# Patient Record
Sex: FEMALE | Race: BLACK OR AFRICAN AMERICAN | Employment: OTHER | URBAN - METROPOLITAN AREA
[De-identification: names, ages, dates, MRNs, and addresses within clinical notes are randomized per-mention and may not be internally consistent; named-entity substitution may affect disease eponyms.]

---

## 2019-07-16 ENCOUNTER — APPOINTMENT (OUTPATIENT)
Dept: GENERAL RADIOLOGY | Age: 64
End: 2019-07-16
Attending: PHYSICIAN ASSISTANT
Payer: MEDICARE

## 2019-07-16 ENCOUNTER — HOSPITAL ENCOUNTER (INPATIENT)
Age: 64
LOS: 6 days | Discharge: HOME | DRG: 291 | End: 2019-07-22
Attending: EMERGENCY MEDICINE | Admitting: INTERNAL MEDICINE
Payer: MEDICARE

## 2019-07-16 ENCOUNTER — HOSPITAL ENCOUNTER (EMERGENCY)
Age: 64
Discharge: ANOTHER HOSPITAL | End: 2019-07-16
Attending: EMERGENCY MEDICINE
Payer: MEDICARE

## 2019-07-16 VITALS
WEIGHT: 207.3 LBS | SYSTOLIC BLOOD PRESSURE: 149 MMHG | HEIGHT: 65 IN | RESPIRATION RATE: 24 BRPM | HEART RATE: 82 BPM | BODY MASS INDEX: 34.54 KG/M2 | OXYGEN SATURATION: 100 % | TEMPERATURE: 98.3 F | DIASTOLIC BLOOD PRESSURE: 90 MMHG

## 2019-07-16 DIAGNOSIS — R77.8 ELEVATED TROPONIN: ICD-10-CM

## 2019-07-16 DIAGNOSIS — Z99.2 ESRD ON PERITONEAL DIALYSIS (HCC): ICD-10-CM

## 2019-07-16 DIAGNOSIS — I10 ACCELERATED HYPERTENSION: ICD-10-CM

## 2019-07-16 DIAGNOSIS — J96.01 ACUTE HYPOXEMIC RESPIRATORY FAILURE (HCC): Primary | ICD-10-CM

## 2019-07-16 DIAGNOSIS — N18.6 ESRD ON PERITONEAL DIALYSIS (HCC): ICD-10-CM

## 2019-07-16 DIAGNOSIS — I50.43 ACUTE ON CHRONIC COMBINED SYSTOLIC AND DIASTOLIC HEART FAILURE (HCC): Primary | ICD-10-CM

## 2019-07-16 DIAGNOSIS — N18.6 END STAGE RENAL DISEASE (HCC): ICD-10-CM

## 2019-07-16 DIAGNOSIS — J96.21 ACUTE ON CHRONIC RESPIRATORY FAILURE WITH HYPOXIA (HCC): ICD-10-CM

## 2019-07-16 PROBLEM — Z95.820 S/P ANGIOPLASTY WITH STENT: Status: ACTIVE | Noted: 2019-07-16

## 2019-07-16 PROBLEM — I50.9 CHF (CONGESTIVE HEART FAILURE) (HCC): Status: ACTIVE | Noted: 2019-07-16

## 2019-07-16 PROBLEM — R06.02 SOB (SHORTNESS OF BREATH): Status: ACTIVE | Noted: 2019-07-16

## 2019-07-16 LAB
ALBUMIN SERPL-MCNC: 2.5 G/DL (ref 3.5–5)
ALBUMIN/GLOB SERPL: 0.5 {RATIO} (ref 1.1–2.2)
ALP SERPL-CCNC: 107 U/L (ref 45–117)
ALT SERPL-CCNC: 18 U/L (ref 12–78)
ANION GAP SERPL CALC-SCNC: 5 MMOL/L (ref 5–15)
ANION GAP SERPL CALC-SCNC: 6 MMOL/L (ref 5–15)
AST SERPL-CCNC: 29 U/L (ref 15–37)
BASE EXCESS BLDV CALC-SCNC: 5.6 MMOL/L
BASOPHILS # BLD: 0.1 K/UL (ref 0–0.1)
BASOPHILS NFR BLD: 1 % (ref 0–1)
BDY SITE: ABNORMAL
BILIRUB SERPL-MCNC: 0.4 MG/DL (ref 0.2–1)
BNP SERPL-MCNC: ABNORMAL PG/ML (ref 0–125)
BREATHS.SPONTANEOUS ON VENT: 15
BUN SERPL-MCNC: 46 MG/DL (ref 6–20)
BUN SERPL-MCNC: 48 MG/DL (ref 6–20)
BUN/CREAT SERPL: 4 (ref 12–20)
BUN/CREAT SERPL: 4 (ref 12–20)
CALCIUM SERPL-MCNC: 9 MG/DL (ref 8.5–10.1)
CALCIUM SERPL-MCNC: 9 MG/DL (ref 8.5–10.1)
CHLORIDE SERPL-SCNC: 103 MMOL/L (ref 97–108)
CHLORIDE SERPL-SCNC: 103 MMOL/L (ref 97–108)
CK MB CFR SERPL CALC: 1 % (ref 0–2.5)
CK MB CFR SERPL CALC: 1.7 % (ref 0–2.5)
CK MB SERPL-MCNC: 2.7 NG/ML (ref 5–25)
CK MB SERPL-MCNC: 3.7 NG/ML (ref 5–25)
CK SERPL-CCNC: 216 U/L (ref 26–192)
CK SERPL-CCNC: 272 U/L (ref 26–192)
CO2 SERPL-SCNC: 31 MMOL/L (ref 21–32)
CO2 SERPL-SCNC: 33 MMOL/L (ref 21–32)
CREAT SERPL-MCNC: 10.74 MG/DL (ref 0.55–1.02)
CREAT SERPL-MCNC: 11.08 MG/DL (ref 0.55–1.02)
D DIMER PPP FEU-MCNC: 1.6 MG/L FEU (ref 0–0.65)
DIFFERENTIAL METHOD BLD: ABNORMAL
EOSINOPHIL # BLD: 0.5 K/UL (ref 0–0.4)
EOSINOPHIL NFR BLD: 4 % (ref 0–7)
ERYTHROCYTE [DISTWIDTH] IN BLOOD BY AUTOMATED COUNT: 20.5 % (ref 11.5–14.5)
FIO2 ON VENT: 100 %
GLOBULIN SER CALC-MCNC: 4.9 G/DL (ref 2–4)
GLUCOSE SERPL-MCNC: 85 MG/DL (ref 65–100)
GLUCOSE SERPL-MCNC: 87 MG/DL (ref 65–100)
HCO3 BLDV-SCNC: 33 MMOL/L (ref 23–28)
HCT VFR BLD AUTO: 29.4 % (ref 35–47)
HGB BLD-MCNC: 9 G/DL (ref 11.5–16)
IMM GRANULOCYTES # BLD AUTO: 0 K/UL (ref 0–0.04)
IMM GRANULOCYTES NFR BLD AUTO: 0 % (ref 0–0.5)
LYMPHOCYTES # BLD: 1.7 K/UL (ref 0.8–3.5)
LYMPHOCYTES NFR BLD: 14 % (ref 12–49)
MCH RBC QN AUTO: 28.3 PG (ref 26–34)
MCHC RBC AUTO-ENTMCNC: 30.6 G/DL (ref 30–36.5)
MCV RBC AUTO: 92.5 FL (ref 80–99)
MONOCYTES # BLD: 1 K/UL (ref 0–1)
MONOCYTES NFR BLD: 8 % (ref 5–13)
NEUTS SEG # BLD: 9.1 K/UL (ref 1.8–8)
NEUTS SEG NFR BLD: 73 % (ref 32–75)
NRBC # BLD: 0 K/UL (ref 0–0.01)
NRBC BLD-RTO: 0 PER 100 WBC
PCO2 BLDV: 61 MMHG (ref 41–51)
PH BLDV: 7.36 [PH] (ref 7.32–7.42)
PLATELET # BLD AUTO: 448 K/UL (ref 150–400)
PMV BLD AUTO: 11.3 FL (ref 8.9–12.9)
PO2 BLDV: 29 MMHG (ref 25–40)
POTASSIUM SERPL-SCNC: 5.7 MMOL/L (ref 3.5–5.1)
POTASSIUM SERPL-SCNC: 6.1 MMOL/L (ref 3.5–5.1)
PROT SERPL-MCNC: 7.4 G/DL (ref 6.4–8.2)
RBC # BLD AUTO: 3.18 M/UL (ref 3.8–5.2)
RBC MORPH BLD: ABNORMAL
SAO2 % BLDV: 50 % (ref 65–88)
SAO2% DEVICE SAO2% SENSOR NAME: ABNORMAL
SODIUM SERPL-SCNC: 140 MMOL/L (ref 136–145)
SODIUM SERPL-SCNC: 141 MMOL/L (ref 136–145)
SPECIMEN SITE: ABNORMAL
TROPONIN I SERPL-MCNC: 0.06 NG/ML
TROPONIN I SERPL-MCNC: 0.06 NG/ML
WBC # BLD AUTO: 12.4 K/UL (ref 3.6–11)

## 2019-07-16 PROCEDURE — 83880 ASSAY OF NATRIURETIC PEPTIDE: CPT

## 2019-07-16 PROCEDURE — 74011250636 HC RX REV CODE- 250/636: Performed by: PHYSICIAN ASSISTANT

## 2019-07-16 PROCEDURE — 75810000455 HC PLCMT CENT VENOUS CATH LVL 2 5182

## 2019-07-16 PROCEDURE — 82550 ASSAY OF CK (CPK): CPT

## 2019-07-16 PROCEDURE — 85025 COMPLETE CBC W/AUTO DIFF WBC: CPT

## 2019-07-16 PROCEDURE — 36415 COLL VENOUS BLD VENIPUNCTURE: CPT

## 2019-07-16 PROCEDURE — 84484 ASSAY OF TROPONIN QUANT: CPT

## 2019-07-16 PROCEDURE — 82553 CREATINE MB FRACTION: CPT

## 2019-07-16 PROCEDURE — 65660000000 HC RM CCU STEPDOWN

## 2019-07-16 PROCEDURE — 99285 EMERGENCY DEPT VISIT HI MDM: CPT

## 2019-07-16 PROCEDURE — 93005 ELECTROCARDIOGRAM TRACING: CPT

## 2019-07-16 PROCEDURE — 80053 COMPREHEN METABOLIC PANEL: CPT

## 2019-07-16 PROCEDURE — 85379 FIBRIN DEGRADATION QUANT: CPT

## 2019-07-16 PROCEDURE — 71045 X-RAY EXAM CHEST 1 VIEW: CPT

## 2019-07-16 PROCEDURE — 96374 THER/PROPH/DIAG INJ IV PUSH: CPT

## 2019-07-16 PROCEDURE — 74011250636 HC RX REV CODE- 250/636: Performed by: INTERNAL MEDICINE

## 2019-07-16 PROCEDURE — 65270000029 HC RM PRIVATE

## 2019-07-16 PROCEDURE — 82803 BLOOD GASES ANY COMBINATION: CPT

## 2019-07-16 PROCEDURE — C1751 CATH, INF, PER/CENT/MIDLINE: HCPCS

## 2019-07-16 RX ORDER — ISOSORBIDE MONONITRATE 30 MG/1
60 TABLET, EXTENDED RELEASE ORAL DAILY
Status: DISCONTINUED | OUTPATIENT
Start: 2019-07-17 | End: 2019-07-22 | Stop reason: HOSPADM

## 2019-07-16 RX ORDER — GUAIFENESIN 100 MG/5ML
81 LIQUID (ML) ORAL DAILY
Status: DISCONTINUED | OUTPATIENT
Start: 2019-07-17 | End: 2019-07-22 | Stop reason: HOSPADM

## 2019-07-16 RX ORDER — OXYCODONE HYDROCHLORIDE 5 MG/1
5 TABLET ORAL
Status: DISCONTINUED | OUTPATIENT
Start: 2019-07-16 | End: 2019-07-22 | Stop reason: HOSPADM

## 2019-07-16 RX ORDER — ONDANSETRON 2 MG/ML
4 INJECTION INTRAMUSCULAR; INTRAVENOUS
Status: DISCONTINUED | OUTPATIENT
Start: 2019-07-16 | End: 2019-07-22 | Stop reason: HOSPADM

## 2019-07-16 RX ORDER — CALCITRIOL 0.25 UG/1
0.25 CAPSULE ORAL
Status: DISCONTINUED | OUTPATIENT
Start: 2019-07-18 | End: 2019-07-22 | Stop reason: HOSPADM

## 2019-07-16 RX ORDER — LEVETIRACETAM 250 MG/1
250 TABLET ORAL 2 TIMES DAILY
Status: DISCONTINUED | OUTPATIENT
Start: 2019-07-17 | End: 2019-07-19

## 2019-07-16 RX ORDER — FUROSEMIDE 10 MG/ML
40 INJECTION INTRAMUSCULAR; INTRAVENOUS ONCE
Status: COMPLETED | OUTPATIENT
Start: 2019-07-16 | End: 2019-07-16

## 2019-07-16 RX ORDER — SEVELAMER CARBONATE 800 MG/1
800 TABLET, FILM COATED ORAL 3 TIMES DAILY
Status: DISCONTINUED | OUTPATIENT
Start: 2019-07-17 | End: 2019-07-22 | Stop reason: HOSPADM

## 2019-07-16 RX ORDER — ATORVASTATIN CALCIUM 40 MG/1
40 TABLET, FILM COATED ORAL
Status: DISCONTINUED | OUTPATIENT
Start: 2019-07-16 | End: 2019-07-22 | Stop reason: HOSPADM

## 2019-07-16 RX ORDER — TRAMADOL HYDROCHLORIDE 50 MG/1
50 TABLET ORAL
Status: DISCONTINUED | OUTPATIENT
Start: 2019-07-16 | End: 2019-07-22 | Stop reason: HOSPADM

## 2019-07-16 RX ORDER — HEPARIN SODIUM 5000 [USP'U]/ML
5000 INJECTION, SOLUTION INTRAVENOUS; SUBCUTANEOUS EVERY 8 HOURS
Status: DISCONTINUED | OUTPATIENT
Start: 2019-07-16 | End: 2019-07-22 | Stop reason: HOSPADM

## 2019-07-16 RX ORDER — MORPHINE SULFATE 4 MG/ML
4 INJECTION, SOLUTION INTRAMUSCULAR; INTRAVENOUS
Status: COMPLETED | OUTPATIENT
Start: 2019-07-16 | End: 2019-07-16

## 2019-07-16 RX ORDER — CLOPIDOGREL BISULFATE 75 MG/1
75 TABLET ORAL DAILY
Status: DISCONTINUED | OUTPATIENT
Start: 2019-07-17 | End: 2019-07-19

## 2019-07-16 RX ORDER — PANTOPRAZOLE SODIUM 40 MG/1
40 TABLET, DELAYED RELEASE ORAL DAILY
Status: DISCONTINUED | OUTPATIENT
Start: 2019-07-17 | End: 2019-07-22 | Stop reason: HOSPADM

## 2019-07-16 RX ORDER — AMLODIPINE BESYLATE 5 MG/1
5 TABLET ORAL DAILY
Status: DISCONTINUED | OUTPATIENT
Start: 2019-07-17 | End: 2019-07-22 | Stop reason: HOSPADM

## 2019-07-16 RX ORDER — ACETAMINOPHEN 325 MG/1
650 TABLET ORAL
Status: DISCONTINUED | OUTPATIENT
Start: 2019-07-16 | End: 2019-07-22 | Stop reason: HOSPADM

## 2019-07-16 RX ORDER — LABETALOL HYDROCHLORIDE 5 MG/ML
10 INJECTION, SOLUTION INTRAVENOUS
Status: DISCONTINUED | OUTPATIENT
Start: 2019-07-16 | End: 2019-07-22 | Stop reason: HOSPADM

## 2019-07-16 RX ADMIN — FUROSEMIDE 40 MG: 10 INJECTION, SOLUTION INTRAMUSCULAR; INTRAVENOUS at 23:04

## 2019-07-16 RX ADMIN — MORPHINE SULFATE 4 MG: 4 INJECTION, SOLUTION INTRAMUSCULAR; INTRAVENOUS at 19:29

## 2019-07-16 NOTE — ED NOTES
Patient here with c/o shortness of breath with intermittent chest pain. Patient reports symptoms started earlier today while sitting in her wheelchair watching a scary movie. Patient states that she had a previous episode a month or two ago when she moved to this area from the [place] area, leaving rehab to come live in a care center. Patient states that she does not wear oxygen at home except however for rare intermittent use. EMS reports arriving with patient 100% on RA, placed on 2L NC for symptoms and comfort. Patient reports being legally blind. Patient uses wheelchair and rollator walker at baseline. Patient with hx of HTN, CHF, and multiple strokes with most recent one in April. Patient also reports hx of blood clot in her left arm. Patient also with hx of renal failure, reports being on peritoneal dialysis every night with nephrologist Dr Sparkle Lima. Patient denies changes to meds, denies changes to diet. 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        Emergency [address] is developed from the Nursing assessment and Emergency Department Attending provider initial evaluation. The plan of care may be reviewed in the ED Provider note.     The Plan of Care was developed with the following considerations:   Patient / Family readiness to learn indicated by:verbalized understanding  Persons(s) to be included in education: patient  Barriers to Learning/Limitations:No    Signed     Sandro Real RN    7/16/2019   4:32 PM

## 2019-07-16 NOTE — ED NOTES
TRANSFER - OUT REPORT:    Ezequiel Tee report given to Jeanette Becker RN (name) on Garland Arnett  being transferred to [address] [facility] ED (unit) for urgent transfer       Report consisted of patients Situation, Background, Assessment and   Recommendations(SBAR). Information from the following report(s) SBAR, Kardex, ED Summary, Intake/Output, MAR and Recent Results was reviewed with the receiving nurse. Lines:       Opportunity for questions and clarification was provided.       Patient transported with:   EMS

## 2019-07-16 NOTE — CONSULTS
[facility]                     [address], [address]                     [facility]       7/16/2019 7:40 PM    [address]  [facility]     Date of  Admission: 7/16/2019  3:48 PM     Admission type:Emergency    Consult for:SOB  Consult by: Essie Iyer MD,FACC,[facility]     Subjective:     Rama Malik is a 62 y.o. female non-smoker with PMHX of CHF, ESRD on peritoneal dialysis, s/p angioplasty and stent 1.5 yrs ago [address], HTN, obesity, s/p CVA, dyslipidemia, hydrocephalus, seizures, DVT left arm, legally blind, GERD admitted for gradual SOB through last night culminating in PND,orthopnea. Had to sleep in recliner last night. Because of progressive SOB,transported via EMS here for further evaluation. Upon arrival, supplemented on oxygen. EKG revealed SR and non-specific ST-T wave changes. BNP . 35,000,  troponin 0.06. Asked to see to from cardiac standpoint. Denies chest pain, palpitations, cough, dizziness, syncope, sputum, wheezing. Patient complains of  dyspnea, orthopnea, paroxysmal nocturnal dyspnea. Previous treatment/evaluation includes Percutaneous Coronary Intervention, echocardiogram, cardiac catheterization, coronary angioplasty and NST . Cardiac risk factors: Dyslipidemia, HTN, obesity.     Patient Active Problem List    Diagnosis Date Noted    S/P angioplasty with stent 07/16/2019    CHF (congestive heart failure) ([facility] 07/16/2019    SOB (shortness of breath) 07/16/2019    Abdominal pain 06/19/2016    Muscle cramps 05/26/2016    Ventriculo-peritoneal shunt status 05/26/2016    Chronic nausea 05/26/2016    Itchy skin 03/31/2016    Hyperlipidemia 03/17/2016    Peritoneal dialysis status ([address] 03/10/2016    Memory loss 03/10/2016    Coronary artery disease involving native coronary artery of native heart with unstable angina pectoris ([facility] 03/10/2016    CVA, old, cognitive deficits 03/10/2016  Chronic abdominal pain 03/10/2016    Seizure ([facility] 10/12/2015    HTN (hypertension) 10/12/2015    Hydrocephalus 10/12/2015    End stage renal disease ([facility] 10/12/2015      Dorina eWiss DO  Past Medical History:   Diagnosis Date    Blood clot in vein     left arm several years ago    CAD (coronary artery disease)     Chronic kidney disease     Congestive heart failure ([facility]     Hypercholesterolemia     Hypertension     Legally blind     Seizures ([facility]     Stroke [facility])       Social History     Socioeconomic History    Marital status: SINGLE     Spouse name: Not on file    Number of children: Not on file    Years of education: Not on file    Highest education level: Not on file   Tobacco Use    Smoking status: Never Smoker    Smokeless tobacco: Never Used   Substance and Sexual Activity    Alcohol use: No    Drug use: Yes     Types: Marijuana     Comment: last used 1 week ago    Sexual activity: Never     Allergies   Allergen Reactions    Gabapentin Other (comments) and Seizures     Confusion, psychosis \"I was acting like a 4year old at a family event, drawing on walls and everything\"        Family History   Problem Relation Age of Onset    Diabetes Other     Hypertension Other     Cancer Other       No current facility-administered medications for this encounter. Current Outpatient Medications   Medication Sig    isosorbide mononitrate ER (IMDUR) 60 mg CR tablet TAKE 1 TABLET BY MOUTH EVERY MORNING    atorvastatin (LIPITOR) 40 mg tablet TAKE 1 TABLET BY MOUTH ONCE DAILY    sevelamer (RENAGEL) 800 mg tablet Take 800 mg by mouth three (3) times daily (with meals).  lactulose (CHRONULAC) 10 gram/15 mL solution Take 20 g by mouth two (2) times daily as needed (constipation).  amLODIPine (NORVASC) 5 mg tablet TAKE 1 TABLET BY MOUTH EVERY EVENING    omeprazole (PRILOSEC) 20 mg capsule Take 20 mg by mouth daily.     IRON FUM & PS CMP/VIT C & B (INTEGRA PO) Take 1 Tab by mouth daily.    traMADol (ULTRAM) 50 mg tablet Take 50 mg by mouth every four (4) hours as needed for Pain.  levETIRAcetam (KEPPRA) 250 mg tablet Take 1 Tab by mouth two (2) times a day.  clopidogrel (PLAVIX) 75 mg tablet Take 1 Tab by mouth daily.  calcitRIOL (ROCALTROL) 0.25 mcg capsule Take 0.25 mcg by mouth two (2) days a week. Mondays and Fridays    potassium chloride (K-DUR, KLOR-CON) 20 mEq tablet Take 20 mEq by mouth daily.  aspirin 81 mg tablet Take 81 mg by mouth daily.         Review of Symptoms:   Constitutional: Tired  Eyes: negative   Ears, nose, mouth, throat, and face: negative  Respiratory: +SOB  Cardiovascular: + dyspnea,PND,orthopnea  Gastrointestinal: negative  Genitourinary:negative   Musculoskeletal:negative   Neurological: negative   Endocrine: negative          Subjective:      Visit Vitals  BP (!) 140/91   Pulse 88   Temp 98.2 °F (36.8 °C)   Resp 16   Ht 5' 5\" (1.651 m)   Wt 207 lb 4.8 oz (94 kg)   SpO2 100%   BMI 34.50 kg/m²       Physical:    General; Obese AAF in mid-moderate resp distress especially when talking on oxygen  Heart: RRR, no m/S3/JVD, no carotid bruits   Lungs: diminished BS's   Abdomen: Soft, +BS, NTND   Extremities: LE zion +DP/PT, no edema   Neurologic: Grossly normal    Data Review:   Recent Labs     07/16/19  1631   WBC 12.4*   HGB 9.0*   HCT 29.4*   *     Recent Labs     07/16/19  1631      K 6.1*      CO2 31   GLU 87   BUN 46*   CREA 10.74*   CA 9.0       Recent Labs     07/16/19  1631   TROIQ 0.06*   *   CKMB 2.7       No intake or output data in the 24 hours ending 07/16/19 1940     Cardiographics    Telemetry: NSR  ECG-as above  Echocardiogram: not done  CXRAY:cardiomegaly, interstitial edema       Assessment:     Assessment:       Principal Problem:    CHF (congestive heart failure) ([facility]) (7/16/2019)    Active Problems:    HTN (hypertension) (10/12/2015)      End stage renal disease (HCC) (10/12/2015)      Per[facility] ([facility] (3/10/2016)      Coronary artery disease involving native coronary artery of native heart with unstable angina pectoris ([facility] (3/10/2016)      CVA, old, cognitive deficits (3/10/2016)      Hyperlipidemia (3/17/2016)      S/P angioplasty with stent (7/16/2019)      SOB (shortness of breath) (7/16/2019)         Plan:     Principal Problem:    CHF (congestive heart failure) (HCC) (7/16/2019)--likely volume overload driven due to ? ineffective peritoneal  Dialysis  In ?combination systolic and diastolic HF. No echo on record. Recommend transfer to higher level of acuity treatment facility especially for Renal involvement > review of peritoneal dialysis formula for better fluid extraction. Followed usually by Dr Libby Reddy. Also will need further cardiac evaluation regarding patency of CAD stent recently placed about 1 year ago as patient states she experienced similar symptoms temporally surrounding recent stent placement. Echo at receiving  facility. Active Problems:     HTN (hypertension) (10/12/2015)--nitropaste transcutaneously for now. Continue home meds      Coronary artery disease involving native coronary artery of native heart with unstable angina pectoris ([facility] (3/10/2016)-- SOB possibly ACS/USA. When stable, will need vigorous cardiac evaluation to exclude ISR. S/P angioplasty with stent (7/16/2019)--as above. High rate of ISR in ESRD patients. Will need Lexiscan NST prior to discharge. SOB (shortness of breath) (7/16/2019)--better on nasal oxygen. Add nitropaste.     Thank you,     Signed: Deidre Oden MD,FACC,[facility]

## 2019-07-16 NOTE — ED PROVIDER NOTES
EMERGENCY DEPARTMENT HISTORY AND PHYSICAL EXAM      Date: 7/16/2019  Patient Name: Duy Yanez    History of Presenting Illness     Chief Complaint   Patient presents with    Shortness of Breath     starting couple hours ago       History Provided By: Patient    HPI: Duy Yanez, 62 y.o. female with PMHx significant for htn, CKD with peritoneal dialysis nightly, seizures, CAD, hypercholesterolemia, CHF, DVT, stroke x 3, legally blind,a dn a fib presents via ambulance to the ED with cc of SOB that started about 3 hours PTA. Pt reports she was sitting in wheelchair and watching TV, when SOB started. Pt reports SOB is intermittent. Denies CP, dizziness, blurred vision. Pt has not taken anything for sx. Denies hx asthma and COPD. Pt states she was recently in rehab for stroke and was discharged 3 weeks ago. Pt states she was on 2L of O2 at rehab facility, but she was not discharged home on O2. Hx previous DVT to left arm. Pt reports stent to heart about 1 year ago. Pt has not taken anything for sx. Denies aggravating or alleviating sx. Denies lower leg swelling. Pt reports receiving peritoneal dialysis last night, per usual.    Most recent stroke 3 months prior. Cardiac stent placed 1.5 years ago. There are no other complaints, changes, or physical findings at this time. PCP: Vivek Watts DO    No current facility-administered medications on file prior to encounter. Current Outpatient Medications on File Prior to Encounter   Medication Sig Dispense Refill    isosorbide mononitrate ER (IMDUR) 60 mg CR tablet TAKE 1 TABLET BY MOUTH EVERY MORNING 30 Tab 0    atorvastatin (LIPITOR) 40 mg tablet TAKE 1 TABLET BY MOUTH ONCE DAILY 30 Tab 0    sevelamer (RENAGEL) 800 mg tablet Take 800 mg by mouth three (3) times daily (with meals).  lactulose (CHRONULAC) 10 gram/15 mL solution Take 20 g by mouth two (2) times daily as needed (constipation).       amLODIPine (NORVASC) 5 mg tablet TAKE 1 TABLET BY MOUTH EVERY EVENING 30 Tab 5    omeprazole (PRILOSEC) 20 mg capsule Take 20 mg by mouth daily. 1    IRON FUM & PS CMP/VIT C & B (INTEGRA PO) Take 1 Tab by mouth daily.  traMADol (ULTRAM) 50 mg tablet Take 50 mg by mouth every four (4) hours as needed for Pain.  0    levETIRAcetam (KEPPRA) 250 mg tablet Take 1 Tab by mouth two (2) times a day. 60 Tab 5    clopidogrel (PLAVIX) 75 mg tablet Take 1 Tab by mouth daily. 30 Tab 5    calcitRIOL (ROCALTROL) 0.25 mcg capsule Take 0.25 mcg by mouth two (2) days a week. Mondays and Fridays      potassium chloride (K-DUR, KLOR-CON) 20 mEq tablet Take 20 mEq by mouth daily.  aspirin 81 mg tablet Take 81 mg by mouth daily. Past History     Past Medical History:  Past Medical History:   Diagnosis Date    Blood clot in vein     left arm several years ago    CAD (coronary artery disease)     Chronic kidney disease     [facility] failure ([facility]     Hypercholesterolemia     Hypertension     Legally blind     Seizures ([facility]     [facility]roke [facility])        Past Surgical History:  Past Surgical History:   Procedure Laterality Date    CARDIAC SURG PROCEDURE UNLIST      heart attack 12/15    HX ORTHOPAEDIC      right middle finger    HX OTHER SURGICAL      shunt placed in brain       Family History:  Family History   Problem Relation Age of Onset    Diabetes Other     Hypertension Other     Cancer Other        Social History:  Social History     Tobacco Use    Smoking status: Never Smoker    Smokeless tobacco: Never Used   Substance Use Topics    Alcohol use: No    Drug use: Yes     Types: Marijuana     Comment: last used 1 week ago       Allergies: Allergies   Allergen Reactions    Gabapentin Other (comments) and Seizures     Confusion, psychosis \"I was acting like a 4year old at a family event, drawing on walls and everything\"           Review of Systems   Review of Systems   Constitutional: Negative for chills and fever.    Respiratory: Positive for shortness of breath. Cardiovascular: Negative for chest pain. Gastrointestinal: Negative for abdominal pain, nausea and vomiting. Genitourinary: Negative for flank pain. Musculoskeletal: Negative for back pain and myalgias. Skin: Negative for color change, pallor, rash and wound. Neurological: Negative for dizziness, weakness and light-headedness. All other systems reviewed and are negative. Physical Exam   Physical Exam   Constitutional: She is oriented to person, place, and time. She appears well-developed and well-nourished. No distress. HENT:   Head: Normocephalic and atraumatic. Eyes: Conjunctivae are normal.   Cardiovascular: Normal rate, regular rhythm and normal heart sounds. No lower leg swelling or edema b/l   Pulmonary/Chest: Effort normal and breath sounds normal. No respiratory distress. Lungs CTA  No wheezing, crackles or rales   Abdominal: Soft. Bowel sounds are normal. She exhibits no distension. Musculoskeletal: Normal range of motion. Neurological: She is alert and oriented to person, place, and time. Skin: Skin is warm. No rash noted. Psychiatric: She has a normal mood and affect. Her behavior is normal.   Nursing note and vitals reviewed.       Diagnostic Study Results     Labs -     Recent Results (from the past 12 hour(s))   EKG, 12 LEAD, INITIAL    Collection Time: 07/16/19  4:16 PM   Result Value Ref Range    Ventricular Rate 81 BPM    Atrial Rate 81 BPM    P-R Interval 168 ms    QRS Duration 120 ms    Q-T Interval 438 ms    QTC Calculation (Bezet) 508 ms    Calculated P Axis 38 degrees    Calculated R Axis -44 degrees    Calculated T Axis 117 degrees    Diagnosis       Normal sinus rhythm  Possible Left atrial enlargement  Left axis deviation  Left ventricular hypertrophy with QRS widening and repolarization abnormality  Abnormal ECG  When compared with ECG of 19-JUN-2016 11:43,  Criteria for Septal infarct are no longer present  Criteria for Inferior infarct are no longer present  T wave inversion now evident in Lateral leads     NT-PRO BNP    Collection Time: 07/16/19  4:31 PM   Result Value Ref Range    NT pro-BNP >35,000 (H) 0 - 125 PG/ML   CBC WITH AUTOMATED DIFF    Collection Time: 07/16/19  4:31 PM   Result Value Ref Range    WBC 12.4 (H) 3.6 - 11.0 K/uL    RBC 3.18 (L) 3.80 - 5.20 M/uL    HGB 9.0 (L) 11.5 - 16.0 g/dL    HCT 29.4 (L) 35.0 - 47.0 %    MCV 92.5 80.0 - 99.0 FL    MCH 28.3 26.0 - 34.0 PG    MCHC 30.6 30.0 - 36.5 g/dL    RDW 20.5 (H) 11.5 - 14.5 %    PLATELET 754 (H) 781 - 400 K/uL    MPV 11.3 8.9 - 12.9 FL    NRBC 0.0 0  WBC    ABSOLUTE NRBC 0.00 0.00 - 0.01 K/uL    NEUTROPHILS 73 32 - 75 %    LYMPHOCYTES 14 12 - 49 %    MONOCYTES 8 5 - 13 %    EOSINOPHILS 4 0 - 7 %    BASOPHILS 1 0 - 1 %    IMMATURE GRANULOCYTES 0 0.0 - 0.5 %    ABS. NEUTROPHILS 9.1 (H) 1.8 - 8.0 K/UL    ABS. LYMPHOCYTES 1.7 0.8 - 3.5 K/UL    ABS. MONOCYTES 1.0 0.0 - 1.0 K/UL    ABS. EOSINOPHILS 0.5 (H) 0.0 - 0.4 K/UL    ABS. BASOPHILS 0.1 0.0 - 0.1 K/UL    ABS. IMM.  GRANS. 0.0 0.00 - 0.04 K/UL    DF SMEAR SCANNED      RBC COMMENTS ANISOCYTOSIS  2+       METABOLIC PANEL, BASIC    Collection Time: 07/16/19  4:31 PM   Result Value Ref Range    Sodium 140 136 - 145 mmol/L    Potassium 6.1 (H) 3.5 - 5.1 mmol/L    Chloride 103 97 - 108 mmol/L    CO2 31 21 - 32 mmol/L    Anion gap 6 5 - 15 mmol/L    Glucose 87 65 - 100 mg/dL    BUN 46 (H) 6 - 20 MG/DL    Creatinine 10.74 (H) 0.55 - 1.02 MG/DL    BUN/Creatinine ratio 4 (L) 12 - 20      GFR est AA 4 (L) >60 ml/min/1.73m2    GFR est non-AA 4 (L) >60 ml/min/1.73m2    Calcium 9.0 8.5 - 10.1 MG/DL   CK W/ CKMB & INDEX    Collection Time: 07/16/19  4:31 PM   Result Value Ref Range     (H) 26 - 192 U/L    CK - MB 2.7 <3.6 NG/ML    CK-MB Index 1.0 0.0 - 2.5     TROPONIN I    Collection Time: 07/16/19  4:31 PM   Result Value Ref Range    Troponin-I, Qt. 0.06 (H) <0.05 ng/mL   D DIMER    Collection Time: 07/16/19  4:31 PM Result Value Ref Range    D-dimer 1.60 (H) 0.00 - 0.65 mg/L FEU       Radiologic Studies -   XR CHEST PORT   Final Result   IMPRESSION:   Cardiomegaly with interstitial pulmonary edema. .                     CT Results  (Last 48 hours)    None        CXR Results  (Last 48 hours)               07/16/19 1623  XR CHEST PORT Final result    Impression:  IMPRESSION:   Cardiomegaly with interstitial pulmonary edema. .                        Narrative:  PORTABLE CHEST RADIOGRAPH/S: 7/16/2019 4:23 PM       Clinical history: Chest pain   INDICATION:   chest pain   COMPARISON: 6/19/2016       FINDINGS:   AP portable upright view of the chest demonstrates a enlarged cardiopericardial   silhouette. The lungs are adequately expanded. Mild interstitial edema. No   pneumothorax or focal consolidation. . The osseous structures are unremarkable. Patient is on a cardiac monitor. Medical Decision Making   I am the first provider for this patient. I reviewed the vital signs, available nursing notes, past medical history, past surgical history, family history and social history. Vital Signs-Reviewed the patient's vital signs. Patient Vitals for the past 12 hrs:   Temp Pulse Resp BP SpO2   07/16/19 1900  (!) 101  141/78 100 %   07/16/19 1837  88 16  100 %   07/16/19 1830    (!) 140/91 100 %   07/16/19 1802  (!) 108 19 (!) 166/131 100 %   07/16/19 1554 98.2 °F (36.8 °C) 81 16 (!) 154/94 100 %       Pulse Oximetry Analysis - 100% on 2L O2      EKG interpretation: (Preliminary)  Rhythm: normal sinus rhythm; and regular . Rate (approx.): 85; Axis: left axis deviation; NM interval: normal; QRS interval: normal ; ST/T wave: non-specific changes; Other findings: left atrial enlargement, left ventricular hypertrophy. No evidence of acute ischemia.     Records Reviewed: Nursing Notes, Old Medical Records and Previous electrocardiograms    Provider Notes (Medical Decision Making):   DDx: Angina, MI, PE, CHF exacerbation, Fluid overload, hypoxic respiratory failure    Upon review of records, pt has chronically elevated troponin. EF 29%. Elevated BNP and ddimer likely due to CKD. No ischemic changes on EKG. Need for further evaluation by cardiology and nephrology. ED Course:   Initial assessment performed. The patients presenting problems have been discussed, and they are in agreement with the care plan formulated and outlined with them. I have encouraged them to ask questions as they arise throughout their visit. O2 saturation down to 94% on RA after 3 minutes without O2.     6:20 PM  Dr. Osmar Prince, cardiologist, evaluated pt at bedside. He states possibility of stenosis of stent because pt is on dialysis. He states pt does not meet criteria for admission at [facility] because of lack of nephrology and stenting capability. He recommends transfer. 6:22 PM  Spoke with pt, and discussed need for transfer. She requested ED [facility].       6:40 PM  Spoke with Dr. Olesya Chance, for ED to ED transfer. Discussed pt's vital signs, presentation, PMHX, and labs. Discussed need for nephrology and possible evaluation of stent with cardiology. Also discussed consult with Dr. Osmar Prince, and he is updating cardiology on patient's arrival at [facility]. He agreed to accept transfer of pt. Procedure Note - Central Venous Access: Peripheral IV access, not central  Performed by Elvis Ormond, PA    Obtained informed Consent. Immediately prior to the procedure, the patient was reevaluated and found suitable for the planned procedure and any planned medications. Immediately prior to the procedure a time out was called to verify the correct patient, procedure, equipment, staff, and marking as appropriate. The site was prepped with ChloraPrep. Using direct cannulation a 18G IV was placed in the R AC via direct cannulation with 2 number of attempts for Blood Drawing and IV Access. Ultrasound Guidance was utilized. There was good blood return. The following complications were encountered: None. A follow-up chest x-ray was not ordered post procedure. The procedure was tolerated well. Disposition:  Transferred to [facility]    PLAN:  1. Current Discharge Medication List        2. Follow-up Information    None       Return to ED if worse     Diagnosis     Clinical Impression:   1. Acute hypoxemic respiratory failure (HCC)    2.  End stage renal disease ([address]

## 2019-07-17 ENCOUNTER — APPOINTMENT (OUTPATIENT)
Dept: NON INVASIVE DIAGNOSTICS | Age: 64
DRG: 291 | End: 2019-07-17
Attending: INTERNAL MEDICINE
Payer: MEDICARE

## 2019-07-17 ENCOUNTER — APPOINTMENT (OUTPATIENT)
Dept: GENERAL RADIOLOGY | Age: 64
DRG: 291 | End: 2019-07-17
Attending: INTERNAL MEDICINE
Payer: MEDICARE

## 2019-07-17 ENCOUNTER — DOCUMENTATION ONLY (OUTPATIENT)
Dept: CASE MANAGEMENT | Age: 64
End: 2019-07-17

## 2019-07-17 LAB
ALBUMIN SERPL-MCNC: 2.3 G/DL (ref 3.5–5)
ALBUMIN/GLOB SERPL: 0.5 {RATIO} (ref 1.1–2.2)
ALP SERPL-CCNC: 94 U/L (ref 45–117)
ALT SERPL-CCNC: 12 U/L (ref 12–78)
ANION GAP SERPL CALC-SCNC: 8 MMOL/L (ref 5–15)
APPEARANCE FLD: CLEAR
AST SERPL-CCNC: 17 U/L (ref 15–37)
ATRIAL RATE: 118 BPM
ATRIAL RATE: 81 BPM
BASOPHILS # BLD: 0.1 K/UL (ref 0–0.1)
BASOPHILS NFR BLD: 1 % (ref 0–1)
BILIRUB SERPL-MCNC: 0.3 MG/DL (ref 0.2–1)
BUN SERPL-MCNC: 57 MG/DL (ref 6–20)
BUN/CREAT SERPL: 5 (ref 12–20)
CALCIUM SERPL-MCNC: 8.6 MG/DL (ref 8.5–10.1)
CALCULATED P AXIS, ECG09: 38 DEGREES
CALCULATED P AXIS, ECG09: 44 DEGREES
CALCULATED R AXIS, ECG10: -38 DEGREES
CALCULATED R AXIS, ECG10: -44 DEGREES
CALCULATED T AXIS, ECG11: 101 DEGREES
CALCULATED T AXIS, ECG11: 117 DEGREES
CHLORIDE SERPL-SCNC: 102 MMOL/L (ref 97–108)
CO2 SERPL-SCNC: 29 MMOL/L (ref 21–32)
COLOR FLD: COLORLESS
CREAT SERPL-MCNC: 11.4 MG/DL (ref 0.55–1.02)
DIAGNOSIS, 93000: NORMAL
DIAGNOSIS, 93000: NORMAL
DIFFERENTIAL METHOD BLD: ABNORMAL
ECHO AO ROOT DIAM: 2.77 CM
ECHO AV AREA PEAK VELOCITY: 1.6 CM2
ECHO AV AREA VTI: 1.5 CM2
ECHO AV AREA/BSA PEAK VELOCITY: 0.8 CM2/M2
ECHO AV AREA/BSA VTI: 0.7 CM2/M2
ECHO AV MEAN GRADIENT: 9.2 MMHG
ECHO AV PEAK GRADIENT: 13.6 MMHG
ECHO AV PEAK VELOCITY: 184.47 CM/S
ECHO AV REGURGITANT PHT: 735.5 CM
ECHO AV VTI: 35.17 CM
ECHO EST RA PRESSURE: 10 MMHG
ECHO LV INTERNAL DIMENSION DIASTOLIC: 5.78 CM (ref 3.9–5.3)
ECHO LV INTERNAL DIMENSION SYSTOLIC: 4.32 CM
ECHO LV IVSD: 1.46 CM (ref 0.6–0.9)
ECHO LV MASS 2D: 458.3 G (ref 67–162)
ECHO LV MASS INDEX 2D: 233.1 G/M2 (ref 43–95)
ECHO LV POSTERIOR WALL DIASTOLIC: 1.41 CM (ref 0.6–0.9)
ECHO LVOT DIAM: 1.97 CM
ECHO LVOT PEAK GRADIENT: 3.7 MMHG
ECHO LVOT PEAK VELOCITY: 95.88 CM/S
ECHO LVOT SV: 53.7 ML
ECHO LVOT VTI: 17.7 CM
ECHO MV A VELOCITY: 105.06 CM/S
ECHO MV AREA PHT: 4.7 CM2
ECHO MV AREA VTI: 1.5 CM2
ECHO MV E DECELERATION TIME (DT): 168 MS
ECHO MV E POINT SEPTAL SEPARATION (EPSS): 16.6 CM
ECHO MV E VELOCITY: 103.65 CM/S
ECHO MV E/A RATIO: 0.99
ECHO MV MAX VELOCITY: 134.73 CM/S
ECHO MV MEAN GRADIENT: 3.9 MMHG
ECHO MV PEAK GRADIENT: 7.3 MMHG
ECHO MV PRESSURE HALF TIME (PHT): 46.8 MS
ECHO MV REGURGITANT PEAK GRADIENT: 107.2 MMHG
ECHO MV REGURGITANT PEAK VELOCITY: 517.8 CM/S
ECHO MV VTI: 36.92 CM
ECHO PULMONARY ARTERY SYSTOLIC PRESSURE (PASP): 43 MMHG
ECHO PV MAX VELOCITY: 84.39 CM/S
ECHO PV PEAK GRADIENT: 2.8 MMHG
ECHO RIGHT VENTRICULAR SYSTOLIC PRESSURE (RVSP): 36.2 MMHG
ECHO RV INTERNAL DIMENSION: 2.75 CM
ECHO TV REGURGITANT MAX VELOCITY: 256 CM/S
ECHO TV REGURGITANT PEAK GRADIENT: 26.2 MMHG
EOSINOPHIL # BLD: 0.1 K/UL (ref 0–0.4)
EOSINOPHIL NFR BLD: 1 % (ref 0–7)
ERYTHROCYTE [DISTWIDTH] IN BLOOD BY AUTOMATED COUNT: 20.8 % (ref 11.5–14.5)
GLOBULIN SER CALC-MCNC: 4.4 G/DL (ref 2–4)
GLUCOSE BLD STRIP.AUTO-MCNC: 162 MG/DL (ref 65–100)
GLUCOSE SERPL-MCNC: 80 MG/DL (ref 65–100)
HCT VFR BLD AUTO: 28.4 % (ref 35–47)
HGB BLD-MCNC: 8.5 G/DL (ref 11.5–16)
IMM GRANULOCYTES # BLD AUTO: 0 K/UL (ref 0–0.04)
IMM GRANULOCYTES NFR BLD AUTO: 0 % (ref 0–0.5)
LACTATE SERPL-SCNC: 1.3 MMOL/L (ref 0.4–2)
LYMPHOCYTES # BLD: 2 K/UL (ref 0.8–3.5)
LYMPHOCYTES NFR BLD: 15 % (ref 12–49)
LYMPHOCYTES NFR FLD: 17 %
MAGNESIUM SERPL-MCNC: 2.1 MG/DL (ref 1.6–2.4)
MCH RBC QN AUTO: 28.1 PG (ref 26–34)
MCHC RBC AUTO-ENTMCNC: 29.9 G/DL (ref 30–36.5)
MCV RBC AUTO: 93.7 FL (ref 80–99)
MONOCYTES # BLD: 1 K/UL (ref 0–1)
MONOCYTES NFR BLD: 7 % (ref 5–13)
MONOS+MACROS NFR FLD: 81 %
NEUTROPHILS NFR FLD: 2 %
NEUTS SEG # BLD: 10.4 K/UL (ref 1.8–8)
NEUTS SEG NFR BLD: 76 % (ref 32–75)
NRBC # BLD: 0 K/UL (ref 0–0.01)
NRBC BLD-RTO: 0 PER 100 WBC
NUC CELL # FLD: 1 /CU MM
P-R INTERVAL, ECG05: 132 MS
P-R INTERVAL, ECG05: 168 MS
PHOSPHATE SERPL-MCNC: 4.5 MG/DL (ref 2.6–4.7)
PISA AR MAX VEL: 395.52 CM/S
PLATELET # BLD AUTO: 431 K/UL (ref 150–400)
PMV BLD AUTO: 11.5 FL (ref 8.9–12.9)
POTASSIUM SERPL-SCNC: 5.3 MMOL/L (ref 3.5–5.1)
PROT SERPL-MCNC: 6.7 G/DL (ref 6.4–8.2)
Q-T INTERVAL, ECG07: 346 MS
Q-T INTERVAL, ECG07: 438 MS
QRS DURATION, ECG06: 120 MS
QRS DURATION, ECG06: 126 MS
QTC CALCULATION (BEZET), ECG08: 484 MS
QTC CALCULATION (BEZET), ECG08: 508 MS
RBC # BLD AUTO: 3.03 M/UL (ref 3.8–5.2)
RBC # FLD: 0 /CU MM
RBC MORPH BLD: ABNORMAL
SERVICE CMNT-IMP: ABNORMAL
SODIUM SERPL-SCNC: 139 MMOL/L (ref 136–145)
SPECIMEN SOURCE FLD: ABNORMAL
TROPONIN I SERPL-MCNC: 0.14 NG/ML
TSH SERPL DL<=0.05 MIU/L-ACNC: 2.9 UIU/ML (ref 0.36–3.74)
VENTRICULAR RATE, ECG03: 118 BPM
VENTRICULAR RATE, ECG03: 81 BPM
WBC # BLD AUTO: 13.6 K/UL (ref 3.6–11)

## 2019-07-17 PROCEDURE — 84443 ASSAY THYROID STIM HORMONE: CPT

## 2019-07-17 PROCEDURE — 93306 TTE W/DOPPLER COMPLETE: CPT

## 2019-07-17 PROCEDURE — 83605 ASSAY OF LACTIC ACID: CPT

## 2019-07-17 PROCEDURE — 36415 COLL VENOUS BLD VENIPUNCTURE: CPT

## 2019-07-17 PROCEDURE — 84484 ASSAY OF TROPONIN QUANT: CPT

## 2019-07-17 PROCEDURE — 74011250636 HC RX REV CODE- 250/636

## 2019-07-17 PROCEDURE — 74011250636 HC RX REV CODE- 250/636: Performed by: INTERNAL MEDICINE

## 2019-07-17 PROCEDURE — 83036 HEMOGLOBIN GLYCOSYLATED A1C: CPT

## 2019-07-17 PROCEDURE — 80053 COMPREHEN METABOLIC PANEL: CPT

## 2019-07-17 PROCEDURE — A4722 DIALYS SOL FLD VOL > 1999CC: HCPCS | Performed by: INTERNAL MEDICINE

## 2019-07-17 PROCEDURE — 77030012794 HC KT NSL CANN/HGH TRAN -A

## 2019-07-17 PROCEDURE — 89050 BODY FLUID CELL COUNT: CPT

## 2019-07-17 PROCEDURE — 84100 ASSAY OF PHOSPHORUS: CPT

## 2019-07-17 PROCEDURE — 74011250636 HC RX REV CODE- 250/636: Performed by: HOSPITALIST

## 2019-07-17 PROCEDURE — 71045 X-RAY EXAM CHEST 1 VIEW: CPT

## 2019-07-17 PROCEDURE — 77030012879 HC MSK CPAP FLL FAC PHIL -B

## 2019-07-17 PROCEDURE — 74011000250 HC RX REV CODE- 250: Performed by: INTERNAL MEDICINE

## 2019-07-17 PROCEDURE — 87205 SMEAR GRAM STAIN: CPT

## 2019-07-17 PROCEDURE — 77010033711 HC HIGH FLOW OXYGEN

## 2019-07-17 PROCEDURE — 74011250637 HC RX REV CODE- 250/637: Performed by: INTERNAL MEDICINE

## 2019-07-17 PROCEDURE — 77010033678 HC OXYGEN DAILY

## 2019-07-17 PROCEDURE — 77030018846 HC SOL IRR STRL H20 ICUM -A

## 2019-07-17 PROCEDURE — 82962 GLUCOSE BLOOD TEST: CPT

## 2019-07-17 PROCEDURE — 85025 COMPLETE CBC W/AUTO DIFF WBC: CPT

## 2019-07-17 PROCEDURE — 93005 ELECTROCARDIOGRAM TRACING: CPT

## 2019-07-17 PROCEDURE — 83735 ASSAY OF MAGNESIUM: CPT

## 2019-07-17 PROCEDURE — 65660000000 HC RM CCU STEPDOWN

## 2019-07-17 RX ORDER — BUMETANIDE 2 MG/1
2 TABLET ORAL DAILY
COMMUNITY
End: 2019-09-09

## 2019-07-17 RX ORDER — LEVETIRACETAM 500 MG/1
500 TABLET ORAL 2 TIMES DAILY
COMMUNITY

## 2019-07-17 RX ORDER — MORPHINE SULFATE 2 MG/ML
1 INJECTION, SOLUTION INTRAMUSCULAR; INTRAVENOUS ONCE
Status: COMPLETED | OUTPATIENT
Start: 2019-07-17 | End: 2019-07-17

## 2019-07-17 RX ORDER — BRIMONIDINE TARTRATE 2 MG/ML
1 SOLUTION/ DROPS OPHTHALMIC 2 TIMES DAILY
COMMUNITY
End: 2019-12-01

## 2019-07-17 RX ORDER — LORAZEPAM 2 MG/ML
0.5 INJECTION INTRAMUSCULAR
Status: DISCONTINUED | OUTPATIENT
Start: 2019-07-17 | End: 2019-07-22 | Stop reason: HOSPADM

## 2019-07-17 RX ORDER — MORPHINE SULFATE 4 MG/ML
INJECTION INTRAVENOUS
Status: DISCONTINUED
Start: 2019-07-17 | End: 2019-07-17

## 2019-07-17 RX ORDER — AMIODARONE HYDROCHLORIDE 200 MG/1
100 TABLET ORAL
COMMUNITY

## 2019-07-17 RX ORDER — OXCARBAZEPINE 150 MG/1
150 TABLET, FILM COATED ORAL 2 TIMES DAILY
COMMUNITY
End: 2019-09-09

## 2019-07-17 RX ORDER — GENTAMICIN SULFATE 1 MG/G
CREAM TOPICAL DAILY
COMMUNITY

## 2019-07-17 RX ORDER — LORAZEPAM 2 MG/ML
0.5 INJECTION INTRAMUSCULAR ONCE
Status: COMPLETED | OUTPATIENT
Start: 2019-07-17 | End: 2019-07-17

## 2019-07-17 RX ADMIN — HEPARIN SODIUM 5000 UNITS: 5000 INJECTION INTRAVENOUS; SUBCUTANEOUS at 00:09

## 2019-07-17 RX ADMIN — SODIUM CHLORIDE, SODIUM LACTATE, CALCIUM CHLORIDE, MAGNESIUM CHLORIDE AND DEXTROSE 2500 ML: 1.5; 538; 448; 18.3; 5.08 INJECTION, SOLUTION INTRAPERITONEAL at 18:50

## 2019-07-17 RX ADMIN — SODIUM CHLORIDE, SODIUM LACTATE, CALCIUM CHLORIDE, MAGNESIUM CHLORIDE AND DEXTROSE 2500 ML: 1.5; 538; 448; 18.3; 5.08 INJECTION, SOLUTION INTRAPERITONEAL at 12:21

## 2019-07-17 RX ADMIN — ISOSORBIDE MONONITRATE 60 MG: 30 TABLET, EXTENDED RELEASE ORAL at 09:18

## 2019-07-17 RX ADMIN — CLOPIDOGREL BISULFATE 75 MG: 75 TABLET ORAL at 09:18

## 2019-07-17 RX ADMIN — PANTOPRAZOLE SODIUM 40 MG: 40 TABLET, DELAYED RELEASE ORAL at 09:18

## 2019-07-17 RX ADMIN — ASPIRIN 81 MG 81 MG: 81 TABLET ORAL at 09:18

## 2019-07-17 RX ADMIN — SEVELAMER CARBONATE 800 MG: 800 TABLET, FILM COATED ORAL at 21:45

## 2019-07-17 RX ADMIN — HEPARIN SODIUM 5000 UNITS: 5000 INJECTION INTRAVENOUS; SUBCUTANEOUS at 21:45

## 2019-07-17 RX ADMIN — LABETALOL HYDROCHLORIDE 10 MG: 5 INJECTION, SOLUTION INTRAVENOUS at 00:10

## 2019-07-17 RX ADMIN — LEVETIRACETAM 250 MG: 250 TABLET ORAL at 17:52

## 2019-07-17 RX ADMIN — LORAZEPAM 0.5 MG: 2 INJECTION INTRAMUSCULAR; INTRAVENOUS at 21:45

## 2019-07-17 RX ADMIN — ATORVASTATIN CALCIUM 40 MG: 40 TABLET, FILM COATED ORAL at 21:45

## 2019-07-17 RX ADMIN — SODIUM CHLORIDE, SODIUM LACTATE, CALCIUM CHLORIDE, MAGNESIUM CHLORIDE AND DEXTROSE 2500 ML: 1.5; 538; 448; 18.3; 5.08 INJECTION, SOLUTION INTRAPERITONEAL at 08:42

## 2019-07-17 RX ADMIN — ONDANSETRON 4 MG: 2 INJECTION INTRAMUSCULAR; INTRAVENOUS at 03:10

## 2019-07-17 RX ADMIN — Medication 1 AMPULE: at 09:18

## 2019-07-17 RX ADMIN — MORPHINE SULFATE 1 MG: 2 INJECTION, SOLUTION INTRAMUSCULAR; INTRAVENOUS at 03:15

## 2019-07-17 RX ADMIN — SEVELAMER CARBONATE 800 MG: 800 TABLET, FILM COATED ORAL at 09:19

## 2019-07-17 RX ADMIN — ATORVASTATIN CALCIUM 40 MG: 40 TABLET, FILM COATED ORAL at 00:10

## 2019-07-17 RX ADMIN — LEVETIRACETAM 250 MG: 250 TABLET ORAL at 09:18

## 2019-07-17 RX ADMIN — ACETAMINOPHEN 650 MG: 325 TABLET ORAL at 17:52

## 2019-07-17 RX ADMIN — TRAMADOL HYDROCHLORIDE 50 MG: 50 TABLET, FILM COATED ORAL at 21:45

## 2019-07-17 RX ADMIN — SODIUM CHLORIDE, SODIUM LACTATE, CALCIUM CHLORIDE, MAGNESIUM CHLORIDE AND DEXTROSE 2500 ML: 1.5; 538; 448; 18.3; 5.08 INJECTION, SOLUTION INTRAPERITONEAL at 01:50

## 2019-07-17 RX ADMIN — HEPARIN SODIUM 5000 UNITS: 5000 INJECTION INTRAVENOUS; SUBCUTANEOUS at 15:42

## 2019-07-17 RX ADMIN — AMLODIPINE BESYLATE 5 MG: 5 TABLET ORAL at 09:18

## 2019-07-17 RX ADMIN — HEPARIN SODIUM 5000 UNITS: 5000 INJECTION INTRAVENOUS; SUBCUTANEOUS at 05:54

## 2019-07-17 RX ADMIN — SEVELAMER CARBONATE 800 MG: 800 TABLET, FILM COATED ORAL at 17:52

## 2019-07-17 RX ADMIN — LORAZEPAM 0.5 MG: 2 INJECTION INTRAMUSCULAR; INTRAVENOUS at 05:53

## 2019-07-17 NOTE — PROGRESS NOTES
Transitional Care Team: Initial HUG Note    Date of Assessment: 07/17/19  Time of Assessment:  1:43 PM    Hina Grigsby is a 62 y.o. female inpatient at  [facility]. Assessment & Plan   Pt admitted with potential diagnosis of heart failure. Also with ESRD which is treated with peritoneal dialysis. Had episode of anxiety earler which was treated with Lorazepam. Now pt very sleepy. Will need revisit to discuss HUG program when more alert. Primary Diagnosis: heart failure    Advance Directive:  No advanced care document in her medical record, will benefit with discussion when more alert. Current Code Status:  full    Referral to Hospice/ Palliative Care Appropriate:yes . Awareness of Medical Conditions: (Trajectory of illness and pts expectations). Await more alert time    [facility] Needs: (to include safety issues) [facility]  Barriers Identified: anxiety    Patient is willing to go to SNF/Inpatient Rehab if recommended: await to see if needed    Medication Review:  Await AVS.    Can patient afford medications:  Current meds yes. Patient is Compliant with Medication regimen:yes    Who manages medications at home: self  Best Patient Contact Number: [phone]    HUG (Healthy Understanding of Goals) program introduced to patient/family. The Transitional Care Team bridges the gaps in care and education surrounding discharge from the acute care facility. The objective is to empower the patient and family in taking a proactive role in preventing readmission within the first thirty days after discharge. The team is also involved in the efforts to reduce readmission to the acute care setting after stabilization and discharge from the acute care environment either to skilled nursing facilities or community.      HUG RN/NP will return with G Calendar/ follow up appointments/ Ambulatory Nurse Navigator name and contact information when the patient is ready for discharge. No future appointments. Non-[facility] follow up appointment(s): none yet    [facility]: call information given to on discharge calendar      Patient education focused on readmission zones as described as: The Red Zone: High risk for readmission, days 1-21  The Yellow Zone: Moderate  risk for readmission, days 22-29   The Green Zone: Lower risk for readmission, days                30 and after    The [facility] Team will attempt to follow the patient from a distance while inpatient as well as be available for further transition/disposition needs. The [facility] team will continue to offer support during the 30- 90 day discharge from acute care setting. Will notify Ambulatory , NIRANJAN RN.     Past Medical History:   Diagnosis Date    Blood clot in vein     left arm several years ago    CAD (coronary artery disease)     Chronic kidney disease     Congestive heart failure ([address]     Hypercholesterolemia     Hypertension     Legally blind     Se[facility]ures [facility]     Stroke ([facility]

## 2019-07-17 NOTE — PROGRESS NOTES
Pharmacy Clarification of the Prior to Admission Medication Regimen Retrospective to the Admission Medication Reconciliation    The patient was not interviewed regarding clarification of the prior to admission medication regimen. MHT called patient's daughter, Jacy Police [phone], who was able to provide patient's medications and last doses administered. Patient's daughter was questioned regarding use of any other inhalers, topical products, over the counter medications, herbal medications, vitamin products or ophthalmic/nasal/otic medication use. Information Obtained From: RX Query, Patient's daughter    Recommendations/Findings: The following amendments were made to the patient's active medication list on file at [facility]:     1) Additions:   amiodarone (PACERONE) 100 mg tablet  brimonidine (ALPHAGAN) 0.2 % ophthalmic solution  bumetanide (BUMEX) 2 mg tablet  gentamicin (GARAMYCIN) 0.1 % topical cream  OXcarbazepine (TRILEPTAL) 150 mg tablet    2) Removals:   Amlodipine  Calcitriol  Clopidogrel  Isosorbide    3) Changes:  levETIRAcetam (KEPPRA) tablet (Old regimen: (strength 250 mg) 250 mg BID /New regimen: (strength 500 mg) 500 mg BID)    4) Pertinent Pharmacy Findings:  During interview patient's daughter stated that the patient receives peritoneal dialysis at home Q6. PTA medication list was corrected to the following:     Prior to Admission Medications   Prescriptions Last Dose Informant Patient Reported? Taking? IRON FUM & PS CMP/VIT C & B (INTEGRA PO) 7/16/2019 at Unknown time Child Yes Yes   Sig: Take 1 Tab by mouth daily. OXcarbazepine (TRILEPTAL) 150 mg tablet 7/16/2019 at Unknown time Child Yes Yes   Sig: Take 150 mg by mouth two (2) times a day. amiodarone (PACERONE) 100 mg tablet 7/16/2019 at Unknown time Child Yes Yes   Sig: Take 100 mg by mouth two (2) times a day. aspirin 81 mg tablet 7/16/2019 at Unknown time Child Yes Yes   Sig: Take 81 mg by mouth daily.    atorvastatin (LIPITOR) 40 mg tablet 7/15/2019 at Unknown time Child No Yes   Sig: TAKE 1 TABLET BY MOUTH ONCE DAILY   brimonidine (ALPHAGAN) 0.2 % ophthalmic solution 7/16/2019 at Unknown time Child Yes Yes   Sig: Administer 1 Drop to both eyes two (2) times a day. bumetanide (BUMEX) 2 mg tablet 7/16/2019 at Unknown time Child Yes Yes   Sig: Take 2 mg by mouth daily. gentamicin (GARAMYCIN) 0.1 % topical cream 7/16/2019 at Unknown time Child Yes Yes   Sig: Apply  to affected area daily. Apply to Cath wound   lactulose (CHRONULAC) 10 gram/15 mL solution 7/16/2019 at Unknown time Child Yes Yes   Sig: Take 20 g by mouth two (2) times daily as needed (constipation). levETIRAcetam (KEPPRA) 500 mg tablet 7/16/2019 at Unknown time Child Yes Yes   Sig: Take 500 mg by mouth two (2) times a day. omeprazole (PRILOSEC) 20 mg capsule 7/16/2019 at Unknown time Child Yes Yes   Sig: Take 20 mg by mouth daily. potassium chloride (K-DUR, KLOR-CON) 20 mEq tablet 7/16/2019 at Unknown time Child Yes Yes   Sig: Take 20 mEq by mouth daily. sevelamer (RENAGEL) 800 mg tablet 7/16/2019 at Unknown time Child Yes Yes   Sig: Take 800 mg by mouth three (3) times daily (with meals). traMADol (ULTRAM) 50 mg tablet 7/14/2019 at Unknown time Child Yes Yes   Sig: Take 50 mg by mouth every four (4) hours as needed for Pain.       Facility-Administered Medications: None          Thank you,  Ascencion Rodriguez  Medication History Pharmacy Technician

## 2019-07-17 NOTE — ED NOTES
Pt transferred to [facility], condition stable. Pt remains short of breath, worse on exertion. Pt is alert and oriented, she states she is painfree at this time.

## 2019-07-17 NOTE — ED NOTES
IV access established by Dr Magana, blood drawn for venous blood gas and lab. Pt can go to [facility] now, transport requested.

## 2019-07-17 NOTE — CARDIO/PULMONARY
Cardiac Rehab Note: chart review     Pt is a 63 yo admitted with acute on chronic diastolic heart failure, increased troponin, ESRD on PD, HTN.     CHF BUNDLE      EF 30%  on 7/17/19 per echo. Smoking history assessed. Patient is a never smoker. Smoking Cessation Program link has not been added to the AVS.     Current inpatient diet: DIET RENAL     \"Living with Heart Failure\" book with daily weight calendar and s/s of heart failure magnet will be  provided to Sabi Samples when medically stable. Patient was not available/able to meet at this time. Pt drowsy this afternoon and critically ill. CP Rehab will  follow up during this admission.

## 2019-07-17 NOTE — PROGRESS NOTES
[facility]  Pulmonary, Critical Care, and Sleep Medicine    Name: Magdi Vallecillo MRN: [id number]   : 1955 Hospital: [address] [facility]   Date: 2019        Critical Care Initial Patient Consult    IMPRESSION:   · ESRD on PD, Appreciate Dr. Evan Chiang assistance  · Dyspnea,Hypoxia. No need for bipap now  · Anxiety, Refusing treatment. Was given ativan earlier  · CHF, CAD, had angioplasty 1 year ago. · Hypertension  · Legally blind  · GERD  · S/p prior CVA  · Hydrocephalus  · Seizures  · Prior Left Upper extremity DVT. RECOMMENDATIONS:   · Per Renal  · Hemodynamic monitoring  · If tolerates PD exchange can potentially go out this pm  · Wean oxygen as tolerated. · Will D/c bipap. Subjective/History: This patient has been seen and evaluated at the request of Dr. Iván Mobley for above. Patient is a 62 y.o. female noted to have increase Dyspnea, orthopnea, increase Pro BNP level. Per EMR did not have cough, fevers, dizziness, syncope, or sputum production. The patient is critically ill and can not provide additional history due to Unable to speak. Very sleepy      Past Medical History:   Diagnosis Date    Blood clot in vein     left arm several years ago    CAD (coronary artery disease)     Chronic kidney disease     [facility] ([facility]     Hypercholesterolemia     Hypertension     Legally blind     Seizures ([facility]     Stroke [facility])       Past Surgical History:   Procedure Laterality Date    CARDIAC SURG PROCEDURE UNLIST      heart attack 12/15    HX ORTHOPAEDIC      right middle finger    HX OTHER SURGICAL      shunt placed in brain      Prior to Admission medications    Medication Sig Start Date End Date Taking?  Authorizing Provider   isosorbide mononitrate ER (IMDUR) 60 mg CR tablet TAKE 1 TABLET BY MOUTH EVERY MORNING 17   Shree Eduardo,    atorvastatin (LIPITOR) 40 mg tablet TAKE 1 TABLET BY MOUTH ONCE DAILY 17   Jayce DO Shree   sevelamer (RENAGEL) 800 mg tablet Take 800 mg by mouth three (3) times daily (with meals). Provider, Historical   lactulose (CHRONULAC) 10 gram/15 mL solution Take 20 g by mouth two (2) times daily as needed (constipation). Provider, Historical   amLODIPine (NORVASC) 5 mg tablet TAKE 1 TABLET BY MOUTH EVERY EVENING 6/6/16   Humera Gonzalez DO   omeprazole (PRILOSEC) 20 mg capsule Take 20 mg by mouth daily. 5/10/16   Provider, Historical   IRON FUM & PS CMP/VIT C & B (INTEGRA PO) Take 1 Tab by mouth daily. Provider, Historical   traMADol (ULTRAM) 50 mg tablet Take 50 mg by mouth every four (4) hours as needed for Pain. 5/10/16   Provider, Historical   levETIRAcetam (KEPPRA) 250 mg tablet Take 1 Tab by mouth two (2) times a day. 5/13/16   Humera Gonzalez DO   clopidogrel (PLAVIX) 75 mg tablet Take 1 Tab by mouth daily. 5/13/16   Humera Gonzalez DO   calcitRIOL (ROCALTROL) 0.25 mcg capsule Take 0.25 mcg by mouth two (2) days a week. Mondays and Fridays    Provider, Historical   potassium chloride (K-DUR, KLOR-CON) 20 mEq tablet Take 20 mEq by mouth daily. Provider, Historical   aspirin 81 mg tablet Take 81 mg by mouth daily.     Other, MD Benson     Current Facility-Administered Medications   Medication Dose Route Frequency    alcohol 62% (NOZIN) nasal  1 Ampule  1 Ampule Topical Q12H    morphine 4 mg/mL injection        peritoneal dialysis DEXTROSE 1.5% (2.5 mEq/L low calcium) solution 2,500 mL  2,500 mL IntraPERitoneal 5XD    amLODIPine (NORVASC) tablet 5 mg  5 mg Oral DAILY    aspirin chewable tablet 81 mg  81 mg Oral DAILY    atorvastatin (LIPITOR) tablet 40 mg  40 mg Oral QHS    [START ON 7/18/2019] calcitRIOL (ROCALTROL) capsule 0.25 mcg  0.25 mcg Oral Once per day on Thu Sat    clopidogrel (PLAVIX) tablet 75 mg  75 mg Oral DAILY    isosorbide mononitrate ER (IMDUR) tablet 60 mg  60 mg Oral DAILY    levETIRAcetam (KEPPRA) tablet 250 mg  250 mg Oral BID    pantoprazole (PROTONIX) tablet 40 mg  40 mg Oral DAILY    sevelamer carbonate (RENVELA) tab 800 mg  800 mg Oral TID    heparin (porcine) injection 5,000 Units  5,000 Units SubCUTAneous Q8H     Allergies   Allergen Reactions    Gabapentin Other (comments) and Seizures     Confusion, psychosis \"I was acting like a 4year old at a family event, drawing on walls and everything\"        Social History     Tobacco Use    Smoking status: Never Smoker    Smokeless tobacco: Never Used   Substance Use Topics    Alcohol use: No      Family History   Problem Relation Age of Onset    Diabetes Other     Hypertension Other     Cancer Other         Review of Systems:  Review of systems not obtained due to patient factors. Objective:   Vital Signs:    Visit Vitals  /88   Pulse 80   Temp 97.4 °F (36.3 °C)   Resp 21   Ht 5' 3\" (1.6 m)   Wt 94.3 kg (208 lb)   SpO2 100%   Breastfeeding? No   BMI 36.85 kg/m²       O2 Device: Hi flow nasal cannula   O2 Flow Rate (L/min): 8 l/min   Temp (24hrs), Av.9 °F (36.6 °C), Min:97.4 °F (36.3 °C), Max:98.3 °F (36.8 °C)       Intake/Output:   Last shift:      No intake/output data recorded. Last 3 shifts: 07/15 1901 -  0700  In: 2500   Out: 3450     Intake/Output Summary (Last 24 hours) at 2019 0852  Last data filed at 2019 0451  Gross per 24 hour   Intake 2500 ml   Output 3450 ml   Net -950 ml     Hemodynamics:   PAP:   CO:     Wedge:   CI:     CVP:    SVR:       PVR:       Ventilator Settings:  Mode Rate Tidal Volume Pressure FiO2 PEEP            50 %(weaned o2 to 50%)       Peak airway pressure:      Minute ventilation: 6.5 l/min      Physical Exam:    General:  Sleepy, arousable but not answering questions. no distress, appears stated age. Head:  Normocephalic, without obvious abnormality, atraumatic. Eyes:  Conjunctivae/corneas clear. PERRL, EOMs intact. Nose: Nares normal. Septum midline. Mucosa normal. No drainage or sinus tenderness.    Throat: Lips, mucosa, and tongue normal. Teeth and gums normal.   Neck: Supple, symmetrical, trachea midline, no adenopathy, thyroid: no enlargment/tenderness/nodules, no carotid bruit and no JVD. Back:   Symmetric, no curvature. ROM normal.   Lungs:   Clear to auscultation bilaterally. Chest wall:  No tenderness or deformity. Has Left upper chest bandage in place. Heart:  Regular rate and rhythm, S1, S2 normal, no murmur, click, rub or gallop. Abdomen:   Soft, obese, PD cath in place. non-tender. Bowel sounds normal. No masses,  No organomegaly. Extremities: Extremities normal, atraumatic, no cyanosis or edema. Pulses: 2+ and symmetric all extremities. Skin: Skin color, texture, turgor normal. No rashes or lesions   Lymph nodes: Cervical, supraclavicular, and axillary nodes normal.   Neurologic: Grossly nonfocal, motor is intact.  Sleepy  Psych: sleepy when seen this am.        Data:     Recent Results (from the past 24 hour(s))   EKG, 12 LEAD, INITIAL    Collection Time: 07/16/19  4:16 PM   Result Value Ref Range    Ventricular Rate 81 BPM    Atrial Rate 81 BPM    P-R Interval 168 ms    QRS Duration 120 ms    Q-T Interval 438 ms    QTC Calculation (Bezet) 508 ms    Calculated P Axis 38 degrees    Calculated R Axis -44 degrees    Calculated T Axis 117 degrees    Diagnosis       Normal sinus rhythm  Possible Left atrial enlargement  Left axis deviation  Left ventricular hypertrophy with QRS widening and repolarization abnormality  Abnormal ECG  When compared with ECG of 19-JUN-2016 11:43,  Criteria for Septal infarct are no longer present  Criteria for Inferior infarct are no longer present  T wave inversion now evident in Lateral leads     NT-PRO BNP    Collection Time: 07/16/19  4:31 PM   Result Value Ref Range    NT pro-BNP >35,000 (H) 0 - 125 PG/ML   CBC WITH AUTOMATED DIFF    Collection Time: 07/16/19  4:31 PM   Result Value Ref Range    WBC 12.4 (H) 3.6 - 11.0 K/uL    RBC 3.18 (L) 3.80 - 5.20 M/uL    HGB 9.0 (L) 11.5 - 16.0 g/dL    HCT 29.4 (L) 35.0 - 47.0 %    MCV 92.5 80.0 - 99.0 FL    MCH 28.3 26.0 - 34.0 PG    MCHC 30.6 30.0 - 36.5 g/dL    RDW 20.5 (H) 11.5 - 14.5 %    PLATELET 655 (H) 823 - 400 K/uL    MPV 11.3 8.9 - 12.9 FL    NRBC 0.0 0  WBC    ABSOLUTE NRBC 0.00 0.00 - 0.01 K/uL    NEUTROPHILS 73 32 - 75 %    LYMPHOCYTES 14 12 - 49 %    MONOCYTES 8 5 - 13 %    EOSINOPHILS 4 0 - 7 %    BASOPHILS 1 0 - 1 %    IMMATURE GRANULOCYTES 0 0.0 - 0.5 %    ABS. NEUTROPHILS 9.1 (H) 1.8 - 8.0 K/UL    ABS. LYMPHOCYTES 1.7 0.8 - 3.5 K/UL    ABS. MONOCYTES 1.0 0.0 - 1.0 K/UL    ABS. EOSINOPHILS 0.5 (H) 0.0 - 0.4 K/UL    ABS. BASOPHILS 0.1 0.0 - 0.1 K/UL    ABS. IMM.  GRANS. 0.0 0.00 - 0.04 K/UL    DF SMEAR SCANNED      RBC COMMENTS ANISOCYTOSIS  2+       METABOLIC PANEL, BASIC    Collection Time: 07/16/19  4:31 PM   Result Value Ref Range    Sodium 140 136 - 145 mmol/L    Potassium 6.1 (H) 3.5 - 5.1 mmol/L    Chloride 103 97 - 108 mmol/L    CO2 31 21 - 32 mmol/L    Anion gap 6 5 - 15 mmol/L    Glucose 87 65 - 100 mg/dL    BUN 46 (H) 6 - 20 MG/DL    Creatinine 10.74 (H) 0.55 - 1.02 MG/DL    BUN/Creatinine ratio 4 (L) 12 - 20      GFR est AA 4 (L) >60 ml/min/1.73m2    GFR est non-AA 4 (L) >60 ml/min/1.73m2    Calcium 9.0 8.5 - 10.1 MG/DL   CK W/ CKMB & INDEX    Collection Time: 07/16/19  4:31 PM   Result Value Ref Range     (H) 26 - 192 U/L    CK - MB 2.7 <3.6 NG/ML    CK-MB Index 1.0 0.0 - 2.5     TROPONIN I    Collection Time: 07/16/19  4:31 PM   Result Value Ref Range    Troponin-I, Qt. 0.06 (H) <0.05 ng/mL   D DIMER    Collection Time: 07/16/19  4:31 PM   Result Value Ref Range    D-dimer 1.60 (H) 0.00 - 0.65 mg/L FEU   VENOUS BLOOD GAS    Collection Time: 07/16/19  8:20 PM   Result Value Ref Range    VENOUS PH 7.36 7.32 - 7.42      VENOUS PCO2 61 (H) 41 - 51 mmHg    VENOUS PO2 29 25 - 40 mmHg    VENOUS O2 SATURATION 50 (L) 65 - 88 %    VENOUS BICARBONATE 33 (H) 23 - 28 mmol/L    VENOUS BASE EXCESS 5.6 mmol/L    O2 METHOD NRM      FIO2 100 %    SPONTANEOUS RATE 15.0      Sample source VENOUS      SITE OTHER     METABOLIC PANEL, COMPREHENSIVE    Collection Time: 07/16/19  8:21 PM   Result Value Ref Range    Sodium 141 136 - 145 mmol/L    Potassium 5.7 (H) 3.5 - 5.1 mmol/L    Chloride 103 97 - 108 mmol/L    CO2 33 (H) 21 - 32 mmol/L    Anion gap 5 5 - 15 mmol/L    Glucose 85 65 - 100 mg/dL    BUN 48 (H) 6 - 20 MG/DL    Creatinine 11.08 (H) 0.55 - 1.02 MG/DL    BUN/Creatinine ratio 4 (L) 12 - 20      GFR est AA 4 (L) >60 ml/min/1.73m2    GFR est non-AA 3 (L) >60 ml/min/1.73m2    Calcium 9.0 8.5 - 10.1 MG/DL    Bilirubin, total 0.4 0.2 - 1.0 MG/DL    ALT (SGPT) 18 12 - 78 U/L    AST (SGOT) 29 15 - 37 U/L    Alk. phosphatase 107 45 - 117 U/L    Protein, total 7.4 6.4 - 8.2 g/dL    Albumin 2.5 (L) 3.5 - 5.0 g/dL    Globulin 4.9 (H) 2.0 - 4.0 g/dL    A-G Ratio 0.5 (L) 1.1 - 2.2     CK W/ REFLX CKMB    Collection Time: 07/16/19 10:14 PM   Result Value Ref Range     (H) 26 - 192 U/L   TROPONIN I    Collection Time: 07/16/19 10:14 PM   Result Value Ref Range    Troponin-I, Qt. 0.06 (H) <0.05 ng/mL   CK-MB,QUANT. Collection Time: 07/16/19 10:14 PM   Result Value Ref Range    CK - MB 3.7 (H) <3.6 NG/ML    CK-MB Index 1.7 0.0 - 2.5     GLUCOSE, POC    Collection Time: 07/17/19  3:21 AM   Result Value Ref Range    Glucose (POC) 162 (H) 65 - 100 mg/dL    Performed by Forest Hays (CON) PCT              Telemetry:normal sinus rhythm    Imaging:  I have personally reviewed the patients radiographs and have reviewed the reports:  7-17-19: IMPRESSION:     Increased bilateral pulmonary edema. No pneumothorax.        Brendia Eh, MD

## 2019-07-17 NOTE — PROGRESS NOTES
Interdisciplinary team rounds were held   7/17/2019  with the following team members:Care Management, Diabetes Treatment Specialist, Nursing, Nutrition, Pharmacy, Physical Therapy, Physician, Respiratory Therapy and Clinical Coordinator. Plan of care discussed. Goal: See MD orders and progress notes for further  interventions and desired outcomes.

## 2019-07-17 NOTE — H&P
Hospitalist Admission Note    NAME: Baldev Lizama   :  1955   MRN:  [id number]     Date/Time:  2019 11:04 PM    Patient PCP: Unknown, Provider  ______________________________________________________________________  Given the patient's current clinical presentation, I have a high level of concern for decompensation if discharged from the emergency department. Complex decision making was performed, which includes reviewing the patient's available past medical records, laboratory results, and x-ray films. My assessment of this patient's clinical condition and my plan of care is as follows. Assessment / Plan:  Acute on Chronic Hypoxic Respiratory Failure POA: c/w supplemental O2, at home recently on [facility]. Acute on Chronic  Diastolic Heart Failure POA: unknown EF, will give Diuretic but needs fluid removal with Dialysis, check ECHO and get Cardiology evaluation. Increased Troponin: indeterminate level, check serial enzymes and ECHO. ESRD on PD: get Nephrology evaluation for PD, abdomen is not tender. HTN: c/w Home regimen, use labetalol prn. Seizures: c/w Keppra, monitor, seizure precautions. Code Status: Full Code  Surrogate Decision Maker: DTCHRISTAL Morrison [phone]  DVT Prophylaxis: Heparin  GI Prophylaxis: not indicated  Baseline: very debilitated, recently D/c [facility] Rehab [facility] then to [facility] to here. Subjective:   CHIEF COMPLAINT: \"I feel very SOB\"    HISTORY OF PRESENT ILLNESS:     Baldev Lizama is a 62 y.o.  female   with PMHx significant for htn, CKD with peritoneal dialysis nightly, seizures, CAD, hypercholesterolemia, CHF, DVT, stroke x 3, legally blind,a dn a fib presents via ambulance to the ED with cc of SOB that started about 3 hours PTA. Pt reports she was sitting in wheelchair and watching TV, when SOB started. Pt reports SOB is intermittent.  Denies CP, dizziness, blurred vision. Pt has not taken anything for sx.  Denies hx asthma and COPD. Pt states she was recently in rehab for stroke and was discharged 3 weeks ago. Pt states she was on 2L of O2 at rehab facility, but she was not discharged home on O2. Hx previous DVT to left arm. Pt reports stent to heart about 1 year ago. Pt has not taken anything for sx.  Denies aggravating or alleviating sx. Denies lower leg swelling. Pt reports receiving peritoneal dialysis last night, per usual.  Most recent stroke 3 months prior. Cardiac stent placed 1.5 years ago.   Review of the outside hospital records. Patient does have elevated BNP and d-dimer secondary to likely CKD. There are no ischemic changes on EKG. [facility] cardiologist did evaluate patient at bedside. He is concerned for possible stenosis of the stent because patient is on dialysis. [facility] does not have nephrology abilities and therefore recommend transfer. Robin montesinos 62 y. o. female non-smoker with PMHX of CHF, ESRD on peritoneal dialysis, s/p angioplasty and stent 1.5 yrs ago [address], HTN, obesity, s/p CVA, dyslipidemia, hydrocephalus, seizures, DVT left arm, legally blind, GERD admitted for gradual SOB through last night culminating in PND,orthopnea. Had to sleep in recliner last night. Because of progressive SOB,transported via EMS here for further evaluation. Upon arrival, supplemented on oxygen. EKG revealed SR and non-specific ST-T wave changes. BNP . 35,000,  troponin 0.06. Asked to see to from cardiac standpoint. Denies chest pain, palpitations, cough, dizziness, syncope, sputum, wheezing. Per Dr. Benji Miner notes. Does have a history of CHF likely volume overload due to possible infected peritoneal dialysis line. Patient also has a combination of possible systolic and diastolic heart failure. Patient will likely need nephrology evaluation review of peritoneal dialysis. Patient is followed by Dr. Cathy Felix.   Also patient will need a ischemic work-up to evaluate the patency of his coronary stent placed about a year ago. Pt denies any other alleviating or exacerbating factors. Additionally, pt specifically denies any recent fever, chills, headache, nausea, vomiting, abdominal pain, CP, SOB, lightheadedness, dizziness, numbness, weakness, BLE swelling, heart palpitations, urinary sxs, diarrhea, constipation, melena, hematochezia, cough, or congestion. At this time patient is lying in bed c/o SOB, low extremity edema, denies chest pain, no fever, no N/V no diarrhea, no urinary symptoms, no other associated symptoms. We were asked to admit for work up and evaluation of the above problems. Past Medical History:   Diagnosis Date    Blood clot in vein     left arm several years ago    CAD (coronary artery disease)     Chronic kidney disease     Congestive heart failure (HCC)     Hypercholesterolemia     Hypertension     Legally blind     Seizures ([facility] [address]     Stroke [facility])         Past Surgical History:   Procedure Laterality Date    CARDIAC SURG PROCEDURE UNLIST      heart attack 12/15    HX ORTHOPAEDIC      right middle finger    HX OTHER SURGICAL      shunt placed in brain       Social History     Tobacco Use    Smoking status: Never Smoker    Smokeless tobacco: Never Used   Substance Use Topics    Alcohol use: No        Family History   Problem Relation Age of Onset    Diabetes Other     Hypertension Other     Cancer Other      Allergies   Allergen Reactions    Gabapentin Other (comments) and Seizures     Confusion, psychosis \"I was acting like a 4year old at a family event, drawing on walls and everything\"          Prior to Admission medications    Medication Sig Start Date End Date Taking?  Authorizing Provider   isosorbide mononitrate ER (IMDUR) 60 mg CR tablet TAKE 1 TABLET BY MOUTH EVERY MORNING 1/30/17   Shree Eduardo DO   atorvastatin (LIPITOR) 40 mg tablet TAKE 1 TABLET BY MOUTH ONCE DAILY 1/30/17   Christopher Brown,    sevelamer (RENAGEL) 800 mg tablet Take 800 mg by mouth three (3) times daily (with meals). Provider, Historical   lactulose (CHRONULAC) 10 gram/15 mL solution Take 20 g by mouth two (2) times daily as needed (constipation). Provider, Historical   amLODIPine (NORVASC) 5 mg tablet TAKE 1 TABLET BY MOUTH EVERY EVENING 6/6/16   Theresa Pittman, DO   omeprazole (PRILOSEC) 20 mg capsule Take 20 mg by mouth daily. 5/10/16   Provider, Historical   IRON FUM & PS CMP/VIT C & B (INTEGRA PO) Take 1 Tab by mouth daily. Provider, Historical   traMADol (ULTRAM) 50 mg tablet Take 50 mg by mouth every four (4) hours as needed for Pain. 5/10/16   Provider, Historical   levETIRAcetam (KEPPRA) 250 mg tablet Take 1 Tab by mouth two (2) times a day. 5/13/16   Theresa Pittman, DO   clopidogrel (PLAVIX) 75 mg tablet Take 1 Tab by mouth daily. 5/13/16   Theresa Pittman, DO   calcitRIOL (ROCALTROL) 0.25 mcg capsule Take 0.25 mcg by mouth two (2) days a week. Mondays and Fridays    Provider, Historical   potassium chloride (K-DUR, KLOR-CON) 20 mEq tablet Take 20 mEq by mouth daily. Provider, Historical   aspirin 81 mg tablet Take 81 mg by mouth daily. Other, MD Benson       REVIEW OF SYSTEMS:     I am not able to complete the review of systems because:    The patient is intubated and sedated    The patient has altered mental status due to his acute medical problems    The patient has baseline aphasia from prior stroke(s)    The patient has baseline dementia and is not reliable historian    The patient is in acute medical distress and unable to provide information           Total of 12 systems reviewed as follows:       POSITIVE= underlined text  Negative = text not underlined  General:  fever, chills, sweats, generalized weakness, weight loss/gain,      loss of appetite   Eyes:    blurred vision, eye pain, loss of vision, double vision  ENT:    rhinorrhea, pharyngitis   Respiratory:   cough, sputum production, SOB, MAYORGA, wheezing, pleuritic pain   Cardiology:   chest pain, palpitations, orthopnea, PND, edema, syncope   Gastrointestinal:  abdominal pain , N/V, diarrhea, dysphagia, constipation, bleeding   Genitourinary:  frequency, urgency, dysuria, hematuria, incontinence   Muskuloskeletal :  arthralgia, myalgia, back pain  Hematology:  easy bruising, nose or gum bleeding, lymphadenopathy   Dermatological: rash, ulceration, pruritis, color change / jaundice  Endocrine:   hot flashes or polydipsia   Neurological:  headache, dizziness, confusion, focal weakness, paresthesia,     Speech difficulties, memory loss, gait difficulty  Psychological: Feelings of anxiety, depression, agitation    Objective:   VITALS:    Visit Vitals  BP (!) 157/97 (BP 1 Location: Left arm, BP Patient Position: At rest)   Pulse 86   Temp 97.8 °F (36.6 °C)   Resp 16   Ht 5' 3\" (1.6 m)   Wt 94.3 kg (208 lb)   SpO2 97%   BMI 36.85 kg/m²       PHYSICAL EXAM:    General:    Alert, cooperative, SOB, appears stated age. HEENT: Atraumatic, anicteric sclerae, pink conjunctivae     No oral ulcers, mucosa moist, throat clear, dentition poor  Neck:  Supple, symmetrical,  thyroid: non tender  Lungs:   Coarse BS, crackles in both sides  Chest wall:  No tenderness  No Accessory muscle use. Heart:   Regular  rhythm,  No  murmur  +  edema  Abdomen:   Soft, non-tender. Not distended. Bowel sounds normal  Extremities: No cyanosis. No clubbing,      Skin turgor normal, Capillary refill normal, Radial  pulse 2+  Skin:     Not pale. Not Jaundiced  No rashes   Psych:  Good insight. Not depressed. Not anxious or agitated. Neurologic: EOMs intact. No facial asymmetry. No aphasia or slurred speech. Symmetrical strength, Sensation grossly intact.  Alert and oriented X 4.     _______________________________________________________________________  Care Plan discussed with:    Comments   Patient y    Family      RN y    Care Manager                    Consultant: _______________________________________________________________________  Expected  Disposition:   Home with Family    HH/PT/OT/RN    SNF/LTC y   [de-identified]    ________________________________________________________________________  TOTAL TIME:  61 Minutes    Critical Care Provided     Minutes non procedure based      Comments    y Reviewed previous records   >50% of visit spent in counseling and coordination of care y Discussion with patient and/or family and questions answered       ________________________________________________________________________  Signed: Olivia Beckett MD    Procedures: see electronic medical records for all procedures/Xrays and details which were not copied into this note but were reviewed prior to creation of Plan.     LAB DATA REVIEWED:    Recent Results (from the past 24 hour(s))   EKG, 12 LEAD, INITIAL    Collection Time: 07/16/19  4:16 PM   Result Value Ref Range    Ventricular Rate 81 BPM    Atrial Rate 81 BPM    P-R Interval 168 ms    QRS Duration 120 ms    Q-T Interval 438 ms    QTC Calculation (Bezet) 508 ms    Calculated P Axis 38 degrees    Calculated R Axis -44 degrees    Calculated T Axis 117 degrees    Diagnosis       Normal sinus rhythm  Possible Left atrial enlargement  Left axis deviation  Left ventricular hypertrophy with QRS widening and repolarization abnormality  Abnormal ECG  When compared with ECG of 19-JUN-2016 11:43,  Criteria for Septal infarct are no longer present  Criteria for Inferior infarct are no longer present  T wave inversion now evident in Lateral leads     NT-PRO BNP    Collection Time: 07/16/19  4:31 PM   Result Value Ref Range    NT pro-BNP >35,000 (H) 0 - 125 PG/ML   CBC WITH AUTOMATED DIFF    Collection Time: 07/16/19  4:31 PM   Result Value Ref Range    WBC 12.4 (H) 3.6 - 11.0 K/uL    RBC 3.18 (L) 3.80 - 5.20 M/uL    HGB 9.0 (L) 11.5 - 16.0 g/dL    HCT 29.4 (L) 35.0 - 47.0 %    MCV 92.5 80.0 - 99.0 FL    MCH 28.3 26.0 - 34.0 PG    MCHC 30.6 30.0 - 36.5 g/dL    RDW 20.5 (H) 11.5 - 14.5 %    PLATELET 954 (H) 966 - 400 K/uL    MPV 11.3 8.9 - 12.9 FL    NRBC 0.0 0  WBC    ABSOLUTE NRBC 0.00 0.00 - 0.01 K/uL    NEUTROPHILS 73 32 - 75 %    LYMPHOCYTES 14 12 - 49 %    MONOCYTES 8 5 - 13 %    EOSINOPHILS 4 0 - 7 %    BASOPHILS 1 0 - 1 %    IMMATURE GRANULOCYTES 0 0.0 - 0.5 %    ABS. NEUTROPHILS 9.1 (H) 1.8 - 8.0 K/UL    ABS. LYMPHOCYTES 1.7 0.8 - 3.5 K/UL    ABS. MONOCYTES 1.0 0.0 - 1.0 K/UL    ABS. EOSINOPHILS 0.5 (H) 0.0 - 0.4 K/UL    ABS. BASOPHILS 0.1 0.0 - 0.1 K/UL    ABS. IMM.  GRANS. 0.0 0.00 - 0.04 K/UL    DF SMEAR SCANNED      RBC COMMENTS ANISOCYTOSIS  2+       METABOLIC PANEL, BASIC    Collection Time: 07/16/19  4:31 PM   Result Value Ref Range    Sodium 140 136 - 145 mmol/L    Potassium 6.1 (H) 3.5 - 5.1 mmol/L    Chloride 103 97 - 108 mmol/L    CO2 31 21 - 32 mmol/L    Anion gap 6 5 - 15 mmol/L    Glucose 87 65 - 100 mg/dL    BUN 46 (H) 6 - 20 MG/DL    Creatinine 10.74 (H) 0.55 - 1.02 MG/DL    BUN/Creatinine ratio 4 (L) 12 - 20      GFR est AA 4 (L) >60 ml/min/1.73m2    GFR est non-AA 4 (L) >60 ml/min/1.73m2    Calcium 9.0 8.5 - 10.1 MG/DL   CK W/ CKMB & INDEX    Collection Time: 07/16/19  4:31 PM   Result Value Ref Range     (H) 26 - 192 U/L    CK - MB 2.7 <3.6 NG/ML    CK-MB Index 1.0 0.0 - 2.5     TROPONIN I    Collection Time: 07/16/19  4:31 PM   Result Value Ref Range    Troponin-I, Qt. 0.06 (H) <0.05 ng/mL   D DIMER    Collection Time: 07/16/19  4:31 PM   Result Value Ref Range    D-dimer 1.60 (H) 0.00 - 0.65 mg/L FEU   VENOUS BLOOD GAS    Collection Time: 07/16/19  8:20 PM   Result Value Ref Range    VENOUS PH 7.36 7.32 - 7.42      VENOUS PCO2 61 (H) 41 - 51 mmHg    VENOUS PO2 29 25 - 40 mmHg    VENOUS O2 SATURATION 50 (L) 65 - 88 %    VENOUS BICARBONATE 33 (H) 23 - 28 mmol/L    VENOUS BASE EXCESS 5.6 mmol/L    O2 METHOD NRM      FIO2 100 %    SPONTANEOUS RATE 15.0      Sample source VENOUS      SITE OTHER     METABOLIC PANEL, COMPREHENSIVE    Collection Time: 07/16/19  8:21 PM   Result Value Ref Range    Sodium 141 136 - 145 mmol/L    Potassium 5.7 (H) 3.5 - 5.1 mmol/L    Chloride 103 97 - 108 mmol/L    CO2 33 (H) 21 - 32 mmol/L    Anion gap 5 5 - 15 mmol/L    Glucose 85 65 - 100 mg/dL    BUN 48 (H) 6 - 20 MG/DL    Creatinine 11.08 (H) 0.55 - 1.02 MG/DL    BUN/Creatinine ratio 4 (L) 12 - 20      GFR est AA 4 (L) >60 ml/min/1.73m2    GFR est non-AA 3 (L) >60 ml/min/1.73m2    Calcium 9.0 8.5 - 10.1 MG/DL    Bilirubin, total 0.4 0.2 - 1.0 MG/DL    ALT (SGPT) 18 12 - 78 U/L    AST (SGOT) 29 15 - 37 U/L    Alk.  phosphatase 107 45 - 117 U/L    Protein, total 7.4 6.4 - 8.2 g/dL    Albumin 2.5 (L) 3.5 - 5.0 g/dL    Globulin 4.9 (H) 2.0 - 4.0 g/dL    A-G Ratio 0.5 (L) 1.1 - 2.2

## 2019-07-17 NOTE — PROGRESS NOTES
**Consult Information**  Member Facility: [facility] Davina Freeman MRN: [id number]  Consult ID: [id number]  Facility Time Zone: ET  Date and Time of Consult: 07/17/2019 04:50:00 AM  Requesting Clinician: Chandan Medina  Patient Name: Lonnie Kearney  YOB: 1955  Gender: Female    **Clinical Note**  Clinical Note: Patient is on BIPAP for respiratory distress related to anxiety. Can I receive something to manage to anxiety? Ordered 0.5 g IV Ativan 1 time dose    **Attestation**  Interaction Mode: Phone Only  Phone Duration (mins): 2  Time of Call : 07/17/2019 04:58 AM  Interaction Attestation: Interprofessional internet consultation was delivered through telephonic and/or electronic communication upon the request of the patients treating physician, while the requesting and the rendering provider were not in the same physical location. Written report was provided to the requesting provider.   Evaluation Duration (mins): 6

## 2019-07-17 NOTE — PROGRESS NOTES
RRT was called because patient was noticed to be tachypneic and tachycardic, patient was seen in evaluation, patient was admitted for volume overload, ABG was ordered patient refused ABG pH to be done, patient was placed on BiPAP, patient was transferred to the stepdown unit, nephrology was called regarding peritoneal dialysis and recommend to drain more fluid.

## 2019-07-17 NOTE — PROGRESS NOTES
Bedside shift change report given to Reno Paulino (oncoming nurse) by Sujatha Mcadams RN (offgoing nurse). Report included the following information SBAR, Intake/Output, MAR, Accordion, Recent Results and Med Rec Status. 0800: Patient assessment completed and documented in flowsheets. 6397: Patient peritoneal dialysis started. Two nurses verified procedure and used verified procedure and guidelines. 3771: Attempted to give patient morning meds and she fell asleep during medication administration. Will attempt again shortly. Consulted PICC team regarding new access and lab sticks. PICC team will come when PD is finished. 0930: Patient currently dwelling. 1045: Patient drained. Fluid specimens obtained for cell count and culture. 1050: Hi-Maurisio dropped to 4L on Hi-Maurisio    1100: PICC team able to obtain labs from patient. 1200: Patient assessment completed and documented in flowsheets. 1540: TRANSFER - OUT REPORT:    Verbal report given to HOMAR Obrien(name) on Rama Inch  being transferred to [facility](unit) for routine progression of care       Report consisted of patients Situation, Background, Assessment and   Recommendations(SBAR). Information from the following report(s) SBAR, Intake/Output, MAR, Accordion, Recent Results and Med Rec Status was reviewed with the receiving nurse. Lines:   Peripheral IV 07/16/19 Right Antecubital (Active)   Site Assessment Clean, dry, & intact 7/17/2019 12:00 PM   Phlebitis Assessment 0 7/17/2019 12:00 PM   Infiltration Assessment 0 7/17/2019 12:00 PM   Dressing Status Clean, dry, & intact 7/17/2019 12:00 PM   Dressing Type Tape;Transparent 7/17/2019 12:00 PM   Hub Color/Line Status Pink 7/17/2019 12:00 PM        Opportunity for questions and clarification was provided.       Patient transported with:   O2 @ 2 liters  Registered Nurse  [facility]

## 2019-07-17 NOTE — PROGRESS NOTES
7570: Orders received, chart reviewed. Spoke to RN who reports that pt is currently on peritoneal dialysis. Will defer PT evaluation however continue to follow. Thank you    12:15: Attempted to follow up however PICC team at bedside. RN also reports that pt is scheduled to get a second round of PD this afternoon. Will defer however continue to follow.     Alina Carrillo, PT, DPT

## 2019-07-17 NOTE — PROGRESS NOTES
9763- Patient transferred from [facility] to CCU Room 2510. Received report from   0400- Patient is alert but very restless at this time. Arrived with BIPAP mask to face, sinus tach on the monitor. 9900- Reached put to hospitalist regarding patient agitated behavior. Received new orders see MAR.   0600- Multiple failed attempts to obtained blood, patient uncooperative despite ativan 0.5 mg given. Rt paged patient on hi-flow at 10 lpm, Oxygen saturation 100%. 5- Spoke with daughter [facility]. Would like to be updated regarding any new changes. Patient is drowsy at this time. 3406- Report given to [address] [place] [address] Street, RN.

## 2019-07-17 NOTE — PROGRESS NOTES
OT Note:    Orders received and chart reviewed. Pt receiving peritoneal dialysis at bedside. Will defer OT evaluation at this time and continue to follow. 13:28 - Pt scheduled for second round of PD this afternoon. Will return an continue to follow.     Thank you,    [facility], OTR/L

## 2019-07-17 NOTE — ED PROVIDER NOTES
EMERGENCY DEPARTMENT HISTORY AND PHYSICAL EXAM      Date: 7/16/2019  Patient Name: Renny Funes  Patient Age and Sex: 62 y.o. female    History of Presenting Illness     Chief Complaint   Patient presents with    Breathing Problem     Patient transferred from [facility] for dyspnea and nephrology consult. Patient on peritoneal dialysis, did not get her treatment today. Patient had 2 recent strokes with L sided residual, was home from rehab for about 3 weeks before coming back to hospital today. Patient has obvious dyspnea, worsens with exertion. Hx CHF. History Provided By: Patient and EMS    HPI: Renny Funes, 62 y.o. female with PMHx significant for HTN, CKD with peritoneal dialysis nightly, seizures, CAD, hypercholesterolemia, CHF, DVT, stroke x 3, legally blind, and Afib presents via ambulance to the ED with cc of SOB that started about 3 hours PTA. Pt reports she was sitting in wheelchair and watching TV, when SOB started. Pt reports SOB is intermittent. Denies CP, dizziness, blurred vision. Pt has not taken anything for sx. Denies hx asthma and COPD. Pt states she was recently in rehab for stroke and was discharged 3 weeks ago. Pt states she was on 2L of O2 at rehab facility, but she was not discharged home on O2. Hx previous DVT to left arm. Pt reports stent to heart about 1 year ago. Pt has not taken anything for sx. Denies aggravating or alleviating sx. Denies lower leg swelling. Pt reports receiving peritoneal dialysis last night, per usual. Pt states her most recent stroke 3 months prior. Cardiac stent placed 1.5 years ago. Review of the outside hospital records showed the following:  Patient does have elevated BNP and d-dimer secondary to likely CKD. There are no ischemic changes on EKG. Dr. Luci Mcdowell, Cardiologist did evaluate patient at bedside. He is concerned for possible stenosis of the stent because patient is on dialysis.   [facility] does not have nephrology abilities and therefore recommend transfer. EKG revealed SR and non-specific ST-T wave changes. BNP . 35,000,  troponin 0.06. Pt denies any other alleviating or exacerbating factors. Additionally, pt specifically denies any recent fever, chills, headache, nausea, vomiting, abdominal pain, lightheadedness, dizziness, numbness, weakness, urinary sxs, diarrhea, constipation, melena, hematochezia, cough, or congestion. PCP: Unknown, Provider    There are no other complaints, changes or physical findings at this time. Past History   Past Medical History:  Past Medical History:   Diagnosis Date    Blood clot in vein     left arm several years ago    CAD (coronary artery disease)     Chronic kidney disease     Congestive heart failure ([address]     Hypercholesterolemia     Hypertension     Legally blind     Seizures ([address]     Stroke [facility])        Past Surgical History:  Past Surgical History:   Procedure Laterality Date    CARDIAC SURG PROCEDURE UNLIST      heart attack 12/15    HX ORTHOPAEDIC      right middle finger    HX OTHER SURGICAL      shunt placed in brain       Family History:  Family History   Problem Relation Age of Onset    Diabetes Other     Hypertension Other     Cancer Other        Social History:  Social History     Tobacco Use    Smoking status: Never Smoker    Smokeless tobacco: Never Used   Substance Use Topics    Alcohol use: No    Drug use: Yes     Types: Marijuana     Comment: last used 1 week ago       Allergies:   Allergies   Allergen Reactions    Gabapentin Other (comments) and Seizures     Confusion, psychosis \"I was acting like a 4year old at a family event, drawing on walls and everything\"         Current Medications:  Current Facility-Administered Medications on File Prior to Encounter   Medication Dose Route Frequency Provider Last Rate Last Dose    [COMPLETED] morphine injection 4 mg  4 mg IntraMUSCular NOW LORENZA Villatoro   4 mg at 07/16/19 1929 Current Outpatient Medications on File Prior to Encounter   Medication Sig Dispense Refill    isosorbide mononitrate ER (IMDUR) 60 mg CR tablet TAKE 1 TABLET BY MOUTH EVERY MORNING 30 Tab 0    atorvastatin (LIPITOR) 40 mg tablet TAKE 1 TABLET BY MOUTH ONCE DAILY 30 Tab 0    sevelamer (RENAGEL) 800 mg tablet Take 800 mg by mouth three (3) times daily (with meals).  lactulose (CHRONULAC) 10 gram/15 mL solution Take 20 g by mouth two (2) times daily as needed (constipation).  amLODIPine (NORVASC) 5 mg tablet TAKE 1 TABLET BY MOUTH EVERY EVENING 30 Tab 5    omeprazole (PRILOSEC) 20 mg capsule Take 20 mg by mouth daily. 1    IRON FUM & PS CMP/VIT C & B (INTEGRA PO) Take 1 Tab by mouth daily.  traMADol (ULTRAM) 50 mg tablet Take 50 mg by mouth every four (4) hours as needed for Pain.  0    levETIRAcetam (KEPPRA) 250 mg tablet Take 1 Tab by mouth two (2) times a day. 60 Tab 5    clopidogrel (PLAVIX) 75 mg tablet Take 1 Tab by mouth daily. 30 Tab 5    calcitRIOL (ROCALTROL) 0.25 mcg capsule Take 0.25 mcg by mouth two (2) days a week. Mondays and Fridays      potassium chloride (K-DUR, KLOR-CON) 20 mEq tablet Take 20 mEq by mouth daily.  aspirin 81 mg tablet Take 81 mg by mouth daily. Review of Systems   Review of Systems   Constitutional: Positive for fatigue. Negative for chills and fever. HENT: Negative. Negative for congestion, facial swelling, rhinorrhea, sore throat, trouble swallowing and voice change. Eyes: Negative. Respiratory: Positive for shortness of breath. Negative for apnea, cough, chest tightness and wheezing. Cardiovascular: Positive for leg swelling. Negative for chest pain and palpitations. Gastrointestinal: Negative. Negative for abdominal distention, abdominal pain, blood in stool, constipation, diarrhea, nausea and vomiting. Endocrine: Negative. Negative for cold intolerance, heat intolerance and polyuria. Genitourinary: Negative.   Negative for difficulty urinating, dysuria, flank pain, frequency, hematuria and urgency. Musculoskeletal: Negative. Negative for arthralgias, back pain, myalgias, neck pain and neck stiffness. Skin: Negative. Negative for color change and rash. Neurological: Negative. Negative for dizziness, syncope, facial asymmetry, speech difficulty, weakness, light-headedness, numbness and headaches. Hematological: Negative. Does not bruise/bleed easily. Psychiatric/Behavioral: Negative. Negative for confusion and self-injury. The patient is not nervous/anxious. Physical Exam   Physical Exam   Constitutional: She is oriented to person, place, and time. She appears well-developed and well-nourished. She appears toxic. She has a sickly appearance. She appears ill. She appears distressed. HENT:   Head: Normocephalic and atraumatic. Mouth/Throat: Oropharynx is clear and moist. No oropharyngeal exudate. Eyes: Pupils are equal, round, and reactive to light. Conjunctivae and EOM are normal.   Neck: Normal range of motion. Cardiovascular: Normal rate, regular rhythm and normal heart sounds. Exam reveals no gallop and no friction rub. No murmur heard. Pulmonary/Chest: Effort normal and breath sounds normal. No respiratory distress. She has no wheezes. She has no rales. She exhibits no tenderness. Abdominal: Soft. Bowel sounds are normal. She exhibits no distension and no mass. There is no tenderness. There is no rebound and no guarding. Musculoskeletal: Normal range of motion. She exhibits no edema, tenderness or deformity. Neurological: She is alert and oriented to person, place, and time. She displays normal reflexes. No cranial nerve deficit. She exhibits normal muscle tone. Coordination normal.   Skin: Skin is warm. No rash noted. She is not diaphoretic. Psychiatric: She has a normal mood and affect. Nursing note and vitals reviewed.       Diagnostic Study Results     Labs -  Recent Results (from the past 24 hour(s))   EKG, 12 LEAD, INITIAL    Collection Time: 07/16/19  4:16 PM   Result Value Ref Range    Ventricular Rate 81 BPM    Atrial Rate 81 BPM    P-R Interval 168 ms    QRS Duration 120 ms    Q-T Interval 438 ms    QTC Calculation (Bezet) 508 ms    Calculated P Axis 38 degrees    Calculated R Axis -44 degrees    Calculated T Axis 117 degrees    Diagnosis       Normal sinus rhythm  Possible Left atrial enlargement  Left axis deviation  Left ventricular hypertrophy with QRS widening and repolarization abnormality  Abnormal ECG  When compared with ECG of 19-JUN-2016 11:43,  Criteria for Septal infarct are no longer present  Criteria for Inferior infarct are no longer present  T wave inversion now evident in Lateral leads     NT-PRO BNP    Collection Time: 07/16/19  4:31 PM   Result Value Ref Range    NT pro-BNP >35,000 (H) 0 - 125 PG/ML   CBC WITH AUTOMATED DIFF    Collection Time: 07/16/19  4:31 PM   Result Value Ref Range    WBC 12.4 (H) 3.6 - 11.0 K/uL    RBC 3.18 (L) 3.80 - 5.20 M/uL    HGB 9.0 (L) 11.5 - 16.0 g/dL    HCT 29.4 (L) 35.0 - 47.0 %    MCV 92.5 80.0 - 99.0 FL    MCH 28.3 26.0 - 34.0 PG    MCHC 30.6 30.0 - 36.5 g/dL    RDW 20.5 (H) 11.5 - 14.5 %    PLATELET 672 (H) 302 - 400 K/uL    MPV 11.3 8.9 - 12.9 FL    NRBC 0.0 0  WBC    ABSOLUTE NRBC 0.00 0.00 - 0.01 K/uL    NEUTROPHILS 73 32 - 75 %    LYMPHOCYTES 14 12 - 49 %    MONOCYTES 8 5 - 13 %    EOSINOPHILS 4 0 - 7 %    BASOPHILS 1 0 - 1 %    IMMATURE GRANULOCYTES 0 0.0 - 0.5 %    ABS. NEUTROPHILS 9.1 (H) 1.8 - 8.0 K/UL    ABS. LYMPHOCYTES 1.7 0.8 - 3.5 K/UL    ABS. MONOCYTES 1.0 0.0 - 1.0 K/UL    ABS. EOSINOPHILS 0.5 (H) 0.0 - 0.4 K/UL    ABS. BASOPHILS 0.1 0.0 - 0.1 K/UL    ABS. IMM.  GRANS. 0.0 0.00 - 0.04 K/UL    DF SMEAR SCANNED      RBC COMMENTS ANISOCYTOSIS  2+       METABOLIC PANEL, BASIC    Collection Time: 07/16/19  4:31 PM   Result Value Ref Range    Sodium 140 136 - 145 mmol/L    Potassium 6.1 (H) 3.5 - 5.1 mmol/L    Chloride 103 97 - 108 mmol/L    CO2 31 21 - 32 mmol/L    Anion gap 6 5 - 15 mmol/L    Glucose 87 65 - 100 mg/dL    BUN 46 (H) 6 - 20 MG/DL    Creatinine 10.74 (H) 0.55 - 1.02 MG/DL    BUN/Creatinine ratio 4 (L) 12 - 20      GFR est AA 4 (L) >60 ml/min/1.73m2    GFR est non-AA 4 (L) >60 ml/min/1.73m2    Calcium 9.0 8.5 - 10.1 MG/DL   CK W/ CKMB & INDEX    Collection Time: 07/16/19  4:31 PM   Result Value Ref Range     (H) 26 - 192 U/L    CK - MB 2.7 <3.6 NG/ML    CK-MB Index 1.0 0.0 - 2.5     TROPONIN I    Collection Time: 07/16/19  4:31 PM   Result Value Ref Range    Troponin-I, Qt. 0.06 (H) <0.05 ng/mL   D DIMER    Collection Time: 07/16/19  4:31 PM   Result Value Ref Range    D-dimer 1.60 (H) 0.00 - 0.65 mg/L FEU   VENOUS BLOOD GAS    Collection Time: 07/16/19  8:20 PM   Result Value Ref Range    VENOUS PH 7.36 7.32 - 7.42      VENOUS PCO2 61 (H) 41 - 51 mmHg    VENOUS PO2 29 25 - 40 mmHg    VENOUS O2 SATURATION 50 (L) 65 - 88 %    VENOUS BICARBONATE 33 (H) 23 - 28 mmol/L    VENOUS BASE EXCESS 5.6 mmol/L    O2 METHOD NRM      FIO2 100 %    SPONTANEOUS RATE 15.0      Sample source VENOUS      SITE OTHER     METABOLIC PANEL, COMPREHENSIVE    Collection Time: 07/16/19  8:21 PM   Result Value Ref Range    Sodium 141 136 - 145 mmol/L    Potassium 5.7 (H) 3.5 - 5.1 mmol/L    Chloride 103 97 - 108 mmol/L    CO2 33 (H) 21 - 32 mmol/L    Anion gap 5 5 - 15 mmol/L    Glucose 85 65 - 100 mg/dL    BUN 48 (H) 6 - 20 MG/DL    Creatinine 11.08 (H) 0.55 - 1.02 MG/DL    BUN/Creatinine ratio 4 (L) 12 - 20      GFR est AA 4 (L) >60 ml/min/1.73m2    GFR est non-AA 3 (L) >60 ml/min/1.73m2    Calcium 9.0 8.5 - 10.1 MG/DL    Bilirubin, total 0.4 0.2 - 1.0 MG/DL    ALT (SGPT) 18 12 - 78 U/L    AST (SGOT) 29 15 - 37 U/L    Alk.  phosphatase 107 45 - 117 U/L    Protein, total 7.4 6.4 - 8.2 g/dL    Albumin 2.5 (L) 3.5 - 5.0 g/dL    Globulin 4.9 (H) 2.0 - 4.0 g/dL    A-G Ratio 0.5 (L) 1.1 - 2.2     CK W/ REFLX CKMB    Collection Time: 07/16/19 10:14 PM   Result Value Ref Range     (H) 26 - 192 U/L   TROPONIN I    Collection Time: 07/16/19 10:14 PM   Result Value Ref Range    Troponin-I, Qt. 0.06 (H) <0.05 ng/mL   CK-MB,QUANT. Collection Time: 07/16/19 10:14 PM   Result Value Ref Range    CK - MB 3.7 (H) <3.6 NG/ML    CK-MB Index 1.7 0.0 - 2.5     GLUCOSE, POC    Collection Time: 07/17/19  3:21 AM   Result Value Ref Range    Glucose (POC) 162 (H) 65 - 100 mg/dL    Performed by Sara Duque (CON) PCT        Radiologic Studies -   XR CHEST PORT   Final Result   IMPRESSION:      Increased bilateral pulmonary edema. No pneumothorax. CT Results  (Last 48 hours)    None        CXR Results  (Last 48 hours)               07/17/19 0544  XR CHEST PORT Final result    Impression:  IMPRESSION:       Increased bilateral pulmonary edema. No pneumothorax. Narrative:  EXAM: XR CHEST PORT       INDICATION: CHF, shortness of breath. COMPARISON: Portable chest on 7/16/2019. TECHNIQUE: Upright portable chest AP view       FINDINGS: Cardiac monitoring wires overlie the thorax. Cardiomegaly is   unchanged. Pulmonary vascular prominence is increased. Heterogeneous bilateral pulmonary edema is increased. Round opacity at the right   lung base is new. No pneumothorax. The visualized bones and upper abdomen are   age-appropriate.           07/16/19 1623  XR CHEST PORT Final result    Impression:  IMPRESSION:   Cardiomegaly with interstitial pulmonary edema. .                        Narrative:  PORTABLE CHEST RADIOGRAPH/S: 7/16/2019 4:23 PM       Clinical history: Chest pain   INDICATION:   chest pain   COMPARISON: 6/19/2016       FINDINGS:   AP portable upright view of the chest demonstrates a enlarged cardiopericardial   silhouette. The lungs are adequately expanded. Mild interstitial edema. No   pneumothorax or focal consolidation. . The osseous structures are unremarkable. Patient is on a cardiac monitor.                   Medical Decision Making   I am the first provider for this patient. I reviewed the vital signs, available nursing notes, past medical history, past surgical history, family history and social history. Vital Signs-Reviewed the patient's vital signs. Patient Vitals for the past 24 hrs:   Temp Pulse Resp BP SpO2   07/17/19 0711     100 %   07/17/19 0710     100 %   07/17/19 0600  80 21 150/88 100 %   07/17/19 0559     100 %   07/17/19 0523     100 %   07/17/19 0500  81 24 133/82 100 %   07/17/19 0400 97.4 °F (36.3 °C) 100 26 (!) 151/98 100 %   07/17/19 0359     100 %   07/17/19 0225  (!) 130 (!) 38  93 %   07/16/19 2355 97.6 °F (36.4 °C) 94 19 (!) 167/108 95 %   07/16/19 2313  87 17 (!) 159/99 98 %   07/16/19 2156 97.8 °F (36.6 °C) 86 16 (!) 157/97 97 %       Pulse Oximetry Analysis - 97% on RA    Cardiac Monitor:   Rate: 86 bpm  Rhythm: Normal Sinus Rhythm      ED EKG interpretation:  Rhythm: normal sinus rhythm; and regular . Rate (approx.): 81; Axis: left axis deviation; P wave: normal; QRS interval: normal ; ST/T wave: normal; Other findings: left ventricular hypertrophy. This EKG was interpreted by Heladio Shaw M.D. Records Reviewed: Nursing Notes, Old Medical Records, Previous electrocardiograms, Previous Radiology Studies and Previous Laboratory Studies    Provider Notes (Medical Decision Making):   Patient presents with acute dyspnea. DDx: asthma, copd, pna, pulmonary edema, acute bronchitis, ACS, ptx, pna. Will obtain EKG, labs, CXR, provide O2 as needed for hypoxia, treat symptomatically and reassess. Will continue to monitor closely in ED. ED Course:   Initial assessment performed. The patients presenting problems have been discussed, and they are in agreement with the care plan formulated and outlined with them. I have encouraged them to ask questions as they arise throughout their visit.     ALCOHOL/SUBSTANCE ABUSE COUNSELING:  Upon evaluation, pt endorsed recent alcohol/illicit drug use. For approximately 15 minutes, pt has been counseled on the dangers of alcohol and illicit drug use on their health, and they were encouraged to quit as soon as possible in order to decrease further risks to their health. Pt has conveyed their understanding of the risks involved should they continue to use these products. HYPERTENSION COUNSELING   Education was provided to the patient today regarding their hypertension. Patient is made aware of their elevated blood pressure and is instructed to follow up this week with their Primary Care for a recheck. Patient is counseled regarding consequences of chronic, uncontrolled hypertension including kidney disease, heart disease, stroke or even death. Patient states their understanding and agrees to follow up this week. Additionally, during their visit, I discussed sodium restriction, maintaining ideal body weight and regular exercise program as physiologic means to achieve blood pressure control. The patient will strive towards this. I reviewed our electronic medical record system for any past medical records that were available that may contribute to the patient's current condition, the nursing notes and vital signs from today's visit.   Jomarie Cabot, MD    Medications Administered During ED Course:  Medications   peritoneal dialysis DEXTROSE 1.5% (2.5 mEq/L low calcium) solution 2,500 mL (2,500 mL IntraPERitoneal Given 7/17/19 0150)   amLODIPine (NORVASC) tablet 5 mg (has no administration in time range)   aspirin chewable tablet 81 mg (has no administration in time range)   atorvastatin (LIPITOR) tablet 40 mg (40 mg Oral Given 7/17/19 0010)   calcitRIOL (ROCALTROL) capsule 0.25 mcg (has no administration in time range)   clopidogrel (PLAVIX) tablet 75 mg (has no administration in time range)   isosorbide mononitrate ER (IMDUR) tablet 60 mg (has no administration in time range)   lactulose (CHRONULAC) 10 gram/15 mL solution 30 mL (has no administration in time range)   levETIRAcetam (KEPPRA) tablet 250 mg (has no administration in time range)   pantoprazole (PROTONIX) tablet 40 mg (has no administration in time range)   sevelamer carbonate (RENVELA) tab 800 mg (has no administration in time range)   traMADol (ULTRAM) tablet 50 mg (has no administration in time range)   heparin (porcine) injection 5,000 Units (5,000 Units SubCUTAneous Given 7/17/19 0554)   labetalol (NORMODYNE;TRANDATE) injection 10 mg (10 mg IntraVENous Given 7/17/19 0010)   acetaminophen (TYLENOL) tablet 650 mg (has no administration in time range)   ondansetron (ZOFRAN) injection 4 mg (4 mg IntraVENous Given 7/17/19 0310)   oxyCODONE IR (ROXICODONE) tablet 5 mg (has no administration in time range)   alcohol 62% (NOZIN) nasal  1 Ampule (has no administration in time range)   morphine 4 mg/mL injection (  Canceled Entry 7/17/19 0400)   furosemide (LASIX) injection 40 mg (40 mg IntraVENous Given 7/16/19 2304)   morphine injection 1 mg (1 mg IntraVENous Given 7/17/19 0315)   LORazepam (ATIVAN) injection 0.5 mg (0.5 mg IntraVENous Given 7/17/19 0553)     Progress Note:  Pt still requiring 2L oxygen, will titrate as needed, per family, pt only uses supplemental oxygen at home    Progress Note:  Will discuss with Nephrology for peritoneal dialysis, clinically fluid overloaded. Will repeat troponin, initial trop elevated. Progress Note:  Per chart review, pt has a hx of chronic systolic/diastolic HF (EF 92% 6/4255) and repeat echo EF 25-30%. In the review of her chart, she has had multiple admissions to [facility] and [facility] over the last 2 years. Progress Note:  PMHx notable for CAD with PCI/ANDER to prox LAD,  of first OM and RCA (8/2018); Consult Note:  Reagan Woodall MD spoke with Dr. Marisol Mccoy   Specialty: Nephrology  Discussed pt's hx, disposition, and available diagnostic and imaging results. Reviewed care plans. Agree with management and plan thus far. Consultant will evaluate pt for dialysis. Progress Note  Mechelle Malone MD spoke with Dr. Jose Cunha   Specialty: Cardiology  Discussed pt's hx, disposition, and available diagnostic and imaging results. Reviewed care plans. Agree with management and plan thus far. Consultant will evaluate pt. Progress Note  Mechelle Malone MD spoke with Dr. Love Coffman  Specialty: Hospitalist  Discussed pt's hx, disposition, and available diagnostic and imaging results. Reviewed care plans. Agree with management and plan thus far. Consultant will evaluate pt for admission. CRITICAL CARE NOTE :    IMPENDING DETERIORATION -Airway, Respiratory, Cardiovascular, Metabolic and Renal  ASSOCIATED RISK FACTORS - Hypotension, Shock, Hypoxia, Dysrhythmia, Metabolic changes and Dehydration  MANAGEMENT- Bedside Assessment and Supervision of Care  INTERPRETATION -  Xrays, Blood Gases, ECG, Blood Pressure and Cardiac Output Measures   INTERVENTIONS - hemodynamic mngmt and Metobolic interventions  CASE REVIEW - Hospitalist, Medical Sub-Specialist, Nursing and Family  TREATMENT RESPONSE -Improved  PERFORMED BY - Self    NOTES   :    I have spent 65 minutes of critical care time involved in lab review, consultations with specialist, family decision- making, bedside attention and documentation. During this entire length of time I was immediately available to the patient . Critical Care: The reason for providing this level of medical care for this critically ill patient was due to a critical illness that impaired one or more vital organ systems, such that there was a high probability of imminent or life threatening deterioration in the patient's condition. This care involved high complexity decision making to assess, manipulate, and support vital system functions, to treat this degree of vital organ system failure, and to prevent further life threatening deterioration of the patients condition.       Mechelle Malone MD    Disposition:  ADMIT  Patient is being admitted to the hospital.  The results of their tests and reasons for their admission have been discussed with them and/or available family. They convey agreement and understanding for the need to be admitted and for their admission diagnosis. Consultation has been made with the inpatient physician specialist for hospitalization. Diagnosis     Clinical Impression:   1. Acute on chronic combined systolic and diastolic heart failure ([address]    2. Acute on chronic respiratory failure with hypoxia (HCC)    3. Elevated troponin    4. ESRD on peritoneal dialysis ([address]    5. Accelerated hypertension        Attestation:  I personally performed the services described in this documentation on this date 7/16/2019 for patient, Sea Hernandez. Varsha Snyder MD    Please note that this dictation was completed with vivio, the computer voice recognition software. Quite often unanticipated grammatical, syntax, homophones, and other interpretive errors are inadvertently transcribed by the computer software. Please disregard these errors. Please excuse any errors that have escaped final proofreading. This note will not be viewable in [address]

## 2019-07-17 NOTE — PROGRESS NOTES
Primary Nurse Bin Hess RN and Dino Arias RN performed a dual skin assessment on this patient No impairment noted  Luis score is 18      SIGNIFICANT CHANGES DURING SHIFT:    0145 P.D. Exchange started at this time without difficulty. Patient complained of shortness of breath. O2 sat's at 99% on 2LNC. Pt very anxious and restless at this time. Drained 700 of fluid from exchange and filled with 2500. After peritoneal dialysis patient very anxious. Asking for NRB mask. Placed pt on venti-mask at this time. Sat's @ %. Lungs crackles noted throughout. Called RRT nurse Leena to see if she would assess patient. 0225 Orders received and hospitalist on floor to see patient. 0300 Defer to RRT note.                 CONCERNS TO ADDRESS WITH MD:

## 2019-07-17 NOTE — PROGRESS NOTES
Pt was brought to CCU on BIPAP continued to wean o2 to 50%, still need to get an ABG and lab draw on her, unable to get blood pt moving arm and sitting up, she has been pulling at the mask wanting the mask off. Placed her on 10L High [place] O2 SAT has remained at 100%. BIPAP on standby at pt bedside if needed.

## 2019-07-17 NOTE — PROGRESS NOTES
RAPID RESPONSE TEAM    [id number]: Called to room 2209 by primary RN Eliot Bryant over staff member concern. Patient is very anxious and upset regarding her peritoneal dialysis treatment. Patient feels the PD treatment was not completed correctly. Per patient, she normally uses the PD cycler at home overnight and dialyzes for 30 mins. Patient had drained for 30 mins and drained 700 ml, then filled total of 2500 ml of dialysate fluid. Attempted to discuss treatment by gravity, patient becomes more upset, \"You don't know what you are doing! \"  Patient became very agitated, arguing with nurses and attempting to get out of bed. Attempts to calm were not successful. Patient HR to 130s, /108, O2 sats 92%. Patient placed on 100% NRB. STAT chest Xray ordered. 0255: Dr. Gema Gaming notified of patient's status. Orders received for ABG, 1 mg morphine IV once, and to notify nephrology for orders regarding PD. MD will be up to assess patient at bedside. 0300: Dr. Mauricio Ferguson paged, orders received to drain patient and leave dry till MD can assess patient in morning.     0301: patient refusing ABG stick or lab draws at this time. 5935: Dr. Gema Gaming at bedside. Orders received Bipap, STAT EKG, transfer to stepdown, and troponin. 9252: Patient nauseated, Bipap removed and patient vomited approx. 100 ml. Zofran given, Bipap removed, placed on 100% NRB. Patient agreeable to lab stick. Dr. Gema Gaming notified, orders received to transfer to CCU for fear of further decompensation. 0340: Unable to obtain labs, patient now refusing lab draws and ABGs. PD drain started. 1648: Patient placed back on Bipap, transferred to CCU room 2510, care handed over to [facility]. Please call for any needs or concerns.      Cassandra Alatorre  Rapid Response RN  Josselyn Zina

## 2019-07-17 NOTE — CONSULTS
[facility] Consult Note        NAME: Lynda Cruz       :  1955       MRN:  [id number]     Date/Time: 2019    Risk of deterioration: low       Assessment:    Plan:  ESRD-PD  Transthoracic PD cath  HTN  Hyperkalemia  CAD  HX of cva   Pd-2.5% 2500 cc dwells  PT asleep on rounds this am-received ativan following rapid response early this am-now in unit with stable hemodynamics. May be component of anxiety as well  AM labs (P)-will fu  WBC 12.4-check cell count       Asked to see for ESRD Needs. Pt on PD thru Dr. Paulette Gonzalez. On [facility]CP[facility] at night. Transferred here for cardiac eval.  Currently sedated following ativan  Subjective:     Chief Complaint:  Unable to give    Review of Systems: Patient was not able to provide review of systems due to mental status change/acute illness    Objective:     VITALS:   Last 24hrs VS reviewed since prior progress note. Most recent are:  Visit Vitals  /88   Pulse 80   Temp 97.4 °F (36.3 °C)   Resp 21   Ht 5' 3\" (1.6 m)   Wt 94.3 kg (208 lb)   SpO2 100%   Breastfeeding? No   BMI 36.85 kg/m²     SpO2 Readings from Last 6 Encounters:   19 100%   19 100%   16 97%   16 98%   16 92%   04/15/16 97%    O2 Flow Rate (L/min): 8 l/min       Intake/Output Summary (Last 24 hours) at 2019 0719  Last data filed at 2019 0451  Gross per 24 hour   Intake 2500 ml   Output 3450 ml   Net -950 ml        Telemetry Reviewed   normal sinus rhythm    PHYSICAL EXAM:    General   well developed, well nourished, appears stated age, in no acute distress  Neck   Supple without nodes or mass. Respiratory   Clear To Auscultation bilaterally - (L) PD cath  Cardiology  Regular Rate and Rythmn    Extremities  No clubbing, cyanosis, or edema. Pulses intact.               Lab Data Reviewed: (see below)    Medications Reviewed: (see below)    PMH/SH reviewed - no change compared to H&P  __________________________________________  ___________________________________________________    Attending Physician: Mireya Nance MD     ____________________________________________________  MEDICATIONS:  Current Facility-Administered Medications   Medication Dose Route Frequency    alcohol 62% (NOZIN) nasal  1 Ampule  1 Ampule Topical Q12H    morphine 4 mg/mL injection        peritoneal dialysis DEXTROSE 1.5% (2.5 mEq/L low calcium) solution 2,500 mL  2,500 mL IntraPERitoneal 5XD    amLODIPine (NORVASC) tablet 5 mg  5 mg Oral DAILY    aspirin chewable tablet 81 mg  81 mg Oral DAILY    atorvastatin (LIPITOR) tablet 40 mg  40 mg Oral QHS    [START ON 7/18/2019] calcitRIOL (ROCALTROL) capsule 0.25 mcg  0.25 mcg Oral Once per day on Thu Sat    clopidogrel (PLAVIX) tablet 75 mg  75 mg Oral DAILY    isosorbide mononitrate ER (IMDUR) tablet 60 mg  60 mg Oral DAILY    lactulose (CHRONULAC) 10 gram/15 mL solution 30 mL  20 g Oral BID PRN    levETIRAcetam (KEPPRA) tablet 250 mg  250 mg Oral BID    pantoprazole (PROTONIX) tablet 40 mg  40 mg Oral DAILY    sevelamer carbonate (RENVELA) tab 800 mg  800 mg Oral TID    traMADol (ULTRAM) tablet 50 mg  50 mg Oral Q6H PRN    heparin (porcine) injection 5,000 Units  5,000 Units SubCUTAneous Q8H    labetalol (NORMODYNE;TRANDATE) injection 10 mg  10 mg IntraVENous Q6H PRN    acetaminophen (TYLENOL) tablet 650 mg  650 mg Oral Q4H PRN    ondansetron (ZOFRAN) injection 4 mg  4 mg IntraVENous Q6H PRN    oxyCODONE IR (ROXICODONE) tablet 5 mg  5 mg Oral Q6H PRN        LABS:  Recent Labs     07/16/19  1631   WBC 12.4*   HGB 9.0*   HCT 29.4*   *     Recent Labs     07/16/19 2021 07/16/19  1631    140   K 5.7* 6.1*    103   CO2 33* 31   BUN 48* 46*   CREA 11.08* 10.74*   GLU 85 87   CA 9.0 9.0     Recent Labs     07/16/19 2021   SGOT 29   ALT 18      TBILI 0.4   TP 7.4   ALB 2.5*   GLOB 4.9*     No results for input(s): INR, PTP, APTT in the last 72 hours. No lab exists for component: INREXT   No results for input(s): FE, TIBC, PSAT, FERR in the last 72 hours. No results for input(s): PH, PCO2, PO2 in the last 72 hours.   Recent Labs     07/16/19  2214 07/16/19  1631   CPK  --  272*   CKNDX 1.7 1.0   TROIQ 0.06* 0.06*     Lab Results   Component Value Date/Time    Glucose (POC) 162 (H) 07/17/2019 03:21 AM

## 2019-07-17 NOTE — PROGRESS NOTES
TRANSFER - IN REPORT:    Verbal report received from Emanuel Varghese (name) on [place]  being received from ED (unit) for routine progression of care      Report consisted of patients Situation, Background, Assessment and   Recommendations(SBAR). Information from the following report(s) SBAR, Kardex, ED Summary and Recent Results was reviewed with the receiving nurse. Opportunity for questions and clarification was provided. Assessment completed upon patients arrival to unit and care assumed.

## 2019-07-17 NOTE — PROGRESS NOTES
Hospitalist Progress Note    NAME: Bjorn Degroot   :  1955   MRN:  [id number]       Assessment / Plan:  Acute on Chronic Hypoxic Respiratory Failure POA:   Episode of RRT 2/2 Tachypnea/Hypoxia. , also some component of anxiety  CXR with increased Pulmonary edema  -was on BIPAP until am, now on NC, 2-3 L  -Transferred out to Floor  -Continue Peritoneal HD  -Cardio on Board  -Nephro on Board    c/w supplemental O2, at home recently on [facility]. Acute on Chronic  Diastolic Heart Failure POA: ,  Increased Troponin POA  Getting peritoneal HD  cardiology on board  Echo with 26-30% EF and Grade II diastolic dysfunction. Left LV hypokinesis    ESRD on PD:  Aware  HTN: c/w Home regimen, use labetalol prn. Seizures: c/w Keppra, monitor, seizure precautions. Code Status: Full Code  Surrogate Decision Maker: DAE Burt [id number]  DVT Prophylaxis: Heparin  GI Prophylaxis: not indicated  Baseline: very debilitated, recently D/c from Rehab to [facility] house then to [facility] to here. Subjective:     Chief Complaint / Reason for Physician Visit  Overnight episode of RRT, due to Tachypnea, anxiety? currently resting comfortably. On 2 L NC      Review of Systems:  Symptom Y/N Comments  Symptom Y/N Comments   Fever/Chills n   Chest Pain n    Poor Appetite n   Edema     Cough n   Abdominal Pain n    Sputum n   Joint Pain     SOB/MAYORGA y   Pruritis/Rash     Nausea/vomit    Tolerating PT/OT     Diarrhea    Tolerating Diet     Constipation    Other       Could NOT obtain due to:      Objective:     VITALS:   Last 24hrs VS reviewed since prior progress note.  Most recent are:  Patient Vitals for the past 24 hrs:   Temp Pulse Resp BP SpO2   19 1534    142/88    19 1526  79 19 (!) 124/96 99 %   19 1500  78 16 139/82 100 %   19 1400  75 18 139/83 100 %   19 1300  75 19 136/84 100 %   19 1230  80 21  99 %   19 1215  80 23  99 %   19 1200 98.2 °F (36.8 °C) 80 20 144/82 100 %   07/17/19 1103    145/82    07/17/19 1100  79 20 137/84 100 %   07/17/19 1000  80 18 145/82 100 %   07/17/19 0900  83 23 141/87 100 %   07/17/19 0800 98.6 °F (37 °C) 86 21 144/85 100 %   07/17/19 0711     100 %   07/17/19 0710     100 %   07/17/19 0600  80 21 150/88 100 %   07/17/19 0559     100 %   07/17/19 0523     100 %   07/17/19 0500  81 24 133/82 100 %   07/17/19 0400 97.4 °F (36.3 °C) 100 26 (!) 151/98 100 %   07/17/19 0359     100 %   07/17/19 0225  (!) 130 (!) 38  93 %   07/16/19 2355 97.6 °F (36.4 °C) 94 19 (!) 167/108 95 %   07/16/19 2313  87 17 (!) 159/99 98 %   07/16/19 2156 97.8 °F (36.6 °C) 86 16 (!) 157/97 97 %       Intake/Output Summary (Last 24 hours) at 7/17/2019 1617  Last data filed at 7/17/2019 1200  Gross per 24 hour   Intake 8150 ml   Output 6250 ml   Net 1900 ml        PHYSICAL EXAM:  General: WD, WN. Alert, cooperative, no acute distress    EENT:  EOMI. Anicteric sclerae. MMM  Resp:  CTA bilaterally, no wheezing or rales. No accessory muscle use  CV:  Regular  rhythm,  No edema  GI:  Soft, Non distended, Non tender.  +Bowel sounds  Neurologic:  Alert and oriented X 3, normal speech,   Psych:   Good insight. Not anxious nor agitated  Skin:  No rashes. No jaundice    Reviewed most current lab test results and cultures  YES  Reviewed most current radiology test results   YES  Review and summation of old records today    NO  Reviewed patient's current orders and MAR    YES  PMH/SH reviewed - no change compared to H&P  ________________________________________________________________________  Care Plan discussed with:    Comments   Patient x    Family      RN x    Care Manager     Consultant  x Dr Rosa Dixon team rounds were held today with , nursing, pharmacist and clinical coordinator. Patient's plan of care was discussed; medications were reviewed and discharge planning was addressed. ________________________________________________________________________  Total NON critical care TIME:  50   Minutes    Total CRITICAL CARE TIME Spent:   Minutes non procedure based      Comments   >50% of visit spent in counseling and coordination of care     ________________________________________________________________________  Sony Rios MD     Procedures: see electronic medical records for all procedures/Xrays and details which were not copied into this note but were reviewed prior to creation of Plan. LABS:  I reviewed today's most current labs and imaging studies.   Pertinent labs include:  Recent Labs     07/17/19  1131 07/16/19  1631   WBC 13.6* 12.4*   HGB 8.5* 9.0*   HCT 28.4* 29.4*   * 448*     Recent Labs     07/17/19  1131 07/16/19 2021 07/16/19  1631    141 140   K 5.3* 5.7* 6.1*    103 103   CO2 29 33* 31   GLU 80 85 87   BUN 57* 48* 46*   CREA 11.40* 11.08* 10.74*   CA 8.6 9.0 9.0   MG 2.1  --   --    PHOS 4.5  --   --    ALB 2.3* 2.5*  --    TBILI 0.3 0.4  --    SGOT 17 29  --    ALT 12 18  --        Signed: Sony Rios MD

## 2019-07-18 ENCOUNTER — DOCUMENTATION ONLY (OUTPATIENT)
Dept: CASE MANAGEMENT | Age: 64
End: 2019-07-18

## 2019-07-18 ENCOUNTER — PATIENT OUTREACH (OUTPATIENT)
Dept: CARDIOLOGY CLINIC | Age: 64
End: 2019-07-18

## 2019-07-18 PROBLEM — J96.20 ACUTE ON CHRONIC RESPIRATORY FAILURE (HCC): Status: ACTIVE | Noted: 2019-07-18

## 2019-07-18 PROBLEM — I50.42 CHRONIC COMBINED SYSTOLIC AND DIASTOLIC HF (HEART FAILURE), NYHA CLASS 2 (HCC): Status: ACTIVE | Noted: 2019-07-18

## 2019-07-18 PROBLEM — I50.22 CHRONIC SYSTOLIC HEART FAILURE (HCC): Status: ACTIVE | Noted: 2019-07-18

## 2019-07-18 PROBLEM — I48.0 PAF (PAROXYSMAL ATRIAL FIBRILLATION) (HCC): Status: ACTIVE | Noted: 2019-07-18

## 2019-07-18 LAB
ANION GAP SERPL CALC-SCNC: 9 MMOL/L (ref 5–15)
BASOPHILS # BLD: 0.1 K/UL (ref 0–0.1)
BASOPHILS NFR BLD: 1 % (ref 0–1)
BUN SERPL-MCNC: 66 MG/DL (ref 6–20)
BUN/CREAT SERPL: 5 (ref 12–20)
CALCIUM SERPL-MCNC: 8.7 MG/DL (ref 8.5–10.1)
CHLORIDE SERPL-SCNC: 100 MMOL/L (ref 97–108)
CO2 SERPL-SCNC: 29 MMOL/L (ref 21–32)
CREAT SERPL-MCNC: 12.6 MG/DL (ref 0.55–1.02)
DIFFERENTIAL METHOD BLD: ABNORMAL
EOSINOPHIL # BLD: 0.5 K/UL (ref 0–0.4)
EOSINOPHIL NFR BLD: 5 % (ref 0–7)
ERYTHROCYTE [DISTWIDTH] IN BLOOD BY AUTOMATED COUNT: 20.6 % (ref 11.5–14.5)
EST. AVERAGE GLUCOSE BLD GHB EST-MCNC: ABNORMAL MG/DL
GLUCOSE SERPL-MCNC: 70 MG/DL (ref 65–100)
HBA1C MFR BLD: <3.5 % (ref 4.2–6.3)
HCT VFR BLD AUTO: 26.1 % (ref 35–47)
HGB BLD-MCNC: 7.8 G/DL (ref 11.5–16)
IMM GRANULOCYTES # BLD AUTO: 0.1 K/UL (ref 0–0.04)
IMM GRANULOCYTES NFR BLD AUTO: 1 % (ref 0–0.5)
LYMPHOCYTES # BLD: 1.8 K/UL (ref 0.8–3.5)
LYMPHOCYTES NFR BLD: 18 % (ref 12–49)
MAGNESIUM SERPL-MCNC: 2.1 MG/DL (ref 1.6–2.4)
MCH RBC QN AUTO: 27.7 PG (ref 26–34)
MCHC RBC AUTO-ENTMCNC: 29.9 G/DL (ref 30–36.5)
MCV RBC AUTO: 92.6 FL (ref 80–99)
MONOCYTES # BLD: 0.8 K/UL (ref 0–1)
MONOCYTES NFR BLD: 8 % (ref 5–13)
NEUTS SEG # BLD: 6.7 K/UL (ref 1.8–8)
NEUTS SEG NFR BLD: 67 % (ref 32–75)
NRBC # BLD: 0 K/UL (ref 0–0.01)
NRBC BLD-RTO: 0 PER 100 WBC
PLATELET # BLD AUTO: 388 K/UL (ref 150–400)
PMV BLD AUTO: 11.8 FL (ref 8.9–12.9)
POTASSIUM SERPL-SCNC: 5.2 MMOL/L (ref 3.5–5.1)
RBC # BLD AUTO: 2.82 M/UL (ref 3.8–5.2)
RBC MORPH BLD: ABNORMAL
SODIUM SERPL-SCNC: 138 MMOL/L (ref 136–145)
TROPONIN I SERPL-MCNC: 0.1 NG/ML
WBC # BLD AUTO: 10 K/UL (ref 3.6–11)

## 2019-07-18 PROCEDURE — 36415 COLL VENOUS BLD VENIPUNCTURE: CPT

## 2019-07-18 PROCEDURE — 83735 ASSAY OF MAGNESIUM: CPT

## 2019-07-18 PROCEDURE — 97116 GAIT TRAINING THERAPY: CPT

## 2019-07-18 PROCEDURE — 85025 COMPLETE CBC W/AUTO DIFF WBC: CPT

## 2019-07-18 PROCEDURE — 74011250636 HC RX REV CODE- 250/636: Performed by: INTERNAL MEDICINE

## 2019-07-18 PROCEDURE — 74011250637 HC RX REV CODE- 250/637: Performed by: INTERNAL MEDICINE

## 2019-07-18 PROCEDURE — 94760 N-INVAS EAR/PLS OXIMETRY 1: CPT

## 2019-07-18 PROCEDURE — A4722 DIALYS SOL FLD VOL > 1999CC: HCPCS | Performed by: INTERNAL MEDICINE

## 2019-07-18 PROCEDURE — 97162 PT EVAL MOD COMPLEX 30 MIN: CPT

## 2019-07-18 PROCEDURE — 97165 OT EVAL LOW COMPLEX 30 MIN: CPT | Performed by: OCCUPATIONAL THERAPIST

## 2019-07-18 PROCEDURE — 65660000000 HC RM CCU STEPDOWN

## 2019-07-18 PROCEDURE — 84484 ASSAY OF TROPONIN QUANT: CPT

## 2019-07-18 PROCEDURE — 97535 SELF CARE MNGMENT TRAINING: CPT | Performed by: OCCUPATIONAL THERAPIST

## 2019-07-18 PROCEDURE — 80048 BASIC METABOLIC PNL TOTAL CA: CPT

## 2019-07-18 RX ORDER — GENTAMICIN SULFATE 1 MG/G
OINTMENT TOPICAL DAILY
Status: DISCONTINUED | OUTPATIENT
Start: 2019-07-18 | End: 2019-07-22 | Stop reason: HOSPADM

## 2019-07-18 RX ADMIN — CALCITRIOL 0.25 MCG: 0.25 CAPSULE ORAL at 21:47

## 2019-07-18 RX ADMIN — AMLODIPINE BESYLATE 5 MG: 5 TABLET ORAL at 08:19

## 2019-07-18 RX ADMIN — LEVETIRACETAM 250 MG: 250 TABLET ORAL at 08:19

## 2019-07-18 RX ADMIN — CLOPIDOGREL BISULFATE 75 MG: 75 TABLET ORAL at 08:19

## 2019-07-18 RX ADMIN — ASPIRIN 81 MG 81 MG: 81 TABLET ORAL at 08:19

## 2019-07-18 RX ADMIN — OXYCODONE HYDROCHLORIDE 5 MG: 5 TABLET ORAL at 08:18

## 2019-07-18 RX ADMIN — HEPARIN SODIUM 5000 UNITS: 5000 INJECTION INTRAVENOUS; SUBCUTANEOUS at 05:49

## 2019-07-18 RX ADMIN — SEVELAMER CARBONATE 800 MG: 800 TABLET, FILM COATED ORAL at 18:55

## 2019-07-18 RX ADMIN — LORAZEPAM 0.5 MG: 2 INJECTION INTRAMUSCULAR; INTRAVENOUS at 15:12

## 2019-07-18 RX ADMIN — HEPARIN SODIUM 5000 UNITS: 5000 INJECTION INTRAVENOUS; SUBCUTANEOUS at 21:48

## 2019-07-18 RX ADMIN — ISOSORBIDE MONONITRATE 60 MG: 30 TABLET, EXTENDED RELEASE ORAL at 08:19

## 2019-07-18 RX ADMIN — LEVETIRACETAM 250 MG: 250 TABLET ORAL at 18:55

## 2019-07-18 RX ADMIN — ATORVASTATIN CALCIUM 40 MG: 40 TABLET, FILM COATED ORAL at 21:47

## 2019-07-18 RX ADMIN — EPOETIN ALFA-EPBX 10000 UNITS: 10000 INJECTION, SOLUTION INTRAVENOUS; SUBCUTANEOUS at 21:47

## 2019-07-18 RX ADMIN — SODIUM CHLORIDE, SODIUM LACTATE, CALCIUM CHLORIDE, MAGNESIUM CHLORIDE AND DEXTROSE 2500 ML: 1.5; 538; 448; 18.3; 5.08 INJECTION, SOLUTION INTRAPERITONEAL at 12:56

## 2019-07-18 RX ADMIN — SODIUM CHLORIDE, SODIUM LACTATE, CALCIUM CHLORIDE, MAGNESIUM CHLORIDE AND DEXTROSE 2500 ML: 4.25; 538; 448; 18.3; 5.08 INJECTION, SOLUTION INTRAPERITONEAL at 22:44

## 2019-07-18 RX ADMIN — SODIUM CHLORIDE, SODIUM LACTATE, CALCIUM CHLORIDE, MAGNESIUM CHLORIDE AND DEXTROSE 2500 ML: 1.5; 538; 448; 18.3; 5.08 INJECTION, SOLUTION INTRAPERITONEAL at 08:48

## 2019-07-18 RX ADMIN — SEVELAMER CARBONATE 800 MG: 800 TABLET, FILM COATED ORAL at 21:47

## 2019-07-18 RX ADMIN — PANTOPRAZOLE SODIUM 40 MG: 40 TABLET, DELAYED RELEASE ORAL at 08:19

## 2019-07-18 NOTE — PROGRESS NOTES
[facility] Progress Note        NAME: Laurel Ayala       :  1955       MRN:  [id number]     Date/Time: 2019    Risk of deterioration: low       Assessment:    Plan:  ESRD-PD  Transthoracic PD cath  HTN  Hyperkalemia  CMO  CAD  HX of cva   Weight down, but cxr worse  VOlume overload from poor PD-nursing unable to get pt to drain. Spent time with nursing to get her caretaker and/or sister to help with pd exchanges as pt can not do this on her own. Increase to 4.25% exchanges to facilitate volume removal  epo       Subjective:     Chief Complaint:  Hey, how you doing? Review of Systems: no n/v/cp/sob/f/c    Objective:     VITALS:   Last 24hrs VS reviewed since prior progress note. Most recent are:  Visit Vitals  BP (!) 147/97 (BP 1 Location: Left arm, BP Patient Position: At rest)   Pulse 87   Temp 98.4 °F (36.9 °C)   Resp 15   Ht 5' 3\" (1.6 m)   Wt 91.9 kg (202 lb 9.6 oz)   SpO2 100%   Breastfeeding? No   BMI 35.89 kg/m²     SpO2 Readings from Last 6 Encounters:   19 100%   19 100%   16 97%   16 98%   16 92%   04/15/16 97%    O2 Flow Rate (L/min): 2 l/min       Intake/Output Summary (Last 24 hours) at 2019 1525  Last data filed at 2019 1047  Gross per 24 hour   Intake 100 ml   Output 100 ml   Net 0 ml        Telemetry Reviewed   normal sinus rhythm    PHYSICAL EXAM:    General   well developed, well nourished, appears stated age, in no acute distress  Neck   Supple without nodes or mass. Respiratory   Clear To Auscultation bilaterally - (L) PD cath  Cardiology  Regular Rate and Rythmn    Extremities  No clubbing, cyanosis, or edema. Pulses intact.               Lab Data Reviewed: (see below)    Medications Reviewed: (see below)    PMH/SH reviewed - no change compared to H&P  __________________________________________  ___________________________________________________    Attending Physician: Estela Cooper MD ____________________________________________________  MEDICATIONS:  Current Facility-Administered Medications   Medication Dose Route Frequency    gentamicin (GARAMYCIN) 0.1 % ointment   Topical DAILY    LORazepam (ATIVAN) injection 0.5 mg  0.5 mg IntraVENous Q6H PRN    peritoneal dialysis DEXTROSE 1.5% (2.5 mEq/L low calcium) solution 2,500 mL  2,500 mL IntraPERitoneal 5XD    amLODIPine (NORVASC) tablet 5 mg  5 mg Oral DAILY    aspirin chewable tablet 81 mg  81 mg Oral DAILY    atorvastatin (LIPITOR) tablet 40 mg  40 mg Oral QHS    calcitRIOL (ROCALTROL) capsule 0.25 mcg  0.25 mcg Oral Once per day on Thu Sat    clopidogrel (PLAVIX) tablet 75 mg  75 mg Oral DAILY    isosorbide mononitrate ER (IMDUR) tablet 60 mg  60 mg Oral DAILY    lactulose (CHRONULAC) 10 gram/15 mL solution 30 mL  20 g Oral BID PRN    levETIRAcetam (KEPPRA) tablet 250 mg  250 mg Oral BID    pantoprazole (PROTONIX) tablet 40 mg  40 mg Oral DAILY    sevelamer carbonate (RENVELA) tab 800 mg  800 mg Oral TID    traMADol (ULTRAM) tablet 50 mg  50 mg Oral Q6H PRN    heparin (porcine) injection 5,000 Units  5,000 Units SubCUTAneous Q8H    labetalol (NORMODYNE;TRANDATE) injection 10 mg  10 mg IntraVENous Q6H PRN    acetaminophen (TYLENOL) tablet 650 mg  650 mg Oral Q4H PRN    ondansetron (ZOFRAN) injection 4 mg  4 mg IntraVENous Q6H PRN    oxyCODONE IR (ROXICODONE) tablet 5 mg  5 mg Oral Q6H PRN        LABS:  Recent Labs     07/18/19  0351 07/17/19  1131   WBC 10.0 13.6*   HGB 7.8* 8.5*   HCT 26.1* 28.4*    431*     Recent Labs     07/18/19  0351 07/17/19  1131 07/16/19 2021    139 141   K 5.2* 5.3* 5.7*    102 103   CO2 29 29 33*   BUN 66* 57* 48*   CREA 12.60* 11.40* 11.08*   GLU 70 80 85   CA 8.7 8.6 9.0   MG 2.1 2.1  --    PHOS  --  4.5  --      Recent Labs     07/17/19  1131 07/16/19 2021   SGOT 17 29   ALT 12 18   AP 94 107   TBILI 0.3 0.4   TP 6.7 7.4   ALB 2.3* 2.5*   GLOB 4.4* 4.9*     No results for input(s): INR, PTP, APTT in the last 72 hours. No lab exists for component: INREXT, INREXT   No results for input(s): FE, TIBC, PSAT, FERR in the last 72 hours. No results for input(s): PH, PCO2, PO2 in the last 72 hours.   Recent Labs     07/18/19  0351 07/17/19  1131 07/16/19  2214 07/16/19  1631   CPK  --   --   --  272*   CKNDX  --   --  1.7 1.0   TROIQ 0.10* 0.14* 0.06* 0.06*     Lab Results   Component Value Date/Time    Glucose (POC) 162 (H) 07/17/2019 03:21 AM

## 2019-07-18 NOTE — PROGRESS NOTES
Pt A+O. Denies dyspnea at this time. States follows reduced sodium diet at home, and weighs self daily. Continues on peritoneal dialysis. States has AMD with designated health care proxy. States the proxy is her daughter who lives in [facility]. Urged pt to have document brought to hospital to be scanned into the medical record. Also pt may benefit to consider having a more local person chosen to be health care proxy.

## 2019-07-18 NOTE — PROGRESS NOTES
Problem: Falls - Risk of  Goal: *Absence of Falls  Description  Document [facility] Fall Risk and appropriate interventions in the flowsheet. Outcome: Progressing Towards Goal  Note:   Fall Risk Interventions:  Mobility Interventions: Bed/chair exit alarm, Communicate number of staff needed for ambulation/transfer         Medication Interventions: Bed/chair exit alarm, Evaluate medications/consider consulting pharmacy, Teach patient to arise slowly    Elimination Interventions: Call light in reach, Bed/chair exit alarm, Elevated toilet seat, Patient to call for help with toileting needs              Problem: Patient Education: Go to Patient Education Activity  Goal: Patient/Family Education  Outcome: Progressing Towards Goal     Problem: Pressure Injury - Risk of  Goal: *Prevention of pressure injury  Description  Document Luis Scale and appropriate interventions in the flowsheet. Outcome: Progressing Towards Goal  Note:   Pressure Injury Interventions:  Sensory Interventions: Assess need for specialty bed, Chair cushion, Check visual cues for pain, Discuss PT/OT consult with provider, Float heels    Moisture Interventions: Absorbent underpads, Assess need for specialty bed, Apply protective barrier, creams and emollients, Minimize layers    Activity Interventions: Chair cushion, Increase time out of bed, Pressure redistribution bed/mattress(bed type), PT/OT evaluation    Mobility Interventions: Chair cushion, Float heels, Pressure redistribution bed/mattress (bed type), PT/OT evaluation, Turn and reposition approx.  every two hours(pillow and wedges)    Nutrition Interventions: Document food/fluid/supplement intake                     Problem: Patient Education: Go to Patient Education Activity  Goal: Patient/Family Education  Outcome: Progressing Towards Goal     Problem: Chronic Renal Failure  Goal: *Fluid and electrolytes stabilized  Outcome: Progressing Towards Goal     Problem: Patient Education: Go to Patient Education Activity  Goal: Patient/Family Education  Outcome: Progressing Towards Goal     Problem: Patient Education: Go to Patient Education Activity  Goal: Patient/Family Education  Outcome: Progressing Towards Goal     Problem: Heart Failure: Day 1  Goal: Off Pathway (Use only if patient is Off Pathway)  Outcome: Progressing Towards Goal  Goal: Activity/Safety  Outcome: Progressing Towards Goal  Goal: Consults, if ordered  Outcome: Progressing Towards Goal  Goal: Diagnostic Test/Procedures  Outcome: Progressing Towards Goal  Goal: Nutrition/Diet  Outcome: Progressing Towards Goal  Goal: Discharge Planning  Outcome: Progressing Towards Goal  Goal: Medications  Outcome: Progressing Towards Goal  Goal: Respiratory  Outcome: Progressing Towards Goal  Goal: Treatments/Interventions/Procedures  Outcome: Progressing Towards Goal  Goal: Psychosocial  Outcome: Progressing Towards Goal  Goal: *Oxygen saturation within defined limits  Outcome: Progressing Towards Goal  Goal: *Hemodynamically stable  Outcome: Progressing Towards Goal  Goal: *Optimal pain control at patient's stated goal  Outcome: Progressing Towards Goal  Goal: *Anxiety reduced or absent  Outcome: Progressing Towards Goal     Problem: Breathing Pattern - Ineffective  Goal: *Absence of hypoxia  Outcome: Progressing Towards Goal  Goal: *Use of effective breathing techniques  Outcome: Progressing Towards Goal

## 2019-07-18 NOTE — PROGRESS NOTES
Bedside shift change report GIVEN TO HOMAR LARA. Report included the following information SBAR, ED Summary, OR Summary, Intake/Output, Recent Results and Cardiac Rhythm NSR.         SIGNIFICANT CHANGES DURING SHIFT: NONE       CONCERNS TO ADDRESS WITH MD:  NONE          [facility] NURSING NOTE   Admission Date 7/16/2019   Admission Diagnosis CHF (congestive heart failure) ([facility] [address] [I50.9]   Consults IP CONSULT TO NEPHROLOGY  IP CONSULT TO NEPHROLOGY  IP CONSULT TO CARDIOLOGY      Cardiac Monitoring [x] Yes [] No      Purposeful Hourly Rounding [x] Yes    Nataliya Score Total Score: 2   Nataliya score 3 or > [x] Bed Alarm [] Avasys [] 1:1 sitter [] Patient refused (Signed refusal form in chart)   Luis Score Luis Score: 17   Luis score 14 or < [] PMT consult [] Wound Care consult    []  Specialty bed  [] Nutrition consult      Influenza Vaccine Received Flu Vaccine for Current Season (usually Sept-March): Not Flu Season           Oxygen needs? [] Room air Oxygen @  []1L    [x]2L    []3L   []4L    []5L   []6L via  NC   Chronic home O2 use? [] Yes [x] No  Perform O2 challenge test and document in progress note using smartphrase (.Homeoxygen)      Last bowel movement Last Bowel Movement Date: 07/17/19      Urinary Catheter             LDAs               Peripheral IV 07/16/19 Right Antecubital (Active)   Site Assessment Clean, dry, & intact 7/17/2019  8:06 PM   Phlebitis Assessment 0 7/17/2019  8:06 PM   Infiltration Assessment 0 7/17/2019  8:06 PM   Dressing Status Clean, dry, & intact 7/17/2019  8:06 PM   Dressing Type Transparent;Tape 7/17/2019  8:06 PM   Hub Color/Line Status Pink 7/17/2019  8:06 PM                         Readmission Risk Assessment Tool Score High Risk            22       Total Score        3 Has Seen PCP in Last 6 Months (Yes=3, No=0)    9 Pt. Coverage (Medicare=5 , Medicaid, or Self-Pay=4)    10 Charlson Comorbidity Score (Age + Comorbid Conditions)        Criteria that do not apply:    .  Living with Significant Other. Assisted Living. LTAC. SNF.  or   Rehab    Patient Length of Stay (>5 days = 3)    IP Visits Last 12 Months (1-3=4, 4=9, >4=11)       Expected Length of Stay 4d 2h   Actual Length of Stay 2

## 2019-07-18 NOTE — PROGRESS NOTES
RAPID RESPONSE TEAM- Follow Up    Rounded on patient due to recent transfer out of CCU (7/17/19). Discussed with primary RN, Gilbert Rivera. No acute concerns, VSS, MEWS 1. No RRT interventions indicated at this time. Please call with any questions or concerns.      Yasmine Painter  Rapid Response RN  Kari Yoder

## 2019-07-18 NOTE — PROGRESS NOTES
Patient is presently on peritoneal dialysis. Will defer OT evaluation and follow back later today or tomorrow.

## 2019-07-18 NOTE — PROGRESS NOTES
Problem: Mobility Impaired (Adult and Pediatric)  Goal: *Acute Goals and Plan of Care (Insert Text)  Description  Physical Therapy Goals  Initiated 7/18/2019  1. Patient will move from supine to sit and sit to supine  in bed with independence within 7 day(s). 2.  Patient will transfer from bed to chair and chair to bed with modified independence using the least restrictive device within 7 day(s). 3.  Patient will perform sit to stand with modified independence within 7 day(s). 4.  Patient will ambulate with modified independence for 100 feet with the least restrictive device within 7 day(s). Outcome: Progressing Towards Goal  PHYSICAL THERAPY EVALUATION  Patient: Juma Simms (67 y.o. female)  Date: 7/18/2019  Primary Diagnosis: CHF (congestive heart failure) ([facility] [I50.9]        Precautions:   Fall    ASSESSMENT :  Based on the objective data described below, the patient presents with generalized weakness, self limiting behavior, fairly good balance and likely baseline mobility. Pt was received in supine and cleared by nursing to mobilize. She was able to perform all mobility at an overall CGA/SBA level, but needed increased cues to be re-directed and encouraged to increase her activity. She was able to ambulate with RW and no LOB noted. She refused to perform toilet transfers even for just assessment. Nursing entered the room and stated pt was needed in bed for peritoneal dialysis. She required additional time to return to supine, but she is able to do it on her own. 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 .... but she lives in [facility]. Lord Dieudonne Arandalas She would benefit from HHPT. Per chart pt is legally blind, but did not present with any issues maneuvering around obstacles. Patient will benefit from skilled intervention to address the above impairments.   Patients rehabilitation potential is considered to be Fair  Factors which may influence rehabilitation potential include:   ? None noted  ? Mental ability/status  ? Medical condition  ? Home/family situation and support systems  ? Safety awareness  ? Pain tolerance/management  ? Other:      PLAN :  Recommendations and Planned Interventions:  ?           Bed Mobility Training             ? Neuromuscular Re-Education  ? Transfer Training                   ? Orthotic/Prosthetic Training  ? Gait Training                         ? Modalities  ? Therapeutic Exercises           ? Edema Management/Control  ? Therapeutic Activities            ? Patient and Family Training/Education  ? Other (comment):    Frequency/Duration: Patient will be followed by physical therapy  3 times a week to address goals. Discharge Recommendations: Home Health  Further Equipment Recommendations for Discharge: none      SUBJECTIVE:   Patient stated Peola Paget is pee on the toilet, I can feel it.  Pt was not even touching the toilet    OBJECTIVE DATA SUMMARY:   HISTORY:    Past Medical History:   Diagnosis Date    Acute on chronic respiratory failure ([facility] 7/18/2019    Blood clot in vein     left arm several years ago    CAD (coronary artery disease)     Chronic kidney disease     Chronic systolic heart failure ([facility] 7/18/2019    Congestive heart failure (HCC)     Hypercholesterolemia     Hypertension     Legally blind     PAF (paroxysmal atrial fibrillation) ([facility] 7/18/2019    Seizures ([facility]     Stroke [facility])      Past Surgical History:   Procedure Laterality Date    CARDIAC SURG PROCEDURE UNLIST      heart attack 12/15    HX ORTHOPAEDIC      right middle finger    HX OTHER SURGICAL      shunt placed in brain     Prior Level of Function/Home Situation: pt reports living with a \"friend\" and had graduated to a [facility] form a RW. Pt seems confused at times and presents with very self limiting behavior.    Personal factors and/or comorbidities impacting plan of care: self limiting behavior    Home Situation  Home Environment: Private residence  One/Two Story Residence: One story  Living Alone: Yes  Support Systems: Friends \ neighbors  Patient Expects to be Discharged to[de-identified] Private residence  Current DME Used/Available at Home: Charley Gracia    EXAMINATION/PRESENTATION/DECISION MAKING:   Critical Behavior:  Neurologic State: Alert  Orientation Level: Oriented X4  Cognition: Follows commands(self limiting behavior)     Hearing: Auditory  Auditory Impairment: None  Skin:  intact  Edema: WDL  Range Of Motion:  AROM: Within functional limits                       Strength:    Strength: Within functional limits                    Tone & Sensation:   Tone: Normal              Sensation: Intact               Coordination:     Vision:   Acuity: Impaired near vision; Impaired far vision(reports being legally blind)  Functional Mobility:  Bed Mobility:  Rolling: Modified independent  Supine to Sit: Modified independent  Sit to Supine: Modified independent  Scooting: Supervision  Transfers:  Sit to Stand: Supervision  Stand to Sit: Supervision                       Balance:   Sitting: Intact  Standing: Impaired(but good with support of RW during ambulation)  Standing - Static: Good  Standing - Dynamic : Good  Ambulation/Gait Training:  Distance (ft): 30 Feet (ft)  Assistive Device: Gait belt;Walker, rolling  Ambulation - Level of Assistance: Stand-by assistance        Gait Abnormalities: Decreased step clearance;Shuffling gait        Base of Support: Widened     Speed/Dalia: Pace decreased (<100 feet/min); Shuffled  Step Length: Right shortened;Left shortened             Functional Measure:  Barthel Index:    Bathin  Bladder: 10  Bowels: 10  Groomin  Dressin  Feedin  Mobility: 0  Stairs: 0  Toilet Use: 5  Transfer (Bed to Chair and Back): 10  Total: 45/100       Percentage of impairment   0%   1-19%   20-39%   40-59%   60-79%   80-99%   100% Barthel Score 0-100 100 99-80 79-60 59-40 20-39 1-19   0     The Barthel ADL Index: Guidelines  1. The index should be used as a record of what a patient does, not as a record of what a patient could do. 2. The main aim is to establish degree of independence from any help, physical or verbal, however minor and for whatever reason. 3. The need for supervision renders the patient not independent. 4. A patient's performance should be established using the best available evidence. Asking the patient, friends/relatives and nurses are the usual sources, but direct observation and common sense are also important. However direct testing is not needed. 5. Usually the patient's performance over the preceding 24-48 hours is important, but occasionally longer periods will be relevant. 6. Middle categories imply that the patient supplies over 50 per cent of the effort. 7. Use of aids to be independent is allowed. Sergo Church., Barthel, D.W. ([id number]). Functional evaluation: the Barthel Index. [facility] (14)2. RODNA Zendejas, Aly Sheriff., Izella Ing., [place], [facility] (1999). Measuring the change indisability after inpatient rehabilitation; comparison of the responsiveness of the Barthel Index and Functional Anderson Measure. Journal of Neurology, Neurosurgery, and Psychiatry, 66(4), 798-918. Jarad Peoples, [place].JACKIE, BRITTANY Lott, & Ruben Pablo, M.A. (2004.) Assessment of post-stroke quality of life in cost-effectiveness studies: The usefulness of the Barthel Index and the EuroQoL-5D.  Quality of Life Research, 15, 193-93            Physical Therapy Evaluation Charge Determination   History Examination Presentation Decision-Making   HIGH Complexity :3+ comorbidities / personal factors will impact the outcome/ POC  MEDIUM Complexity : 3 Standardized tests and measures addressing body structure, function, activity limitation and / or participation in recreation  MEDIUM Complexity : Evolving with changing characteristics  Other outcome measures barthel  MEDIUM      Based on the above components, the patient evaluation is determined to be of the following complexity level: MEDIUM    Pain:  Pain Scale 1: Numeric (0 - 10)  Pain Intensity 1: 9  Pain Location 1: Rib cage; Throat  Pain Orientation 1: Left;Right  Pain Description 1: Aching; Sore  Pain Intervention(s) 1: Medication (see MAR)  Activity Tolerance:   fair  Please refer to the flowsheet for vital signs taken during this treatment. After treatment:   ?         Patient left in no apparent distress sitting up in chair  ? Patient left in no apparent distress in bed  ? Call bell left within reach  ? Nursing notified  ? Caregiver present  ? Bed alarm activated    COMMUNICATION/EDUCATION:   The patients plan of care was discussed with: Occupational Therapist and Registered Nurse. ?         Fall prevention education was provided and the patient/caregiver indicated understanding. ? Patient/family have participated as able in goal setting and plan of care. ?         Patient/family agree to work toward stated goals and plan of care. ?         Patient understands intent and goals of therapy, but is neutral about his/her participation. ? Patient is unable to participate in goal setting and plan of care.     Thank you for this referral.  Hal Viveros, PT, DPT   Time Calculation: 27 mins

## 2019-07-18 NOTE — PROGRESS NOTES
Occupational Therapy EVALUATION/discharge  Patient: Danilo Dempsey (23 y.o. female)  Date: 7/18/2019  Primary Diagnosis: CHF (congestive heart failure) ([facility] [I50.9]       Precautions:   Fall    ASSESSMENT:   Based on the objective data described below, the patient presents with baseline Low vision, mildly decreased standing balance and self limiting behavior which impedes her functional independence . Patient's concept of her physical abilities is greatly impaired, reporting that she has very impaired balance and that her LUE is not functional, despite demonstrating functional use of LUE and good standing balance with support of RW today. She reports requiring min to mod A for most ADLs prior to admit, but was able to perform ADLs at a supervision/setup level during this evaluation. In regards to functional mobility she is mod I for bed mobility, supervision for transfers and is SBA to ambulate with a RW. Further skilled acute occupational therapy is not indicated at this time, but she will benefit from [address] [place] [address] after discharge. Discharge Recommendations: Home Health OT, PT and supervision. Further Equipment Recommendations for Discharge: None           OBJECTIVE DATA SUMMARY:   HISTORY:   Past Medical History:   Diagnosis Date    Blood clot in vein     left arm several years ago    CAD (coronary artery disease)     Chronic kidney disease     Congestive heart failure ([facility]     Hypercholesterolemia     Hypertension     Legally blind     Seizures ([facility]     [facility]roke [facility])      Past Surgical History:   Procedure Laterality Date    CARDIAC SURG PROCEDURE UNLIST      heart attack 12/15    HX ORTHOPAEDIC      right middle finger    HX OTHER SURGICAL      shunt placed in brain       Prior Level of Function/Environment/Context: Patient recently discharged from SNF rehab to living with a friend. She reports that she recently graduated from ambulating with a RW to using a cane.  Patient reports requiring min to mod A for ADLs overall. She has low vision at baseline. Occupations in which the patient is/was successful, what are the barriers preventing that success:   Performance Patterns (routines, roles, habits, and rituals):   Personal Interests and/or values:   Expanded or extensive additional review of patient history:     Home Situation  Home Environment: Private residence  One/Two Story Residence: One story  Living Alone: Yes  Support Systems: Friends \ neighbors  Patient Expects to be Discharged to[de-identified] Private residence  Current DME Used/Available at Home: [address], toilet riser     Hand dominance: Right    EXAMINATION OF PERFORMANCE DEFICITS:  Cognitive/Behavioral Status:  Neurologic State: Alert  Orientation Level: Oriented X4  Cognition: Follows commands(self limiting behavior)  Perception: Appears intact  Perseveration: No perseveration noted       Hearing: Auditory  Auditory Impairment: None    Vision/Perceptual:    Acuity: Impaired near vision; Impaired far vision(reports being legally blind)         Range of Motion:  AROM: Within functional limits                   Strength:  Strength: Within functional limits                Coordination:  Fine Motor Skills-Upper: Left Intact; Right Intact    Gross Motor Skills-Upper: Left Intact; Right Intact    Tone & Sensation:  Tone: Normal  Sensation: Intact                      Balance:  Sitting: Intact  Standing: Impaired(but good with support of RW during ambulation)  Standing - Static: Good  Standing - Dynamic : Good    Functional Mobility and Transfers for ADLs:  Bed Mobility:  Rolling: Modified independent  Supine to Sit: Modified independent  Sit to Supine: Modified independent  Scooting: Supervision    Transfers:  Sit to Stand: Supervision  Stand to Sit: Supervision  Bathroom Mobility: Stand-by assistance(ambulating with a RW. cueing due to impaired vision)  Toilet Transfer : Supervision    ADL Assessment:  Feeding: Supervision;Setup    Oral Facial Hygiene/Grooming: Supervision;Setup    Bathing: Supervision;Setup    Upper Body Dressing: Supervision;Setup    Lower Body Dressing: Supervision;Setup    Toileting: Supervision;Setup              Functional Measure:  Barthel Index:    Bathin  Bladder: 10  Bowels: 10  Groomin  Dressin  Feedin  Mobility: 0  Stairs: 0  Toilet Use: 5  Transfer (Bed to Chair and Back): 10  Total: 45        The Barthel ADL Index: Guidelines  1. The index should be used as a record of what a patient does, not as a record of what a patient could do. 2. The main aim is to establish degree of independence from any help, physical or verbal, however minor and for whatever reason. 3. The need for supervision renders the patient not independent. 4. A patient's performance should be established using the best available evidence. Asking the patient, friends/relatives and nurses are the usual sources, but direct observation and common sense are also important. However direct testing is not needed. 5. Usually the patient's performance over the preceding 24-48 hours is important, but occasionally longer periods will be relevant. 6. Middle categories imply that the patient supplies over 50 per cent of the effort. 7. Use of aids to be independent is allowed. Charmaine Briceño., Barthel, D.W. (1616). Functional evaluation: the Barthel Index. [address] [facility] (14)2. RONDA Schreiber, Augustine Fajardo, Bentley Collado., [place], [address] [place] [address] (). Measuring the change indisability after inpatient rehabilitation; comparison of the responsiveness of the Barthel Index and Functional Carroll Measure. Journal of Neurology, Neurosurgery, and Psychiatry, 66(4), . Asad Newell, N.J.A, BRITTANY Lott, & Ofe Chaney M.A. (2004.) Assessment of post-stroke quality of life in cost-effectiveness studies: The usefulness of the Barthel Index and the EuroQoL-5D.  Quality of Life Research, 13, 003-15     Activity Tolerance:   VSS on 2 L NC  Please refer to the flowsheet for vital signs taken during this treatment. After treatment:   []  Patient left in no apparent distress sitting up in chair  [x]  Patient left in no apparent distress in bed  [x]  Call bell left within reach  [x]  Nursing notified  []  Caregiver present  [x]  Bed alarm activated    COMMUNICATION/EDUCATION:   Communication/Collaboration:  [x]      Home safety education was provided and the patient/caregiver indicated understanding. [x]      Patient/family have participated as able and agree with findings and recommendations. []      Patient is unable to participate in plan of care at this time.   Findings and recommendations were discussed with: Physical Therapist and     Favio Zzishve, OTR/L  Time Calculation: 25 mins

## 2019-07-18 NOTE — PROGRESS NOTES
RAPID RESPONSE TEAM-Follow up  Rounding on patient due to transfer from CCU on 7/17. Vitals w/ MEWS Score (last day)     Date/Time MEWS Score Pulse Resp Temp BP Level of Consciousness SpO2    07/18/19 1505    87  15  98.4 °F (36.9 °C)  (!) 147/97    100 %    07/18/19 1046  1  89  18  98.2 °F (36.8 °C)  130/74  Alert  99 %    07/18/19 0754  1  93  18  98.1 °F (36.7 °C)  (!) 154/103  Alert  100 %    07/18/19 0240  1  92  20  98 °F (36.7 °C)  (!) 146/99  Alert  91 %    07/18/19 0018    (!) 105  20  97.7 °F (36.5 °C)  (!) 159/93    91 %    07/17/19 2006  1  78  19  98.2 °F (36.8 °C)  127/74  Alert  98 %    07/17/19 1534          142/88        07/17/19 1526    79  19    (!) 124/96    99 %    07/17/19 1500    78  16    139/82    100 %    07/17/19 1400    75  18    139/83    100 %    07/17/19 1300    75  19    136/84    100 %    07/17/19 1230    80  21        99 %    07/17/19 1215    80  23        99 %    07/17/19 1200  2  80  20  98.2 °F (36.8 °C)  144/82  Responds to Voice  100 %    07/17/19 1103          145/82        07/17/19 1100    79  20    137/84    100 %    07/17/19 1000    80  18    145/82    100 %    07/17/19 0900    83  23    141/87    100 %    07/17/19 0800  4  86  21  98.6 °F (37 °C)  144/85  (!) Confused or Agitated  100 %    07/17/19 0711              100 %    07/17/19 0710              100 %    07/17/19 0600    80  21    150/88    100 %    07/17/19 0559              100 %    07/17/19 0523              100 %    07/17/19 0500    81  24    133/82    100 %    07/17/19 0400  4  100  26  97.4 °F (36.3 °C)  (!) 151/98  (!) Confused or Agitated  100 %    07/17/19 0359              100 %    07/17/19 0225    (!) 130  (!) 38        93 %            No RRT interventions are currently indicated. Please call back if needed.  Champ Delacruz

## 2019-07-18 NOTE — CARDIO/PULMONARY
Cardiac Rehab Note: chart review     CHF BUNDLE      EF 26-30%  on 7/17/2019 per Echo    Smoking history assessed. Patient is a never smoker. Current inpatient diet: DIET RENAL     \"Living with Heart Failure\" book with daily weight calendar and s/s of heart failure magnet provided to Rama Malik on 7/18/2019                                              Nurse stated pt could still be taught during [address] states dialysis has not yet been started- mask on per pt request                                               Educated using teach back method. Instruction given on s/s of CHF, checking weight every am and calling MD if weight is up 2-3 lbs in a day or 5 lbs in a week (or as directed by the physician), fluid/Na restrictions, s/s of worsening CHF and when to call MD.   Reviewed activity as tolerated with frequent rest periods as needed, taking medications as prescribed, and the importance of follow up visits with physician. Reminded pt to follow wt reporting and diet per Nephrologist recommendations. States she does have a scale at home and lives with family and has caregivers help her with groceries,meals and care. Able to list high sodium foods and states she is very meticulous regarding cleanliness and infection control. Rama Malik verbalized understanding with questions answered.   Marie Mcpherson RN

## 2019-07-18 NOTE — PROGRESS NOTES
[facility] met with patient today to provide an introduction to self, the [address] at [facility]. Bundled Payment Care Program:    Patient was provided a copy of the [facility] letter. Patient states that they have a functioning scale at home. Patient was not provided a scale during this visit. Reinforced the importance of checking their weight at the same time daily and calling the cardiologist if their weight is up 3 lbs. in a day or 5 lbs. in a week (or per MD guidelines). Patient  has not received a \"Living with Heart Failure\" patient education book. [employer] Program:    Provided patient demonstration of [employer] [employer] program on training iPad.     Patient was not interested in the [employer]:     Patient stated best day(s) of the week for provider appointment(s): any day  Patient stated best time of day for provider appointment(s):  AM

## 2019-07-18 NOTE — CONSULTS
[address] , [place], [address] [facility]     Date of  Admission: 7/16/2019  9:47 PM     Admission type:Emergency    Consult for: heart failure  Consult by: hospitalist     Subjective:     Laura Aguilera is a 62 y.o. female admitted for CHF (congestive heart failure) ([facility] [I50.9]. Admitted to [facility] with c/o acute respiratory distress r/t ESRD, noncompliant dialysis ?, volume overload, and has a hx of chronic systolic/diastolic HF (EF 11% 3/9905) and repeat echo EF 25-30%. Is on peritoneal dialysis but remains 2L positive. Patient is not the best historian, but states she has gained 50#s in the last year. In the review of her chart, she has had multiple admissions to [facility] and [facility] over the last 2 years. She is not clear about her history and confuses dates. Apparently was discharged from a SNF 3 weeks ago, lives with family member who assists with PD as she is legally blind. ECG ST with no acute changes. Trop 0.06-0.14-0. 10. NTproBNP > 35,000. PMH:   · CAD with PCI/ANDER to prox LAD,  of first OM and RCA (8/2018);    · Chronic systolic/diastolic HF (EF 67-05%) - low EF documented in 2016 with no mention of AICD implant  · PAF - PTA on amiodarone but was not restarted , not a candidate for DOAC due to chronic subdural hematoma  · ESRD on peritoneal dyalysis  · Hx of CVA  · Anemia    Previous treatment/evaluation includes Percutaneous Coronary Intervention and echocardiogram . Cardiac risk factors: family history, dyslipidemia, obesity, sedentary life style, hypertension, stress, post-menopausal.    Patient Active Problem List    Diagnosis Date Noted    Chronic combined systolic and diastolic HF (heart failure), NYHA class 2 ([facility] 07/18/2019    PAF (paroxysmal atrial fibrillation) ([facility] 07/18/2019    Acute on chronic respiratory failure ([facility] 07/18/2019    S/P angioplasty with stent 07/16/2019    CHF (congestive heart failure) ([facility] 07/16/2019    SOB (shortness of breath) 07/16/2019    Abdominal pain 06/19/2016    Muscle cramps 05/26/2016    Ventriculo-peritoneal shunt status 05/26/2016    Chronic nausea 05/26/2016    Itchy skin 03/31/2016    Hyperlipidemia 03/17/2016    Peritoneal dialysis status ([facility] 03/10/2016    Memory loss 03/10/2016    Coronary artery disease involving native coronary artery of native heart with unstable angina pectoris ([facility] 03/10/2016    CVA, old, cognitive deficits 03/10/2016    Chronic abdominal pain 03/10/2016    Seizure ([facility] 10/12/2015    HTN (hypertension) 10/12/2015    Hydrocephalus 10/12/2015    End stage renal disease ([facility] 10/12/2015      Unknown, Provider  Past Medical History:   Diagnosis Date    Acute on chronic respiratory failure ([facility] 7/18/2019    Blood clot in vein     left arm several years ago    CAD (coronary artery disease)     Chronic kidney disease     Chronic systolic heart failure (HCC) 7/18/2019    Congestive heart failure ([facility]     Hypercholesterolemia     Hypertension     Legally blind     PAF (paroxysmal atrial fibrillation) ([facility] 7/18/2019    Seizures ([facility]     Stroke [facility])       Social History     Socioeconomic History    Marital status: SINGLE     Spouse name: Not on file    Number of children: Not on file    Years of education: Not on file    Highest education level: Not on file   Tobacco Use    Smoking status: Never Smoker    Smokeless tobacco: Never Used   Substance and Sexual Activity    Alcohol use: No    Drug use: Yes     Types: Marijuana     Comment: last used 1 week ago    Sexual activity: Never     Allergies   Allergen Reactions    Gabapentin Other (comments) and Seizures     Confusion, psychosis \"I was acting like a 4year old at a family event, drawing on walls and everything\"        Family History   Problem Relation Age of Onset    Diabetes Other     Hypertension Other     Cancer Other       Current Facility-Administered Medications   Medication Dose Route Frequency    gentamicin (GARAMYCIN) 0.1 % ointment   Topical DAILY    LORazepam (ATIVAN) injection 0.5 mg  0.5 mg IntraVENous Q6H PRN    peritoneal dialysis DEXTROSE 1.5% (2.5 mEq/L low calcium) solution 2,500 mL  2,500 mL IntraPERitoneal 5XD    amLODIPine (NORVASC) tablet 5 mg  5 mg Oral DAILY    aspirin chewable tablet 81 mg  81 mg Oral DAILY    atorvastatin (LIPITOR) tablet 40 mg  40 mg Oral QHS    calcitRIOL (ROCALTROL) capsule 0.25 mcg  0.25 mcg Oral Once per day on Thu Sat    clopidogrel (PLAVIX) tablet 75 mg  75 mg Oral DAILY    isosorbide mononitrate ER (IMDUR) tablet 60 mg  60 mg Oral DAILY    lactulose (CHRONULAC) 10 gram/15 mL solution 30 mL  20 g Oral BID PRN    levETIRAcetam (KEPPRA) tablet 250 mg  250 mg Oral BID    pantoprazole (PROTONIX) tablet 40 mg  40 mg Oral DAILY    sevelamer carbonate (RENVELA) tab 800 mg  800 mg Oral TID    traMADol (ULTRAM) tablet 50 mg  50 mg Oral Q6H PRN    heparin (porcine) injection 5,000 Units  5,000 Units SubCUTAneous Q8H    labetalol (NORMODYNE;TRANDATE) injection 10 mg  10 mg IntraVENous Q6H PRN    acetaminophen (TYLENOL) tablet 650 mg  650 mg Oral Q4H PRN    ondansetron (ZOFRAN) injection 4 mg  4 mg IntraVENous Q6H PRN    oxyCODONE IR (ROXICODONE) tablet 5 mg  5 mg Oral Q6H PRN        Review of Symptoms: difficult to obtain from patient        Objective:      Visit Vitals  /74 (BP 1 Location: Right arm)   Pulse 89   Temp 98.2 °F (36.8 °C)   Resp 18   Ht 5' 3\" (1.6 m)   Wt 91.9 kg (202 lb 9.6 oz)   SpO2 99%   Breastfeeding?  No   BMI 35.89 kg/m²       Physical:   General:   Heart: RRR, no m/S3/JVD, no carotid bruits   Lungs: clear   Abdomen: Soft, +BS, NTND   Extremities: LE zion +DP/PT, no edema   Neurologic: Grossly normal    Data Review:   Recent Labs     07/18/19  0351 07/17/19  1131 07/16/19  1631   WBC 10.0 13.6* 12.4*   HGB 7.8* 8.5* 9.0*   HCT 26.1* 28.4* 29.4*    431* 448* Recent Labs     07/18/19  0351 07/17/19  1131 07/16/19  2021    139 141   K 5.2* 5.3* 5.7*    102 103   CO2 29 29 33*   GLU 70 80 85   BUN 66* 57* 48*   CREA 12.60* 11.40* 11.08*   CA 8.7 8.6 9.0   MG 2.1 2.1  --    PHOS  --  4.5  --    ALB  --  2.3* 2.5*   TBILI  --  0.3 0.4   SGOT  --  17 29   ALT  --  12 18       Recent Labs     07/18/19  0351 07/17/19  1131 07/16/19  2214 07/16/19  1631   TROIQ 0.10* 0.14* 0.06* 0.06*   CPK  --   --   --  272*   CKMB  --   --  3.7* 2.7         Intake/Output Summary (Last 24 hours) at 7/18/2019 1349  Last data filed at 7/18/2019 1047  Gross per 24 hour   Intake 100 ml   Output 0 ml   Net 100 ml        Cardiographics    Telemetry: SR  ECG: ST with no acute changes    Echocardiogram: 7/17/19  · Left Ventricle: Mildly dilated left ventricle. Mild concentric hypertrophy. Severe systolic dysfunction. Estimated left ventricular ejection fraction is 26 - 30%. Visually measured ejection fraction. Left ventricular global hypokinesis. Moderate (grade 2) left ventricular diastolic dysfunction. · Left Atrium: Moderately dilated left atrium. · Aortic Valve: Mild aortic valve focal thickening present. Mild aortic valve leaflet calcification present with reduced excursion. Mild to moderate aortic valve regurgitation is present. · Mitral Valve: Moderate mitral annular calcification. Mild mitral valve stenosis. Moderate mitral valve regurgitation. · Tricuspid Valve: Mild tricuspid valve regurgitation is present. · Pulmonary Artery: Mild pulmonary hypertension. CXRAY: Increased bilateral pulmonary edema. No pneumothorax.        Assessment:       Principal Problem:    Acute on chronic respiratory failure ([facility] (7/18/2019)    Active Problems:    Seizure ([facility] (10/12/2015)      HTN (hypertension) (10/12/2015)      End stage renal disease ([facility] (10/12/2015)      Peritoneal dialysis status ([facility] (3/10/2016)      Memory loss (3/10/2016)      Hyperlipidemia (3/17/2016) Chronic combined systolic and diastolic HF (heart failure), NYHA class 2 (HCC) (7/18/2019)      PAF (paroxysmal atrial fibrillation) ([facility] (7/18/2019)         Plan:     Acute on chronic systolic/diastolic HF (EF 70-03% 2/09/0527)  Respiratory failure r/t ESRD, volume overload which has exacerbated the HF  Agree with aggressive peritoneal dialysis as tolerated. Patient had been on Bumex 2mg BID PTA, but this has not been continued. She has only received 1 dose of diuretics since admission on 7/16/19 and continues with worsening pulm edema - discussing with Nephrology  Had been on an ARB but this was discontinued at OSH within the last few months. BP would allow restarting if ok with Nephrology  Restarting Coreg, BP stable. Further discussion with POA reference AICD conversation in the past - could not find mention of AICD in OSH records  Monitor I/Os, daily weights, and labs. CAD with hx of stents and CTOs of RCA and OM:  Minimal elevation of troponin, and likely r/t ESRD as it is relatively flat  Will need to determine if patient is willing to proceed with further cardiac evaluation  Continue on ASA, BB, statin. SQ heparin    Thank you for consulting [address], NP       [facility]    7/18/2019         Patient seen, examined by me personally. Plan discussed as detailed. Agree with note as outlined by  NP. I confirm findings in history and physical exam. No additional findings noted. Agree with plan as outlined above. Not sure if patient understands her clinical disorders and treatment options. Will optimize medical therapy. Continue to educate, discuss with POA.     Sandor Moy MD

## 2019-07-18 NOTE — PROGRESS NOTES
[facility] dialysis, spoke with Nahomy Laboy the PD nurse familiar with the patient. She will call Maricruz Wilkes, patient's caregiver.

## 2019-07-18 NOTE — PROGRESS NOTES
Hospitalist Progress Note    NAME: Gege White   :  1955   MRN:  [id number]       Assessment / Plan:  Acute on Chronic Hypoxic Respiratory Failure POA:   Episode of RRT - 2/ Tachypnea/Hypoxia. , also some component of anxiety  CXR with increased Pulmonary edema   -Continue Peritoneal HD  -Volume overload likely poor PD nursing by pt at home, will need to teach her caregiver.  -Cardio on Board  -Nephro on Board    c/w supplemental O2, at home recently on [facility]. Acute on Chronic  Diastolic Heart Failure POA: ,  Increased Troponin POA  Getting peritoneal HD  cardiology on board  Echo with 26-30% EF and Grade II diastolic dysfunction. Left LV hypokinesis  Medical management as per cards    ESRD on PD:  Aware  HTN: c/w Home regimen, use labetalol prn. Seizures: c/w Keppra, monitor, seizure precautions. Code Status: Full Code  Surrogate Decision Maker: DTR Macel [place] [id number]  DVT Prophylaxis: Heparin  GI Prophylaxis: not indicated  Baseline: very debilitated, recently D/c from Rehab to friends house then to [facility] to here. Subjective:     Chief Complaint / Reason for Physician Visit  No events overnight  This afternoon pt reported that she feels anxious again. No cp. Currently on 2 L NC      Review of Systems:  Symptom Y/N Comments  Symptom Y/N Comments   Fever/Chills n   Chest Pain n    Poor Appetite n   Edema     Cough n   Abdominal Pain n    Sputum n   Joint Pain     SOB/MAYORGA y   Pruritis/Rash     Nausea/vomit    Tolerating PT/OT     Diarrhea    Tolerating Diet     Constipation    Other       Could NOT obtain due to:      Objective:     VITALS:   Last 24hrs VS reviewed since prior progress note.  Most recent are:  Patient Vitals for the past 24 hrs:   Temp Pulse Resp BP SpO2   19 1505 98.4 °F (36.9 °C) 87 15 (!) 147/97 100 %   19 1046 98.2 °F (36.8 °C) 89 18 130/74 99 %   19 0754 98.1 °F (36.7 °C) 93 18 (!) 154/103 100 %   19 0240 98 °F (36.7 °C) 92 20 (!) 146/99 91 %   07/18/19 0018 97.7 °F (36.5 °C) (!) 105 20 (!) 159/93 91 %   07/17/19 2006 98.2 °F (36.8 °C) 78 19 127/74 98 %       Intake/Output Summary (Last 24 hours) at 7/18/2019 1546  Last data filed at 7/18/2019 1047  Gross per 24 hour   Intake 100 ml   Output 100 ml   Net 0 ml        PHYSICAL EXAM:  General: WD, WN. Alert, cooperative, no acute distress    EENT:  EOMI. Anicteric sclerae. MMM  Resp:  Minimal rales base  CV:  Regular  rhythm,  No edema  GI:  Soft, Non distended, Non tender.  +Bowel sounds  Neurologic:  Alert and oriented X 3, normal speech,   Psych:   Good insight. Not anxious nor agitated  Skin:  No rashes. No jaundice    Reviewed most current lab test results and cultures  YES  Reviewed most current radiology test results   YES  Review and summation of old records today    NO  Reviewed patient's current orders and MAR    YES  PMH/ reviewed - no change compared to H&P  ________________________________________________________________________  Care Plan discussed with:    Comments   Patient x    Family      RN x    Care Manager     Consultant  x                      Multidiciplinary team rounds were held today with , nursing, pharmacist and clinical coordinator. Patient's plan of care was discussed; medications were reviewed and discharge planning was addressed. ________________________________________________________________________  Total NON critical care TIME:  50   Minutes    Total CRITICAL CARE TIME Spent:   Minutes non procedure based      Comments   >50% of visit spent in counseling and coordination of care     ________________________________________________________________________  Antonette Cunha MD     Procedures: see electronic medical records for all procedures/Xrays and details which were not copied into this note but were reviewed prior to creation of Plan. LABS:  I reviewed today's most current labs and imaging studies.   Pertinent labs include:  Recent Labs     07/18/19  0351 07/17/19  1131 07/16/19  1631   WBC 10.0 13.6* 12.4*   HGB 7.8* 8.5* 9.0*   HCT 26.1* 28.4* 29.4*    431* 448*     Recent Labs     07/18/19  0351 07/17/19  1131 07/16/19 2021    139 141   K 5.2* 5.3* 5.7*    102 103   CO2 29 29 33*   GLU 70 80 85   BUN 66* 57* 48*   CREA 12.60* 11.40* 11.08*   CA 8.7 8.6 9.0   MG 2.1 2.1  --    PHOS  --  4.5  --    ALB  --  2.3* 2.5*   TBILI  --  0.3 0.4   SGOT  --  17 29   ALT  --  12 18       Signed: Keila Gaxiola MD

## 2019-07-18 NOTE — PROGRESS NOTES
Patient's PD was not draining Nephrology paged. Talk with MD Iqbal. MD stated to stop and hold PD for tonight and will see patient in the morning.

## 2019-07-18 NOTE — PROGRESS NOTES
[facility] INTERDISCIPLINARY ROUNDS    Cardiopulmonary Care Interdisciplinary Rounds were held today to discuss patient's plan of care and outcomes. The following members were present: NP/Physician, Pharmacy, Nursing and Case Management.   Expected Length of Stay:  4d 2h    PLAN OF CARE:   Continue current treatment plan

## 2019-07-19 ENCOUNTER — APPOINTMENT (OUTPATIENT)
Dept: GENERAL RADIOLOGY | Age: 64
DRG: 291 | End: 2019-07-19
Attending: INTERNAL MEDICINE
Payer: MEDICARE

## 2019-07-19 LAB
ALBUMIN SERPL-MCNC: 2.5 G/DL (ref 3.5–5)
ALBUMIN/GLOB SERPL: 0.5 {RATIO} (ref 1.1–2.2)
ALP SERPL-CCNC: 95 U/L (ref 45–117)
ALT SERPL-CCNC: 14 U/L (ref 12–78)
ANION GAP SERPL CALC-SCNC: 10 MMOL/L (ref 5–15)
AST SERPL-CCNC: 19 U/L (ref 15–37)
BASOPHILS # BLD: 0.1 K/UL (ref 0–0.1)
BASOPHILS NFR BLD: 1 % (ref 0–1)
BILIRUB SERPL-MCNC: 0.4 MG/DL (ref 0.2–1)
BUN SERPL-MCNC: 69 MG/DL (ref 6–20)
BUN/CREAT SERPL: 5 (ref 12–20)
CALCIUM SERPL-MCNC: 8.8 MG/DL (ref 8.5–10.1)
CHLORIDE SERPL-SCNC: 100 MMOL/L (ref 97–108)
CO2 SERPL-SCNC: 27 MMOL/L (ref 21–32)
CREAT SERPL-MCNC: 12.6 MG/DL (ref 0.55–1.02)
DIFFERENTIAL METHOD BLD: ABNORMAL
EOSINOPHIL # BLD: 0.6 K/UL (ref 0–0.4)
EOSINOPHIL NFR BLD: 7 % (ref 0–7)
ERYTHROCYTE [DISTWIDTH] IN BLOOD BY AUTOMATED COUNT: 19.9 % (ref 11.5–14.5)
GLOBULIN SER CALC-MCNC: 4.7 G/DL (ref 2–4)
GLUCOSE SERPL-MCNC: 87 MG/DL (ref 65–100)
HCT VFR BLD AUTO: 27.2 % (ref 35–47)
HGB BLD-MCNC: 8.4 G/DL (ref 11.5–16)
IMM GRANULOCYTES # BLD AUTO: 0.1 K/UL (ref 0–0.04)
IMM GRANULOCYTES NFR BLD AUTO: 1 % (ref 0–0.5)
LYMPHOCYTES # BLD: 1.4 K/UL (ref 0.8–3.5)
LYMPHOCYTES NFR BLD: 17 % (ref 12–49)
MCH RBC QN AUTO: 28.8 PG (ref 26–34)
MCHC RBC AUTO-ENTMCNC: 30.9 G/DL (ref 30–36.5)
MCV RBC AUTO: 93.2 FL (ref 80–99)
MONOCYTES # BLD: 0.8 K/UL (ref 0–1)
MONOCYTES NFR BLD: 10 % (ref 5–13)
NEUTS SEG # BLD: 5.5 K/UL (ref 1.8–8)
NEUTS SEG NFR BLD: 64 % (ref 32–75)
NRBC # BLD: 0 K/UL (ref 0–0.01)
NRBC BLD-RTO: 0 PER 100 WBC
PLATELET # BLD AUTO: 393 K/UL (ref 150–400)
PMV BLD AUTO: 11.7 FL (ref 8.9–12.9)
POTASSIUM SERPL-SCNC: 4.7 MMOL/L (ref 3.5–5.1)
PROT SERPL-MCNC: 7.2 G/DL (ref 6.4–8.2)
RBC # BLD AUTO: 2.92 M/UL (ref 3.8–5.2)
SODIUM SERPL-SCNC: 137 MMOL/L (ref 136–145)
WBC # BLD AUTO: 8.4 K/UL (ref 3.6–11)

## 2019-07-19 PROCEDURE — 74011250637 HC RX REV CODE- 250/637: Performed by: INTERNAL MEDICINE

## 2019-07-19 PROCEDURE — 85025 COMPLETE CBC W/AUTO DIFF WBC: CPT

## 2019-07-19 PROCEDURE — 74011250636 HC RX REV CODE- 250/636: Performed by: INTERNAL MEDICINE

## 2019-07-19 PROCEDURE — 71045 X-RAY EXAM CHEST 1 VIEW: CPT

## 2019-07-19 PROCEDURE — A4722 DIALYS SOL FLD VOL > 1999CC: HCPCS | Performed by: INTERNAL MEDICINE

## 2019-07-19 PROCEDURE — 77010033678 HC OXYGEN DAILY

## 2019-07-19 PROCEDURE — 94760 N-INVAS EAR/PLS OXIMETRY 1: CPT

## 2019-07-19 PROCEDURE — 36415 COLL VENOUS BLD VENIPUNCTURE: CPT

## 2019-07-19 PROCEDURE — 65660000000 HC RM CCU STEPDOWN

## 2019-07-19 PROCEDURE — 80053 COMPREHEN METABOLIC PANEL: CPT

## 2019-07-19 RX ORDER — OXCARBAZEPINE 150 MG/1
150 TABLET, FILM COATED ORAL 2 TIMES DAILY
Status: DISCONTINUED | OUTPATIENT
Start: 2019-07-19 | End: 2019-07-22 | Stop reason: HOSPADM

## 2019-07-19 RX ORDER — FACIAL-BODY WIPES
10 EACH TOPICAL ONCE
Status: DISPENSED | OUTPATIENT
Start: 2019-07-19 | End: 2019-07-19

## 2019-07-19 RX ORDER — CLOPIDOGREL BISULFATE 75 MG/1
75 TABLET ORAL DAILY
Status: DISCONTINUED | OUTPATIENT
Start: 2019-07-20 | End: 2019-07-22 | Stop reason: HOSPADM

## 2019-07-19 RX ORDER — LEVETIRACETAM 500 MG/1
500 TABLET ORAL 2 TIMES DAILY
Status: DISCONTINUED | OUTPATIENT
Start: 2019-07-19 | End: 2019-07-22 | Stop reason: HOSPADM

## 2019-07-19 RX ADMIN — HEPARIN SODIUM 5000 UNITS: 5000 INJECTION INTRAVENOUS; SUBCUTANEOUS at 06:47

## 2019-07-19 RX ADMIN — GENTAMICIN SULFATE: 1 OINTMENT TOPICAL at 09:30

## 2019-07-19 RX ADMIN — SODIUM CHLORIDE, SODIUM LACTATE, CALCIUM CHLORIDE, MAGNESIUM CHLORIDE AND DEXTROSE 2500 ML: 4.25; 538; 448; 18.3; 5.08 INJECTION, SOLUTION INTRAPERITONEAL at 16:18

## 2019-07-19 RX ADMIN — OXYCODONE HYDROCHLORIDE 5 MG: 5 TABLET ORAL at 18:58

## 2019-07-19 RX ADMIN — HEPARIN SODIUM 5000 UNITS: 5000 INJECTION INTRAVENOUS; SUBCUTANEOUS at 22:53

## 2019-07-19 RX ADMIN — CLOPIDOGREL BISULFATE 75 MG: 75 TABLET ORAL at 09:19

## 2019-07-19 RX ADMIN — SODIUM CHLORIDE, SODIUM LACTATE, CALCIUM CHLORIDE, MAGNESIUM CHLORIDE AND DEXTROSE 2500 ML: 4.25; 538; 448; 18.3; 5.08 INJECTION, SOLUTION INTRAPERITONEAL at 06:38

## 2019-07-19 RX ADMIN — OXYCODONE HYDROCHLORIDE 5 MG: 5 TABLET ORAL at 09:19

## 2019-07-19 RX ADMIN — LEVETIRACETAM 250 MG: 250 TABLET ORAL at 09:19

## 2019-07-19 RX ADMIN — ISOSORBIDE MONONITRATE 60 MG: 30 TABLET, EXTENDED RELEASE ORAL at 09:20

## 2019-07-19 RX ADMIN — SODIUM CHLORIDE, SODIUM LACTATE, CALCIUM CHLORIDE, MAGNESIUM CHLORIDE AND DEXTROSE 2500 ML: 2.5; 538; 448; 18.3; 5.08 INJECTION, SOLUTION INTRAPERITONEAL at 12:10

## 2019-07-19 RX ADMIN — OXYCODONE HYDROCHLORIDE 5 MG: 5 TABLET ORAL at 02:17

## 2019-07-19 RX ADMIN — PANTOPRAZOLE SODIUM 40 MG: 40 TABLET, DELAYED RELEASE ORAL at 09:20

## 2019-07-19 RX ADMIN — ASPIRIN 81 MG 81 MG: 81 TABLET ORAL at 09:20

## 2019-07-19 RX ADMIN — ATORVASTATIN CALCIUM 40 MG: 40 TABLET, FILM COATED ORAL at 22:53

## 2019-07-19 RX ADMIN — SEVELAMER CARBONATE 800 MG: 800 TABLET, FILM COATED ORAL at 09:19

## 2019-07-19 RX ADMIN — AMLODIPINE BESYLATE 5 MG: 5 TABLET ORAL at 09:20

## 2019-07-19 RX ADMIN — LEVETIRACETAM 500 MG: 500 TABLET ORAL at 18:48

## 2019-07-19 RX ADMIN — OXCARBAZEPINE 150 MG: 150 TABLET ORAL at 18:48

## 2019-07-19 RX ADMIN — SODIUM CHLORIDE, SODIUM LACTATE, CALCIUM CHLORIDE, MAGNESIUM CHLORIDE AND DEXTROSE 2500 ML: 4.25; 538; 448; 18.3; 5.08 INJECTION, SOLUTION INTRAPERITONEAL at 22:54

## 2019-07-19 RX ADMIN — SEVELAMER CARBONATE 800 MG: 800 TABLET, FILM COATED ORAL at 22:53

## 2019-07-19 RX ADMIN — SODIUM CHLORIDE, SODIUM LACTATE, CALCIUM CHLORIDE, MAGNESIUM CHLORIDE AND DEXTROSE 2500 ML: 2.5; 538; 448; 18.3; 5.08 INJECTION, SOLUTION INTRAPERITONEAL at 18:49

## 2019-07-19 NOTE — PROGRESS NOTES
Cardiology Progress Note      7/19/2019 1:05 PM    Admit Date: 7/16/2019    Admit Diagnosis: CHF (congestive heart failure) ([facility] [I50.9]      Subjective:     Dortha Coma is feeling better with appropriate dialysis. No chest pain. Visit Vitals  BP (!) 143/92 (BP 1 Location: Right arm, BP Patient Position: At rest)   Pulse 82   Temp 98.2 °F (36.8 °C)   Resp 16   Ht 5' 3\" (1.6 m)   Wt 215 lb 13.3 oz (97.9 kg)   SpO2 95%   Breastfeeding?  No   BMI 38.23 kg/m²       Current Facility-Administered Medications   Medication Dose Route Frequency    bisacodyl (DULCOLAX) suppository 10 mg  10 mg Rectal ONCE    levETIRAcetam (KEPPRA) tablet 500 mg  500 mg Oral BID    OXcarbazepine (TRILEPTAL) tablet 150 mg  150 mg Oral BID    gentamicin (GARAMYCIN) 0.1 % ointment   Topical DAILY    peritoneal dialysis DEXTROSE 2.5% (2.5 mEq/L low calcium) solution 2,500 mL  2,500 mL IntraPERitoneal BID    peritoneal dialysis DEXTROSE 4.25% (2.5 mEq/L low calcium) solution 2,500 mL  2,500 mL IntraPERitoneal TID    epoetin tawny-epbx (RETACRIT) injection 10,000 Units  10,000 Units SubCUTAneous Q TUE, THU & SAT    LORazepam (ATIVAN) injection 0.5 mg  0.5 mg IntraVENous Q6H PRN    amLODIPine (NORVASC) tablet 5 mg  5 mg Oral DAILY    aspirin chewable tablet 81 mg  81 mg Oral DAILY    atorvastatin (LIPITOR) tablet 40 mg  40 mg Oral QHS    calcitRIOL (ROCALTROL) capsule 0.25 mcg  0.25 mcg Oral Once per day on Thu Sat    clopidogrel (PLAVIX) tablet 75 mg  75 mg Oral DAILY    isosorbide mononitrate ER (IMDUR) tablet 60 mg  60 mg Oral DAILY    lactulose (CHRONULAC) 10 gram/15 mL solution 30 mL  20 g Oral BID PRN    pantoprazole (PROTONIX) tablet 40 mg  40 mg Oral DAILY    sevelamer carbonate (RENVELA) tab 800 mg  800 mg Oral TID    traMADol (ULTRAM) tablet 50 mg  50 mg Oral Q6H PRN    heparin (porcine) injection 5,000 Units  5,000 Units SubCUTAneous Q8H    labetalol (NORMODYNE;TRANDATE) injection 10 mg  10 mg IntraVENous Q6H PRN    acetaminophen (TYLENOL) tablet 650 mg  650 mg Oral Q4H PRN    ondansetron (ZOFRAN) injection 4 mg  4 mg IntraVENous Q6H PRN    oxyCODONE IR (ROXICODONE) tablet 5 mg  5 mg Oral Q6H PRN         Objective:      Physical Exam:  Visit Vitals  BP (!) 143/92 (BP 1 Location: Right arm, BP Patient Position: At rest)   Pulse 82   Temp 98.2 °F (36.8 °C)   Resp 16   Ht 5' 3\" (1.6 m)   Wt 215 lb 13.3 oz (97.9 kg)   SpO2 95%   Breastfeeding? No   BMI 38.23 kg/m²     General Appearance:  Well developed, well nourished,alert and oriented x 3, and individual in no acute distress. Ears/Nose/Mouth/Throat:   Hearing grossly normal.         Neck: Supple. Chest:   Lungs clear to auscultation bilaterally. Cardiovascular:  Regular rate and rhythm, S1, S2 normal, no murmur. Abdomen:   Soft, non-tender, bowel sounds are active. Extremities: No edema bilaterally. Skin: Warm and dry. Data Review:   Labs:  No results found for this or any previous visit (from the past 24 hour(s)). Telemetry: normal sinus rhythm      Assessment:     Principal Problem:    Acute on chronic respiratory failure ([address] (7/18/2019)    Active Problems:    Seizure ([address] (10/12/2015)      HTN (hypertension) (10/12/2015)      End stage renal disease ([address] (10/12/2015)      Peritoneal dialysis status ([address] (3/10/2016)      Memory loss (3/10/2016)      Hyperlipidemia (3/17/2016)      Chronic combined systolic and diastolic HF (heart failure), NYHA class 2 ([address] (7/18/2019)      PAF (paroxysmal atrial fibrillation) ([address] (7/18/2019)        Plan:     She is still unsure if she wants any invasive therapy. Stress test Monday if she agrees for further evaluation. If not, can f/u with Dr. Truong Paulino . Will need ICD if patient/family accept.  Not a candidate for Ronnie Obrien MD

## 2019-07-19 NOTE — PROGRESS NOTES
[facility] Progress Note        NAME: Michael Martel       :  1955       MRN:  [id number]     Date/Time: 2019    Risk of deterioration: low       Assessment:    Plan:  ESRD-PD  Transthoracic PD cath  HTN  Hyperkalemia  CMO  CAD  HX of cva   Spent time with nursing to get her caretaker and/or sister to help with pd exchanges as pt can not do this on her own. Increased to 4.25%/2.5%exchanges to facilitate volume removal     epo       Subjective:     Chief Complaint: \"I finally drained 2800 two times. \"  Less dyspnea. No N/V. No pain. Objective:     VITALS:   Last 24hrs VS reviewed since prior progress note. Most recent are:  Visit Vitals  /81 (BP 1 Location: Right arm, BP Patient Position: At rest)   Pulse 76   Temp 98 °F (36.7 °C)   Resp 16   Ht 5' 3\" (1.6 m)   Wt 97.9 kg (215 lb 13.3 oz)   SpO2 100%   Breastfeeding?  No   BMI 38.23 kg/m²     SpO2 Readings from Last 6 Encounters:   19 100%   19 100%   16 97%   16 98%   16 92%   04/15/16 97%    O2 Flow Rate (L/min): 3 l/min       Intake/Output Summary (Last 24 hours) at 2019 1031  Last data filed at 2019 2357  Gross per 24 hour   Intake 5200 ml   Output 1600 ml   Net 3600 ml        Telemetry Reviewed   normal sinus rhythm    PHYSICAL EXAM:    General NAD  abd soft  No edema            Lab Data Reviewed: (see below)    Medications Reviewed: (see below)    PMH/SH reviewed - no change compared to H&P  __________________________________________  ___________________________________________________    Attending Physician: Marla Diane MD     ____________________________________________________  MEDICATIONS:  Current Facility-Administered Medications   Medication Dose Route Frequency    bisacodyl (DULCOLAX) suppository 10 mg  10 mg Rectal ONCE    gentamicin (GARAMYCIN) 0.1 % ointment   Topical DAILY    peritoneal dialysis DEXTROSE 2.5% (2.5 mEq/L low calcium) solution 2,500 mL  2,500 mL IntraPERitoneal BID    peritoneal dialysis DEXTROSE 4.25% (2.5 mEq/L low calcium) solution 2,500 mL  2,500 mL IntraPERitoneal TID    epoetin tawny-epbx (RETACRIT) injection 10,000 Units  10,000 Units SubCUTAneous Q TUE, THU & SAT    LORazepam (ATIVAN) injection 0.5 mg  0.5 mg IntraVENous Q6H PRN    amLODIPine (NORVASC) tablet 5 mg  5 mg Oral DAILY    aspirin chewable tablet 81 mg  81 mg Oral DAILY    atorvastatin (LIPITOR) tablet 40 mg  40 mg Oral QHS    calcitRIOL (ROCALTROL) capsule 0.25 mcg  0.25 mcg Oral Once per day on Thu Sat    clopidogrel (PLAVIX) tablet 75 mg  75 mg Oral DAILY    isosorbide mononitrate ER (IMDUR) tablet 60 mg  60 mg Oral DAILY    lactulose (CHRONULAC) 10 gram/15 mL solution 30 mL  20 g Oral BID PRN    levETIRAcetam (KEPPRA) tablet 250 mg  250 mg Oral BID    pantoprazole (PROTONIX) tablet 40 mg  40 mg Oral DAILY    sevelamer carbonate (RENVELA) tab 800 mg  800 mg Oral TID    traMADol (ULTRAM) tablet 50 mg  50 mg Oral Q6H PRN    heparin (porcine) injection 5,000 Units  5,000 Units SubCUTAneous Q8H    labetalol (NORMODYNE;TRANDATE) injection 10 mg  10 mg IntraVENous Q6H PRN    acetaminophen (TYLENOL) tablet 650 mg  650 mg Oral Q4H PRN    ondansetron (ZOFRAN) injection 4 mg  4 mg IntraVENous Q6H PRN    oxyCODONE IR (ROXICODONE) tablet 5 mg  5 mg Oral Q6H PRN        LABS:  Recent Labs     07/18/19  0351 07/17/19  1131   WBC 10.0 13.6*   HGB 7.8* 8.5*   HCT 26.1* 28.4*    431*     Recent Labs     07/18/19  0351 07/17/19  1131 07/16/19 2021    139 141   K 5.2* 5.3* 5.7*    102 103   CO2 29 29 33*   BUN 66* 57* 48*   CREA 12.60* 11.40* 11.08*   GLU 70 80 85   CA 8.7 8.6 9.0   MG 2.1 2.1  --    PHOS  --  4.5  --      Recent Labs     07/17/19  1131 07/16/19 2021   SGOT 17 29   ALT 12 18   AP 94 107   TBILI 0.3 0.4   TP 6.7 7.4   ALB 2.3* 2.5*   GLOB 4.4* 4.9*     No results for input(s): INR, PTP, APTT in the last 72 hours.     No lab exists for component: INREXT, INREXT   No results for input(s): FE, TIBC, PSAT, FERR in the last 72 hours. No results for input(s): PH, PCO2, PO2 in the last 72 hours.   Recent Labs     07/18/19  0351 07/17/19  1131 07/16/19  2214 07/16/19  1631   CPK  --   --   --  272*   CKNDX  --   --  1.7 1.0   TROIQ 0.10* 0.14* 0.06* 0.06*     Lab Results   Component Value Date/Time    Glucose (POC) 162 (H) 07/17/2019 03:21 AM

## 2019-07-19 NOTE — PROGRESS NOTES
Bedside shift change report GIVEN TO Shirley Swanson RN. Report included the following information SBAR and Kardex. SIGNIFICANT CHANGES DURING SHIFT:   200- Peritoneal dialysis missed by offgoing nurse due to bag defect   0400- unable to obtain labs from pt due to hard stick      CONCERNS TO ADDRESS WITH MD:            [address] NURSING NOTE   Admission Date 7/16/2019   Admission Diagnosis CHF (congestive heart failure) ([facility] [address] [I50.9]   Consults IP CONSULT TO NEPHROLOGY  IP CONSULT TO NEPHROLOGY  IP CONSULT TO CARDIOLOGY      Cardiac Monitoring [] Yes [] No      Purposeful Hourly Rounding [x] Yes    Nataliya Score Total Score: 2   Nataliya score 3 or > [x] Bed Alarm [] Avasys [] 1:1 sitter [] Patient refused (Signed refusal form in chart)   Luis Score Luis Score: 17   Luis score 14 or < [] PMT consult [] Wound Care consult    []  Specialty bed  [] Nutrition consult      Influenza Vaccine Received Flu Vaccine for Current Season (usually Sept-March): Not Flu Season           Oxygen needs? [] Room air Oxygen @  []1L    []2L    [x]3L   []4L    []5L   []6L via  NC   Chronic home O2 use? [] Yes [] No  Perform O2 challenge test and document in progress note using smartphrase (.Homeoxygen)      Last bowel movement Last Bowel Movement Date: 07/17/19      Urinary Catheter             LDAs               Peripheral IV 07/16/19 Right Antecubital (Active)   Site Assessment Clean, dry, & intact 7/18/2019  7:21 PM   Phlebitis Assessment 0 7/18/2019  7:21 PM   Infiltration Assessment 0 7/18/2019  7:21 PM   Dressing Status Clean, dry, & intact 7/18/2019  7:21 PM   Dressing Type Transparent 7/18/2019  7:21 PM   Hub Color/Line Status Pink 7/18/2019  7:21 PM                         Readmission Risk Assessment Tool Score High Risk            22       Total Score        3 Has Seen PCP in Last 6 Months (Yes=3, No=0)    9 Pt.  Coverage (Medicare=5 , Medicaid, or Self-Pay=4)    10 Charlson Comorbidity Score (Age + Comorbid Conditions) Criteria that do not apply:    . Living with Significant Other. Assisted Living. LTAC. SNF.  or   Rehab    Patient Length of Stay (>5 days = 3)    IP Visits Last 12 Months (1-3=4, 4=9, >4=11)       Expected Length of Stay 4d 2h   Actual Length of Stay 3

## 2019-07-19 NOTE — PROGRESS NOTES
Problem: Falls - Risk of  Goal: *Absence of Falls  Description  Document iLnnea Butt Fall Risk and appropriate interventions in the flowsheet.   Outcome: Progressing Towards Goal  Note:   Fall Risk Interventions:  Mobility Interventions: Bed/chair exit alarm, Communicate number of staff needed for ambulation/transfer, Patient to call before getting OOB, PT Consult for mobility concerns, PT Consult for assist device competence, Strengthening exercises (ROM-active/passive)         Medication Interventions: Bed/chair exit alarm, Evaluate medications/consider consulting pharmacy, Patient to call before getting OOB, Teach patient to arise slowly    Elimination Interventions: Bed/chair exit alarm, Call light in reach, Stay With Me (per policy), Patient to call for help with toileting needs, Toilet paper/wipes in reach, Toileting schedule/hourly rounds              Problem: Patient Education: Go to Patient Education Activity  Goal: Patient/Family Education  Outcome: Progressing Towards Goal

## 2019-07-19 NOTE — PROGRESS NOTES
Bedside and Verbal shift change report GIVEN TO Marisa Burgos RN. Report included the following information SBAR, Kardex, ED Summary, Procedure Summary, Intake/Output, MAR, Recent Results and Cardiac Rhythm (NSR). SIGNIFICANT CHANGES DURING SHIFT:   1622- Flushed IV, slow to flush and painful at site, educated patient that IV is probably clotted or infiltrated and that a new one will be needed. Pt refused new IV, old IV still intact. Educated pt about need for O2 challenge, pt lethargic at this time and refused to get OOB. [facility] NURSING NOTE   Admission Date 7/16/2019   Admission Diagnosis CHF (congestive heart failure) ([facility] [address] [I50.9]   Consults IP CONSULT TO NEPHROLOGY  IP CONSULT TO NEPHROLOGY  IP CONSULT TO CARDIOLOGY      Cardiac Monitoring [x] Yes [] No      Purposeful Hourly Rounding [x] Yes    Nataliya Score Total Score: 2   Nataliya score 3 or > [x] Bed Alarm [] Avasys [] 1:1 sitter [] Patient refused (Signed refusal form in chart)   Luis Score Luis Score: 17   Luis score 14 or < [] PMT consult [] Wound Care consult    []  Specialty bed  [] Nutrition consult      Influenza Vaccine Received Flu Vaccine for Current Season (usually Sept-March): Not Flu Season           Oxygen needs? [x] Room air Oxygen @  []1L    []2L    []3L   []4L    []5L   []6L via  NC   Chronic home O2 use?  [] Yes [x] No  Perform O2 challenge test and document in progress note using smartphrase (.Homeoxygen)      Last bowel movement Last Bowel Movement Date: 07/17/19      Urinary Catheter             LDAs               Peripheral IV 07/16/19 Right Antecubital (Active)   Site Assessment Clean, dry, & intact 7/19/2019  3:37 AM   Phlebitis Assessment 0 7/19/2019  3:37 AM   Infiltration Assessment 0 7/19/2019  3:37 AM   Dressing Status Clean, dry, & intact 7/19/2019  3:37 AM   Dressing Type Transparent 7/19/2019  3:37 AM   Hub Color/Line Status Pink 7/19/2019  3:37 AM                         Readmission Risk Assessment Tool Score High Risk            22       Total Score        3 Has Seen PCP in Last 6 Months (Yes=3, No=0)    9 Pt. Coverage (Medicare=5 , Medicaid, or Self-Pay=4)    10 Charlson Comorbidity Score (Age + Comorbid Conditions)        Criteria that do not apply:    . Living with Significant Other. Assisted Living. LTAC. SNF.  or   Rehab    Patient Length of Stay (>5 days = 3)    IP Visits Last 12 Months (1-3=4, 4=9, >4=11)       Expected Length of Stay 4d 2h   Actual Length of Stay 3

## 2019-07-19 NOTE — PROGRESS NOTES
Hospitalist Progress Note    NAME: Therese Castillo   :  1955   MRN:  [id number]       Assessment / Plan:  Acute on Chronic Hypoxic Respiratory Failure POA: , improving  Episode of RRT - 2/ Tachypnea/Hypoxia. , also some component of anxiety  CXR with increased Pulmonary edema   -Continue Peritoneal HD  -Volume overload likely poor PD nursing by pt at home, will need to teach her caregiver.  -Cardio on Board  -Nephro on Board  -Improving continue fluid removal as per Nephro    c/w supplemental O2, at home recently on [facility]. Acute on Chronic  Diastolic Heart Failure POA: ,  Increased Troponin POA  Getting peritoneal HD  cardiology on board  Echo with 26-30% EF and Grade II diastolic dysfunction. Left LV hypokinesis  Pt denies any invasive therapy. Plan for ICD if pt agress          CAD s/p PCI  [facility] () - mid LAD 70-80%, distal LAD 50%, diagonal high-grade disease, OMB high-grade disease, RCA total occlusion - medically managed  2. [facility] 2018 - heavily calcified vessels with severe early mid LAD stenosis, occluded RCA with faint L-R collateral filling and occluded 1st marginal - felt to be high risk for CABG / patient refused  3. [facility] 18 - S/p successful PCI of the prox LAD using 3.0 x 24 Synergy ANDER   -Continue DAPT      ESRD on PD:  Aware  HTN: c/w Home regimen, use labetalol prn. Seizures: c/w Keppra, monitor, seizure precautions. Code Status: Full Code  Surrogate Decision Maker: DAE Worthy Gain [phone]  DVT Prophylaxis: Heparin  GI Prophylaxis: not indicated  Baseline: very debilitated, recently D/c from Rehab to friends [place] then to [facility] to here. Cardiology following ? Plan for ICD/Intervention/stress test if pt agrees. Discharge after cardiology and Renal clearance. ?tomorrow if no Intervention planned. Subjective:     Chief Complaint / Reason for Physician Visit  Much better. Not sob. Sitting in chair.    No cp/palpitations    Review of Systems:  Symptom Y/N Comments Symptom Y/N Comments   Fever/Chills n   Chest Pain n    Poor Appetite n   Edema     Cough n   Abdominal Pain n    Sputum n   Joint Pain     SOB/MAYORGA y   Pruritis/Rash     Nausea/vomit    Tolerating PT/OT     Diarrhea    Tolerating Diet     Constipation    Other       Could NOT obtain due to:      Objective:     VITALS:   Last 24hrs VS reviewed since prior progress note. Most recent are:  Patient Vitals for the past 24 hrs:   Temp Pulse Resp BP SpO2   07/19/19 1100 98.2 °F (36.8 °C) 82 16 (!) 143/92 95 %   07/19/19 0840 98 °F (36.7 °C) 76 16 134/81 100 %   07/19/19 0337 98 °F (36.7 °C) 83 17 141/84 98 %   07/18/19 2357 97.3 °F (36.3 °C) 88 20 105/59 91 %   07/18/19 2340 97.5 °F (36.4 °C) 78 26 147/86 100 %   07/18/19 1921 97.9 °F (36.6 °C) 85 22 141/90 99 %       Intake/Output Summary (Last 24 hours) at 7/19/2019 1506  Last data filed at 7/19/2019 1410  Gross per 24 hour   Intake 2880 ml   Output 1600 ml   Net 1280 ml        PHYSICAL EXAM:  General: WD, WN. Alert, cooperative, no acute distress    EENT:  EOMI. Anicteric sclerae. MMM  Resp:  Minimal rales b/l bases  CV:  Regular  rhythm,  No edema  GI:  Soft, Non distended, Non tender.  +Bowel sounds  Neurologic:  Alert and oriented X 3, normal speech,   Psych:   Good insight. Not anxious nor agitated  Skin:  No rashes. No jaundice    Reviewed most current lab test results and cultures  YES  Reviewed most current radiology test results   YES  Review and summation of old records today    NO  Reviewed patient's current orders and MAR    YES  PMH/SH reviewed - no change compared to H&P  ________________________________________________________________________  Care Plan discussed with:    Comments   Patient x    Family      RN x    Care Manager     Consultant  x                     x Multidiciplinary team rounds were held today with , nursing, pharmacist and clinical coordinator.   Patient's plan of care was discussed; medications were reviewed and discharge planning was addressed. ________________________________________________________________________  Total NON critical care TIME:  50   Minutes    Total CRITICAL CARE TIME Spent:   Minutes non procedure based      Comments   >50% of visit spent in counseling and coordination of care     ________________________________________________________________________  Sony Rios MD     Procedures: see electronic medical records for all procedures/Xrays and details which were not copied into this note but were reviewed prior to creation of Plan. LABS:  I reviewed today's most current labs and imaging studies.   Pertinent labs include:  Recent Labs     07/19/19  1229 07/18/19  0351 07/17/19  1131   WBC 8.4 10.0 13.6*   HGB 8.4* 7.8* 8.5*   HCT 27.2* 26.1* 28.4*    388 431*     Recent Labs     07/19/19  1229 07/18/19  0351 07/17/19  1131 07/16/19  2021    138 139 141   K 4.7 5.2* 5.3* 5.7*    100 102 103   CO2 27 29 29 33*   GLU 87 70 80 85   BUN 69* 66* 57* 48*   CREA 12.60* 12.60* 11.40* 11.08*   CA 8.8 8.7 8.6 9.0   MG  --  2.1 2.1  --    PHOS  --   --  4.5  --    ALB 2.5*  --  2.3* 2.5*   TBILI 0.4  --  0.3 0.4   SGOT 19  --  17 29   ALT 14  --  12 18       Signed: Sony Rios MD

## 2019-07-19 NOTE — PROGRESS NOTES
Reason for Admission: Acute on chronic respiratory failure (HCC)               RRAT Score: 22 HIGH              Resources/supports as identified by patient/family: Pt states she has a strong family                 Top Challenges facing patient (as identified by patient/family and CM): Finances/Medication cost?  No problems identified               Transportation? No problems identified               Support system or lack thereof? Pt states she has a strong family support system                      Living arrangements? No problems identified              Self-care/ADLs/Cognition? Alert, oriented and needs assistance with ADLS           Current Advanced Directive/Advance Care Plan:  None on file, pt states her daughter is MPOA and CM educated pt on having that brought up to the hospital.                           Plan for utilizing home health: Per recommendation                Transition of Care Plan:                Pt is a 62year old, admitted with Acute on chronic respiratory failure ([facility] [address] Pt was alert and oriented when meeting with CM, confirming address, emergency contact and PCP (Dr. Ajith Farah; practice and phone number unknown). Pt states she lives in a one level home with her lifelong friend Stephanie Turner. Pt has a walker, rollator and cane. Pt is not currently on oxygen at home but will likely need home O2 arranged at time of discharge as she is on 3L. Pt does not drive and has not had HH in the past. Pt has been to multiple SNF's in the past ([facility], [facility] and most recently [facility]- discharged June 21, 2019). Pt uses the [facility] [place]. Pt states no problems affording or accessing medications. Pt family friend will drive her home at time of discharge. CM will continue to follow pt for discharge planning.      Plan of Care   1) Home with Carlos Craig (RN, PT/OT)    2) Pt would benefit from ACP discussion   3) Home with manan villanueva     Care Management Interventions  PCP Verified by CM:  Yes  Mode of Transport at Discharge: Self  Transition of Care Consult (CM Consult): Discharge Planning  MyChart Signup: No  Discharge Durable Medical Equipment: No  Physical Therapy Consult: Yes  Occupational Therapy Consult: Yes  Speech Therapy Consult: No  Current Support Network: Lives with Spouse, Lives with Caregiver  Confirm Follow Up Transport: Family  Plan discussed with Pt/Family/Caregiver: Yes  Freedom of Choice Offered: Yes  Discharge Location  Discharge Placement: Home with home health     PAMELA Infante, [address] [facility]    [phone]

## 2019-07-20 PROCEDURE — 94760 N-INVAS EAR/PLS OXIMETRY 1: CPT

## 2019-07-20 PROCEDURE — A4722 DIALYS SOL FLD VOL > 1999CC: HCPCS | Performed by: INTERNAL MEDICINE

## 2019-07-20 PROCEDURE — 74011250637 HC RX REV CODE- 250/637: Performed by: INTERNAL MEDICINE

## 2019-07-20 PROCEDURE — 77010033678 HC OXYGEN DAILY

## 2019-07-20 PROCEDURE — 93005 ELECTROCARDIOGRAM TRACING: CPT

## 2019-07-20 PROCEDURE — 74011250636 HC RX REV CODE- 250/636: Performed by: INTERNAL MEDICINE

## 2019-07-20 PROCEDURE — 65660000000 HC RM CCU STEPDOWN

## 2019-07-20 RX ORDER — CARVEDILOL 6.25 MG/1
6.25 TABLET ORAL 2 TIMES DAILY WITH MEALS
Status: DISCONTINUED | OUTPATIENT
Start: 2019-07-20 | End: 2019-07-22 | Stop reason: HOSPADM

## 2019-07-20 RX ORDER — METOPROLOL TARTRATE 25 MG/1
25 TABLET, FILM COATED ORAL EVERY 12 HOURS
Status: DISCONTINUED | OUTPATIENT
Start: 2019-07-20 | End: 2019-07-20

## 2019-07-20 RX ADMIN — METOPROLOL TARTRATE 25 MG: 25 TABLET ORAL at 14:07

## 2019-07-20 RX ADMIN — HEPARIN SODIUM 5000 UNITS: 5000 INJECTION INTRAVENOUS; SUBCUTANEOUS at 22:18

## 2019-07-20 RX ADMIN — CLOPIDOGREL BISULFATE 75 MG: 75 TABLET ORAL at 08:43

## 2019-07-20 RX ADMIN — EPOETIN ALFA-EPBX 10000 UNITS: 10000 INJECTION, SOLUTION INTRAVENOUS; SUBCUTANEOUS at 22:18

## 2019-07-20 RX ADMIN — ATORVASTATIN CALCIUM 40 MG: 40 TABLET, FILM COATED ORAL at 22:18

## 2019-07-20 RX ADMIN — OXYCODONE HYDROCHLORIDE 5 MG: 5 TABLET ORAL at 05:28

## 2019-07-20 RX ADMIN — HEPARIN SODIUM 5000 UNITS: 5000 INJECTION INTRAVENOUS; SUBCUTANEOUS at 14:07

## 2019-07-20 RX ADMIN — PANTOPRAZOLE SODIUM 40 MG: 40 TABLET, DELAYED RELEASE ORAL at 08:43

## 2019-07-20 RX ADMIN — OXCARBAZEPINE 150 MG: 150 TABLET ORAL at 17:56

## 2019-07-20 RX ADMIN — SODIUM CHLORIDE, SODIUM LACTATE, CALCIUM CHLORIDE, MAGNESIUM CHLORIDE AND DEXTROSE 2500 ML: 2.5; 538; 448; 18.3; 5.08 INJECTION, SOLUTION INTRAPERITONEAL at 18:19

## 2019-07-20 RX ADMIN — LEVETIRACETAM 500 MG: 500 TABLET ORAL at 17:56

## 2019-07-20 RX ADMIN — SODIUM CHLORIDE, SODIUM LACTATE, CALCIUM CHLORIDE, MAGNESIUM CHLORIDE AND DEXTROSE 2500 ML: 2.5; 538; 448; 18.3; 5.08 INJECTION, SOLUTION INTRAPERITONEAL at 22:19

## 2019-07-20 RX ADMIN — CALCITRIOL 0.25 MCG: 0.25 CAPSULE ORAL at 18:06

## 2019-07-20 RX ADMIN — SODIUM CHLORIDE, SODIUM LACTATE, CALCIUM CHLORIDE, MAGNESIUM CHLORIDE AND DEXTROSE 2500 ML: 4.25; 538; 448; 18.3; 5.08 INJECTION, SOLUTION INTRAPERITONEAL at 06:34

## 2019-07-20 RX ADMIN — METOPROLOL TARTRATE 25 MG: 25 TABLET ORAL at 02:35

## 2019-07-20 RX ADMIN — SEVELAMER CARBONATE 800 MG: 800 TABLET, FILM COATED ORAL at 08:43

## 2019-07-20 RX ADMIN — SODIUM CHLORIDE, SODIUM LACTATE, CALCIUM CHLORIDE, MAGNESIUM CHLORIDE AND DEXTROSE 2500 ML: 2.5; 538; 448; 18.3; 5.08 INJECTION, SOLUTION INTRAPERITONEAL at 14:12

## 2019-07-20 RX ADMIN — OXYCODONE HYDROCHLORIDE 5 MG: 5 TABLET ORAL at 23:59

## 2019-07-20 RX ADMIN — SEVELAMER CARBONATE 800 MG: 800 TABLET, FILM COATED ORAL at 22:18

## 2019-07-20 RX ADMIN — CARVEDILOL 6.25 MG: 6.25 TABLET, FILM COATED ORAL at 17:56

## 2019-07-20 RX ADMIN — OXYCODONE HYDROCHLORIDE 5 MG: 5 TABLET ORAL at 12:20

## 2019-07-20 RX ADMIN — ISOSORBIDE MONONITRATE 60 MG: 30 TABLET, EXTENDED RELEASE ORAL at 08:43

## 2019-07-20 RX ADMIN — AMLODIPINE BESYLATE 5 MG: 5 TABLET ORAL at 08:42

## 2019-07-20 RX ADMIN — HEPARIN SODIUM 5000 UNITS: 5000 INJECTION INTRAVENOUS; SUBCUTANEOUS at 05:30

## 2019-07-20 RX ADMIN — LACTULOSE 30 ML: 20 SOLUTION ORAL at 08:43

## 2019-07-20 RX ADMIN — OXCARBAZEPINE 150 MG: 150 TABLET ORAL at 08:42

## 2019-07-20 RX ADMIN — SEVELAMER CARBONATE 800 MG: 800 TABLET, FILM COATED ORAL at 17:56

## 2019-07-20 RX ADMIN — ASPIRIN 81 MG 81 MG: 81 TABLET ORAL at 08:43

## 2019-07-20 RX ADMIN — LEVETIRACETAM 500 MG: 500 TABLET ORAL at 08:42

## 2019-07-20 RX ADMIN — GENTAMICIN SULFATE: 1 OINTMENT TOPICAL at 08:44

## 2019-07-20 NOTE — PROGRESS NOTES
Problem: Falls - Risk of  Goal: *Absence of Falls  Description  Document Julio Anderson Fall Risk and appropriate interventions in the flowsheet.   7/20/2019 0756 by Teresa Vega  Outcome: Progressing Towards Goal  Note:   Fall Risk Interventions:  Mobility Interventions: Bed/chair exit alarm, Patient to call before getting OOB         Medication Interventions: Evaluate medications/consider consulting pharmacy, Patient to call before getting OOB, Teach patient to arise slowly    Elimination Interventions: Bed/chair exit alarm, Call light in reach, Patient to call for help with toileting needs           7/19/2019 1829 by Teresa Vega  Outcome: Progressing Towards Goal  Note:   Fall Risk Interventions:  Mobility Interventions: Bed/chair exit alarm, Communicate number of staff needed for ambulation/transfer, Patient to call before getting OOB, PT Consult for mobility concerns, PT Consult for assist device competence, Strengthening exercises (ROM-active/passive)         Medication Interventions: Bed/chair exit alarm, Evaluate medications/consider consulting pharmacy, Patient to call before getting OOB, Teach patient to arise slowly    Elimination Interventions: Bed/chair exit alarm, Call light in reach, Stay With Me (per policy), Patient to call for help with toileting needs, Toilet paper/wipes in reach, Toileting schedule/hourly rounds              Problem: Patient Education: Go to Patient Education Activity  Goal: Patient/Family Education  7/20/2019 0756 by Teresa Vega  Outcome: Progressing Towards Goal  7/19/2019 [place] 1 by Teresa Vega  Outcome: Progressing Towards Goal

## 2019-07-20 NOTE — PROGRESS NOTES
Cardiology Progress Note      7/20/2019 1:05 PM    Admit Date: 7/16/2019    Admit Diagnosis: CHF (congestive heart failure) ([address] [I50.9]      Subjective:     Renny Funes is feeling better with appropriate dialysis. No chest pain. Visit Vitals  /82 (BP 1 Location: Right arm, BP Patient Position: At rest)   Pulse 73   Temp 98.1 °F (36.7 °C)   Resp 20   Ht 5' 3\" (1.6 m)   Wt 208 lb 4.8 oz (94.5 kg)   SpO2 94%   Breastfeeding?  No   BMI 36.90 kg/m²       Current Facility-Administered Medications   Medication Dose Route Frequency    metoprolol tartrate (LOPRESSOR) tablet 25 mg  25 mg Oral Q12H    peritoneal dialysis DEXTROSE 2.5% (2.5 mEq/L low calcium) solution 2,500 mL  2,500 mL IntraPERitoneal QID    levETIRAcetam (KEPPRA) tablet 500 mg  500 mg Oral BID    OXcarbazepine (TRILEPTAL) tablet 150 mg  150 mg Oral BID    clopidogrel (PLAVIX) tablet 75 mg  75 mg Oral DAILY    gentamicin (GARAMYCIN) 0.1 % ointment   Topical DAILY    epoetin tawny-epbx (RETACRIT) injection 10,000 Units  10,000 Units SubCUTAneous Q TUE, THU & SAT    LORazepam (ATIVAN) injection 0.5 mg  0.5 mg IntraVENous Q6H PRN    amLODIPine (NORVASC) tablet 5 mg  5 mg Oral DAILY    aspirin chewable tablet 81 mg  81 mg Oral DAILY    atorvastatin (LIPITOR) tablet 40 mg  40 mg Oral QHS    calcitRIOL (ROCALTROL) capsule 0.25 mcg  0.25 mcg Oral Once per day on Thu Sat    isosorbide mononitrate ER (IMDUR) tablet 60 mg  60 mg Oral DAILY    lactulose (CHRONULAC) 10 gram/15 mL solution 30 mL  20 g Oral BID PRN    pantoprazole (PROTONIX) tablet 40 mg  40 mg Oral DAILY    sevelamer carbonate (RENVELA) tab 800 mg  800 mg Oral TID    traMADol (ULTRAM) tablet 50 mg  50 mg Oral Q6H PRN    heparin (porcine) injection 5,000 Units  5,000 Units SubCUTAneous Q8H    labetalol (NORMODYNE;TRANDATE) injection 10 mg  10 mg IntraVENous Q6H PRN    acetaminophen (TYLENOL) tablet 650 mg  650 mg Oral Q4H PRN    ondansetron (ZOFRAN) injection 4 mg  4 mg IntraVENous Q6H PRN    oxyCODONE IR (ROXICODONE) tablet 5 mg  5 mg Oral Q6H PRN         Objective:      Physical Exam:  Visit Vitals  /82 (BP 1 Location: Right arm, BP Patient Position: At rest)   Pulse 73   Temp 98.1 °F (36.7 °C)   Resp 20   Ht 5' 3\" (1.6 m)   Wt 208 lb 4.8 oz (94.5 kg)   SpO2 94%   Breastfeeding? No   BMI 36.90 kg/m²     General Appearance:  Well developed, well nourished,alert and oriented x 3, and individual in no acute distress. Ears/Nose/Mouth/Throat:   Hearing grossly normal.         Neck: Supple. Chest:   Lungs clear to auscultation bilaterally. Cardiovascular:  Regular rate and rhythm, S1, S2 normal, no murmur. Abdomen:   Soft, non-tender, bowel sounds are active. Extremities: No edema bilaterally. Skin: Warm and dry. Data Review:   Labs:    Recent Results (from the past 24 hour(s))   EKG, 12 LEAD, SUBSEQUENT    Collection Time: 07/20/19  9:50 AM   Result Value Ref Range    Ventricular Rate 70 BPM    Atrial Rate 70 BPM    P-R Interval 188 ms    QRS Duration 118 ms    Q-T Interval 502 ms    QTC Calculation (Bezet) 542 ms    Calculated P Axis 34 degrees    Calculated R Axis -43 degrees    Calculated T Axis 111 degrees    Diagnosis       Normal sinus rhythm  Possible Left atrial enlargement  Left axis deviation  Left ventricular hypertrophy with QRS widening  ST & T wave abnormality, consider anterolateral ischemia  Prolonged QT  When compared with ECG of 17-JUL-2019 03:15,  Vent.  rate has decreased BY  48 BPM  ST now depressed in Anterior leads  T wave inversion now evident in Anterior leads         Telemetry: normal sinus rhythm      Assessment:     Principal Problem:    Acute on chronic respiratory failure ([facility] [address] (7/18/2019)    Active Problems:    Seizure ([facility] [address] (10/12/2015)      HTN (hypertension) (10/12/2015)      End stage renal disease ([facility] [address] (10/12/2015)      Peritoneal dialysis status ([facility] [address] (3/10/2016)      Memory loss (3/10/2016)      Hyperlipidemia (3/17/2016)      Chronic combined systolic and diastolic HF (heart failure), NYHA class 2 ([facility] (7/18/2019)      PAF (paroxysmal atrial fibrillation) ([facility] (7/18/2019)        Plan:     Cardiomyopathy, LVEF 26 to 30%, moderate mitral regurgitation:  She is still unsure if she wants any invasive therapy. Stress test Monday if she agrees for further evaluation. If not, can f/u with Dr. Alberto Marin. Add carvedilol, consider ACE inhibitor/ARB versus hydralazine isosorbide mononitrate on Monday. Continue volume control as per renal with peritoneal dialysis. Will need ICD if patient/family accept. Not a candidate for lifevest.    Paroxysmal atrial fibrillation:  One brief run of paroxysmal atrial fibrillation at about 1:30 AM on 7/20/2019. This is obviously a previously known problem as she has been on amiodarone. She is not on anticoagulation, perhaps due to relatively severe anemia. On aspirin and Plavix. Due to low atrial fibrillation burden, I will defer on further treatment for now other than beta-blocker and defer need for anticoagulants to Dr. Mar Alejandre.     End-stage renal disease on peritoneal dialysis      Dawn Farah MD

## 2019-07-20 NOTE — PROGRESS NOTES
Bedside and Verbal shift change report GIVEN TO Armando Jackson RN. Report included the following information SBAR, Kardex, ED Summary, Procedure Summary, Intake/Output, MAR, Recent Results and Cardiac Rhythm (NSR). SIGNIFICANT CHANGES DURING SHIFT:    1104- Patient pulled IV, \"It hurt so I took it out\". Pt refusing that nursing staff insert a new IV. Pt now has no IV access. [address] NURSING NOTE   Admission Date 7/16/2019   Admission Diagnosis CHF (congestive heart failure) ([facility] [address] [I50.9]   Consults IP CONSULT TO NEPHROLOGY  IP CONSULT TO NEPHROLOGY  IP CONSULT TO CARDIOLOGY      Cardiac Monitoring [x] Yes [] No      Purposeful Hourly Rounding [x] Yes    Nataliya Score Total Score: 3   Nataliya score 3 or > [x] Bed Alarm [] Avasys [] 1:1 sitter [] Patient refused (Signed refusal form in chart)   Luis Score Lius Score: 17   Luis score 14 or < [] PMT consult [] Wound Care consult    []  Specialty bed  [] Nutrition consult      Influenza Vaccine Received Flu Vaccine for Current Season (usually Sept-March): Not Flu Season           Oxygen needs? [x] Room air Oxygen @  []1L    []2L    []3L   []4L    []5L   []6L via  NC   Chronic home O2 use? [] Yes [x] No  Perform O2 challenge test and document in progress note using smartphrase (.Homeoxygen)      Last bowel movement Last Bowel Movement Date: 07/16/19      Urinary Catheter             LDAs                                    Readmission Risk Assessment Tool Score High Risk            22       Total Score        3 Has Seen PCP in Last 6 Months (Yes=3, No=0)    9 Pt. Coverage (Medicare=5 , Medicaid, or Self-Pay=4)    10 Charlson Comorbidity Score (Age + Comorbid Conditions)        Criteria that do not apply:    . Living with Significant Other. Assisted Living. LTAC. SNF.  or   Rehab    Patient Length of Stay (>5 days = 3)    IP Visits Last 12 Months (1-3=4, 4=9, >4=11)       Expected Length of Stay 4d 2h   Actual Length of Stay 4

## 2019-07-20 NOTE — PROGRESS NOTES
Hospitalist Progress Note    NAME: Bjorn Degroot   :  1955   MRN:  [id number]       Assessment / Plan:  Acute on Chronic Hypoxic Respiratory Failure POA: , improving  Episode of RRT - 2/ Tachypnea/Hypoxia. , also some component of anxiety  CXR with increased Pulmonary edema   -Continue Peritoneal HD  -Volume overload likely poor PD nursing by pt at home,   -Cardio on Board  -Nephro on Board, continue Fluid removal as per Nephro  c/w supplemental O2, at home recently on [facility]. Acute on Chronic  Diastolic Heart Failure POA: ,  Increased Troponin POA  Getting peritoneal HD  cardiology on board  Echo with 26-30% EF and Grade II diastolic dysfunction. Left LV hypokinesis  Pt needs a stress test and ? Plan for on Monday  Also candidate for ICD    Paroxysmal Afib  Had episode of afib overnight, convertedto Sinus by itself  -oon BB  -Avoid anticoagulation, H/O SDH and required  shunt    CAD s/p PCI  [facility] () - mid LAD 70-80%, distal LAD 50%, diagonal high-grade disease, OMB high-grade disease, RCA total occlusion - medically managed  2. [facility] 2018 - heavily calcified vessels with severe early mid LAD stenosis, occluded RCA with faint L-R collateral filling and occluded 1st marginal - felt to be high risk for CABG / patient refused  3. [facility] 18 - S/p successful PCI of the prox LAD using 3.0 x 24 Synergy ANDER   -Continue DAPT      ESRD on PD:  Aware  HTN: c/w Home regimen, use labetalol prn. Seizures: c/w Keppra, monitor, seizure precautions. Code Status: Full Code  Surrogate Decision Maker: DTR [place] [phone]  DVT Prophylaxis: Heparin  GI Prophylaxis: not indicated    Baseline: very debilitated, recently D/c from Rehab to friends house then to [facility] to here. Cardiology following ,Plan for ICD/Intervention/stress test on Monday? Subjective:     Chief Complaint / Reason for Physician Visit  C/o cramps whole body. SOB resolved.   Episode of Afib overnight, resolved in 30 minutes. Currently NSR    Review of Systems:  Symptom Y/N Comments  Symptom Y/N Comments   Fever/Chills n   Chest Pain n    Poor Appetite n   Edema     Cough n   Abdominal Pain n    Sputum n   Joint Pain     SOB/MAYORGA y   Pruritis/Rash     Nausea/vomit    Tolerating PT/OT     Diarrhea    Tolerating Diet     Constipation    Other       Could NOT obtain due to:      Objective:     VITALS:   Last 24hrs VS reviewed since prior progress note. Most recent are:  Patient Vitals for the past 24 hrs:   Temp Pulse Resp BP SpO2   07/20/19 1134 98.1 °F (36.7 °C) 73 20 140/82 94 %   07/20/19 0747 97.9 °F (36.6 °C) 71 21 144/84 94 %   07/20/19 0308 98.7 °F (37.1 °C) 77 18 145/87 96 %   07/20/19 0235  80  137/81    07/20/19 0111  (!) 127      07/19/19 2241 97.9 °F (36.6 °C) 80 18 139/82 99 %   07/19/19 1821 97.7 °F (36.5 °C) 82 16 132/77 99 %   07/19/19 1624     95 %   07/19/19 1532 98.2 °F (36.8 °C) 86 18 142/85 93 %       Intake/Output Summary (Last 24 hours) at 7/20/2019 1414  Last data filed at 7/20/2019 1222  Gross per 24 hour   Intake 44508 ml   Output 18545 ml   Net 220 ml        PHYSICAL EXAM:  General: WD, WN. Alert, cooperative, no acute distress    EENT:  EOMI. Anicteric sclerae. MMM  Resp:  Minimal rales b/l bases  CV:  Regular  rhythm,  No edema  GI:  Soft, Non distended, Non tender.  +Bowel sounds  Neurologic:  Alert and oriented X 3, normal speech,   Psych:   Good insight. Not anxious nor agitated  Skin:  No rashes.   No jaundice    Reviewed most current lab test results and cultures  YES  Reviewed most current radiology test results   YES  Review and summation of old records today    NO  Reviewed patient's current orders and MAR    YES  PMH/SH reviewed - no change compared to H&P  ________________________________________________________________________  Care Plan discussed with:    Comments   Patient x    Family      RN x    Care Manager     Consultant                        Multidiciplinary team rounds were held today with , nursing, pharmacist and clinical coordinator. Patient's plan of care was discussed; medications were reviewed and discharge planning was addressed. ________________________________________________________________________  Total NON critical care TIME:  40   Minutes    Total CRITICAL CARE TIME Spent:   Minutes non procedure based      Comments   >50% of visit spent in counseling and coordination of care     ________________________________________________________________________  Spencer Blancas MD     Procedures: see electronic medical records for all procedures/Xrays and details which were not copied into this note but were reviewed prior to creation of Plan. LABS:  I reviewed today's most current labs and imaging studies.   Pertinent labs include:  Recent Labs     07/19/19  1229 07/18/19  0351   WBC 8.4 10.0   HGB 8.4* 7.8*   HCT 27.2* 26.1*    388     Recent Labs     07/19/19  1229 07/18/19  0351    138   K 4.7 5.2*    100   CO2 27 29   GLU 87 70   BUN 69* 66*   CREA 12.60* 12.60*   CA 8.8 8.7   MG  --  2.1   ALB 2.5*  --    TBILI 0.4  --    SGOT 19  --    ALT 14  --        Signed: Spencer Blancas MD

## 2019-07-20 NOTE — PROGRESS NOTES
Bedside shift change report GIVEN TO Mandy López RN. Report included the following information SBAR and Kardex. SIGNIFICANT CHANGES DURING SHIFT:   0100- pt converted from NSR to Afib, HR ranging from 115-130, paged on call cardiologist   0130-pt converted back to NSR, HR 89  0140- received telephone orders with read back for 25mg Metoprolol q12h  0400- pt refused morning labs, educated pt on importance of lab work     CONCERNS TO ADDRESS WITH MD:            [facility]   Admission Date 7/16/2019   Admission Diagnosis CHF (congestive heart failure) ([facility] [address] [I50.9]   Consults IP CONSULT TO NEPHROLOGY  IP CONSULT TO NEPHROLOGY  IP CONSULT TO CARDIOLOGY      Cardiac Monitoring [x] Yes [] No      Purposeful Hourly Rounding [x] Yes    Nataliya Score Total Score: 3   Nataliya score 3 or > [x] Bed Alarm [] Avasys [] 1:1 sitter [] Patient refused (Signed refusal form in chart)   Luis Score Luis Score: 17   Luis score 14 or < [] PMT consult [] Wound Care consult    []  Specialty bed  [] Nutrition consult      Influenza Vaccine Received Flu Vaccine for Current Season (usually Sept-March): Not Flu Season           Oxygen needs? [x] Room air Oxygen @  []1L    []2L    []3L   []4L    []5L   []6L via  NC   Chronic home O2 use?  [] Yes [] No  Perform O2 challenge test and document in progress note using smartphrase (.Homeoxygen)      Last bowel movement Last Bowel Movement Date: 07/17/19      Urinary Catheter             LDAs               Peripheral IV 07/16/19 Right Antecubital (Active)   Site Assessment Clean, dry, & intact 7/19/2019  7:42 PM   Phlebitis Assessment 0 7/19/2019  7:42 PM   Infiltration Assessment 0 7/19/2019  7:42 PM   Dressing Status Clean, dry, & intact 7/19/2019  7:42 PM   Dressing Type Transparent 7/19/2019  7:42 PM   Hub Color/Line Status Pink 7/19/2019  7:42 PM                         Readmission Risk Assessment Tool Score High Risk            22       Total Score        3 Has Seen PCP in Last 6 Months (Yes=3, No=0)    9 Pt. Coverage (Medicare=5 , Medicaid, or Self-Pay=4)    10 Charlson Comorbidity Score (Age + Comorbid Conditions)        Criteria that do not apply:    . Living with Significant Other. Assisted Living. LTAC. SNF.  or   Rehab    Patient Length of Stay (>5 days = 3)    IP Visits Last 12 Months (1-3=4, 4=9, >4=11)       Expected Length of Stay 4d 2h   Actual Length of Stay 4

## 2019-07-20 NOTE — PROGRESS NOTES
[facility] Progress Note        NAME: Shu Meredith       :  1955       MRN:  [id number]     Date/Time: 2019    Risk of deterioration: low       Assessment:    Plan:  ESRD-PD  Transthoracic PD cath  HTN  Hyperkalemia  CMO  CAD  HX of cva   Draining well. Having cramps. Suspect that she is getting on dry side. Change to all 2.5% exchanges which is what she normally does at home. epo       Subjective:     Chief Complaint: \"I have terrible cramps. \"  Less dyspnea. No N/V. Objective:     VITALS:   Last 24hrs VS reviewed since prior progress note. Most recent are:  Visit Vitals  /84 (BP 1 Location: Left arm, BP Patient Position: Supine)   Pulse 71   Temp 97.9 °F (36.6 °C)   Resp 21   Ht 5' 3\" (1.6 m)   Wt 94.5 kg (208 lb 4.8 oz)   SpO2 94%   Breastfeeding?  No   BMI 36.90 kg/m²     SpO2 Readings from Last 6 Encounters:   19 94%   19 100%   16 97%   16 98%   16 92%   04/15/16 97%    O2 Flow Rate (L/min): 0.5 l/min       Intake/Output Summary (Last 24 hours) at 2019 0912  Last data filed at 2019 0747  Gross per 24 hour   Intake 17169 ml   Output 78654 ml   Net 280 ml        Telemetry Reviewed   normal sinus rhythm    PHYSICAL EXAM:    General NAD  abd soft  No edema            Lab Data Reviewed: (see below)    Medications Reviewed: (see below)    PMH/SH reviewed - no change compared to H&P  __________________________________________  ___________________________________________________    Attending Physician: Melissa Malave MD     ____________________________________________________  MEDICATIONS:  Current Facility-Administered Medications   Medication Dose Route Frequency    metoprolol tartrate (LOPRESSOR) tablet 25 mg  25 mg Oral Q12H    peritoneal dialysis DEXTROSE 2.5% (2.5 mEq/L low calcium) solution 2,500 mL  2,500 mL IntraPERitoneal QID    levETIRAcetam (KEPPRA) tablet 500 mg  500 mg Oral BID    OXcarbazepine (TRILEPTAL) tablet 150 mg 150 mg Oral BID    clopidogrel (PLAVIX) tablet 75 mg  75 mg Oral DAILY    gentamicin (GARAMYCIN) 0.1 % ointment   Topical DAILY    epoetin tawny-epbx (RETACRIT) injection 10,000 Units  10,000 Units SubCUTAneous Q TUE, THU & SAT    LORazepam (ATIVAN) injection 0.5 mg  0.5 mg IntraVENous Q6H PRN    amLODIPine (NORVASC) tablet 5 mg  5 mg Oral DAILY    aspirin chewable tablet 81 mg  81 mg Oral DAILY    atorvastatin (LIPITOR) tablet 40 mg  40 mg Oral QHS    calcitRIOL (ROCALTROL) capsule 0.25 mcg  0.25 mcg Oral Once per day on Thu Sat    isosorbide mononitrate ER (IMDUR) tablet 60 mg  60 mg Oral DAILY    lactulose (CHRONULAC) 10 gram/15 mL solution 30 mL  20 g Oral BID PRN    pantoprazole (PROTONIX) tablet 40 mg  40 mg Oral DAILY    sevelamer carbonate (RENVELA) tab 800 mg  800 mg Oral TID    traMADol (ULTRAM) tablet 50 mg  50 mg Oral Q6H PRN    heparin (porcine) injection 5,000 Units  5,000 Units SubCUTAneous Q8H    labetalol (NORMODYNE;TRANDATE) injection 10 mg  10 mg IntraVENous Q6H PRN    acetaminophen (TYLENOL) tablet 650 mg  650 mg Oral Q4H PRN    ondansetron (ZOFRAN) injection 4 mg  4 mg IntraVENous Q6H PRN    oxyCODONE IR (ROXICODONE) tablet 5 mg  5 mg Oral Q6H PRN        LABS:  Recent Labs     07/19/19  1229 07/18/19  0351   WBC 8.4 10.0   HGB 8.4* 7.8*   HCT 27.2* 26.1*    388     Recent Labs     07/19/19  1229 07/18/19  0351 07/17/19  1131    138 139   K 4.7 5.2* 5.3*    100 102   CO2 27 29 29   BUN 69* 66* 57*   CREA 12.60* 12.60* 11.40*   GLU 87 70 80   CA 8.8 8.7 8.6   MG  --  2.1 2.1   PHOS  --   --  4.5     Recent Labs     07/19/19  1229 07/17/19  1131   SGOT 19 17   ALT 14 12   AP 95 94   TBILI 0.4 0.3   TP 7.2 6.7   ALB 2.5* 2.3*   GLOB 4.7* 4.4*     No results for input(s): INR, PTP, APTT in the last 72 hours. No lab exists for component: INREXT, INREXT   No results for input(s): FE, TIBC, PSAT, FERR in the last 72 hours.    No results for input(s): PH, PCO2, PO2 in the last 72 hours.   Recent Labs     07/18/19  0351 07/17/19  1131   TROIQ 0.10* 0.14*     Lab Results   Component Value Date/Time    Glucose (POC) 162 (H) 07/17/2019 03:21 AM

## 2019-07-21 LAB
BACTERIA SPEC CULT: NORMAL
BACTERIA SPEC CULT: NORMAL
GRAM STN SPEC: NORMAL
GRAM STN SPEC: NORMAL
SERVICE CMNT-IMP: NORMAL

## 2019-07-21 PROCEDURE — 74011250637 HC RX REV CODE- 250/637: Performed by: INTERNAL MEDICINE

## 2019-07-21 PROCEDURE — 74011250636 HC RX REV CODE- 250/636: Performed by: INTERNAL MEDICINE

## 2019-07-21 PROCEDURE — A4722 DIALYS SOL FLD VOL > 1999CC: HCPCS | Performed by: INTERNAL MEDICINE

## 2019-07-21 PROCEDURE — 65660000000 HC RM CCU STEPDOWN

## 2019-07-21 RX ADMIN — CARVEDILOL 6.25 MG: 6.25 TABLET, FILM COATED ORAL at 18:34

## 2019-07-21 RX ADMIN — LEVETIRACETAM 500 MG: 500 TABLET ORAL at 08:35

## 2019-07-21 RX ADMIN — OXCARBAZEPINE 150 MG: 150 TABLET ORAL at 08:35

## 2019-07-21 RX ADMIN — OXCARBAZEPINE 150 MG: 150 TABLET ORAL at 18:36

## 2019-07-21 RX ADMIN — LACTULOSE 30 ML: 20 SOLUTION ORAL at 14:01

## 2019-07-21 RX ADMIN — SEVELAMER CARBONATE 800 MG: 800 TABLET, FILM COATED ORAL at 18:34

## 2019-07-21 RX ADMIN — HEPARIN SODIUM 5000 UNITS: 5000 INJECTION INTRAVENOUS; SUBCUTANEOUS at 18:00

## 2019-07-21 RX ADMIN — ISOSORBIDE MONONITRATE 60 MG: 30 TABLET, EXTENDED RELEASE ORAL at 08:35

## 2019-07-21 RX ADMIN — OXYCODONE HYDROCHLORIDE 5 MG: 5 TABLET ORAL at 08:35

## 2019-07-21 RX ADMIN — HEPARIN SODIUM 5000 UNITS: 5000 INJECTION INTRAVENOUS; SUBCUTANEOUS at 21:51

## 2019-07-21 RX ADMIN — LACTULOSE 30 ML: 20 SOLUTION ORAL at 21:56

## 2019-07-21 RX ADMIN — HEPARIN SODIUM 5000 UNITS: 5000 INJECTION INTRAVENOUS; SUBCUTANEOUS at 05:45

## 2019-07-21 RX ADMIN — AMLODIPINE BESYLATE 5 MG: 5 TABLET ORAL at 08:35

## 2019-07-21 RX ADMIN — LEVETIRACETAM 500 MG: 500 TABLET ORAL at 18:36

## 2019-07-21 RX ADMIN — ASPIRIN 81 MG 81 MG: 81 TABLET ORAL at 08:35

## 2019-07-21 RX ADMIN — SODIUM CHLORIDE, SODIUM LACTATE, CALCIUM CHLORIDE, MAGNESIUM CHLORIDE AND DEXTROSE 2500 ML: 2.5; 538; 448; 18.3; 5.08 INJECTION, SOLUTION INTRAPERITONEAL at 17:47

## 2019-07-21 RX ADMIN — PANTOPRAZOLE SODIUM 40 MG: 40 TABLET, DELAYED RELEASE ORAL at 08:35

## 2019-07-21 RX ADMIN — SEVELAMER CARBONATE 800 MG: 800 TABLET, FILM COATED ORAL at 21:55

## 2019-07-21 RX ADMIN — HEPARIN SODIUM: 1000 INJECTION, SOLUTION INTRAVENOUS; SUBCUTANEOUS at 23:02

## 2019-07-21 RX ADMIN — CLOPIDOGREL BISULFATE 75 MG: 75 TABLET ORAL at 08:35

## 2019-07-21 RX ADMIN — SODIUM CHLORIDE, SODIUM LACTATE, CALCIUM CHLORIDE, MAGNESIUM CHLORIDE AND DEXTROSE 2500 ML: 2.5; 538; 448; 18.3; 5.08 INJECTION, SOLUTION INTRAPERITONEAL at 10:40

## 2019-07-21 RX ADMIN — CARVEDILOL 6.25 MG: 6.25 TABLET, FILM COATED ORAL at 08:35

## 2019-07-21 RX ADMIN — ATORVASTATIN CALCIUM 40 MG: 40 TABLET, FILM COATED ORAL at 21:55

## 2019-07-21 NOTE — PROGRESS NOTES
When getting PD with Patient the Patient was able to drain with 40min 1800ml out. When instilling the 2500ml of2.5 dextrorse with 2.5mEq Ca the Patient only had 1900ml in over 2-3hrs. Nephrology was paged and the on call MD was reached. MD stated to get the patient moving and that a bowel movement could help.  MD also stated to do the next exchange at 4pm or 5pm.

## 2019-07-21 NOTE — PROGRESS NOTES
Bedside shift change report GIVEN TO Kaelyn Casey RN. Report included the following information SBAR and Kardex. SIGNIFICANT CHANGES DURING SHIFT:   0400- unable to obtain morning labs after multiple attempts. CONCERNS TO ADDRESS WITH MD:            [facility] NURSING NOTE   Admission Date 7/16/2019   Admission Diagnosis CHF (congestive heart failure) ([facility] [address] [I50.9]   Consults IP CONSULT TO NEPHROLOGY  IP CONSULT TO NEPHROLOGY  IP CONSULT TO CARDIOLOGY      Cardiac Monitoring [x] Yes [] No      Purposeful Hourly Rounding [x] Yes    Nataliya Score Total Score: 3   Nataliya score 3 or > [x] Bed Alarm [] Avasys [] 1:1 sitter [] Patient refused (Signed refusal form in chart)   Luis Score Luis Score: 17   Luis score 14 or < [] PMT consult [] Wound Care consult    []  Specialty bed  [] Nutrition consult      Influenza Vaccine Received Flu Vaccine for Current Season (usually Sept-March): Not Flu Season           Oxygen needs? [x] Room air Oxygen @  []1L    []2L    []3L   []4L    []5L   []6L via  NC   Chronic home O2 use? [] Yes [] No  Perform O2 challenge test and document in progress note using smartphrase (.Homeoxygen)      Last bowel movement Last Bowel Movement Date: 07/16/19      Urinary Catheter             LDAs                                    Readmission Risk Assessment Tool Score High Risk            22       Total Score        3 Has Seen PCP in Last 6 Months (Yes=3, No=0)    9 Pt. Coverage (Medicare=5 , Medicaid, or Self-Pay=4)    10 Charlson Comorbidity Score (Age + Comorbid Conditions)        Criteria that do not apply:    . Living with Significant Other. Assisted Living. LTAC. SNF.  or   Rehab    Patient Length of Stay (>5 days = 3)    IP Visits Last 12 Months (1-3=4, 4=9, >4=11)       Expected Length of Stay 4d 2h   Actual Length of Stay 5

## 2019-07-21 NOTE — PROGRESS NOTES
Cardiology Progress Note      7/21/2019 1:05 PM    Admit Date: 7/16/2019    Admit Diagnosis: CHF (congestive heart failure) ([facility] [address] [I50.9]      Subjective:     Garland Arnett is feeling better with dialysis. No chest pain. Has intermittent leg cramps. Visit Vitals  /73 (BP 1 Location: Right arm, BP Patient Position: At rest)   Pulse 70   Temp 98.4 °F (36.9 °C)   Resp 20   Ht 5' 3\" (1.6 m)   Wt 203 lb 3.2 oz (92.2 kg)   SpO2 97%   Breastfeeding?  No   BMI 36.00 kg/m²       Current Facility-Administered Medications   Medication Dose Route Frequency    peritoneal dialysis DEXTROSE 2.5% (2.5 mEq/L low calcium) solution 2,500 mL  2,500 mL IntraPERitoneal QID    carvedilol (COREG) tablet 6.25 mg  6.25 mg Oral BID WITH MEALS    levETIRAcetam (KEPPRA) tablet 500 mg  500 mg Oral BID    OXcarbazepine (TRILEPTAL) tablet 150 mg  150 mg Oral BID    clopidogrel (PLAVIX) tablet 75 mg  75 mg Oral DAILY    gentamicin (GARAMYCIN) 0.1 % ointment   Topical DAILY    epoetin tawny-epbx (RETACRIT) injection 10,000 Units  10,000 Units SubCUTAneous Q TUE, THU & SAT    LORazepam (ATIVAN) injection 0.5 mg  0.5 mg IntraVENous Q6H PRN    amLODIPine (NORVASC) tablet 5 mg  5 mg Oral DAILY    aspirin chewable tablet 81 mg  81 mg Oral DAILY    atorvastatin (LIPITOR) tablet 40 mg  40 mg Oral QHS    calcitRIOL (ROCALTROL) capsule 0.25 mcg  0.25 mcg Oral Once per day on Thu Sat    isosorbide mononitrate ER (IMDUR) tablet 60 mg  60 mg Oral DAILY    lactulose (CHRONULAC) 10 gram/15 mL solution 30 mL  20 g Oral BID PRN    pantoprazole (PROTONIX) tablet 40 mg  40 mg Oral DAILY    sevelamer carbonate (RENVELA) tab 800 mg  800 mg Oral TID    traMADol (ULTRAM) tablet 50 mg  50 mg Oral Q6H PRN    heparin (porcine) injection 5,000 Units  5,000 Units SubCUTAneous Q8H    labetalol (NORMODYNE;TRANDATE) injection 10 mg  10 mg IntraVENous Q6H PRN    acetaminophen (TYLENOL) tablet 650 mg  650 mg Oral Q4H PRN    ondansetron (ZOFRAN) injection 4 mg  4 mg IntraVENous Q6H PRN    oxyCODONE IR (ROXICODONE) tablet 5 mg  5 mg Oral Q6H PRN         Objective:      Physical Exam:  Visit Vitals  /73 (BP 1 Location: Right arm, BP Patient Position: At rest)   Pulse 70   Temp 98.4 °F (36.9 °C)   Resp 20   Ht 5' 3\" (1.6 m)   Wt 203 lb 3.2 oz (92.2 kg)   SpO2 97%   Breastfeeding? No   BMI 36.00 kg/m²     General Appearance:  Well developed, well nourished,alert and oriented x 3, and individual in no acute distress. Ears/Nose/Mouth/Throat:   Hearing grossly normal.         Neck: Supple. Chest:   Lungs clear to auscultation bilaterally. Cardiovascular:  Regular rate and rhythm, S1, S2 normal, no murmur. Abdomen:   Soft, non-tender, bowel sounds are active. Extremities: No edema bilaterally. Skin: Warm and dry. Data Review:   Labs:    No results found for this or any previous visit (from the past 24 hour(s)). Telemetry: normal sinus rhythm      Assessment:     Principal Problem:    Acute on chronic respiratory failure ([facility] [address] (7/18/2019)    Active Problems:    Seizure ([facility] [address] (10/12/2015)      HTN (hypertension) (10/12/2015)      End stage renal disease ([facility] [address] (10/12/2015)      Peritoneal dialysis status ([facility] [address] (3/10/2016)      Memory loss (3/10/2016)      Hyperlipidemia (3/17/2016)      Chronic combined systolic and diastolic HF (heart failure), NYHA class 2 ([facility] [address] (7/18/2019)      PAF (paroxysmal atrial fibrillation) ([facility] [address] (7/18/2019)        Plan:     Cardiomyopathy, LVEF 26 to 30%, moderate mitral regurgitation:  She is still unsure if she wants any invasive therapy. Stress test Monday if she agrees for further evaluation. If not, can f/u with Dr. Carson Burnett. Added carvedilol, consider ACE inhibitor/ARB versus hydralazine isosorbide mononitrate on Monday. Continue volume control as per renal with peritoneal dialysis. Will need ICD if patient/family accept.  Not a candidate for lifevest.    Paroxysmal atrial fibrillation:  One brief run of paroxysmal atrial fibrillation at about 1:30 AM on 7/20/2019. This is obviously a previously known problem as she has been on amiodarone. She is not on anticoagulation, perhaps due to relatively severe anemia. On aspirin and Plavix. Due to low atrial fibrillation burden, I will defer on further treatment for now other than beta-blocker and defer need for anticoagulants to Dr. Netta Kilgore.     End-stage renal disease on peritoneal dialysis      Jennifer Sheth MD

## 2019-07-21 NOTE — PROGRESS NOTES
Hospitalist Progress Note    NAME: Laura Aguilera   :  1955   MRN:  [id number]       Assessment / Plan:  Acute on Chronic Hypoxic Respiratory Failure POA: , improving/resolved  Episode of RRT - 2/2 Tachypnea/Hypoxia. , also some component of anxiety  CXR with increased Pulmonary edema   -Continue Peritoneal HD  -Volume overload likely poor PD nursing by pt at home,   -Cardio on Board  -Nephro on Board, continue Fluid removal as per Nephro  c/w supplemental O2, at home recently on [facility]. Acute on Chronic  Diastolic Heart Failure POA: ,  Increased Troponin POA  Getting peritoneal HD  cardiology on board  Echo with 26-30% EF and Grade II diastolic dysfunction. Left LV hypokinesis  Pt needs a stress test and ? Plan for on Monday  Also candidate for ICD    Paroxysmal Afib  Had episode of afib overnight, convertedto Sinus by itself  -has known h/o afib. -oon BB  -Avoid anticoagulation, H/O SDH and required  shunt    CAD s/p PCI    As per chart  [facility] () - mid LAD 70-80%, distal LAD 50%, diagonal high-grade disease, OMB high-grade disease, RCA total occlusion - medically managed  2. [facility] 2018 - heavily calcified vessels with severe early mid LAD stenosis, occluded RCA with faint L-R collateral filling and occluded 1st marginal - felt to be high risk for CABG / patient refused  3. [facility] 18 - S/p successful PCI of the prox LAD using 3.0 x 24 Synergy ANDER   -Continue DAPT      ESRD on PD:  Aware  HTN: c/w Home regimen, use labetalol prn. Seizures: c/w Keppra, monitor, seizure precautions. Code Status: Full Code  Surrogate Decision Maker: DTR Shaquille Copeland [phone]  DVT Prophylaxis: Heparin  GI Prophylaxis: not indicated    Baseline: very debilitated, recently D/c from Rehab to friends house then to [facility] to here. Cardiology following ,Plan for ICD/Intervention/stress test on Monday?   NPO from midnight       Subjective:     Chief Complaint / Reason for Physician Visit  Much better  Sitting on side of bed. On room air    Review of Systems:  Symptom Y/N Comments  Symptom Y/N Comments   Fever/Chills n   Chest Pain n    Poor Appetite n   Edema     Cough n   Abdominal Pain n    Sputum n   Joint Pain     SOB/MAYORGA y   Pruritis/Rash     Nausea/vomit    Tolerating PT/OT     Diarrhea    Tolerating Diet     Constipation    Other       Could NOT obtain due to:      Objective:     VITALS:   Last 24hrs VS reviewed since prior progress note. Most recent are:  Patient Vitals for the past 24 hrs:   Temp Pulse Resp BP SpO2   07/21/19 0749 98.4 °F (36.9 °C) 70 20 132/73 97 %   07/21/19 0417 98 °F (36.7 °C) 72 20 149/90 97 %   07/20/19 2344 98.2 °F (36.8 °C) 69 20 141/87 97 %   07/20/19 1911 98.1 °F (36.7 °C) 73 18 131/81 97 %   07/20/19 1427 98.7 °F (37.1 °C) 80 16 135/90 96 %       Intake/Output Summary (Last 24 hours) at 7/21/2019 1306  Last data filed at 7/21/2019 1040  Gross per 24 hour   Intake 7800 ml   Output 8400 ml   Net -600 ml        PHYSICAL EXAM:  General: WD, WN. Alert, cooperative, no acute distress    EENT:  EOMI. Anicteric sclerae. MMM  Resp:  Minimal rales b/l bases  CV:  Regular  rhythm,  No edema  GI:  Soft, Non distended, Non tender.  +Bowel sounds  Neurologic:  Alert and oriented X 3, normal speech,   Psych:   Good insight. Not anxious nor agitated  Skin:  No rashes. No jaundice    Reviewed most current lab test results and cultures  YES  Reviewed most current radiology test results   YES  Review and summation of old records today    NO  Reviewed patient's current orders and MAR    YES  PMH/SH reviewed - no change compared to H&P  ________________________________________________________________________  Care Plan discussed with:    Comments   Patient x    Family      RN x    Care Manager     Consultant                        Multidiciplinary team rounds were held today with , nursing, pharmacist and clinical coordinator.   Patient's plan of care was discussed; medications were reviewed and discharge planning was addressed. ________________________________________________________________________  Total NON critical care TIME:  40   Minutes    Total CRITICAL CARE TIME Spent:   Minutes non procedure based      Comments   >50% of visit spent in counseling and coordination of care     ________________________________________________________________________  Murphy Shay MD     Procedures: see electronic medical records for all procedures/Xrays and details which were not copied into this note but were reviewed prior to creation of Plan. LABS:  I reviewed today's most current labs and imaging studies.   Pertinent labs include:  Recent Labs     07/19/19  1229   WBC 8.4   HGB 8.4*   HCT 27.2*        Recent Labs     07/19/19  1229      K 4.7      CO2 27   GLU 87   BUN 69*   CREA 12.60*   CA 8.8   ALB 2.5*   TBILI 0.4   SGOT 19   ALT 14       Signed: Murphy Shay MD

## 2019-07-21 NOTE — PROGRESS NOTES
Received report from Lorrie Gan RN. Assumed care of patient. 0000  PD exchange at 2300 drained 3800 cc of clear yellow fluid. (Drain time 20 minutes). Filled with 2500 cc of dialysate with heparin per Dr. Marine Hill order. Pt tolerated well. Pt concerned about mild leakage around pd cath site while draining. Will pass along to day shift RN tomorrow to address with MD. (Pt not due for any further PD treatments tonight).

## 2019-07-21 NOTE — PROGRESS NOTES
Patient's 5026 PD she had 1000 out    1840 Patient was a fourth of the way through receiving the 2500ml (2.5 dextrose, 2.5mEq Ca). MD was paged and Dr. Rachel Wolf was reached. Md ordered one time dose of lactulose, to reposition, and the he was going to heparinize her PD fluid    2000: Patient just finished her 2500ml (2.5 dextrose, 2.5 Ca) fluid.

## 2019-07-21 NOTE — PROGRESS NOTES
[facility] Progress Note        NAME: Catrachito Richard       :  1955       MRN:  [id number]     Date/Time: 2019    Risk of deterioration: low       Assessment:    Plan:  ESRD-PD  Transthoracic PD cath  HTN  Hyperkalemia  CMO  CAD  HX of cva   Draining well. Doing all 2.5% exchanges. epo       Subjective:     Chief Complaint: \"Is the stress test painful? \"  No dyspnea. No N/V. Objective:     VITALS:   Last 24hrs VS reviewed since prior progress note. Most recent are:  Visit Vitals  /73 (BP 1 Location: Right arm, BP Patient Position: At rest)   Pulse 70   Temp 98.4 °F (36.9 °C)   Resp 20   Ht 5' 3\" (1.6 m)   Wt 92.2 kg (203 lb 3.2 oz)   SpO2 97%   Breastfeeding?  No   BMI 36.00 kg/m²     SpO2 Readings from Last 6 Encounters:   19 97%   19 100%   16 97%   16 98%   16 92%   04/15/16 97%    O2 Flow Rate (L/min): 0.5 l/min       Intake/Output Summary (Last 24 hours) at 2019 8417  Last data filed at 2019 0417  Gross per 24 hour   Intake 7920 ml   Output 6600 ml   Net 1320 ml        Telemetry Reviewed   normal sinus rhythm    PHYSICAL EXAM:    General NAD  abd soft  No edema            Lab Data Reviewed: (see below)    Medications Reviewed: (see below)    PMH/SH reviewed - no change compared to H&P  __________________________________________  ___________________________________________________    Attending Physician: Ronal Peacock MD     ____________________________________________________  MEDICATIONS:  Current Facility-Administered Medications   Medication Dose Route Frequency    peritoneal dialysis DEXTROSE 2.5% (2.5 mEq/L low calcium) solution 2,500 mL  2,500 mL IntraPERitoneal QID    carvedilol (COREG) tablet 6.25 mg  6.25 mg Oral BID WITH MEALS    levETIRAcetam (KEPPRA) tablet 500 mg  500 mg Oral BID    OXcarbazepine (TRILEPTAL) tablet 150 mg  150 mg Oral BID    clopidogrel (PLAVIX) tablet 75 mg  75 mg Oral DAILY    gentamicin (GARAMYCIN) 0.1 % ointment   Topical DAILY    epoetin tawny-epbx (RETACRIT) injection 10,000 Units  10,000 Units SubCUTAneous Q TUE, THU & SAT    LORazepam (ATIVAN) injection 0.5 mg  0.5 mg IntraVENous Q6H PRN    amLODIPine (NORVASC) tablet 5 mg  5 mg Oral DAILY    aspirin chewable tablet 81 mg  81 mg Oral DAILY    atorvastatin (LIPITOR) tablet 40 mg  40 mg Oral QHS    calcitRIOL (ROCALTROL) capsule 0.25 mcg  0.25 mcg Oral Once per day on Thu Sat    isosorbide mononitrate ER (IMDUR) tablet 60 mg  60 mg Oral DAILY    lactulose (CHRONULAC) 10 gram/15 mL solution 30 mL  20 g Oral BID PRN    pantoprazole (PROTONIX) tablet 40 mg  40 mg Oral DAILY    sevelamer carbonate (RENVELA) tab 800 mg  800 mg Oral TID    traMADol (ULTRAM) tablet 50 mg  50 mg Oral Q6H PRN    heparin (porcine) injection 5,000 Units  5,000 Units SubCUTAneous Q8H    labetalol (NORMODYNE;TRANDATE) injection 10 mg  10 mg IntraVENous Q6H PRN    acetaminophen (TYLENOL) tablet 650 mg  650 mg Oral Q4H PRN    ondansetron (ZOFRAN) injection 4 mg  4 mg IntraVENous Q6H PRN    oxyCODONE IR (ROXICODONE) tablet 5 mg  5 mg Oral Q6H PRN        LABS:  Recent Labs     07/19/19  1229   WBC 8.4   HGB 8.4*   HCT 27.2*        Recent Labs     07/19/19  1229      K 4.7      CO2 27   BUN 69*   CREA 12.60*   GLU 87   CA 8.8     Recent Labs     07/19/19  1229   SGOT 19   ALT 14   AP 95   TBILI 0.4   TP 7.2   ALB 2.5*   GLOB 4.7*     No results for input(s): INR, PTP, APTT in the last 72 hours. No lab exists for component: INREXT, INREXT   No results for input(s): FE, TIBC, PSAT, FERR in the last 72 hours. No results for input(s): PH, PCO2, PO2 in the last 72 hours. No results for input(s): CPK, CKNDX, TROIQ in the last 72 hours.     No lab exists for component: CPKMB  Lab Results   Component Value Date/Time    Glucose (POC) 162 (H) 07/17/2019 03:21 AM

## 2019-07-22 ENCOUNTER — DOCUMENTATION ONLY (OUTPATIENT)
Dept: CASE MANAGEMENT | Age: 64
End: 2019-07-22

## 2019-07-22 ENCOUNTER — HOME HEALTH ADMISSION (OUTPATIENT)
Dept: HOME HEALTH SERVICES | Facility: HOME HEALTH | Age: 64
End: 2019-07-22

## 2019-07-22 ENCOUNTER — APPOINTMENT (OUTPATIENT)
Dept: GENERAL RADIOLOGY | Age: 64
DRG: 291 | End: 2019-07-22
Attending: INTERNAL MEDICINE
Payer: MEDICARE

## 2019-07-22 VITALS
SYSTOLIC BLOOD PRESSURE: 150 MMHG | RESPIRATION RATE: 22 BRPM | HEIGHT: 63 IN | DIASTOLIC BLOOD PRESSURE: 89 MMHG | HEART RATE: 78 BPM | TEMPERATURE: 98.3 F | BODY MASS INDEX: 36.32 KG/M2 | WEIGHT: 205 LBS | OXYGEN SATURATION: 100 %

## 2019-07-22 LAB
ANION GAP SERPL CALC-SCNC: 10 MMOL/L (ref 5–15)
ATRIAL RATE: 70 BPM
BASOPHILS # BLD: 0.2 K/UL (ref 0–0.1)
BASOPHILS NFR BLD: 2 % (ref 0–1)
BUN SERPL-MCNC: 54 MG/DL (ref 6–20)
BUN/CREAT SERPL: 5 (ref 12–20)
CALCIUM SERPL-MCNC: 8.9 MG/DL (ref 8.5–10.1)
CALCULATED P AXIS, ECG09: 34 DEGREES
CALCULATED R AXIS, ECG10: -43 DEGREES
CALCULATED T AXIS, ECG11: 111 DEGREES
CHLORIDE SERPL-SCNC: 98 MMOL/L (ref 97–108)
CO2 SERPL-SCNC: 29 MMOL/L (ref 21–32)
CREAT SERPL-MCNC: 12 MG/DL (ref 0.55–1.02)
DIAGNOSIS, 93000: NORMAL
DIFFERENTIAL METHOD BLD: ABNORMAL
EOSINOPHIL # BLD: 0.7 K/UL (ref 0–0.4)
EOSINOPHIL NFR BLD: 7 % (ref 0–7)
ERYTHROCYTE [DISTWIDTH] IN BLOOD BY AUTOMATED COUNT: 19.9 % (ref 11.5–14.5)
GLUCOSE BLD STRIP.AUTO-MCNC: 87 MG/DL (ref 65–100)
GLUCOSE SERPL-MCNC: 107 MG/DL (ref 65–100)
HCT VFR BLD AUTO: 28.7 % (ref 35–47)
HGB BLD-MCNC: 8.7 G/DL (ref 11.5–16)
IMM GRANULOCYTES # BLD AUTO: 0.1 K/UL (ref 0–0.04)
IMM GRANULOCYTES NFR BLD AUTO: 1 % (ref 0–0.5)
LYMPHOCYTES # BLD: 2.1 K/UL (ref 0.8–3.5)
LYMPHOCYTES NFR BLD: 21 % (ref 12–49)
MAGNESIUM SERPL-MCNC: 2 MG/DL (ref 1.6–2.4)
MCH RBC QN AUTO: 27.9 PG (ref 26–34)
MCHC RBC AUTO-ENTMCNC: 30.3 G/DL (ref 30–36.5)
MCV RBC AUTO: 92 FL (ref 80–99)
MONOCYTES # BLD: 1 K/UL (ref 0–1)
MONOCYTES NFR BLD: 10 % (ref 5–13)
NEUTS SEG # BLD: 5.7 K/UL (ref 1.8–8)
NEUTS SEG NFR BLD: 59 % (ref 32–75)
NRBC # BLD: 0.02 K/UL (ref 0–0.01)
NRBC BLD-RTO: 0.2 PER 100 WBC
P-R INTERVAL, ECG05: 188 MS
PLATELET # BLD AUTO: 372 K/UL (ref 150–400)
PMV BLD AUTO: 11.6 FL (ref 8.9–12.9)
POTASSIUM SERPL-SCNC: 4.2 MMOL/L (ref 3.5–5.1)
Q-T INTERVAL, ECG07: 502 MS
QRS DURATION, ECG06: 118 MS
QTC CALCULATION (BEZET), ECG08: 542 MS
RBC # BLD AUTO: 3.12 M/UL (ref 3.8–5.2)
SERVICE CMNT-IMP: NORMAL
SODIUM SERPL-SCNC: 137 MMOL/L (ref 136–145)
VENTRICULAR RATE, ECG03: 70 BPM
WBC # BLD AUTO: 9.8 K/UL (ref 3.6–11)

## 2019-07-22 PROCEDURE — 74018 RADEX ABDOMEN 1 VIEW: CPT

## 2019-07-22 PROCEDURE — 36415 COLL VENOUS BLD VENIPUNCTURE: CPT

## 2019-07-22 PROCEDURE — 74011250637 HC RX REV CODE- 250/637: Performed by: INTERNAL MEDICINE

## 2019-07-22 PROCEDURE — 83735 ASSAY OF MAGNESIUM: CPT

## 2019-07-22 PROCEDURE — 74011250636 HC RX REV CODE- 250/636: Performed by: INTERNAL MEDICINE

## 2019-07-22 PROCEDURE — 85025 COMPLETE CBC W/AUTO DIFF WBC: CPT

## 2019-07-22 PROCEDURE — 80048 BASIC METABOLIC PNL TOTAL CA: CPT

## 2019-07-22 PROCEDURE — 82962 GLUCOSE BLOOD TEST: CPT

## 2019-07-22 RX ORDER — CALCITRIOL 0.25 UG/1
0.25 CAPSULE ORAL
Qty: 30 CAP | Refills: 0 | Status: SHIPPED | OUTPATIENT
Start: 2019-07-22 | End: 2019-09-09

## 2019-07-22 RX ORDER — CLOPIDOGREL BISULFATE 75 MG/1
75 TABLET ORAL DAILY
Qty: 30 TAB | Refills: 1 | Status: SHIPPED | OUTPATIENT
Start: 2019-07-23

## 2019-07-22 RX ORDER — ISOSORBIDE MONONITRATE 60 MG/1
60 TABLET, EXTENDED RELEASE ORAL
Qty: 30 TAB | Refills: 0 | Status: ON HOLD | OUTPATIENT
Start: 2019-07-22 | End: 2019-12-09 | Stop reason: SDUPTHER

## 2019-07-22 RX ORDER — LORAZEPAM 0.5 MG/1
0.5 TABLET ORAL ONCE
Status: COMPLETED | OUTPATIENT
Start: 2019-07-22 | End: 2019-07-22

## 2019-07-22 RX ORDER — CARVEDILOL 6.25 MG/1
6.25 TABLET ORAL 2 TIMES DAILY WITH MEALS
Qty: 30 TAB | Refills: 0 | Status: ON HOLD | OUTPATIENT
Start: 2019-07-22 | End: 2019-09-19 | Stop reason: SDUPTHER

## 2019-07-22 RX ADMIN — LEVETIRACETAM 500 MG: 500 TABLET ORAL at 09:12

## 2019-07-22 RX ADMIN — CLOPIDOGREL BISULFATE 75 MG: 75 TABLET ORAL at 09:12

## 2019-07-22 RX ADMIN — AMLODIPINE BESYLATE 5 MG: 5 TABLET ORAL at 09:12

## 2019-07-22 RX ADMIN — ISOSORBIDE MONONITRATE 60 MG: 30 TABLET, EXTENDED RELEASE ORAL at 09:12

## 2019-07-22 RX ADMIN — ASPIRIN 81 MG 81 MG: 81 TABLET ORAL at 09:12

## 2019-07-22 RX ADMIN — PANTOPRAZOLE SODIUM 40 MG: 40 TABLET, DELAYED RELEASE ORAL at 09:12

## 2019-07-22 RX ADMIN — CARVEDILOL 6.25 MG: 6.25 TABLET, FILM COATED ORAL at 09:12

## 2019-07-22 RX ADMIN — HEPARIN SODIUM 5000 UNITS: 5000 INJECTION INTRAVENOUS; SUBCUTANEOUS at 06:03

## 2019-07-22 RX ADMIN — LORAZEPAM 0.5 MG: 0.5 TABLET ORAL at 10:43

## 2019-07-22 RX ADMIN — OXCARBAZEPINE 150 MG: 150 TABLET ORAL at 09:12

## 2019-07-22 NOTE — PROGRESS NOTES
Patient refusing IV Cardiology notified.  Cardiology stated that they will do stress test out patient

## 2019-07-22 NOTE — PROGRESS NOTES
Cardiology Progress Note      7/22/2019 10:33 AM    Admit Date: 7/16/2019    Admit Diagnosis: CHF (congestive heart failure) ([address] [I50.9]      Subjective:     Hina Grigsby is upset about her PD. Does not want  stress test.    Visit Vitals  /87 (BP 1 Location: Left arm, BP Patient Position: Post activity)   Pulse 77   Temp 98.2 °F (36.8 °C)   Resp 22   Ht 5' 3\" (1.6 m)   Wt 205 lb (93 kg)   SpO2 98%   Breastfeeding?  No   BMI 36.31 kg/m²       Current Facility-Administered Medications   Medication Dose Route Frequency    LORazepam (ATIVAN) tablet 0.5 mg  0.5 mg Oral ONCE    peritoneal dialysis DEXTROSE 2.5% (2.5 mEq/L low calcium) solution 2,500 mL with HEPARIN 1000 units/L   IntraPERitoneal QID    carvedilol (COREG) tablet 6.25 mg  6.25 mg Oral BID WITH MEALS    levETIRAcetam (KEPPRA) tablet 500 mg  500 mg Oral BID    OXcarbazepine (TRILEPTAL) tablet 150 mg  150 mg Oral BID    clopidogrel (PLAVIX) tablet 75 mg  75 mg Oral DAILY    gentamicin (GARAMYCIN) 0.1 % ointment   Topical DAILY    epoetin tawny-epbx (RETACRIT) injection 10,000 Units  10,000 Units SubCUTAneous Q TUE, THU & SAT    LORazepam (ATIVAN) injection 0.5 mg  0.5 mg IntraVENous Q6H PRN    amLODIPine (NORVASC) tablet 5 mg  5 mg Oral DAILY    aspirin chewable tablet 81 mg  81 mg Oral DAILY    atorvastatin (LIPITOR) tablet 40 mg  40 mg Oral QHS    calcitRIOL (ROCALTROL) capsule 0.25 mcg  0.25 mcg Oral Once per day on Thu Sat    isosorbide mononitrate ER (IMDUR) tablet 60 mg  60 mg Oral DAILY    lactulose (CHRONULAC) 10 gram/15 mL solution 30 mL  20 g Oral BID PRN    pantoprazole (PROTONIX) tablet 40 mg  40 mg Oral DAILY    sevelamer carbonate (RENVELA) tab 800 mg  800 mg Oral TID    traMADol (ULTRAM) tablet 50 mg  50 mg Oral Q6H PRN    heparin (porcine) injection 5,000 Units  5,000 Units SubCUTAneous Q8H    labetalol (NORMODYNE;TRANDATE) injection 10 mg  10 mg IntraVENous Q6H PRN    acetaminophen (TYLENOL) tablet 650 mg  650 mg Oral Q4H PRN    ondansetron (ZOFRAN) injection 4 mg  4 mg IntraVENous Q6H PRN    oxyCODONE IR (ROXICODONE) tablet 5 mg  5 mg Oral Q6H PRN         Objective:      Physical Exam:  Visit Vitals  /87 (BP 1 Location: Left arm, BP Patient Position: Post activity)   Pulse 77   Temp 98.2 °F (36.8 °C)   Resp 22   Ht 5' 3\" (1.6 m)   Wt 205 lb (93 kg)   SpO2 98%   Breastfeeding? No   BMI 36.31 kg/m²     General Appearance:  Well developed, well nourished,alert and oriented x 2, and individual in no acute distress. Ears/Nose/Mouth/Throat:   Hearing grossly normal.         Neck: Supple. Chest:   Lungs clear to auscultation bilaterally. Cardiovascular:  Regular rate and rhythm, S1, S2 normal, no murmur. Abdomen:   Soft, non-tender, bowel sounds are active. Extremities: No edema bilaterally. Skin: Warm and dry. Data Review:   Labs:    Recent Results (from the past 24 hour(s))   CBC WITH AUTOMATED DIFF    Collection Time: 07/22/19  1:46 AM   Result Value Ref Range    WBC 9.8 3.6 - 11.0 K/uL    RBC 3.12 (L) 3.80 - 5.20 M/uL    HGB 8.7 (L) 11.5 - 16.0 g/dL    HCT 28.7 (L) 35.0 - 47.0 %    MCV 92.0 80.0 - 99.0 FL    MCH 27.9 26.0 - 34.0 PG    MCHC 30.3 30.0 - 36.5 g/dL    RDW 19.9 (H) 11.5 - 14.5 %    PLATELET 474 989 - 121 K/uL    MPV 11.6 8.9 - 12.9 FL    NRBC 0.2 (H) 0  WBC    ABSOLUTE NRBC 0.02 (H) 0.00 - 0.01 K/uL    NEUTROPHILS 59 32 - 75 %    LYMPHOCYTES 21 12 - 49 %    MONOCYTES 10 5 - 13 %    EOSINOPHILS 7 0 - 7 %    BASOPHILS 2 (H) 0 - 1 %    IMMATURE GRANULOCYTES 1 (H) 0.0 - 0.5 %    ABS. NEUTROPHILS 5.7 1.8 - 8.0 K/UL    ABS. LYMPHOCYTES 2.1 0.8 - 3.5 K/UL    ABS. MONOCYTES 1.0 0.0 - 1.0 K/UL    ABS. EOSINOPHILS 0.7 (H) 0.0 - 0.4 K/UL    ABS. BASOPHILS 0.2 (H) 0.0 - 0.1 K/UL    ABS. IMM.  GRANS. 0.1 (H) 0.00 - 0.04 K/UL    DF AUTOMATED     MAGNESIUM    Collection Time: 07/22/19  1:46 AM   Result Value Ref Range    Magnesium 2.0 1.6 - 2.4 mg/dL   METABOLIC PANEL, BASIC Collection Time: 07/22/19  1:46 AM   Result Value Ref Range    Sodium 137 136 - 145 mmol/L    Potassium 4.2 3.5 - 5.1 mmol/L    Chloride 98 97 - 108 mmol/L    CO2 29 21 - 32 mmol/L    Anion gap 10 5 - 15 mmol/L    Glucose 107 (H) 65 - 100 mg/dL    BUN 54 (H) 6 - 20 MG/DL    Creatinine 12.00 (H) 0.55 - 1.02 MG/DL    BUN/Creatinine ratio 5 (L) 12 - 20      GFR est AA 4 (L) >60 ml/min/1.73m2    GFR est non-AA 3 (L) >60 ml/min/1.73m2    Calcium 8.9 8.5 - 10.1 MG/DL       Telemetry: normal sinus rhythm      Assessment:     Principal Problem:    Acute on chronic respiratory failure (HCC) (7/18/2019)    Active Problems:    Seizure ([facility] (10/12/2015)      HTN (hypertension) (10/12/2015)      End stage renal disease ([facility] (10/12/2015)      Peritoneal dialysis status ([facility] (3/10/2016)      Memory loss (3/10/2016)      Hyperlipidemia (3/17/2016)      Chronic combined systolic and diastolic HF (heart failure), NYHA class 2 ([facility] (7/18/2019)      PAF (paroxysmal atrial fibrillation) ([facility] (7/18/2019)        Plan:     No further cardiac w/u at this time per patient choice. Not a candidate foe anticoagulation due to her seizure disorder, limited understanding. Can f/u with Dr. Dahlia Diaz upon discharge. Will be available if needed. Discussed with Dr. Ashleigh Arellano.     Gerardo Barroso MD

## 2019-07-22 NOTE — DISCHARGE SUMMARY
Hospitalist Discharge Summary     Patient ID:  Melissa Boyer  [id number]  62 y.o.  1955 7/16/2019    PCP on record: Unknown, Provider    Admit date: 7/16/2019  Discharge date and time: 7/22/2019    DISCHARGE DIAGNOSIS:    Acute on Chronic Hypoxic Respiratory Failure POA:   ,Acute on Chronic  Diastolic Heart Failure POA: ,  Increased Troponin POA  Paroxysmal Afib  CAD s/p PCI  ESRD on PD  Aware  HTN  Seizures           CONSULTATIONS:  IP CONSULT TO NEPHROLOGY  IP CONSULT TO NEPHROLOGY  IP CONSULT TO CARDIOLOGY    Excerpted HPI from H&P of Harsh Berg MD:  Melissa Boyer is a 62 y.o.  female [address] [facility] PMHx significant for htn, CKD with peritoneal dialysis nightly, seizures, CAD, hypercholesterolemia, CHF, DVT, stroke x 3, legally blind,a dn a fib presents via ambulance to the ED with cc of SOB that started about 3 hours PTA. Pt reports she was sitting in wheelchair and watching TV, when SOB started. Pt reports SOB is intermittent.  Denies CP, dizziness, blurred vision. Pt has not taken anything for sx. Denies hx asthma and COPD. Pt states she was recently in rehab for stroke and was discharged 3 weeks ago. Pt states she was on 2L of O2 at rehab facility, but she was not discharged home on O2. Hx previous DVT to left arm. Pt reports stent to heart about 1 year ago. Pt has not taken anything for sx.  Denies aggravating or alleviating sx. Denies lower leg swelling. Pt reports receiving peritoneal dialysis last night, per usual.  Most recent stroke 3 months prior.  Cardiac stent placed 1.5 years ago.   Review of the outside hospital records. Bob Barthel does have elevated BNP and d-dimer secondary to likely CKD.  There are no ischemic changes on EKG. [facility] cardiologist did evaluate patient at bedside. Dave Billings is concerned for possible stenosis of the stent because patient is on dialysis.  [facility] does not have nephrology abilities and therefore recommend transfer.    ______________________________________________________________________  DISCHARGE SUMMARY/HOSPITAL COURSE:  for full details see H&P, daily progress notes, labs, consult notes. Acute on Chronic Hypoxic Respiratory Failure POA: ,  2/2 HF with ESRD  Episode of RRT 7/17- 2/2 Tachypnea/Hypoxia. , also some component of anxiety  CXR with increased Pulmonary edema 7/17  -Continue Peritoneal HD  -Volume overload likely poor PD nursing by pt at home , ?NSTEMI  -Cardio on Board  -Nephro on Board, continue Fluid removal as per Nephro  c/w supplemental O2, at home recently on [facility].     Acute on Chronic  Diastolic Heart Failure POA: ,  Increased Troponin POA  Getting peritoneal HD  cardiology on board  Echo with 26-30% EF and Grade II diastolic dysfunction. Left LV hypokinesis  She has been very unsure of what she wants to do, she initially agreed for stress test, then she denied it  Pt does not want any intervention. Advised to follow with her cardiologist outpatient      Paroxysmal Afib  Had episode of afib once convertedto Sinus by itself  -has known h/o afib.   -onBB  -Avoid anticoagulation, H/O SDH and required  shunt     CAD s/p PCI     As per chart  [facility] (2015) - mid LAD 70-80%, distal LAD 50%, diagonal high-grade disease, OMB high-grade disease, RCA total occlusion - medically managed  2. [facility] 8/2018 - heavily calcified vessels with severe early mid LAD stenosis, occluded RCA with faint L-R collateral filling and occluded 1st marginal - felt to be high risk for CABG / patient refused  3. [facility] 9/4/18 - S/p successful PCI of the prox LAD using 3.0 x 24 Synergy ANDER   -Continue DAPT        ESRD on PD:    On day of discharge, her Transfer set (part of Dialysis catheter) got broken, apparently she did not change for past 2 years, which she was supposed to change every 6 months. I was told that we dont have transfer set in out hospital, so can not change it here.  So talked to Tyson Puga of pt at [facility] [address], and she will change today, and pt will go directly over there. Aware    HTN: c/w Home regimen, use labetalol prn. Seizures: c/w Keppra, monitor, seizure precautions.     High risk for Discharge, but medically stable for [address] [place] [address]    _______________________________________________________________________  Patient seen and examined by me on discharge day. Pertinent Findings:  Gen:    Not in distress  Chest: Clear lungs  CVS:   Regular rhythm. No edema  Abd:  Soft, not distended, not tender  Neuro:  Alert, orientedx3  _______________________________________________________________________  DISCHARGE MEDICATIONS:   Current Discharge Medication List      START taking these medications    Details   clopidogrel (PLAVIX) 75 mg tab Take 1 Tab by mouth daily. Qty: 30 Tab, Refills: 1      carvedilol (COREG) 6.25 mg tablet Take 1 Tab by mouth two (2) times daily (with meals). Qty: 30 Tab, Refills: 0         CONTINUE these medications which have CHANGED    Details   calcitRIOL (ROCALTROL) 0.25 mcg capsule Take 1 Cap by mouth two (2) days a week. Mondays and Fridays  Qty: 30 Cap, Refills: 0      isosorbide mononitrate ER (IMDUR) 60 mg CR tablet Take 1 Tab by mouth every morning. Qty: 30 Tab, Refills: 0    Comments: NEEDS OFFICE VISIT FOR MORE REFILLS         CONTINUE these medications which have NOT CHANGED    Details   levETIRAcetam (KEPPRA) 500 mg tablet Take 500 mg by mouth two (2) times a day. amiodarone (PACERONE) 100 mg tablet Take 100 mg by mouth two (2) times a day. brimonidine (ALPHAGAN) 0.2 % ophthalmic solution Administer 1 Drop to both eyes two (2) times a day. bumetanide (BUMEX) 2 mg tablet Take 2 mg by mouth daily. gentamicin (GARAMYCIN) 0.1 % topical cream Apply  to affected area daily. Apply to Cath wound      OXcarbazepine (TRILEPTAL) 150 mg tablet Take 150 mg by mouth two (2) times a day.       atorvastatin (LIPITOR) 40 mg tablet TAKE 1 TABLET BY MOUTH ONCE DAILY  Qty: 30 Tab, Refills: 0    Comments: NEEDS OFFICE VISIT FOR MORE REFILLS      sevelamer (RENAGEL) 800 mg tablet Take 800 mg by mouth three (3) times daily (with meals). lactulose (CHRONULAC) 10 gram/15 mL solution Take 20 g by mouth two (2) times daily as needed (constipation). omeprazole (PRILOSEC) 20 mg capsule Take 20 mg by mouth daily. Refills: 1      IRON FUM & PS CMP/VIT C & B (INTEGRA PO) Take 1 Tab by mouth daily. traMADol (ULTRAM) 50 mg tablet Take 50 mg by mouth every four (4) hours as needed for Pain. Refills: 0      potassium chloride (K-DUR, KLOR-CON) 20 mEq tablet Take 20 mEq by mouth daily. aspirin 81 mg tablet Take 81 mg by mouth daily. STOP taking these medications       amLODIPine (NORVASC) 5 mg tablet Comments:   Reason for Stopping:                 Patient Follow Up Instructions: Activity: Activity as tolerated  Diet: Renal Diet    Follow-up Information     Follow up With Specialties Details Why Contact Info    Arlene Pennington MD Nephrology Go on 7/22/2019 Nephrology appt at 1:30 pm. Arrive at 12:00 pm for catheter replacement. [address] [place] [address]      Ady Sky NP Nurse Practitioner Go on 7/30/2019 Arrive at 1:45 PM for Cardiology hospital follow up appointment. Please bring your photo ID, insurance card, and list of current medications. [address]  [phone]      [address] [place] [address]   A home health representative will call you to schedule a home visit.   [address] [place] [id number] [phone]    Unknown, Provider    Patient not available to ask          ________________________________________________________________    Risk of deterioration: Moderate    Condition at Discharge:  Stable  __________________________________________________________________    Disposition  Home with family and home health services    ____________________________________________________________________    Code Status: Full Code  ___________________________________________________________________      Total time in minutes spent coordinating this discharge (includes going over instructions, follow-up, prescriptions, and preparing report for sign off to her PCP) :  50 minutes    Signed:  Belinda Larios MD

## 2019-07-22 NOTE — PROGRESS NOTES
Problem: Falls - Risk of  Goal: *Absence of Falls  Description  Document [employer] Fall Risk and appropriate interventions in the flowsheet. Outcome: Progressing Towards Goal  Note:   Fall Risk Interventions:  Mobility Interventions: Bed/chair exit alarm    Mentation Interventions: Bed/chair exit alarm    Medication Interventions: Assess postural VS orthostatic hypotension, Bed/chair exit alarm    Elimination Interventions: Call light in reach              Problem: Pressure Injury - Risk of  Goal: *Prevention of pressure injury  Description  Document Luis Scale and appropriate interventions in the flowsheet.   Outcome: Progressing Towards Goal  Note:   Pressure Injury Interventions:  Sensory Interventions: Avoid rigorous massage over bony prominences    Moisture Interventions: Absorbent underpads    Activity Interventions: Increase time out of bed    Mobility Interventions: Pressure redistribution bed/mattress (bed type)    Nutrition Interventions: Document food/fluid/supplement intake    Friction and Shear Interventions: Lift sheet

## 2019-07-22 NOTE — PROGRESS NOTES
Problem: Falls - Risk of  Goal: *Absence of Falls  Description  Document Soumya Turner Fall Risk and appropriate interventions in the flowsheet.   Outcome: Progressing Towards Goal  Note:   Fall Risk Interventions:  Mobility Interventions: Bed/chair exit alarm    Mentation Interventions: Bed/chair exit alarm    Medication Interventions: Assess postural VS orthostatic hypotension, Bed/chair exit alarm    Elimination Interventions: Call light in reach

## 2019-07-22 NOTE — PROGRESS NOTES
[facility] Progress Note        NAME: Nicole John       :  1955       MRN:  [id number]     Date/Time: 2019    Risk of deterioration: low       Assessment:    Plan:  ESRD-PD  Transthoracic PD cath  HTN  Hyperkalemia  CMO  CAD  HX of cva   Doing all 2.5% exchanges. Last night apparently had leakage from transfer set. Dry now. Pt says that transfer set it at least 3years old. Needs to be replaced. epo    D/W Dr. Trung Butler       Subjective:     Chief Complaint: \"I had leakage from the catheter overnight. \"  No dyspnea. No N/V. Objective:     VITALS:   Last 24hrs VS reviewed since prior progress note. Most recent are:  Visit Vitals  /87 (BP 1 Location: Left arm, BP Patient Position: Post activity)   Pulse 77   Temp 98.2 °F (36.8 °C)   Resp 22   Ht 5' 3\" (1.6 m)   Wt 93 kg (205 lb)   SpO2 98%   Breastfeeding?  No   BMI 36.31 kg/m²     SpO2 Readings from Last 6 Encounters:   19 98%   19 100%   16 97%   16 98%   16 92%   04/15/16 97%    O2 Flow Rate (L/min): 0.5 l/min       Intake/Output Summary (Last 24 hours) at 2019 1039  Last data filed at 2019 2259  Gross per 24 hour   Intake 6900 ml   Output 6200 ml   Net 700 ml        Telemetry Reviewed   normal sinus rhythm    PHYSICAL EXAM:    General NAD  abd soft  No edema            Lab Data Reviewed: (see below)    Medications Reviewed: (see below)    PMH/SH reviewed - no change compared to H&P  __________________________________________  ___________________________________________________    Attending Physician: Adonay Reynolds MD     ____________________________________________________  MEDICATIONS:  Current Facility-Administered Medications   Medication Dose Route Frequency    LORazepam (ATIVAN) tablet 0.5 mg  0.5 mg Oral ONCE    peritoneal dialysis DEXTROSE 2.5% (2.5 mEq/L low calcium) solution 2,500 mL with HEPARIN 1000 units/L   IntraPERitoneal QID    carvedilol (COREG) tablet 6.25 mg 6.25 mg Oral BID WITH MEALS    levETIRAcetam (KEPPRA) tablet 500 mg  500 mg Oral BID    OXcarbazepine (TRILEPTAL) tablet 150 mg  150 mg Oral BID    clopidogrel (PLAVIX) tablet 75 mg  75 mg Oral DAILY    gentamicin (GARAMYCIN) 0.1 % ointment   Topical DAILY    epoetin tawny-epbx (RETACRIT) injection 10,000 Units  10,000 Units SubCUTAneous Q TUE, THU & SAT    LORazepam (ATIVAN) injection 0.5 mg  0.5 mg IntraVENous Q6H PRN    amLODIPine (NORVASC) tablet 5 mg  5 mg Oral DAILY    aspirin chewable tablet 81 mg  81 mg Oral DAILY    atorvastatin (LIPITOR) tablet 40 mg  40 mg Oral QHS    calcitRIOL (ROCALTROL) capsule 0.25 mcg  0.25 mcg Oral Once per day on Thu Sat    isosorbide mononitrate ER (IMDUR) tablet 60 mg  60 mg Oral DAILY    lactulose (CHRONULAC) 10 gram/15 mL solution 30 mL  20 g Oral BID PRN    pantoprazole (PROTONIX) tablet 40 mg  40 mg Oral DAILY    sevelamer carbonate (RENVELA) tab 800 mg  800 mg Oral TID    traMADol (ULTRAM) tablet 50 mg  50 mg Oral Q6H PRN    heparin (porcine) injection 5,000 Units  5,000 Units SubCUTAneous Q8H    labetalol (NORMODYNE;TRANDATE) injection 10 mg  10 mg IntraVENous Q6H PRN    acetaminophen (TYLENOL) tablet 650 mg  650 mg Oral Q4H PRN    ondansetron (ZOFRAN) injection 4 mg  4 mg IntraVENous Q6H PRN    oxyCODONE IR (ROXICODONE) tablet 5 mg  5 mg Oral Q6H PRN        LABS:  Recent Labs     07/22/19  0146 07/19/19  1229   WBC 9.8 8.4   HGB 8.7* 8.4*   HCT 28.7* 27.2*    393     Recent Labs     07/22/19  0146 07/19/19  1229    137   K 4.2 4.7   CL 98 100   CO2 29 27   BUN 54* 69*   CREA 12.00* 12.60*   * 87   CA 8.9 8.8   MG 2.0  --      Recent Labs     07/19/19  1229   SGOT 19   ALT 14   AP 95   TBILI 0.4   TP 7.2   ALB 2.5*   GLOB 4.7*     No results for input(s): INR, PTP, APTT in the last 72 hours. No lab exists for component: INREXT, INREXT   No results for input(s): FE, TIBC, PSAT, FERR in the last 72 hours.    No results for input(s): PH, PCO2, PO2 in the last 72 hours. No results for input(s): CPK, CKNDX, TROIQ in the last 72 hours.     No lab exists for component: CPKMB  Lab Results   Component Value Date/Time    Glucose (POC) 162 (H) 07/17/2019 03:21 AM

## 2019-07-22 NOTE — PROGRESS NOTES
NIRANJAN plan: Pt will discharge home today, transported by a family friend in a personal vehicle. Pt will first be transported directly to her dialysis clinic to have her catheter changed and to see her Nephrologist. Pt is scheduled to see her cardiologist on 7/30/19. Pt will receive a H2H visit through [facility] who can assess for additional needs. Pt will need to establish with a PCP in order to have formal home health services. Per pt and family, pt is scheduled with a new PCP but are unable to recall the name of the MD or practice. AVS updated. No further concerns indicated at this time. Medicare pt has received, reviewed, and signed 2nd IM letter informing them of their right to appeal the discharge. Signed copy has been placed on pt bedside chart. Care Management Interventions  PCP Verified by CM: No  Mode of Transport at Discharge: Other (see comment)(family friend)  Transition of Care Consult (CM Consult):  Other(home with H2H and f/u appts)  MyChart Signup: No  Discharge Durable Medical Equipment: No  Physical Therapy Consult: Yes  Occupational Therapy Consult: Yes  Speech Therapy Consult: No  Current Support Network: Lives with Spouse, Lives with Caregiver  Confirm Follow Up Transport: Family  Plan discussed with Pt/Family/Caregiver: Yes  Freedom of Choice Offered: Yes  Discharge Location  Discharge Placement: Home(H2H)    PAMELA Wells  Care Manager  [phone]

## 2019-07-22 NOTE — PROGRESS NOTES
Transitional Care Team: YARELIS Discharge Note    Date of Assessment: 07/22/19  Time of Assessment:  12:52 PM      Idalia Reich is a 62 y.o. female inpatient at [facility] with heart failure on  7/16. Assessment & Plan   Pt denies dyspnea. Will continue care for peritoneal catheter. Has discharge orders- to go to nephrology office to exchange catheter. Still has no chosen a PCP, but states she has the name at home as recommended by family. Urged to to call today as it is sometimes difficult to get new pt appointments. Also to follow up at cardiology office. Had not brought in the AMD she states has completed at home. Current Code Status:  full    Medication Reconciliation:  was performed. Can patient afford medications:  yes    Who manages medications at home self    Emergency Contact  Hailee Vega  Phone Number [phone]    Chart reviewed by me and YARELIS (Healthy Understanding of Goals) program introduced to patient/family. The Transitional Care Team bridges the gaps in care and education surrounding discharge from the acute care facility. The objective is to empower the patient and family in taking a proactive role in the task of preventing readmission within the first thirty days after discharge from the acute care setting, The team is also involved in the efforts to reduce readmission to the acute care setting after stabilization and discharge from the acute care environment either to skilled nursing facilities or community. Discharge With The Following Arrangements in [address] [facility]    [facility] Appointments   Date Time Provider Sophy Hernandez   7/30/2019  2:00 PM Ignacio Landers NP [address] [facility]       [facility] [place] appointment(s): nephrologist today  [facility] call information given to pt     Patient / Family was instructed on specific signs/symptoms to look for with regards to worsening of their medical conditions.  Learning was assessed using teach back.     The patient / family have added upcoming appointment dates to their [facility] Calendar prior to discharge. Patient education focused on readmission zones as described as: The Red Zone: High risk for readmission, days 1-21   The Yellow Zone: Moderate  risk for readmission, days 22-29   The Green Zone: Lower risk for readmission, days 30 and after    The [facility] Team will follow patient from a distance while inpatient as well as be available for further transition disposition as needed. The NIRANJAN TEAM will continue to offer support during the 30- 90 day discharge from acute care setting. Notified Ambulatory {Blank Single Select Template:20061[de-identified] ,NIRANJAN RN.       Signed By: Jyoti Shaikh RN     July 22, 2019

## 2019-07-22 NOTE — DISCHARGE INSTRUCTIONS
HOSPITALIST DISCHARGE INSTRUCTIONS    NAME: Lnyda Cruz   :  1955   MRN:  [id number]     Date/Time:  2019 11:46 AM    ADMIT DATE: 2019   DISCHARGE DATE: 2019     Acute on Chronic Hypoxic Respiratory Failure POA:   ,Acute on Chronic  Diastolic Heart Failure POA: ,  Increased Troponin POA  Paroxysmal Afib  CAD s/p PCI  ESRD on PD  Aware  HTN  Seizures         · It is important that you take the medication exactly as they are prescribed. · Keep your medication in the bottles provided by the pharmacist and keep a list of the medication names, dosages, and times to be taken in your wallet. · Do not take other medications without consulting your doctor. What to do at [address]:  Renal Diet and Encourage fluids    Recommended activity: Activity as tolerated      If you have questions regarding the hospital related prescriptions or hospital related issues please call [facility] at [phone]. You can always direct your questions to your primary care doctor if you are unable to reach your hospital physician; your PCP works as an extension of your hospital doctor just like your hospital doctor is an extension of your PCP for your time at the hospital [facility])    If you experience any of the following symptoms then please call your primary care physician or return to the emergency room if you cannot get hold of your doctor:    Fever, chills, nausea, vomiting, or persistent diarrhea  Worsening weakness or new problems with your speech or balance  Dark stools or visible blood in your stools  New Leg swelling or shortness of breath as these could be signs of a clot    Additional Instructions:      Bring these papers with you to your follow up appointments.  The papers will help your doctors be sure to continue the care plan from the hospital.              Information obtained by :  I understand that if any problems occur once I am at home I am to contact my physician. I understand and acknowledge receipt of the instructions indicated above.                                                                                                                                            Physician's or R.N.'s Signature                                                                  Date/Time                                                                                                                                              Patient or Representative Signature

## 2019-07-22 NOTE — PROGRESS NOTES
Patient unsure of PCP, dialysis center setting up per CM Castaneda Led. Patient has a follow up with Nephrology today Jeni Escalante 1:30, and cardiology NP Kaylin Bell 7/30/19 1:45. On AVS and Hug team notified.

## 2019-07-23 NOTE — PROGRESS NOTES
Pt is scheduled with a new PCP appointment - Thursday, 7/25/19, at 9:30 am with NP Soraida Singleton at [facility]. Practice will call the pt to inform them of appointment.      PAMELA Perez  Care Manager  [phone]

## 2019-07-23 NOTE — PROGRESS NOTES
DISCHARGE SUMMARY FROM NURSE    The patient is stable for discharge. I have reviewed the discharge instructions with the patient and son. The patient and son verbalized understanding. All questions were fully answered. The  patient and son and denies any complaints. There were no personal belongings, valuables or home medications left at patients bedside,  or safe. Hard scripts and medication handouts were given and reviewed with the patient and son. Appropriate educational materials and medication side effects teaching were provided. Cardiac monitor and IV line(s) were removed.

## 2019-09-09 ENCOUNTER — APPOINTMENT (OUTPATIENT)
Dept: GENERAL RADIOLOGY | Age: 64
DRG: 291 | End: 2019-09-09
Attending: EMERGENCY MEDICINE
Payer: MEDICARE

## 2019-09-09 ENCOUNTER — HOSPITAL ENCOUNTER (INPATIENT)
Age: 64
LOS: 10 days | Discharge: HOME | DRG: 291 | End: 2019-09-19
Attending: EMERGENCY MEDICINE | Admitting: HOSPITALIST
Payer: MEDICARE

## 2019-09-09 DIAGNOSIS — N18.9 CHRONIC RENAL FAILURE, UNSPECIFIED CKD STAGE: Primary | ICD-10-CM

## 2019-09-09 DIAGNOSIS — I50.1 PULMONARY EDEMA CARDIAC CAUSE (HCC): ICD-10-CM

## 2019-09-09 DIAGNOSIS — I62.9 INTRACRANIAL HEMORRHAGE (HCC): ICD-10-CM

## 2019-09-09 DIAGNOSIS — E78.5 HYPERLIPIDEMIA, UNSPECIFIED HYPERLIPIDEMIA TYPE: ICD-10-CM

## 2019-09-09 DIAGNOSIS — Z98.2 VENTRICULO-PERITONEAL SHUNT STATUS: ICD-10-CM

## 2019-09-09 DIAGNOSIS — I69.319 CVA, OLD, COGNITIVE DEFICITS: ICD-10-CM

## 2019-09-09 DIAGNOSIS — I10 ESSENTIAL HYPERTENSION: ICD-10-CM

## 2019-09-09 DIAGNOSIS — I48.0 PAF (PAROXYSMAL ATRIAL FIBRILLATION) (HCC): ICD-10-CM

## 2019-09-09 DIAGNOSIS — J44.1 COPD EXACERBATION (HCC): ICD-10-CM

## 2019-09-09 DIAGNOSIS — G93.2 IIH (IDIOPATHIC INTRACRANIAL HYPERTENSION): ICD-10-CM

## 2019-09-09 DIAGNOSIS — G40.919 INTRACTABLE EPILEPSY WITHOUT STATUS EPILEPTICUS, UNSPECIFIED EPILEPSY TYPE (HCC): ICD-10-CM

## 2019-09-09 PROBLEM — R06.02 SHORTNESS OF BREATH: Status: ACTIVE | Noted: 2019-09-09

## 2019-09-09 LAB
ALBUMIN SERPL-MCNC: 2.2 G/DL (ref 3.5–5)
ALBUMIN/GLOB SERPL: 0.4 {RATIO} (ref 1.1–2.2)
ALP SERPL-CCNC: 182 U/L (ref 45–117)
ALT SERPL-CCNC: 15 U/L (ref 12–78)
ANION GAP SERPL CALC-SCNC: 8 MMOL/L (ref 5–15)
AST SERPL-CCNC: ABNORMAL U/L (ref 15–37)
ATRIAL RATE: 90 BPM
BASOPHILS # BLD: 0.2 K/UL (ref 0–0.1)
BASOPHILS NFR BLD: 2 % (ref 0–1)
BILIRUB SERPL-MCNC: 0.4 MG/DL (ref 0.2–1)
BNP SERPL-MCNC: ABNORMAL PG/ML
BUN SERPL-MCNC: 50 MG/DL (ref 6–20)
BUN/CREAT SERPL: 5 (ref 12–20)
CALCIUM SERPL-MCNC: 8.6 MG/DL (ref 8.5–10.1)
CALCULATED P AXIS, ECG09: 34 DEGREES
CALCULATED R AXIS, ECG10: -47 DEGREES
CALCULATED T AXIS, ECG11: 90 DEGREES
CHLORIDE SERPL-SCNC: 97 MMOL/L (ref 97–108)
CO2 SERPL-SCNC: 29 MMOL/L (ref 21–32)
COMMENT, HOLDF: NORMAL
CREAT SERPL-MCNC: 9.41 MG/DL (ref 0.55–1.02)
DIAGNOSIS, 93000: NORMAL
DIFFERENTIAL METHOD BLD: ABNORMAL
EOSINOPHIL # BLD: 0.6 K/UL (ref 0–0.4)
EOSINOPHIL NFR BLD: 7 % (ref 0–7)
ERYTHROCYTE [DISTWIDTH] IN BLOOD BY AUTOMATED COUNT: 19 % (ref 11.5–14.5)
GLOBULIN SER CALC-MCNC: 5.4 G/DL (ref 2–4)
GLUCOSE SERPL-MCNC: 73 MG/DL (ref 65–100)
HCT VFR BLD AUTO: 33 % (ref 35–47)
HGB BLD-MCNC: 10 G/DL (ref 11.5–16)
IMM GRANULOCYTES # BLD AUTO: 0 K/UL (ref 0–0.04)
IMM GRANULOCYTES NFR BLD AUTO: 0 % (ref 0–0.5)
LYMPHOCYTES # BLD: 1.8 K/UL (ref 0.8–3.5)
LYMPHOCYTES NFR BLD: 19 % (ref 12–49)
MAGNESIUM SERPL-MCNC: 2.4 MG/DL (ref 1.6–2.4)
MCH RBC QN AUTO: 27.6 PG (ref 26–34)
MCHC RBC AUTO-ENTMCNC: 30.3 G/DL (ref 30–36.5)
MCV RBC AUTO: 91.2 FL (ref 80–99)
MONOCYTES # BLD: 0.7 K/UL (ref 0–1)
MONOCYTES NFR BLD: 8 % (ref 5–13)
NEUTS SEG # BLD: 6.2 K/UL (ref 1.8–8)
NEUTS SEG NFR BLD: 64 % (ref 32–75)
NRBC # BLD: 0 K/UL (ref 0–0.01)
NRBC BLD-RTO: 0 PER 100 WBC
P-R INTERVAL, ECG05: 138 MS
PHOSPHATE SERPL-MCNC: 3.4 MG/DL (ref 2.6–4.7)
PLATELET # BLD AUTO: 329 K/UL (ref 150–400)
PMV BLD AUTO: 12.7 FL (ref 8.9–12.9)
POTASSIUM SERPL-SCNC: ABNORMAL MMOL/L (ref 3.5–5.1)
PROT SERPL-MCNC: 7.6 G/DL (ref 6.4–8.2)
Q-T INTERVAL, ECG07: 434 MS
QRS DURATION, ECG06: 126 MS
QTC CALCULATION (BEZET), ECG08: 530 MS
RBC # BLD AUTO: 3.62 M/UL (ref 3.8–5.2)
SAMPLES BEING HELD,HOLD: NORMAL
SODIUM SERPL-SCNC: 134 MMOL/L (ref 136–145)
VENTRICULAR RATE, ECG03: 90 BPM
WBC # BLD AUTO: 9.5 K/UL (ref 3.6–11)

## 2019-09-09 PROCEDURE — 65660000000 HC RM CCU STEPDOWN

## 2019-09-09 PROCEDURE — 74011000250 HC RX REV CODE- 250: Performed by: HOSPITALIST

## 2019-09-09 PROCEDURE — 80177 DRUG SCRN QUAN LEVETIRACETAM: CPT

## 2019-09-09 PROCEDURE — 36415 COLL VENOUS BLD VENIPUNCTURE: CPT

## 2019-09-09 PROCEDURE — 80339 ANTIEPILEPTICS NOS 1-3: CPT

## 2019-09-09 PROCEDURE — 85025 COMPLETE CBC W/AUTO DIFF WBC: CPT

## 2019-09-09 PROCEDURE — 94640 AIRWAY INHALATION TREATMENT: CPT

## 2019-09-09 PROCEDURE — 71045 X-RAY EXAM CHEST 1 VIEW: CPT

## 2019-09-09 PROCEDURE — 80053 COMPREHEN METABOLIC PANEL: CPT

## 2019-09-09 PROCEDURE — 83880 ASSAY OF NATRIURETIC PEPTIDE: CPT

## 2019-09-09 PROCEDURE — 74011250637 HC RX REV CODE- 250/637: Performed by: HOSPITALIST

## 2019-09-09 PROCEDURE — 74011250636 HC RX REV CODE- 250/636: Performed by: HOSPITALIST

## 2019-09-09 PROCEDURE — 80185 ASSAY OF PHENYTOIN TOTAL: CPT

## 2019-09-09 PROCEDURE — 83735 ASSAY OF MAGNESIUM: CPT

## 2019-09-09 PROCEDURE — 93005 ELECTROCARDIOGRAM TRACING: CPT

## 2019-09-09 PROCEDURE — 84100 ASSAY OF PHOSPHORUS: CPT

## 2019-09-09 PROCEDURE — 99285 EMERGENCY DEPT VISIT HI MDM: CPT

## 2019-09-09 PROCEDURE — 74011000250 HC RX REV CODE- 250: Performed by: EMERGENCY MEDICINE

## 2019-09-09 RX ORDER — LACOSAMIDE 50 MG/1
150 TABLET ORAL 2 TIMES DAILY
Status: DISCONTINUED | OUTPATIENT
Start: 2019-09-09 | End: 2019-09-19 | Stop reason: HOSPADM

## 2019-09-09 RX ORDER — OXCARBAZEPINE 150 MG/1
150 TABLET, FILM COATED ORAL 2 TIMES DAILY
Status: DISCONTINUED | OUTPATIENT
Start: 2019-09-09 | End: 2019-09-09

## 2019-09-09 RX ORDER — SODIUM CHLORIDE 0.9 % (FLUSH) 0.9 %
5-40 SYRINGE (ML) INJECTION EVERY 8 HOURS
Status: DISCONTINUED | OUTPATIENT
Start: 2019-09-09 | End: 2019-09-19 | Stop reason: HOSPADM

## 2019-09-09 RX ORDER — LACOSAMIDE 150 MG/1
150 TABLET ORAL 2 TIMES DAILY
COMMUNITY

## 2019-09-09 RX ORDER — PANTOPRAZOLE SODIUM 40 MG/1
40 TABLET, DELAYED RELEASE ORAL
Status: DISCONTINUED | OUTPATIENT
Start: 2019-09-10 | End: 2019-09-19 | Stop reason: HOSPADM

## 2019-09-09 RX ORDER — DOCUSATE SODIUM 100 MG/1
100 CAPSULE, LIQUID FILLED ORAL
Status: DISCONTINUED | OUTPATIENT
Start: 2019-09-09 | End: 2019-09-19 | Stop reason: HOSPADM

## 2019-09-09 RX ORDER — AMIODARONE HYDROCHLORIDE 200 MG/1
100 TABLET ORAL DAILY
Status: DISCONTINUED | OUTPATIENT
Start: 2019-09-11 | End: 2019-09-19 | Stop reason: HOSPADM

## 2019-09-09 RX ORDER — CARVEDILOL 12.5 MG/1
12.5 TABLET ORAL 2 TIMES DAILY WITH MEALS
Status: DISCONTINUED | OUTPATIENT
Start: 2019-09-10 | End: 2019-09-19 | Stop reason: HOSPADM

## 2019-09-09 RX ORDER — LOSARTAN POTASSIUM 25 MG/1
25 TABLET ORAL DAILY
Status: DISCONTINUED | OUTPATIENT
Start: 2019-09-09 | End: 2019-09-19 | Stop reason: HOSPADM

## 2019-09-09 RX ORDER — FUROSEMIDE 10 MG/ML
40 INJECTION INTRAMUSCULAR; INTRAVENOUS DAILY
Status: DISCONTINUED | OUTPATIENT
Start: 2019-09-09 | End: 2019-09-16

## 2019-09-09 RX ORDER — ISOSORBIDE MONONITRATE 60 MG/1
60 TABLET, EXTENDED RELEASE ORAL
Status: DISCONTINUED | OUTPATIENT
Start: 2019-09-09 | End: 2019-09-19 | Stop reason: HOSPADM

## 2019-09-09 RX ORDER — AMIODARONE HYDROCHLORIDE 200 MG/1
100 TABLET ORAL 2 TIMES DAILY
Status: DISCONTINUED | OUTPATIENT
Start: 2019-09-09 | End: 2019-09-09

## 2019-09-09 RX ORDER — BRIMONIDINE TARTRATE 2 MG/ML
1 SOLUTION/ DROPS OPHTHALMIC 2 TIMES DAILY
Status: DISCONTINUED | OUTPATIENT
Start: 2019-09-09 | End: 2019-09-19 | Stop reason: HOSPADM

## 2019-09-09 RX ORDER — IPRATROPIUM BROMIDE AND ALBUTEROL SULFATE 2.5; .5 MG/3ML; MG/3ML
3 SOLUTION RESPIRATORY (INHALATION)
Status: COMPLETED | OUTPATIENT
Start: 2019-09-09 | End: 2019-09-09

## 2019-09-09 RX ORDER — CARVEDILOL 3.12 MG/1
6.25 TABLET ORAL
Status: COMPLETED | OUTPATIENT
Start: 2019-09-09 | End: 2019-09-09

## 2019-09-09 RX ORDER — PHENYTOIN SODIUM 100 MG/1
300 CAPSULE, EXTENDED RELEASE ORAL
Status: DISCONTINUED | OUTPATIENT
Start: 2019-09-09 | End: 2019-09-19 | Stop reason: HOSPADM

## 2019-09-09 RX ORDER — CALCITRIOL 0.25 UG/1
0.25 CAPSULE ORAL
Status: DISCONTINUED | OUTPATIENT
Start: 2019-09-12 | End: 2019-09-19 | Stop reason: HOSPADM

## 2019-09-09 RX ORDER — LEVETIRACETAM 500 MG/1
500 TABLET ORAL 2 TIMES DAILY
Status: DISCONTINUED | OUTPATIENT
Start: 2019-09-09 | End: 2019-09-19 | Stop reason: HOSPADM

## 2019-09-09 RX ORDER — ATORVASTATIN CALCIUM 40 MG/1
40 TABLET, FILM COATED ORAL DAILY
Status: DISCONTINUED | OUTPATIENT
Start: 2019-09-10 | End: 2019-09-19 | Stop reason: HOSPADM

## 2019-09-09 RX ORDER — LOSARTAN POTASSIUM 25 MG/1
25 TABLET ORAL
Status: ON HOLD | COMMUNITY
End: 2019-12-09 | Stop reason: SDUPTHER

## 2019-09-09 RX ORDER — POTASSIUM CHLORIDE 1.5 G/1.77G
20 POWDER, FOR SOLUTION ORAL DAILY
Status: DISCONTINUED | OUTPATIENT
Start: 2019-09-10 | End: 2019-09-19 | Stop reason: HOSPADM

## 2019-09-09 RX ORDER — ASPIRIN 81 MG/1
81 TABLET ORAL DAILY
Status: DISCONTINUED | OUTPATIENT
Start: 2019-09-10 | End: 2019-09-19 | Stop reason: HOSPADM

## 2019-09-09 RX ORDER — CLOPIDOGREL BISULFATE 75 MG/1
75 TABLET ORAL DAILY
Status: DISCONTINUED | OUTPATIENT
Start: 2019-09-10 | End: 2019-09-19 | Stop reason: HOSPADM

## 2019-09-09 RX ORDER — DOCUSATE SODIUM 100 MG/1
100 CAPSULE, LIQUID FILLED ORAL
COMMUNITY

## 2019-09-09 RX ORDER — PHENYTOIN SODIUM 300 MG/1
300 CAPSULE, EXTENDED RELEASE ORAL
COMMUNITY

## 2019-09-09 RX ORDER — FUROSEMIDE 10 MG/ML
40 INJECTION INTRAMUSCULAR; INTRAVENOUS
Status: COMPLETED | OUTPATIENT
Start: 2019-09-09 | End: 2019-09-09

## 2019-09-09 RX ORDER — IPRATROPIUM BROMIDE AND ALBUTEROL SULFATE 2.5; .5 MG/3ML; MG/3ML
3 SOLUTION RESPIRATORY (INHALATION)
Status: DISCONTINUED | OUTPATIENT
Start: 2019-09-09 | End: 2019-09-19 | Stop reason: HOSPADM

## 2019-09-09 RX ORDER — SEVELAMER CARBONATE 800 MG/1
1600 TABLET, FILM COATED ORAL
Status: DISCONTINUED | OUTPATIENT
Start: 2019-09-10 | End: 2019-09-19 | Stop reason: HOSPADM

## 2019-09-09 RX ORDER — SODIUM CHLORIDE 0.9 % (FLUSH) 0.9 %
5-40 SYRINGE (ML) INJECTION AS NEEDED
Status: DISCONTINUED | OUTPATIENT
Start: 2019-09-09 | End: 2019-09-19 | Stop reason: HOSPADM

## 2019-09-09 RX ADMIN — BRIMONIDINE TARTRATE 1 DROP: 2 SOLUTION OPHTHALMIC at 23:24

## 2019-09-09 RX ADMIN — ALBUTEROL SULFATE 1 DOSE: 2.5 SOLUTION RESPIRATORY (INHALATION) at 13:27

## 2019-09-09 RX ADMIN — Medication 10 ML: at 22:00

## 2019-09-09 RX ADMIN — ISOSORBIDE MONONITRATE 60 MG: 60 TABLET, EXTENDED RELEASE ORAL at 23:22

## 2019-09-09 RX ADMIN — IPRATROPIUM BROMIDE AND ALBUTEROL SULFATE 3 ML: .5; 3 SOLUTION RESPIRATORY (INHALATION) at 14:41

## 2019-09-09 RX ADMIN — FUROSEMIDE 40 MG: 10 INJECTION, SOLUTION INTRAMUSCULAR; INTRAVENOUS at 23:19

## 2019-09-09 RX ADMIN — LOSARTAN POTASSIUM 25 MG: 25 TABLET ORAL at 23:22

## 2019-09-09 RX ADMIN — CARVEDILOL 6.25 MG: 3.12 TABLET, FILM COATED ORAL at 18:15

## 2019-09-09 RX ADMIN — LACOSAMIDE 150 MG: 50 TABLET, FILM COATED ORAL at 23:23

## 2019-09-09 RX ADMIN — FUROSEMIDE 40 MG: 10 INJECTION, SOLUTION INTRAMUSCULAR; INTRAVENOUS at 14:42

## 2019-09-09 RX ADMIN — LEVETIRACETAM 500 MG: 500 TABLET, FILM COATED ORAL at 23:22

## 2019-09-09 NOTE — PROGRESS NOTES
HP dictated    Need better controll BP Increased Coreg dose. One dose of Lasix given. Nephrology consulted  Echo ordered. SOB is probably related to CHF and poor BP control. Probably will need to be on Oxygen at discharge(ABG pending).

## 2019-09-09 NOTE — H&P
[address]  HISTORY AND PHYSICAL    Name:  Syd Cramer  MR#:  [id number]  :  1955  ACCOUNT #:  [de-identified]  ADMIT DATE:  2019      PRIMARY CARE PHYSICIAN:      Dr. Rafa Edge. NEPHROLOGIST:      Dr. Billie Olszewski. CARDIOLOGIST:      Dr. Roger Mack. PRESENTING COMPLAINT:      Shortness of breath. HISTORY OF PRESENTING ILLNESS:      The patient is a 52-year-old female who has previous history significant for systolic heart failure, end-stage renal disease on peritoneal dialysis, history of coronary artery disease status post angioplasty and stent placement approximately 1-1/2 years ago at [facility], history of hypertension, previous history of stroke, hyperlipidemia, seizure disorder, comes to the emergency room due to worsening shortness of breath. The patient tells me that she has been short of breath for the last 4-5 days,in the past shortness of breath   was mainly when she was lying down. Now, she is getting short of breath even when she is sitting up. She denies having any significant chest pain. She coughs and brings up white sputum. She has no fever or chills. The patient tells me at one time she was on oxygen; however, her sister never picked up her oxygen tank. In the emergency room, the patient was given 2 breathing treatments by ER physician. On further questioning, the patient says that she has gained a lot of weight and has noticed worsening leg swelling. PAST MEDICAL HISTORY:      Significant for,  1. Coronary artery disease. 2.  History of chronic kidney disease on peritoneal dialysis. 3.  History of systolic heart failure. 4.  Hyperlipidemia. 5.  Hypertension. 6.  Legally blind. 7.  History of stroke. 8.  History of stent placement in the past.    ALLERGIES:  INCLUDE GABAPENTIN. CURRENT MEDICATIONS:      Prior to admission not clear.   However, last time when she was admitted to hospital, she was on amiodarone 100 b.i.d., aspirin 81 daily, Lipitor 40 mg daily, Bumex 2 mg daily, Rocaltrol 0.25 twice a week Monday and Friday, Coreg 6.25 b.i.d., Plavix 75 mg daily, iron supplement, Imdur 60 mg daily, lactulose 20 g twice daily, Keppra 500 mg twice daily, omeprazole 20 mg daily, Trileptal 150 twice daily, potassium 20 mEq daily, Renagel 800 mg 3 times daily, tramadol 50 mg daily. SOCIOECONOMIC HISTORY:      The patient does not smoke, does not drink. She lives with her significant other. FAMILY HISTORY:      Significant for diabetes, hypertension and cancer. REVIEW OF SYSTEMS:  Negative except as mentioned in history of presenting illness. All systems reviewed. No positive finding was noticed. CODE STATUS:      Full code. PHYSICAL EXAMINATION:    GENERAL:  The patient is a 70-year-old female not in any acute distress. VITAL SIGNS:  Reveal temperature 98.1, blood pressure 146/96, pulse is 86, respiratory rate is 21, saturation is 98%. HEENT:  Reveals pupils equally reactive to light and accommodation. NECK:  Supple. There is no lymphadenopathy or JVD. CHEST:  Reveals some crackles. CARDIOVASCULAR:  S1 and S2 regular. No murmur. No S3.  ABDOMEN:  Reveals no tenderness, no guarding, no rigidity. Bowel sounds are active. EXTREMITIES:  2+ pedal edema. CNS:  Reveals the patient is alert, oriented, has normal strength and reflexes. Plantars downgoing. Cranial nerves are normal.  PSYCHIATRIC:  Unremarkable. SKIN:  Unremarkable. LABORATORY DATA:      Lab work done in the ER reveals a white count of 9.5, hemoglobin of 10, hematocrit 33, MCV 91.2, platelet count 085,612. Chemistry:  Sodium 134, potassium is pending, chloride is 97, bicarb is 29, gap of 8, BUN is 60, creatinine is 9.41, bilirubin 0.4, protein 7.6, albumin is 2.2, globulin is 5.4, ALT 15, AST is pending, alkaline phosphatase 182, proBNP is more than 35,000.   Electrocardiogram done in ER reveals normal sinus rhythm with a ventricular rate of 90 beats per minute. She does have left ventricular hypertrophy. Chest x-ray is pending at this time. ASSESSMENT/PLAN:      The patient is a 81-year-old female who has multiple medical issues including end-stage renal disease, coronary artery disease, systolic heart failure, hyperlipidemia, hypertension, seizures and stroke is being admitted for,    1. Worsening shortness of breath. The patient probably has worsening of CHF. A chest x-ray has been ordered. Lasix has been also ordered in the emergency room patient says she some times makes urine. 2.  The patient had a previous history of systolic heart failure. Her current medications are not clear. However, she is on Coreg, dual antiplatelet therapy, and a statin along with Imdur, all of these will be continued. She needs better blood pressure control. I have added  ARB and increased dose of Coreg. 3.  The patient had previous history of hypoxemia probably related to congestive heart failure. She is supposed to be on oxygen which she has not been using. We will put her back on oxygen. 4.  Repeat an echocardiogram.  5.  Reconsult cardiology. She probably needs more workup for severe heart failure. She might be a candidate for automatic implantable cardioverter defibrillator placement. She had a history of paroxysmal atrial fibrillation in the past.She may need cardiolyte stress test to rule out angina equivalent. 6.  I will also add angiotensin converting enzyme inhibitor as the patient is on dialysis for control of blood pressure and for congestive heart failure. 7.  The patient is not on an anticoagulation due to previous history of subdural hemorrhage. 8.  Consult case management as we have to check home situation why she is not on oxygen. 9.  Check serial cardiac enzymes. 10.  Check intake, output, and daily weight. 11. Deep venous thrombosis prophylaxis.         Sylvester Montague MD      [id number]  D:  09/09/2019 14:41  T:  09/09/2019 14:48  JOB #:  [id number]

## 2019-09-09 NOTE — ROUTINE PROCESS
TRANSFER - OUT REPORT:    Verbal report given to [facility], RN(name) on Braden Washington  being transferred to [facility](unit) for routine progression of care       Report consisted of patients Situation, Background, Assessment and   Recommendations(SBAR). Information from the following report(s) SBAR, Kardex, Intake/Output and MAR was reviewed with the receiving nurse. Lines:   Peripheral IV 09/09/19 Right Antecubital (Active)   Site Assessment Clean, dry, & intact 9/9/2019 12:08 PM   Phlebitis Assessment 0 9/9/2019 12:08 PM   Infiltration Assessment 4 9/9/2019 12:08 PM   Dressing Status Clean, dry, & intact; Occlusive 9/9/2019 12:08 PM   Dressing Type 4 X 4;Tape;Transparent 9/9/2019 12:08 PM   Hub Color/Line Status Pink;Flushed;Patent 9/9/2019 12:08 PM   Action Taken Blood drawn 9/9/2019 12:08 PM        Opportunity for questions and clarification was provided.

## 2019-09-09 NOTE — PROGRESS NOTES
Admission Medication Reconciliation:    Information obtained from:  patient's sister Devon Hare who can be reached at [phone] with permission from patient, rx query  RxQuery data available¹:  YES    Comments/Recommendations: Updated PTA meds/reviewed patient's allergies. 1)  Patient's sister was able to tell us the names of medications, but was unsure of frequency. 2)  Medication changes (since last review): Added  - phenytoin, vimpat, losartan    Adjusted  - amiodarone from 100 mg BID to 100 mg daily  - sevelamer from 800 mg TID to 1600 mg TID    Removed  - aspirin 81 mg daily, bumex 2 mg daily, calcitriol 0.25 mcg twice a week on Mon and Frid, lactulose 20 g BID prn, trileptal 150 mg BID, tramadol 50 mg q4h prn pain      3) Contacting Dr. Unique Rodriguez to alert to the above changes in meds and for adjustment of any medications already ordered. Please follow-up. ¹RxQuery pharmacy benefit data reflects medications filled and processed through the patient's insurance, however   this data does NOT capture whether the medication was picked up or is currently being taken by the patient. Allergies:  Gabapentin    Significant PMH/Disease States:   Past Medical History:   Diagnosis Date    Acute on chronic respiratory failure ([facility] [address] 7/18/2019    Blood clot in vein     left arm several years ago    CAD (coronary artery disease)     Chronic kidney disease     Chronic systolic heart failure ([facility] [address] 7/18/2019    Congestive heart failure (HCC)     Hypercholesterolemia     Hypertension     Legally blind     PAF (paroxysmal atrial fibrillation) ([facility] [address] 7/18/2019    Seizures ([facility] [address]     Stroke [facility])      Chief Complaint for this Admission:    Chief Complaint   Patient presents with    Shortness of Breath     Prior to Admission Medications:   Prior to Admission Medications   Prescriptions Last Dose Informant Patient Reported? Taking?    IRON FUM & PS CMP/VIT C & B (INTEGRA PO) Unknown at Unknown time Child Yes No   Sig: Take 1 Tab by mouth daily. amiodarone (CORDARONE) 200 mg tablet 9/8/2019 at Unknown time Child Yes Yes   Sig: Take 100 mg by mouth daily. atorvastatin (LIPITOR) 40 mg tablet 9/8/2019 at Unknown time Child No Yes   Sig: TAKE 1 TABLET BY MOUTH ONCE DAILY   brimonidine (ALPHAGAN) 0.2 % ophthalmic solution Unknown at Unknown time Child Yes No   Sig: Administer 1 Drop to both eyes two (2) times a day. carvedilol (COREG) 6.25 mg tablet 9/8/2019 at Unknown time  No Yes   Sig: Take 1 Tab by mouth two (2) times daily (with meals). clopidogrel (PLAVIX) 75 mg tab 9/8/2019 at Unknown time  No Yes   Sig: Take 1 Tab by mouth daily. docusate sodium (COLACE) 100 mg capsule   Yes Yes   Sig: Take 100 mg by mouth two (2) times daily as needed for Constipation. gentamicin (GARAMYCIN) 0.1 % topical cream Unknown at Unknown time Child Yes No   Sig: Apply  to affected area daily. Apply to Cath wound   isosorbide mononitrate ER (IMDUR) 60 mg CR tablet 9/8/2019 at Unknown time  No Yes   Sig: Take 1 Tab by mouth every morning. lacosamide (VIMPAT) 150 mg tab tablet 9/8/2019 at Unknown time  Yes Yes   Sig: Take 150 mg by mouth two (2) times a day. levETIRAcetam (KEPPRA) 500 mg tablet 9/8/2019 at Unknown time Child Yes Yes   Sig: Take 500 mg by mouth two (2) times a day. losartan (COZAAR) 25 mg tablet 9/8/2019 at Unknown time  Yes Yes   Sig: Take 25 mg by mouth daily. omeprazole (PRILOSEC) 20 mg capsule 9/8/2019 at Unknown time Child Yes Yes   Sig: Take 20 mg by mouth daily. phenytoin (DILANTIN ER) 300 mg ER capsule 9/8/2019 at Unknown time  Yes Yes   Sig: Take 300 mg by mouth nightly. potassium chloride (K-DUR, KLOR-CON) 20 mEq tablet 9/8/2019 at Unknown time Child Yes Yes   Sig: Take 20 mEq by mouth daily. sevelamer (RENAGEL) 800 mg tablet 9/8/2019 at Unknown time Child Yes Yes   Sig: Take 1,600 mg by mouth three (3) times daily (with meals).       Facility-Administered Medications: None       Please contact the main inpatient pharmacy with any questions or concerns at [phone] and we will direct you to the clinical pharmacist covering this patient's care while in-house.    Mariama Galvan

## 2019-09-09 NOTE — CONSULTS
Patient was examined in ED room 25  PD will be continued with dextrose 4.25% for 24-48 hours to address hypervolemia

## 2019-09-09 NOTE — ED PROVIDER NOTES
62 y.o. female with past medical history significant for HTN, CKD, cardiac stents, seizures, CAD, hypercholesterolemia, CHF, stroke, DVT, a-fib, and chronic respiratory failure who presents from home with chief complaint of shortness of breath. Pt complains of shortness of breath for the past week. Pt states she had symptoms predominantly at night, until today when she became short of breath. Pt states she has been waking up in the middle of the night feeling like she is \"choking\". Pt states she has been sleeping sitting up. Pt notes she saw Dr. Ar Abbasi, Cardiology, on 9/3 for a follow up. Pt notes she is on peritoneal dialysis nightly. Pt is anticoagulated on plavix. Pt states she is complaint with her CHF medications, and denies recent change in dosage. Pt denies chest pain. There are no other acute medical concerns at this time. Chart Review: Recently admitted to [facility] from 7/16-7/22 for CHF. Social hx: Never Smoker. Denies EtOH Use. Drug Use- Marijuana. PCP: Unknown, Provider    Note written by Dori Moon. Bella Leung, as dictated by Chuy Magallanes MD 11:56 AM        The history is provided by the patient, a relative and medical records.         Past Medical History:   Diagnosis Date    Acute on chronic respiratory failure ([address] 7/18/2019    Blood clot in vein     left arm several years ago    CAD (coronary artery disease)     Chronic kidney disease     Chronic systolic heart failure (HCC) 7/18/2019    Congestive heart failure ([address]     Hypercholesterolemia     Hypertension     Legally blind     PAF (paroxysmal atrial fibrillation) ([address] 7/18/2019    Seizures ([address]     Stroke [facility])        Past Surgical History:   Procedure Laterality Date    CARDIAC SURG PROCEDURE UNLIST      heart attack 12/15    HX ORTHOPAEDIC      right middle finger    HX OTHER SURGICAL      shunt placed in brain         Family History:   Problem Relation Age of Onset    Diabetes Other     Hypertension Other  Cancer Other        Social History     Socioeconomic History    Marital status: SINGLE     Spouse name: Not on file    Number of children: Not on file    Years of education: Not on file    Highest education level: Not on file   Occupational History    Not on file   Social Needs    Financial resource strain: Not on file    Food insecurity:     Worry: Not on file     Inability: Not on file    Transportation needs:     Medical: Not on file     Non-medical: Not on file   Tobacco Use    Smoking status: Never Smoker    Smokeless tobacco: Never Used   Substance and Sexual Activity    Alcohol use: No    Drug use: Yes     Types: Marijuana     Comment: last used 1 week ago    Sexual activity: Never   Lifestyle    Physical activity:     Days per week: Not on file     Minutes per session: Not on file    Stress: Not on file   Relationships    Social connections:     Talks on phone: Not on file     Gets together: Not on file     Attends Church service: Not on file     Active member of club or organization: Not on file     Attends meetings of clubs or organizations: Not on file     Relationship status: Not on file    Intimate partner violence:     Fear of current or ex partner: Not on file     Emotionally abused: Not on file     Physically abused: Not on file     Forced sexual activity: Not on file   Other Topics Concern    Not on file   Social History Narrative    Not on file         ALLERGIES: Gabapentin    Review of Systems   Constitutional: Negative for activity change, appetite change and fatigue. HENT: Negative for ear pain, facial swelling, sore throat and trouble swallowing. Eyes: Negative for pain, discharge and visual disturbance. Respiratory: Positive for shortness of breath. Negative for chest tightness and wheezing. Cardiovascular: Negative for chest pain and palpitations. Gastrointestinal: Negative for abdominal pain, blood in stool, nausea and vomiting.    Genitourinary: Negative for difficulty urinating, flank pain and hematuria. Musculoskeletal: Negative for arthralgias, joint swelling, myalgias and neck pain. Skin: Negative for color change and rash. Neurological: Negative for dizziness, weakness, numbness and headaches. Hematological: Negative for adenopathy. Does not bruise/bleed easily. Psychiatric/Behavioral: Negative for behavioral problems, confusion and sleep disturbance. All other systems reviewed and are negative. Vitals:    09/09/19 1124   BP: (!) 155/99   Pulse: 98   Resp: 20   Temp: 98.1 °F (36.7 °C)   SpO2: 95%   Weight: 94.3 kg (207 lb 14.3 oz)   Height: 5' 3\" (1.6 m)            Physical Exam   Constitutional: She is oriented to person, place, and time. She appears well-developed and well-nourished. No distress. HENT:   Head: Normocephalic and atraumatic. Nose: Nose normal.   Mouth/Throat: Oropharynx is clear and moist.   Eyes: Pupils are equal, round, and reactive to light. Conjunctivae and EOM are normal. No scleral icterus. Neck: Normal range of motion. Neck supple. No JVD present. No tracheal deviation present. No thyromegaly present. No carotid bruits noted. Cardiovascular: Normal rate, regular rhythm, normal heart sounds and intact distal pulses. Exam reveals no gallop and no friction rub. No murmur heard. Pulmonary/Chest: Effort normal. No respiratory distress. She has no wheezes. She has rales. She exhibits no tenderness. Audible wheezing. Abdominal: Soft. Bowel sounds are normal. She exhibits no distension and no mass. There is no tenderness. There is no rebound and no guarding. Musculoskeletal: Normal range of motion. She exhibits no edema or tenderness. Lymphadenopathy:     She has no cervical adenopathy. Neurological: She is alert and oriented to person, place, and time. She has normal reflexes. No cranial nerve deficit. Coordination normal.   Skin: Skin is warm and dry. No rash noted. No erythema.    Psychiatric: She has a normal mood and affect. Her behavior is normal. Judgment and thought content normal.   Nursing note and vitals reviewed. Note written by Mary Trujillo. Jenna Louis, as dictated by Zafar Shah MD 11:58 PM      MDM  Number of Diagnoses or Management Options  Chronic renal failure, unspecified CKD stage: established and worsening  COPD exacerbation ([address]): established and worsening  Pulmonary edema cardiac cause [facility]): established and worsening     Amount and/or Complexity of Data Reviewed  Clinical lab tests: ordered and reviewed  Tests in the radiology section of CPT®: ordered and reviewed  Decide to obtain previous medical records or to obtain history from someone other than the patient: yes  Review and summarize past medical records: yes  Discuss the patient with other providers: yes  Independent visualization of images, tracings, or specimens: yes    Risk of Complications, Morbidity, and/or Mortality  Presenting problems: high  Diagnostic procedures: high  Management options: high    Patient Progress  Patient progress: stable         Procedures    ED EKG interpretation:  Rhythm: normal sinus rhythm; and regular . Rate (approx.): 90; Axis: left axis deviation; ST/T wave: normal; Normal intervals. Poor R wave progression, No ectopy or acute ischemic change. Note written by Mary Trujillo. Jenna Louis, as dictated by Zafar Shah MD 11:39 AM    Hospitalist Noem[facility] for Admission  1:50 PM    ED Room Number: ER25/25  Patient Name and age:  Adair Michel 62 y.o.  female  Working Diagnosis:   1. Chronic renal failure, unspecified CKD stage    2. Pulmonary edema cardiac cause ([facility]    3. COPD exacerbation ([facility]      Readmission: no  Isolation Requirements:  no  Recommended Level of Care:  telemetry  Code Status:  Full Code  Department:[facility] Adult ED - [phone]  Other:      CONSULT NOTE:  2:00 PM Zafar Shah MD communicated with Dr. Claudette Dolan, Consult for Hospitalist via [facility] FOR [facility] Text.   Discussed available diagnostic tests and clinical findings.   Dr. Lupe Hauser will admit the patient to the hospital.

## 2019-09-09 NOTE — ED TRIAGE NOTES
Patient arrives from home for worsening shortness of breath over the last week. Reports she has been sleeping sitting up. Able to speak in complete sentences. Patient does PD dialysis nightly. Denies chest pain.

## 2019-09-09 NOTE — ED NOTES
Bedside and Verbal shift change report given to Aaron Beal (oncoming nurse) by Marcos Elias RN (offgoing nurse). Report included the following information SBAR, ED Summary, MAR and Recent Results.

## 2019-09-10 ENCOUNTER — APPOINTMENT (OUTPATIENT)
Dept: NON INVASIVE DIAGNOSTICS | Age: 64
DRG: 291 | End: 2019-09-10
Attending: INTERNAL MEDICINE
Payer: MEDICARE

## 2019-09-10 ENCOUNTER — APPOINTMENT (OUTPATIENT)
Dept: CT IMAGING | Age: 64
DRG: 291 | End: 2019-09-10
Attending: INTERNAL MEDICINE
Payer: MEDICARE

## 2019-09-10 LAB
ALBUMIN SERPL-MCNC: 1.9 G/DL (ref 3.5–5)
ALBUMIN/GLOB SERPL: 0.4 {RATIO} (ref 1.1–2.2)
ALP SERPL-CCNC: 164 U/L (ref 45–117)
ALT SERPL-CCNC: 13 U/L (ref 12–78)
ANION GAP SERPL CALC-SCNC: 10 MMOL/L (ref 5–15)
ARTERIAL PATENCY WRIST A: YES
AST SERPL-CCNC: 15 U/L (ref 15–37)
BASE EXCESS BLD CALC-SCNC: 5 MMOL/L
BASOPHILS # BLD: 0.1 K/UL (ref 0–0.1)
BASOPHILS NFR BLD: 2 % (ref 0–1)
BDY SITE: ABNORMAL
BILIRUB SERPL-MCNC: 0.2 MG/DL (ref 0.2–1)
BUN SERPL-MCNC: 56 MG/DL (ref 6–20)
BUN/CREAT SERPL: 5 (ref 12–20)
CALCIUM SERPL-MCNC: 8.4 MG/DL (ref 8.5–10.1)
CHLORIDE SERPL-SCNC: 98 MMOL/L (ref 97–108)
CO2 SERPL-SCNC: 27 MMOL/L (ref 21–32)
CREAT SERPL-MCNC: 10.2 MG/DL (ref 0.55–1.02)
DIFFERENTIAL METHOD BLD: ABNORMAL
ECHO AO ROOT DIAM: 3.23 CM
ECHO AV AREA PEAK VELOCITY: 1.4 CM2
ECHO AV AREA VTI: 1.4 CM2
ECHO AV CUSP MM: 1.44 CM
ECHO AV MEAN GRADIENT: 7.8 MMHG
ECHO AV PEAK GRADIENT: 16.7 MMHG
ECHO AV PEAK VELOCITY: 204.39 CM/S
ECHO AV REGURGITANT PHT: 561.5 CM
ECHO AV VTI: 37.07 CM
ECHO LA MAJOR AXIS: 4.95 CM
ECHO LA TO AORTIC ROOT RATIO: 1.53
ECHO LV E' LATERAL VELOCITY: 4.37 CM/S
ECHO LV E' SEPTAL VELOCITY: 3.99 CM/S
ECHO LV INTERNAL DIMENSION DIASTOLIC: 4.72 CM (ref 3.9–5.3)
ECHO LV INTERNAL DIMENSION SYSTOLIC: 4.18 CM
ECHO LV IVSD: 1.2 CM (ref 0.6–0.9)
ECHO LV MASS 2D: 246.7 G (ref 67–162)
ECHO LV MASS INDEX 2D: 128 G/M2 (ref 43–95)
ECHO LV POSTERIOR WALL DIASTOLIC: 1.16 CM (ref 0.6–0.9)
ECHO LVOT DIAM: 2.04 CM
ECHO LVOT PEAK GRADIENT: 3.1 MMHG
ECHO LVOT PEAK VELOCITY: 88.04 CM/S
ECHO LVOT SV: 51 ML
ECHO LVOT VTI: 15.67 CM
ECHO MV A VELOCITY: 80.54 CM/S
ECHO MV AREA PHT: 3.7 CM2
ECHO MV E DECELERATION TIME (DT): 205.7 MS
ECHO MV E VELOCITY: 90.44 CM/S
ECHO MV E/A RATIO: 1.12
ECHO MV E/E' LATERAL: 20.7
ECHO MV E/E' RATIO (AVERAGED): 21.68
ECHO MV E/E' SEPTAL: 22.67
ECHO MV PRESSURE HALF TIME (PHT): 59.7 MS
ECHO PV MAX VELOCITY: 82.08 CM/S
ECHO PV PEAK GRADIENT: 2.7 MMHG
ECHO RV INTERNAL DIMENSION: 3.55 CM
ECHO RV TAPSE: 1.82 CM (ref 1.5–2)
EOSINOPHIL # BLD: 0.5 K/UL (ref 0–0.4)
EOSINOPHIL NFR BLD: 6 % (ref 0–7)
ERYTHROCYTE [DISTWIDTH] IN BLOOD BY AUTOMATED COUNT: 18.8 % (ref 11.5–14.5)
GAS FLOW.O2 O2 DELIVERY SYS: ABNORMAL L/MIN
GAS FLOW.O2 SETTING OXYMISER: 3 L/M
GLOBULIN SER CALC-MCNC: 4.3 G/DL (ref 2–4)
GLUCOSE SERPL-MCNC: 107 MG/DL (ref 65–100)
HCO3 BLD-SCNC: 29.6 MMOL/L (ref 22–26)
HCT VFR BLD AUTO: 28.4 % (ref 35–47)
HGB BLD-MCNC: 8.7 G/DL (ref 11.5–16)
IMM GRANULOCYTES # BLD AUTO: 0 K/UL (ref 0–0.04)
IMM GRANULOCYTES NFR BLD AUTO: 0 % (ref 0–0.5)
LYMPHOCYTES # BLD: 1.6 K/UL (ref 0.8–3.5)
LYMPHOCYTES NFR BLD: 20 % (ref 12–49)
MAGNESIUM SERPL-MCNC: 2.2 MG/DL (ref 1.6–2.4)
MCH RBC QN AUTO: 27.7 PG (ref 26–34)
MCHC RBC AUTO-ENTMCNC: 30.6 G/DL (ref 30–36.5)
MCV RBC AUTO: 90.4 FL (ref 80–99)
MONOCYTES # BLD: 0.8 K/UL (ref 0–1)
MONOCYTES NFR BLD: 11 % (ref 5–13)
NEUTS SEG # BLD: 4.9 K/UL (ref 1.8–8)
NEUTS SEG NFR BLD: 62 % (ref 32–75)
NRBC # BLD: 0 K/UL (ref 0–0.01)
NRBC BLD-RTO: 0 PER 100 WBC
PCO2 BLD: 48 MMHG (ref 35–45)
PH BLD: 7.4 [PH] (ref 7.35–7.45)
PHENYTOIN SERPL-MCNC: 9.6 UG/ML (ref 10–20)
PHOSPHATE SERPL-MCNC: 3.3 MG/DL (ref 2.6–4.7)
PISA AR MAX VEL: 406.88 CM/S
PLATELET # BLD AUTO: 251 K/UL (ref 150–400)
PMV BLD AUTO: 12.8 FL (ref 8.9–12.9)
PO2 BLD: 99 MMHG (ref 80–100)
POTASSIUM SERPL-SCNC: 3.4 MMOL/L (ref 3.5–5.1)
PROT SERPL-MCNC: 6.2 G/DL (ref 6.4–8.2)
RBC # BLD AUTO: 3.14 M/UL (ref 3.8–5.2)
SAO2 % BLD: 98 % (ref 92–97)
SODIUM SERPL-SCNC: 135 MMOL/L (ref 136–145)
SPECIMEN TYPE: ABNORMAL
TOTAL RESP. RATE, ITRR: 18
TROPONIN I SERPL-MCNC: 0.06 NG/ML
WBC # BLD AUTO: 8 K/UL (ref 3.6–11)

## 2019-09-10 PROCEDURE — 65660000000 HC RM CCU STEPDOWN

## 2019-09-10 PROCEDURE — 74011000258 HC RX REV CODE- 258: Performed by: INTERNAL MEDICINE

## 2019-09-10 PROCEDURE — 84100 ASSAY OF PHOSPHORUS: CPT

## 2019-09-10 PROCEDURE — A4722 DIALYS SOL FLD VOL > 1999CC: HCPCS | Performed by: INTERNAL MEDICINE

## 2019-09-10 PROCEDURE — 36600 WITHDRAWAL OF ARTERIAL BLOOD: CPT

## 2019-09-10 PROCEDURE — 74011250636 HC RX REV CODE- 250/636: Performed by: INTERNAL MEDICINE

## 2019-09-10 PROCEDURE — 36415 COLL VENOUS BLD VENIPUNCTURE: CPT

## 2019-09-10 PROCEDURE — 70450 CT HEAD/BRAIN W/O DYE: CPT

## 2019-09-10 PROCEDURE — 84484 ASSAY OF TROPONIN QUANT: CPT

## 2019-09-10 PROCEDURE — 82803 BLOOD GASES ANY COMBINATION: CPT

## 2019-09-10 PROCEDURE — 74011250637 HC RX REV CODE- 250/637: Performed by: HOSPITALIST

## 2019-09-10 PROCEDURE — 83735 ASSAY OF MAGNESIUM: CPT

## 2019-09-10 PROCEDURE — 74011250637 HC RX REV CODE- 250/637: Performed by: NURSE PRACTITIONER

## 2019-09-10 PROCEDURE — 80053 COMPREHEN METABOLIC PANEL: CPT

## 2019-09-10 PROCEDURE — 93306 TTE W/DOPPLER COMPLETE: CPT

## 2019-09-10 PROCEDURE — 95816 EEG AWAKE AND DROWSY: CPT | Performed by: INTERNAL MEDICINE

## 2019-09-10 PROCEDURE — 85025 COMPLETE CBC W/AUTO DIFF WBC: CPT

## 2019-09-10 RX ORDER — FACIAL-BODY WIPES
10 EACH TOPICAL DAILY PRN
Status: DISCONTINUED | OUTPATIENT
Start: 2019-09-10 | End: 2019-09-19 | Stop reason: HOSPADM

## 2019-09-10 RX ORDER — TRAMADOL HYDROCHLORIDE 50 MG/1
50 TABLET ORAL
Status: DISCONTINUED | OUTPATIENT
Start: 2019-09-10 | End: 2019-09-10

## 2019-09-10 RX ORDER — LORAZEPAM 2 MG/ML
1 INJECTION INTRAMUSCULAR
Status: DISCONTINUED | OUTPATIENT
Start: 2019-09-10 | End: 2019-09-19 | Stop reason: HOSPADM

## 2019-09-10 RX ORDER — ACETAMINOPHEN 325 MG/1
650 TABLET ORAL
Status: DISCONTINUED | OUTPATIENT
Start: 2019-09-10 | End: 2019-09-19 | Stop reason: HOSPADM

## 2019-09-10 RX ADMIN — SODIUM CHLORIDE, SODIUM LACTATE, CALCIUM CHLORIDE, MAGNESIUM CHLORIDE AND DEXTROSE 2500 ML: 4.25; 538; 448; 18.3; 5.08 INJECTION, SOLUTION INTRAPERITONEAL at 06:43

## 2019-09-10 RX ADMIN — SODIUM CHLORIDE, SODIUM LACTATE, CALCIUM CHLORIDE, MAGNESIUM CHLORIDE AND DEXTROSE 2500 ML: 4.25; 538; 448; 18.3; 5.08 INJECTION, SOLUTION INTRAPERITONEAL at 01:03

## 2019-09-10 RX ADMIN — ACETAMINOPHEN 650 MG: 325 TABLET, FILM COATED ORAL at 21:04

## 2019-09-10 RX ADMIN — BRIMONIDINE TARTRATE 1 DROP: 2 SOLUTION OPHTHALMIC at 10:46

## 2019-09-10 RX ADMIN — PHENYTOIN SODIUM 300 MG: 100 CAPSULE ORAL at 21:03

## 2019-09-10 RX ADMIN — Medication 10 ML: at 21:18

## 2019-09-10 RX ADMIN — LEVETIRACETAM 500 MG: 500 TABLET, FILM COATED ORAL at 10:31

## 2019-09-10 RX ADMIN — POTASSIUM CHLORIDE 20 MEQ: 1.5 POWDER, FOR SOLUTION ORAL at 10:32

## 2019-09-10 RX ADMIN — LEVETIRACETAM 500 MG: 500 TABLET, FILM COATED ORAL at 18:14

## 2019-09-10 RX ADMIN — Medication 10 ML: at 16:08

## 2019-09-10 RX ADMIN — SODIUM CHLORIDE, SODIUM LACTATE, CALCIUM CHLORIDE, MAGNESIUM CHLORIDE AND DEXTROSE 2500 ML: 4.25; 538; 448; 18.3; 5.08 INJECTION, SOLUTION INTRAPERITONEAL at 21:15

## 2019-09-10 RX ADMIN — ASCORBIC ACID, THIAMINE MONONITRATE,RIBOFLAVIN, NIACINAMIDE, PYRIDOXINE HYDROCHLORIDE, FOLIC ACID, CYANOCOBALAMIN, BIOTIN, CALCIUM PANTOTHENATE, 1 CAPSULE: 100; 1.5; 1.7; 20; 10; 1; 6000; 150000; 5 CAPSULE, LIQUID FILLED ORAL at 10:31

## 2019-09-10 RX ADMIN — TRAMADOL HYDROCHLORIDE 50 MG: 50 TABLET, FILM COATED ORAL at 00:37

## 2019-09-10 RX ADMIN — SEVELAMER CARBONATE 1600 MG: 800 TABLET, FILM COATED ORAL at 10:32

## 2019-09-10 RX ADMIN — ISOSORBIDE MONONITRATE 60 MG: 60 TABLET, EXTENDED RELEASE ORAL at 06:35

## 2019-09-10 RX ADMIN — CLOPIDOGREL BISULFATE 75 MG: 75 TABLET ORAL at 10:32

## 2019-09-10 RX ADMIN — CARVEDILOL 12.5 MG: 12.5 TABLET, FILM COATED ORAL at 17:55

## 2019-09-10 RX ADMIN — EPOETIN ALFA-EPBX 14000 UNITS: 10000 INJECTION, SOLUTION INTRAVENOUS; SUBCUTANEOUS at 17:54

## 2019-09-10 RX ADMIN — BRIMONIDINE TARTRATE 1 DROP: 2 SOLUTION OPHTHALMIC at 00:00

## 2019-09-10 RX ADMIN — Medication 10 ML: at 08:21

## 2019-09-10 RX ADMIN — SEVELAMER CARBONATE 1600 MG: 800 TABLET, FILM COATED ORAL at 17:57

## 2019-09-10 RX ADMIN — PHENYTOIN SODIUM 300 MG: 100 CAPSULE ORAL at 01:09

## 2019-09-10 RX ADMIN — LEVETIRACETAM 1000 MG: 100 INJECTION, SOLUTION INTRAVENOUS at 18:14

## 2019-09-10 RX ADMIN — SODIUM CHLORIDE, SODIUM LACTATE, CALCIUM CHLORIDE, MAGNESIUM CHLORIDE AND DEXTROSE 2500 ML: 4.25; 538; 448; 18.3; 5.08 INJECTION, SOLUTION INTRAPERITONEAL at 11:29

## 2019-09-10 RX ADMIN — PANTOPRAZOLE SODIUM 40 MG: 40 TABLET, DELAYED RELEASE ORAL at 06:40

## 2019-09-10 RX ADMIN — ASPIRIN 81 MG: 81 TABLET, COATED ORAL at 10:31

## 2019-09-10 RX ADMIN — LACOSAMIDE 150 MG: 50 TABLET, FILM COATED ORAL at 10:40

## 2019-09-10 RX ADMIN — CARVEDILOL 12.5 MG: 12.5 TABLET, FILM COATED ORAL at 06:35

## 2019-09-10 RX ADMIN — DOCUSATE SODIUM 100 MG: 100 CAPSULE, LIQUID FILLED ORAL at 23:08

## 2019-09-10 RX ADMIN — SODIUM CHLORIDE, SODIUM LACTATE, CALCIUM CHLORIDE, MAGNESIUM CHLORIDE AND DEXTROSE 2500 ML: 4.25; 538; 448; 18.3; 5.08 INJECTION, SOLUTION INTRAPERITONEAL at 15:59

## 2019-09-10 RX ADMIN — LACTULOSE 30 ML: 20 SOLUTION ORAL at 22:17

## 2019-09-10 RX ADMIN — ATORVASTATIN CALCIUM 40 MG: 40 TABLET, FILM COATED ORAL at 10:31

## 2019-09-10 RX ADMIN — LACOSAMIDE 150 MG: 50 TABLET, FILM COATED ORAL at 20:21

## 2019-09-10 NOTE — PROGRESS NOTES
Hospitalist Progress Note  Benji Edwards MD  Answering service: [phone] OR [phone] from in house phone  Cell:       Date of Service:  9/10/2019  NAME:  True Lew  :  1955  MRN:  [id number]      Admission Summary:   The patient is a 71-year-old female who has previous history significant for systolic heart failure, end-stage renal disease on peritoneal dialysis, history of coronary artery disease status post angioplasty and stent placement approximately 1-1/2 years ago at [facility], history of hypertension, previous history of stroke, hyperlipidemia, seizure disorder, comes to the emergency room due to worsening shortness of breath. The patient tells me that she has been short of breath for the last 4-5 days,in the past shortness of breath   was mainly when she was lying down. Now, she is getting short of breath even when she is sitting up. She denies having any significant chest pain. She coughs and brings up white sputum. She has no fever or chills. The patient tells me at one time she was on oxygen; however, her sister never picked up her oxygen tank. In the emergency room, the patient was given 2 breathing treatments by ER physician. On further questioning, the patient says that she has gained a lot of weight and has noticed worsening leg swelling. Interval history / Subjective:         Patient drowsy and intermittently waking up today   Maybe seizing and we I saw her postcital     Will give additional gram of keppra and EEG and consult neuro      Assessment & Plan:          Altered mental status 9/10  Abg is normal   Waxing and waning   Get stat EEG , stat CT head and neuro consult   Discussed with Dr Martina Brito 1 g now ad check levels of vimpat , keppra and dilantin       Shortness of breath   2/2 HF with ESRD   cxr with vascular prominence   -Volume overload likely poor PD nursing by pt at home ,  Cardio on Board  -Nephro on Board, continue Fluid removal as per Nephro    Systolic and diastolic heart failure   Echo with 26-30% EF and Grade II diastolic dysfunction. Paroxysmal Afib  Had episode of afib once converted  -onBB  -Avoid anticoagulation, H/O SDH and required  shunt    SDH in past     History of seizures   See above ,on AED     Trop of .06   Cards on board   Stress testing to assess for coronary insufficiency either prior to discharge or as an OP per Dr Ana Cristina Richter     CAD s/p PCI   [facility] (2015) - mid LAD 70-80%, distal LAD 50%, diagonal high-grade disease, OMB high-grade disease, RCA total occlusion - medically managed  2. [facility] 8/2018 - heavily calcified vessels with severe early mid LAD stenosis, occluded RCA with faint L-R collateral filling and occluded 1st marginal - felt to be high risk for CABG / patient refused  3. [facility] 9/4/18 - S/p successful PCI of the prox LAD using 3.0 x 24 Synergy ANDER     COPD   Stable     STROKE X3     LEGALLY BLIND     HTN: c/w Home regimen, use labetalol prn. Code status: full   DVT prophylaxis:     Care Plan discussed with: Patient/Family  Disposition: Home w/Family and TBD     Hospital Problems  Date Reviewed: 7/18/2019          Codes Class Noted POA    Shortness of breath ICD-10-CM: R06.02  ICD-9-CM: 786.05  9/9/2019 Unknown                Review of Systems:   A comprehensive review of systems was negative except for that written in the HPI. Vital Signs:    Last 24hrs VS reviewed since prior progress note.  Most recent are:  Visit Vitals  BP (!) 148/108 (BP 1 Location: Left arm, BP Patient Position: At rest)   Pulse 73   Temp 96.9 °F (36.1 °C)   Resp 20   Ht 5' 3\" (1.6 m)   Wt 90 kg (198 lb 6.6 oz)   SpO2 100%   BMI 35.15 kg/m²         Intake/Output Summary (Last 24 hours) at 9/10/2019 1638  Last data filed at 9/10/2019 1126  Gross per 24 hour   Intake 7500 ml   Output 7250 ml   Net 250 ml        Physical Examination:             Constitutional: intermittent arousable all day , when I examined , poorly responsible    ENT:  Oral mucous moist, oropharynx benign. Neck supple,    Resp:  CTA bilaterally. No wheezing/rhonchi/rales. No accessory muscle use   CV:  Regular rhythm, normal rate, no murmurs, gallops, rubs    GI:  Soft, non distended, non tender. normoactive bowel sounds, no hepatosplenomegaly     Musculoskeletal:  No edema, warm, 2+ pulses throughout    Neurologic:  left upper anad lower extremity weak             Data Review:    Review and/or order of clinical lab test      Labs:     Recent Labs     09/10/19  0543 09/09/19  1204   WBC 8.0 9.5   HGB 8.7* 10.0*   HCT 28.4* 33.0*    329     Recent Labs     09/10/19  0543 09/09/19  1204   * 134*   K 3.4* HEMOLYZED,RECOLLECT REQUESTED   CL 98 97   CO2 27 29   BUN 56* 50*   CREA 10.20* 9.41*   * 73   CA 8.4* 8.6   MG 2.2 2.4   PHOS 3.3 3.4     Recent Labs     09/10/19  0543 09/09/19  1204   SGOT 15 HEMOLYZED,RECOLLECT REQUESTED   ALT 13 15   * 182*   TBILI 0.2 0.4   TP 6.2* 7.6   ALB 1.9* 2.2*   GLOB 4.3* 5.4*     No results for input(s): INR, PTP, APTT in the last 72 hours. No lab exists for component: INREXT   No results for input(s): FE, TIBC, PSAT, FERR in the last 72 hours. No results found for: FOL, RBCF   No results for input(s): PH, PCO2, PO2 in the last 72 hours.   Recent Labs     09/10/19  0543   TROIQ 0.06*     No results found for: CHOL, CHOLX, CHLST, CHOLV, HDL, HDLP, LDL, LDLC, DLDLP, TGLX, TRIGL, TRIGP, CHHD, CHHDX  Lab Results   Component Value Date/Time    Glucose (POC) 87 07/22/2019 11:16 AM    Glucose (POC) 162 (H) 07/17/2019 03:21 AM     Lab Results   Component Value Date/Time    Color YELLOW/STRAW 04/03/2015 08:31 PM    Appearance CLOUDY (A) 04/03/2015 08:31 PM    Specific gravity 1.013 04/03/2015 08:31 PM    pH (UA) 7.5 04/03/2015 08:31 PM    Protein 300 (A) 04/03/2015 08:31 PM    Glucose NEGATIVE  04/03/2015 08:31 PM    Ketone NEGATIVE  04/03/2015 08:31 PM    Bilirubin NEGATIVE  04/03/2015 08:31 PM    Urobilinogen 0.2 04/03/2015 08:31 PM    Nitrites NEGATIVE  04/03/2015 08:31 PM    Leukocyte Esterase NEGATIVE  04/03/2015 08:31 PM    Epithelial cells MANY (A) 04/03/2015 08:31 PM    Bacteria 1+ (A) 04/03/2015 08:31 PM    WBC 0-4 04/03/2015 08:31 PM    RBC 0-5 04/03/2015 08:31 PM         Medications Reviewed:     Current Facility-Administered Medications   Medication Dose Route Frequency    epoetin tawny-epbx (RETACRIT) 14,000 Units combo injection  14,000 Units SubCUTAneous ONCE    sodium chloride (NS) flush 5-40 mL  5-40 mL IntraVENous Q8H    sodium chloride (NS) flush 5-40 mL  5-40 mL IntraVENous PRN    aspirin delayed-release tablet 81 mg  81 mg Oral DAILY    atorvastatin (LIPITOR) tablet 40 mg  40 mg Oral DAILY    brimonidine (ALPHAGAN) 0.2 % ophthalmic solution 1 Drop  1 Drop Both Eyes BID    [START ON 9/12/2019] calcitRIOL (ROCALTROL) capsule 0.25 mcg  0.25 mcg Oral Once per day on Thu Sat    carvedilol (COREG) tablet 12.5 mg  12.5 mg Oral BID WITH MEALS    clopidogrel (PLAVIX) tablet 75 mg  75 mg Oral DAILY    isosorbide mononitrate ER (IMDUR) tablet 60 mg  60 mg Oral 7am    lactulose (CHRONULAC) 10 gram/15 mL solution 30 mL  20 g Oral BID PRN    levETIRAcetam (KEPPRA) tablet 500 mg  500 mg Oral BID    pantoprazole (PROTONIX) tablet 40 mg  40 mg Oral ACB    sevelamer carbonate (RENVELA) tab 1,600 mg  1,600 mg Oral TID WITH MEALS    furosemide (LASIX) injection 40 mg  40 mg IntraVENous DAILY    docusate sodium (COLACE) capsule 100 mg  100 mg Oral BID PRN    B complex-vitaminC-folic acid (NEPHROCAP) cap  1 Cap Oral DAILY    lacosamide (VIMPAT) tablet 150 mg  150 mg Oral BID    losartan (COZAAR) tablet 25 mg  25 mg Oral DAILY    phenytoin ER (DILANTIN ER) ER capsule 300 mg  300 mg Oral QHS    potassium chloride (KLOR-CON) packet for solution 20 mEq  20 mEq Oral DAILY    albuterol-ipratropium (DUO-NEB) 2.5 MG-0.5 MG/3 ML  3 mL Nebulization Q6H PRN    peritoneal dialysis DEXTROSE 4.25% (2.5 mEq/L low calcium) solution 2,500 mL  2,500 mL IntraPERitoneal QID    [START ON 9/11/2019] amiodarone (CORDARONE) tablet 100 mg  100 mg Oral DAILY     ______________________________________________________________________  EXPECTED LENGTH OF STAY: - - -  ACTUAL LENGTH OF STAY:          1                 Manisha Khan MD

## 2019-09-10 NOTE — PROGRESS NOTES
Patient visited by [facility] Partner Volunteer on Telemetry Unit on 9/10/2019.     Rev.  Bam Diaz MDiv, [facility], [facility] paging service: [phone]

## 2019-09-10 NOTE — PROGRESS NOTES
Discussed POC with Dr Dorys Bailey throughout day. Concerned for pt this AM when she as barely arousable. ABGs done with AM labs, tramadol DC'd by MD. Pt wide awake soon after and most of the day. This afternoon when Dr Janae Green came by pt was again very lethargic again. Discussed with Dr Dorys Bailey again, stat CT ordered, and EEG. Md wants IV kepra and PO dose for now, called pharmacy and they stated to clarify with MD. Pt wide awake again this evening. Neurology consulted. Awaiting EEG. Took pt to CT on monitor myself for close monitoring. Pt tolerated well. Back to bed and on her monitor. Anuric, unable to send urine studies. Will cont to monitor and implement POC.

## 2019-09-10 NOTE — PROGRESS NOTES
Patient arrived from ED      0024 paged Isabel Vasquez patient takes Tramadol 50 mg QHS. He called back and he is putting the order in.    0100 first PD started draining now     0200 PD completed dwelling started    0600 second PD started emptying    0635 filling started     0645 dwelling started next exchange is at 11 am    0730 Bedside and Verbal shift change report given to Margurette Goldberg, RN  (oncoming nurse) by Frannie Navas RN  (offgoing nurse). Report included the following information SBAR, MAR, Recent Results and Med Rec Status.

## 2019-09-10 NOTE — PROGRESS NOTES
Problem: Falls - Risk of  Goal: *Absence of Falls  Description  Document Annalise Vidal Fall Risk and appropriate interventions in the flowsheet.   Outcome: Progressing Towards Goal  Note:   Fall Risk Interventions:  Mobility Interventions: Communicate number of staff needed for ambulation/transfer, Patient to call before getting OOB, PT Consult for mobility concerns, PT Consult for assist device competence, Strengthening exercises (ROM-active/passive), Utilize walker, cane, or other assistive device         Medication Interventions: Patient to call before getting OOB, Teach patient to arise slowly    Elimination Interventions: Call light in reach, Patient to call for help with toileting needs              Problem: Patient Education: Go to Patient Education Activity  Goal: Patient/Family Education  Outcome: Progressing Towards Goal     Problem: Heart Failure: Day 1  Goal: Off Pathway (Use only if patient is Off Pathway)  Outcome: Progressing Towards Goal  Goal: Activity/Safety  Outcome: Progressing Towards Goal  Goal: Consults, if ordered  Outcome: Progressing Towards Goal  Goal: Diagnostic Test/Procedures  Outcome: Progressing Towards Goal  Goal: Nutrition/Diet  Outcome: Progressing Towards Goal  Goal: Discharge Planning  Outcome: Progressing Towards Goal  Goal: Medications  Outcome: Progressing Towards Goal  Goal: Respiratory  Outcome: Progressing Towards Goal  Goal: Treatments/Interventions/Procedures  Outcome: Progressing Towards Goal  Goal: Psychosocial  Outcome: Progressing Towards Goal  Goal: *Oxygen saturation within defined limits  Outcome: Progressing Towards Goal  Goal: *Hemodynamically stable  Outcome: Progressing Towards Goal  Goal: *Optimal pain control at patient's stated goal  Outcome: Progressing Towards Goal  Goal: *Anxiety reduced or absent  Outcome: Progressing Towards Goal

## 2019-09-10 NOTE — PROGRESS NOTES
Name: Mare Milan MRN: [id number]   : 1955 Hospital: . [address]   Date: 9/10/2019        IMPRESSION:   · ESRD on PD. The PD is progressing well. Patient is in negative fluid balance. · SOB without edema and with clear CXR  · Anemia with significant drop in Hb. ?bleeding  · Severe hypoalbuminemia. PLAN:   · Continue PD with 4.25% dextrose for 24 more hours. And change to 2.5%  · Start protein supplements  · Anemia management- check iron profile, give a dose of Epo and check for occult GI bleed. · Repeat the renal panel and CBC in AM     Subjective/Interval History:   I have reviewed the flowsheet and previous days notes. ROS:A comprehensive review of systems was negative. Objective:   Vital Signs:    Visit Vitals  BP (!) 133/95   Pulse 69   Temp 96.9 °F (36.1 °C)   Resp 18   Ht 5' 3\" (1.6 m)   Wt 90 kg (198 lb 6.6 oz)   SpO2 100%   BMI 35.15 kg/m²       O2 Device: Nasal cannula   O2 Flow Rate (L/min): 3 l/min   Temp (24hrs), Av.9 °F (36.6 °C), Min:96.9 °F (36.1 °C), Max:98.6 °F (37 °C)       Intake/Output:   Last shift:      No intake/output data recorded. Last 3 shifts:  1901 - 09/10 0700  In: 2500   Out: 3950     Intake/Output Summary (Last 24 hours) at 9/10/2019 1441  Last data filed at 9/10/2019 0600  Gross per 24 hour   Intake 2500 ml   Output 3950 ml   Net -1450 ml        Physical Exam:  General:    Asleep, no distress, appears stated age. Head:   Normocephalic, without obvious abnormality, atraumatic. Eyes:   Closed. Nose:  Nares normal. No drainage or sinus tenderness. Neck:  Supple, symmetrical,  no adenopathy, thyroid: non tender    no carotid bruit and no JVD. Lungs:   Clear to auscultation bilaterally. No Wheezing or Rhonchi. No rales. Chest wall:  No tenderness or deformity. No Accessory muscle use. PD catheter exit site is just above the left breast.  Heart:   Regular rate and rhythm,  no murmur, rub or gallop. Abdomen:   Soft, non-tender.  Not distended. Bowel sounds normal. No masses  Extremities: Extremities normal, atraumatic, No cyanosis. No edema. No clubbing  Skin:     Texture, turgor normal. No rashes or lesions. Not Jaundiced  Psych:  Not anxious or agitated. DATA:  Labs:  Recent Labs     09/10/19  0543 09/09/19  1204   * 134*   K 3.4* HEMOLYZED,RECOLLECT REQUESTED   CL 98 97   CO2 27 29   BUN 56* 50*   CREA 10.20* 9.41*   CA 8.4* 8.6   ALB 1.9* 2.2*   PHOS 3.3 3.4   MG 2.2 2.4     Recent Labs     09/10/19  0543 09/09/19  1204   WBC 8.0 9.5   HGB 8.7* 10.0*   HCT 28.4* 33.0*    329     No results for input(s): CLARISA, KU, CLU, CREAU in the last 72 hours.     No lab exists for component: PROU    Total time spent with patient:  35 minutes    [] Critical Care Provided    Care Plan discussed with:   Don Benites MD

## 2019-09-10 NOTE — CONSULTS
[address]    Name:  Mackenzie Bennett  MR#:  [id number]  :  1955  ACCOUNT #:  [de-identified]  DATE OF SERVICE:  2019    REFERRING PHYSICIAN:  Baldo Solis MD.    [address]:  Assistance with management of the patient with end-stage renal disease on PD, who presents with hypervolemia. HISTORY OF PRESENT ILLNESS:  The patient is a very pleasant 23-year-old black woman, who is a peritoneal dialysis patient of my partner Dr. Kathleen Cortez. The patient is using a night-time cycler. She usually uses 1.5 and rarely 2.5% dextrose. The patient ran out of 2.5 dextrose. For the last several days, her ultrafiltration was below a liter. The patient reports that she likes to drink water and knows that this is her weakness. The patient presented to the emergency room with worsening shortness of breath for at least a week. She claims that she gained more than 55 pounds over the last several years. The patient is known to have cardiomyopathy with low ejection fraction. PAST MEDICAL HISTORY:  1. End-stage renal disease on peritoneal dialysis. 2.  Cardiomyopathy with ejection fraction of 25.  3.  Hypertension. 4.  Seizure disorder. 5.  Chronic systolic heart failure. 6.  History of CVA. 7.  Anemia. 8.  Paroxysmal atrial fibrillation. 9.  Obesity. 10.  Legally blind. 11.  Subdural hematoma. 12.  Hypercholesterolemia. MEDICATIONS LIST:  1. Gentamicin locally if any infection. 2.  Lorazepam when needed. 3.  Amlodipine 5 mg once a day. 4.  Pantoprazole 40 mg once a day. 5.  Keppra 250 b.i.d.  6.  Renvela 800 mg with meals. 7.  Zofran when needed. 8.  Roxicodone when needed. ALLERGIES:  ALLERGIC TO GABAPENTIN. SOCIAL HISTORY:  The patient is sedentary. She denies alcohol, tobacco, or illicit drug abuse. FAMILY HISTORY:  Negative for renal disease. REVIEW OF SYSTEMS:  CONSTITUTIONAL:  The patient feels fatigued.   RESPIRATORY:  Reports shortness of breath and wheezing. CARDIOVASCULAR:  Denies chest pain. GASTROINTESTINAL:  Denies abdominal pain, nausea, vomiting, constipation, or diarrhea. NEUROPSYCHIATRIC:  No recent seizures. PHYSICAL EXAMINATION:  GENERAL:  Obese, middle-aged black woman, who is awake, alert, and oriented. She is actually in good spirit. She is able to talk in full sentences. She is not in any apparent obvious distress. VITAL SIGNS:  Blood pressure is 154/94, heart rate is 90, and temperature is 98. HEENT:  Head is normocephalic. Eyes with anicteric sclerae. Ears and nose without abnormal discharge. Mouth with moist oral mucosa. NECK:  Supple with no increased JVP, carotid bruit, or thyromegaly. LUNGS:  Fairly clear to auscultation with no wheezes, rales, or rhonchi. HEART:  S1 and S2 with regular rate and rhythm with blowing systolic murmur. The patient has a PD catheter, which exits on her left upper chest just above her left breast.  ABDOMEN:  Obese and soft with some PD fluid in it. The patient's abdomen is not tender, not distended. Bowel sounds are present. EXTREMITIES:  With no edema, cyanosis, or clubbing. Pulses are symmetrical.  Joints are without effusion. NEUROLOGIC:  Shows a woman who is awake, alert, and oriented to time, self, and place. There is no obvious localized weakness. LABORATORY DATA:  Sodium is 134, BUN is 50, creatinine is 9.4, CO2 is 29, and albumin is 2.2. White blood count is 9.5 and hemoglobin is 10. IMPRESSION:  1. End-stage renal disease, on peritoneal dialysis. The patient most probably accumulated some fluids due to her noncompliance to oral fluid restriction and use of 4.25% peritoneal fluid. The patient has a history of systolic failure as well. 2.  Shortness of breath. A combination of fluid overlap and decompensated left ventricular failure. 3.  Electrolytes are normal.  4.  Anemia of end-stage renal disease.   Hemoglobin is at target. RECOMMENDATIONS:  1. The patient's peritoneal dialysis will be transitioned to manual exchange. We will use 4.25% dextrose to ultrafiltrate more fluid and put her in negative balance. The patient is anuric and Lasix does not play any role in her hypervolemia management. 2.  Monitor renal function and check PTH. 3.  Cardiology evaluation may be helpful. I will defer that to the Primary Team.    Thank you very much for the opportunity to be part of this patient's care. Our service will follow up with you closely.       MD JUAN Murillo/[email or web address]  D:  09/09/2019 16:28  T:  09/09/2019 17:10  JOB #:  [id number]

## 2019-09-10 NOTE — CONSULTS
Cardiology Note dictated # [id number]  Impression:  Admitted with progressively worsening dyspnea and orthopnea. Better now after PD. Clinically does not appear to be signficantly volume overloaded at this time. Known CM: today's echo is suggestive of ventricular non compaction: EF 25%   Known CAD: by history had PCI in [place], [place] ~1-2 years ago  DM   HTN  CRF on peritoneal dialysis  AMS? Recommendations:  AMS evaluation per hospitalist team  Stress testing to assess for coronary insufficiency either prior to discharge or as an OP  Continue maintenance PD  Further recommendations to follow.   Thank you for this referral.  Kindra Delgado MD

## 2019-09-10 NOTE — NURSE NAVIGATOR
Chart reviewed by Heart Failure Nurse Navigator. Heart Failure database completed. EF:  Pending. Prior EF 26/30% 7/19    ACEi/ARB/ARNi: Cozaar    BB: Coreg    Aldosterone Antagonist: On PD    Obstructive Sleep Apnea Screening:No   STOP-BANG score:   Referred to Sleep Medicine:     CRT not indicated    NYHA Functional Class requested via provider message on [facility]    Heart Failure Teach Back in Patient Education. Heart Failure Avoiding Triggers on Discharge Instructions. Cardiologist: CURT      Post discharge follow up phone call to be made within 48-72 hours of discharge. [id number] [facility] Heart Failure Bundle Readmit.

## 2019-09-11 ENCOUNTER — APPOINTMENT (OUTPATIENT)
Dept: NON INVASIVE DIAGNOSTICS | Age: 64
DRG: 291 | End: 2019-09-11
Attending: INTERNAL MEDICINE
Payer: MEDICARE

## 2019-09-11 ENCOUNTER — APPOINTMENT (OUTPATIENT)
Dept: MRI IMAGING | Age: 64
DRG: 291 | End: 2019-09-11
Attending: PSYCHIATRY & NEUROLOGY
Payer: MEDICARE

## 2019-09-11 ENCOUNTER — APPOINTMENT (OUTPATIENT)
Dept: NUCLEAR MEDICINE | Age: 64
DRG: 291 | End: 2019-09-11
Attending: INTERNAL MEDICINE
Payer: MEDICARE

## 2019-09-11 PROCEDURE — 74011250637 HC RX REV CODE- 250/637: Performed by: HOSPITALIST

## 2019-09-11 PROCEDURE — 87205 SMEAR GRAM STAIN: CPT

## 2019-09-11 PROCEDURE — 87015 SPECIMEN INFECT AGNT CONCNTJ: CPT

## 2019-09-11 PROCEDURE — A4722 DIALYS SOL FLD VOL > 1999CC: HCPCS | Performed by: INTERNAL MEDICINE

## 2019-09-11 PROCEDURE — 74011250636 HC RX REV CODE- 250/636: Performed by: INTERNAL MEDICINE

## 2019-09-11 PROCEDURE — 70551 MRI BRAIN STEM W/O DYE: CPT

## 2019-09-11 PROCEDURE — 74011250636 HC RX REV CODE- 250/636: Performed by: HOSPITALIST

## 2019-09-11 PROCEDURE — 65660000000 HC RM CCU STEPDOWN

## 2019-09-11 RX ORDER — SODIUM CHLORIDE 0.9 % (FLUSH) 0.9 %
20 SYRINGE (ML) INJECTION
Status: DISPENSED | OUTPATIENT
Start: 2019-09-11 | End: 2019-09-11

## 2019-09-11 RX ADMIN — CARVEDILOL 12.5 MG: 12.5 TABLET, FILM COATED ORAL at 21:53

## 2019-09-11 RX ADMIN — POTASSIUM CHLORIDE 20 MEQ: 1.5 POWDER, FOR SOLUTION ORAL at 09:25

## 2019-09-11 RX ADMIN — Medication 10 ML: at 21:25

## 2019-09-11 RX ADMIN — BRIMONIDINE TARTRATE 1 DROP: 2 SOLUTION OPHTHALMIC at 09:25

## 2019-09-11 RX ADMIN — CARVEDILOL 12.5 MG: 12.5 TABLET, FILM COATED ORAL at 11:00

## 2019-09-11 RX ADMIN — SODIUM CHLORIDE, SODIUM LACTATE, CALCIUM CHLORIDE, MAGNESIUM CHLORIDE AND DEXTROSE 2500 ML: 4.25; 538; 448; 18.3; 5.08 INJECTION, SOLUTION INTRAPERITONEAL at 10:00

## 2019-09-11 RX ADMIN — AMIODARONE HYDROCHLORIDE 100 MG: 200 TABLET ORAL at 09:00

## 2019-09-11 RX ADMIN — SODIUM CHLORIDE, SODIUM LACTATE, CALCIUM CHLORIDE, MAGNESIUM CHLORIDE AND DEXTROSE 2000 ML: 2.5; 538; 448; 18.3; 5.08 INJECTION, SOLUTION INTRAPERITONEAL at 20:53

## 2019-09-11 RX ADMIN — Medication 10 ML: at 06:00

## 2019-09-11 RX ADMIN — LEVETIRACETAM 500 MG: 500 TABLET, FILM COATED ORAL at 09:26

## 2019-09-11 RX ADMIN — ASPIRIN 81 MG: 81 TABLET, COATED ORAL at 09:25

## 2019-09-11 RX ADMIN — PHENYTOIN SODIUM 300 MG: 100 CAPSULE ORAL at 21:24

## 2019-09-11 RX ADMIN — LACOSAMIDE 150 MG: 50 TABLET, FILM COATED ORAL at 21:24

## 2019-09-11 RX ADMIN — CLOPIDOGREL BISULFATE 75 MG: 75 TABLET ORAL at 09:25

## 2019-09-11 RX ADMIN — Medication 10 ML: at 14:46

## 2019-09-11 RX ADMIN — ASCORBIC ACID, THIAMINE MONONITRATE,RIBOFLAVIN, NIACINAMIDE, PYRIDOXINE HYDROCHLORIDE, FOLIC ACID, CYANOCOBALAMIN, BIOTIN, CALCIUM PANTOTHENATE, 1 CAPSULE: 100; 1.5; 1.7; 20; 10; 1; 6000; 150000; 5 CAPSULE, LIQUID FILLED ORAL at 09:25

## 2019-09-11 RX ADMIN — LACOSAMIDE 150 MG: 50 TABLET, FILM COATED ORAL at 09:25

## 2019-09-11 RX ADMIN — LOSARTAN POTASSIUM 25 MG: 25 TABLET ORAL at 09:25

## 2019-09-11 RX ADMIN — LEVETIRACETAM 500 MG: 500 TABLET, FILM COATED ORAL at 21:24

## 2019-09-11 RX ADMIN — SODIUM CHLORIDE, SODIUM LACTATE, CALCIUM CHLORIDE, MAGNESIUM CHLORIDE AND DEXTROSE 2000 ML: 2.5; 538; 448; 18.3; 5.08 INJECTION, SOLUTION INTRAPERITONEAL at 14:43

## 2019-09-11 RX ADMIN — SODIUM CHLORIDE, SODIUM LACTATE, CALCIUM CHLORIDE, MAGNESIUM CHLORIDE AND DEXTROSE 2500 ML: 4.25; 538; 448; 18.3; 5.08 INJECTION, SOLUTION INTRAPERITONEAL at 01:22

## 2019-09-11 RX ADMIN — ATORVASTATIN CALCIUM 40 MG: 40 TABLET, FILM COATED ORAL at 09:25

## 2019-09-11 NOTE — PROGRESS NOTES
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 Given her refractory epilepsy, certainly this could have been a breakthrough seizure. I do not believe that we are going to be able to get control of her refractory epilepsy during this admission. Exam is non-focal, pt's history is varying drastically with reality. EEG 19 is a normal study  Plan:   -MRI brain is pending.  -Continue Keppra 500mg bid  -Continue Vimpat 150mg bid  -Continue PHT 300mg daily  Subjective:    Pt c/o diarrhea, states she doesn't know why, and N/V, states she is hungry, but afraid to eat. C/o abdominal pain. RN tells me that the pt requested two different laxatives last night which has led to loose stools. She has been NPO for cardiac stress test today, but pt refused to continue to be NPO b/c cardiology could not tell her when the test was going to occur today. She has not c/o nausea to the RN, she has been begging for food. She has not had any vomiting.      Objective:   Chart reviewed since last seen    Current Facility-Administered Medications   Medication Dose Route Frequency    regadenoson (LEXISCAN) injection 0.4 mg  0.4 mg IntraVENous RAD ONCE    saline peripheral flush soln 20 mL  20 mL InterCATHeter RAD ONCE    acetaminophen (TYLENOL) tablet 650 mg  650 mg Oral Q4H PRN    LORazepam (ATIVAN) injection 1 mg  1 mg IntraVENous Q4H PRN    bisacodyl (DULCOLAX) suppository 10 mg  10 mg Rectal DAILY PRN    sodium chloride (NS) flush 5-40 mL  5-40 mL IntraVENous Q8H    sodium chloride (NS) flush 5-40 mL  5-40 mL IntraVENous PRN    aspirin delayed-release tablet 81 mg  81 mg Oral DAILY    atorvastatin (LIPITOR) tablet 40 mg  40 mg Oral DAILY    brimonidine (ALPHAGAN) 0.2 % ophthalmic solution 1 Drop  1 Drop Both Eyes BID    [START ON 9/12/2019] calcitRIOL (ROCALTROL) capsule 0.25 mcg  0.25 mcg Oral Once per day on Thu Sat    carvedilol (COREG) tablet 12.5 mg  12.5 mg Oral BID WITH MEALS    clopidogrel (PLAVIX) tablet 75 mg  75 mg Oral DAILY    isosorbide mononitrate ER (IMDUR) tablet 60 mg  60 mg Oral 7am    lactulose (CHRONULAC) 10 gram/15 mL solution 30 mL  20 g Oral BID PRN    levETIRAcetam (KEPPRA) tablet 500 mg  500 mg Oral BID    pantoprazole (PROTONIX) tablet 40 mg  40 mg Oral ACB    sevelamer carbonate (RENVELA) tab 1,600 mg  1,600 mg Oral TID WITH MEALS    furosemide (LASIX) injection 40 mg  40 mg IntraVENous DAILY    docusate sodium (COLACE) capsule 100 mg  100 mg Oral BID PRN    B complex-vitaminC-folic acid (NEPHROCAP) cap  1 Cap Oral DAILY    lacosamide (VIMPAT) tablet 150 mg  150 mg Oral BID    losartan (COZAAR) tablet 25 mg  25 mg Oral DAILY    phenytoin ER (DILANTIN ER) ER capsule 300 mg  300 mg Oral QHS    potassium chloride (KLOR-CON) packet for solution 20 mEq  20 mEq Oral DAILY    albuterol-ipratropium (DUO-NEB) 2.5 MG-0.5 MG/3 ML  3 mL Nebulization Q6H PRN    peritoneal dialysis DEXTROSE 4.25% (2.5 mEq/L low calcium) solution 2,500 mL  2,500 mL IntraPERitoneal QID    amiodarone (CORDARONE) tablet 100 mg  100 mg Oral DAILY       Visit Vitals  BP (!) 142/93   Pulse 77   Temp 98 °F (36.7 °C)   Resp 18   Ht 5' 3\" (1.6 m)   Wt 96.9 kg (213 lb 10 oz)   SpO2 100%   BMI 37.84 kg/m²     Temp (24hrs), Av °F (36.7 °C), Min:97.6 °F (36.4 °C), Max:98.2 °F (36.8 °C)      No intake/output data recorded.  1901 -  0700  In: 60516 [I.V.:100]  Out: 19547       Physical Exam:  General: Well developed well nourished patient in no apparent distress. Cardiac: Regular rate and rhythm with no murmurs. Extremities: 2+ Radial pulses, no cyanosis or edema    Neurological Exam:  Mental Status: Oriented to time, place and person. Speech and language intact. Attention and fund of knowledge appropriate. Normal recent and remote memory. Cranial Nerves:   EOMI, no nystagmus, no ptosis. Facial movement is symmetric. Hearing is intact. Motor:  Deferred due to pt complaints.     Reflexes:      Sensory:      Gait:     Cerebellar:           Lab Review   Recent Results (from the past 24 hour(s))   ECHO ADULT COMPLETE    Collection Time: 09/10/19  2:26 PM   Result Value Ref Range    LV E' Lateral Velocity 4.37 cm/s    LV E' Septal Velocity 3.99 cm/s    Tapse 1.82 1.5 - 2.0 cm    Ao Root D 3.23 cm    Aortic Valve Systolic Peak Velocity 987.93 cm/s    AoV VTI 37.07 cm    Aortic Valve Area by Continuity of Peak Velocity 1.4 cm2    Aortic Valve Area by Continuity of VTI 1.4 cm2    AoV PG 16.7 mmHg    LVIDd 4.72 3.9 - 5.3 cm    LVPWd 1.16 (A) 0.6 - 0.9 cm    LVIDs 4.18 cm    IVSd 1.20 (A) 0.6 - 0.9 cm    LVOT d 2.04 cm    LVOT Peak Velocity 88.04 cm/s    LVOT Peak Gradient 3.1 mmHg    LVOT VTI 15.67 cm    MVA (PHT) 3.7 cm2    MV A Andres 80.54 cm/s    MV E Andres 90.44 cm/s    MV E/A 1.12     Left Atrium to Aortic Root Ratio 1.53     RVIDd 3.55 cm    Aortic Valve Systolic Mean Gradient 7.8 mmHg    LV Mass .7 (A) 67 - 162 g    LV Mass AL Index 128.0 43 - 95 g/m2    E/E' lateral 20.70     E/E' septal 22.67     E/E' ratio (averaged) 21.68     Mitral Valve E Wave Deceleration Time 205.7 ms    Mitral Valve Pressure Half-time 59.7 ms Left Atrium Major Axis 4.95 cm    Aortic Regurgitant Pressure Half-time 561.5 cm    Pulmonic Valve Max Velocity 82.08 cm/s    LVOT SV 51.0 ml    AV Cusp 1.44 cm    AR Max Andres 406.88 cm/s    PV peak gradient 2.7 mmHg       Additional comments:  I have reviewed the patient's new clinical lab test results. I have personally reviewed the patient's radiographs. MRI   MRI Results (most recent):  Results from [place] encounter on 05/05/16   MRI BRAIN WO CONT    Narrative **Final Report**       ICD Codes / Adm. Diagnosis: 850.0  V45.2 / Concussion with no loss of con    Presence of cerebrospinal fl  Examination:  MR BRAIN WO CON  - [id number] - May  5 2016  1:12PM  Accession No:  [id number]  Reason:  60F with VPS s/p fall. Continued pain at VPS site. REPORT:  EXAM:  MR BRAIN WO CON  INDICATION:  60F with ventriculoperitoneal shunt s/p fall. Continued pain at   VPS site., [employer] S06.0X0a, Z98.2, C86.69, shot initially placed 2/20/16,   nausea and vomiting, hand spasms. Dialysis patient. TECHNIQUE: Sagittal T1, axial FLAIR, T2,T1 and gradient echo images as well   as coronal T2 weighted images and axial diffusion weighted images of the   head were obtained. COMPARISON:  CT head 10/12/15  FINDINGS:  The patient's right frontal ventriculoperitoneal shunt tubing is again   demonstrated. There is evidence of chronic hemorrhage along the   ventriculostomy track and in the right frontal lobe. The ventricular size is   within normal limits and increased when compared to the CT 10/12/15. Ventricles are smaller when compared to preshunt CT 4/3/15. There are flow-voids present in the aqueduct and foramen Magendie without   evidence of obstructive hydrocephalus. The patient has a left chronic subdural hematoma which appears to have some   loculated areas within it. No definitive acute hemorrhage. There is a smaller right convexity chronic subdural collection.   Multifocal and confluent white matter T2 hyperintensity corresponds to   previously described areas of decreased attenuation on CT and appears to be   chronic. Visualized structures of the skull base including paranasal sinuses are   unremarkable. Flow voids are present in vertebral basilar and carotid artery systems. The craniocervical junction is unremarkable. IMPRESSION:  1. Right frontal ventriculoperitoneal shunt with focal encephalomalacia and   hemorrhage at the insertion site in the right frontal lobe. No definite   acute hemorrhage. 2. Ventricular size is within normal limits. 3. Small left greater than right chronic appearing subdural hematomas. 4. Chronic nonspecific periventricular white matter T2 hyperintensity. Signing/Reading Doctor: Shamika Villafuerte ([id number])    Approved: Shamika Villafuerte ([id number])  May  5 2016  2:02PM                                 CT Results (most recent):  Results from Hospital Encounter encounter on 09/09/19   CT HEAD WO CONT    Narrative EXAM: CT HEAD WO CONT    INDICATION: Confusion/delirium, altered LOC, unexplained    COMPARISON: 2015. CONTRAST: None. TECHNIQUE: Unenhanced CT of the head was performed using 5 mm images. Brain and  bone windows were generated. CT dose reduction was achieved through use of a  standardized protocol tailored for this examination and automatic exposure  control for dose modulation. FINDINGS:  The ventricles are enlarged compared to prior examination of 2015. There is a  ventriculoperitoneal shunt in place with the tip in the region of the right  lateral ventricle. There is periventricular hypoattenuation compatible with  chronic small vessel ischemic changes. There is encephalomalacia in the left  frontal lobe, likely in an area of prior ventriculostomy tube. There is a small  low density collection in the right subdural space which appears chronic. There  is no midline shift. There is encephalomalacia in the right frontal lobe. The  basilar cisterns are open.  No acute infarct is identified. The bone windows  demonstrate there is evidence of left parietal bone surgery. There is left  maxillary sinus disease. There is calcification in the left extra-axial space. Impression IMPRESSION: Interval enlargement of ventricular size since prior study with  ventriculostomy tube in place. Care Plan discussed with:  Patient    Family    RN    Care Manager    Consultant/Specialist:       Signed: Tasneem Darnell MD

## 2019-09-11 NOTE — PROGRESS NOTES
9/11/2019     Admit Date: 9/9/2019    Admit Diagnosis: Shortness of breath [R06.02]    Active Problems:    Shortness of breath (9/9/2019)        Assessment/Plan:  Much more awake and alert today. She remembers me from her IOV last week. Pulmonary edema: recurrent  Known CAD  Doesn't want the stress test today but will agree to it for tomorrow  In any event, she will need a stress test in the event any NS is needed     Subjective:  Complains of dehydrated d/t NPO status and ongoing diarrhea       Harshil Rai denies chest pain. Objective:     Visit Vitals  BP (!) 138/95 (BP 1 Location: Left arm, BP Patient Position: At rest)   Pulse 76   Temp 98 °F (36.7 °C)   Resp 18   Ht 160 cm (63\")   Wt 96.9 kg (213 lb 10 oz)   SpO2 100%   BMI 37.84 kg/m²        Physical Exam:  Neck: supple, symmetrical, trachea midline, no adenopathy, thyroid: not enlarged, symmetric, no tenderness/mass/nodules, no carotid bruit and no JVD  Heart: regular rate and rhythm, S1, S2 normal  Lungs: clear to auscultation bilaterally, normal percussion bilaterally  Abdomen: soft, non-tender. Bowel sounds normal. No masses,  no organomegaly  Extremities: extremities normal, atraumatic, no cyanosis or edema  Pulses: 2+ and symmetric  Neurologic: Grossly normal      Labs:    Recent Labs     09/10/19  0543   TROIQ 0.06*     Recent Labs     09/10/19  0543   *   K 3.4*   CL 98   BUN 56*   CREA 10.20*   *   PHOS 3.3   CA 8.4*     Recent Labs     09/10/19  0543   WBC 8.0   HGB 8.7*   HCT 28.4*        No results for input(s): CHOL, LDLC in the last 72 hours.     No lab exists for component: TGL, HDLC,  HBA1C    Telemetry: normal sinus rhythm     Data Review: current meds, labs,recent radiology, intake/output/weight and problem list reviewed

## 2019-09-11 NOTE — PROGRESS NOTES
Hospitalist Progress Note  Librado Bailon MD  Answering service: [phone] OR [phone] from in house phone  Cell:       Date of Service:  2019  NAME:  Elaine Connolly  :  1955  MRN:  [id number]      Admission Summary:   The patient is a 43-year-old female who has previous history significant for systolic heart failure, end-stage renal disease on peritoneal dialysis, history of coronary artery disease status post angioplasty and stent placement approximately 1-1/2 years ago at [facility], history of hypertension, previous history of stroke, hyperlipidemia, seizure disorder, comes to the emergency room due to worsening shortness of breath. The patient tells me that she has been short of breath for the last 4-5 days,in the past shortness of breath   was mainly when she was lying down. Now, she is getting short of breath even when she is sitting up. She denies having any significant chest pain. She coughs and brings up white sputum. She has no fever or chills. The patient tells me at one time she was on oxygen; however, her sister never picked up her oxygen tank. In the emergency room, the patient was given 2 breathing treatments by ER physician. On further questioning, the patient says that she has gained a lot of weight and has noticed worsening leg swelling. Interval history / Subjective:           Feels better this am   She is scheduled to get a stress test   Grumpy about NPO   Getting EEG now     No SOB today, no swelling   Complains of diarrhea after laxatives        Assessment & Plan: Altered mental status 9/10  Abg is normal , no resp acidosis   Waxing and waning mental status    EEG , stat CT head shows ventricular dilatation   Discussed with Dr Isreal Schmid   Given keppra 1 g now ad check levels of vimpat , keppra and dilantin , pending   NSG consult given CT findings   MRI brain pending for now .         Shortness of breath POA   2/2 HF with ESRD , no PNA on CXR   cxr with central vascular prominence   -Volume overload likely poor PD  by pt at home ,might be not a good candidate anymore but will defer to nephrology   -Nephro on Board, continue Fluid removal as per Nephro    Acute on Chronic Systolic and diastolic heart failure POA   Echo with 26-30% EF and Grade III diastolic dysfunction. On PD and gets iv lasix as well   Strict in and o  Cards has been consulted   She is scheduled for stress test    Paroxysmal Afib  -onBB and amiodarone   -Avoid anticoagulation, H/O SDH and required  shunt    SDH in past    Right frontal ventriculoperitoneal shunt     History of seizures   See above ,on AED     Trop of .06   Cards on board   Stress testing to assess for coronary insufficiency either prior to discharge or as an OP per Dr Sourav Seay   Scheduled for today   Coreg , imdur and asa and plavix     CAD s/p PCI   [facility] (2015) - mid LAD 70-80%, distal LAD 50%, diagonal high-grade disease, OMB high-grade disease, RCA total occlusion - medically managed  2. [facility] 8/2018 - heavily calcified vessels with severe early mid LAD stenosis, occluded RCA with faint L-R collateral filling and occluded 1st marginal - felt to be high risk for CABG / patient refused  3. [facility] 9/4/18 - S/p successful PCI of the prox LAD using 3.0 x 24 Synergy ANDER     COPD   Stable     STROKE X3 , left hemiparesisis    LEGALLY BLIND     HTN: c/w Home regimen, use labetalol prn.         Social : lives with nephew in [facility][note 3]  Has daughter in [place] and in [place]  Was in [place] rehab 2 months prior to it      Code status: full   DVT prophylaxis: [address] discussed with: Patient/Family  Disposition: Home w/Family and TBD     Hospital Problems  Date Reviewed: 7/18/2019          Codes Class Noted POA    Shortness of breath ICD-10-CM: R06.02  ICD-9-CM: 786.05  9/9/2019 Unknown                Review of Systems:   A comprehensive review of systems was negative except for that written in the HPI. Vital Signs:    Last 24hrs VS reviewed since prior progress note. Most recent are:  Visit Vitals  BP (!) 147/100 (BP 1 Location: Left arm, BP Patient Position: At rest)   Pulse 75   Temp 97.9 °F (36.6 °C)   Resp 16   Ht 5' 3\" (1.6 m)   Wt 96.9 kg (213 lb 10 oz)   SpO2 100%   BMI 37.84 kg/m²         Intake/Output Summary (Last 24 hours) at 9/11/2019 0911  Last data filed at 9/11/2019 0210  Gross per 24 hour   Intake 19562 ml   Output 10383 ml   Net -1900 ml        Physical Examination:             Constitutional: Awake and feels ok    ENT:  Oral mucous moist, oropharynx benign. Neck supple,    Resp:  CTA bilaterally. No wheezing/rhonchi/rales. No accessory muscle use   CV:  Regular rhythm, normal rate, no murmurs, gallops, rubs    GI:  Soft, non distended, non tender. normoactive bowel sounds, no hepatosplenomegaly     Musculoskeletal:  No edema, warm, 2+ pulses throughout    Neurologic:  left upper anad lower extremity weak             Data Review:    Review and/or order of clinical lab test      Labs:     Recent Labs     09/10/19  0543 09/09/19  1204   WBC 8.0 9.5   HGB 8.7* 10.0*   HCT 28.4* 33.0*    329     Recent Labs     09/10/19  0543 09/09/19  1204   * 134*   K 3.4* HEMOLYZED,RECOLLECT REQUESTED   CL 98 97   CO2 27 29   BUN 56* 50*   CREA 10.20* 9.41*   * 73   CA 8.4* 8.6   MG 2.2 2.4   PHOS 3.3 3.4     Recent Labs     09/10/19  0543 09/09/19  1204   SGOT 15 HEMOLYZED,RECOLLECT REQUESTED   ALT 13 15   * 182*   TBILI 0.2 0.4   TP 6.2* 7.6   ALB 1.9* 2.2*   GLOB 4.3* 5.4*     No results for input(s): INR, PTP, APTT in the last 72 hours. No lab exists for component: INREXT, INREXT   No results for input(s): FE, TIBC, PSAT, FERR in the last 72 hours. No results found for: FOL, RBCF   No results for input(s): PH, PCO2, PO2 in the last 72 hours.   Recent Labs     09/10/19  0543   TROIQ 0.06*     No results found for: CHOL, CHOLX, CHLST, CHOLV, HDL, HDLP, LDL, LDLC, DLDLP, TGLX, TRIGL, TRIGP, CHHD, CHHDX  Lab Results   Component Value Date/Time    Glucose (POC) 87 07/22/2019 11:16 AM    Glucose (POC) 162 (H) 07/17/2019 03:21 AM     Lab Results   Component Value Date/Time    Color YELLOW/STRAW 04/03/2015 08:31 PM    Appearance CLOUDY (A) 04/03/2015 08:31 PM    Specific gravity 1.013 04/03/2015 08:31 PM    pH (UA) 7.5 04/03/2015 08:31 PM    Protein 300 (A) 04/03/2015 08:31 PM    Glucose NEGATIVE  04/03/2015 08:31 PM    Ketone NEGATIVE  04/03/2015 08:31 PM    Bilirubin NEGATIVE  04/03/2015 08:31 PM    Urobilinogen 0.2 04/03/2015 08:31 PM    Nitrites NEGATIVE  04/03/2015 08:31 PM    Leukocyte Esterase NEGATIVE  04/03/2015 08:31 PM    Epithelial cells MANY (A) 04/03/2015 08:31 PM    Bacteria 1+ (A) 04/03/2015 08:31 PM    WBC 0-4 04/03/2015 08:31 PM    RBC 0-5 04/03/2015 08:31 PM         Medications Reviewed:     Current Facility-Administered Medications   Medication Dose Route Frequency    regadenoson (LEXISCAN) injection 0.4 mg  0.4 mg IntraVENous RAD ONCE    saline peripheral flush soln 20 mL  20 mL InterCATHeter RAD ONCE    acetaminophen (TYLENOL) tablet 650 mg  650 mg Oral Q4H PRN    LORazepam (ATIVAN) injection 1 mg  1 mg IntraVENous Q4H PRN    bisacodyl (DULCOLAX) suppository 10 mg  10 mg Rectal DAILY PRN    sodium chloride (NS) flush 5-40 mL  5-40 mL IntraVENous Q8H    sodium chloride (NS) flush 5-40 mL  5-40 mL IntraVENous PRN    aspirin delayed-release tablet 81 mg  81 mg Oral DAILY    atorvastatin (LIPITOR) tablet 40 mg  40 mg Oral DAILY    brimonidine (ALPHAGAN) 0.2 % ophthalmic solution 1 Drop  1 Drop Both Eyes BID    [START ON 9/12/2019] calcitRIOL (ROCALTROL) capsule 0.25 mcg  0.25 mcg Oral Once per day on Thu Sat    carvedilol (COREG) tablet 12.5 mg  12.5 mg Oral BID WITH MEALS    clopidogrel (PLAVIX) tablet 75 mg  75 mg Oral DAILY    isosorbide mononitrate ER (IMDUR) tablet 60 mg  60 mg Oral 7am    lactulose (CHRONULAC) 10 gram/15 mL solution 30 mL  20 g Oral BID PRN    levETIRAcetam (KEPPRA) tablet 500 mg  500 mg Oral BID    pantoprazole (PROTONIX) tablet 40 mg  40 mg Oral ACB    sevelamer carbonate (RENVELA) tab 1,600 mg  1,600 mg Oral TID WITH MEALS    furosemide (LASIX) injection 40 mg  40 mg IntraVENous DAILY    docusate sodium (COLACE) capsule 100 mg  100 mg Oral BID PRN    B complex-vitaminC-folic acid (NEPHROCAP) cap  1 Cap Oral DAILY    lacosamide (VIMPAT) tablet 150 mg  150 mg Oral BID    losartan (COZAAR) tablet 25 mg  25 mg Oral DAILY    phenytoin ER (DILANTIN ER) ER capsule 300 mg  300 mg Oral QHS    potassium chloride (KLOR-CON) packet for solution 20 mEq  20 mEq Oral DAILY    albuterol-ipratropium (DUO-NEB) 2.5 MG-0.5 MG/3 ML  3 mL Nebulization Q6H PRN    peritoneal dialysis DEXTROSE 4.25% (2.5 mEq/L low calcium) solution 2,500 mL  2,500 mL IntraPERitoneal QID    amiodarone (CORDARONE) tablet 100 mg  100 mg Oral DAILY     ______________________________________________________________________  EXPECTED LENGTH OF STAY: - - -  ACTUAL LENGTH OF STAY:          2                 Marylu Siu MD

## 2019-09-11 NOTE — CONSULTS
Neuro consult. Dictated note to follow. Unclear history. VPS for hydrocephalus or IIH, Concussion, seizure, 3 brain surgeries in [place], just move here 2 days ago, may or may not be getting med. Has 3 seizures in last month. Continue home medications at current doses. Levels pending. Will order ativan prn for seizure lasting longer than 1 minute. EEG is pending. Suspect she may require an EMU admission to really clarify seizures and frequency. MRI brain ordered. NSG consult to assess shunt and reported enlarged ventricles compared to previous imaging.

## 2019-09-11 NOTE — PROGRESS NOTES
Problem: Falls - Risk of  Goal: *Absence of Falls  Description  Document Merlinejaguar Clevelandse Fall Risk and appropriate interventions in the flowsheet.   Outcome: Progressing Towards Goal  Note:   Fall Risk Interventions:  Mobility Interventions: Patient to call before getting OOB, Utilize walker, cane, or other assistive device         Medication Interventions: Patient to call before getting OOB, Teach patient to arise slowly    Elimination Interventions: Call light in reach, Stay With Me (per policy)

## 2019-09-11 NOTE — CONSULTS
[address]    Name:  Ambrocio Murillo  MR#:  [id number]  :  1955  ACCOUNT #:  [de-identified]  DATE OF SERVICE:  09/10/2019    HISTORY OF PRESENT ILLNESS:  This is a 25-year-old right-handed female, who was admitted yesterday  with shortness of breath for last 4-5 days. I am asked to see the patient for an episode of unresponsiveness earlier today. They thought perhaps she was postictal.  She does have a history of seizures. She was given Keppra 1000 mg IV. Nursing staff report no subsequent events. The patient is a poor historian. In review of her chart, she had a  shunt placed for idiopathic intracranial hypertension at [facility] in 2015. She then re-presented in 10/2015 with new-onset seizure, started on Keppra initially, but it was switched to Depakote. The patient was then seen by Dr. Shanta Oh in the clinic for pain around her shunt site, and at that time the patient had stopped her seizure medication and had not had any subsequent seizures. The patient was not seen again at [facility] until 2019. There are notes in the Care everywhere chart from [facility], note from 2017 states that the patient came from [facility] for rehab after a long complicated hospital course earlier in  in [facility] due to [address] after a fall requiring surgical intervention. She presented to the Emergency Department after nausea, vomiting, and a seizure. She was noted on shunt series to have had a left frontal craniotomy and there is mention of disruption of the shunt in her head and noted to have a left frontal subdural, felt to most likely be chronic. She was on Vimpat, Keppra and Dilantin. The patient provides bits and pieces of this history, but not in any kind of coherent timeline. She tells me she just moved from [facility] 2 days ago. She also mentions concern that the nephew she is living with may not be giving her seizure medicines appropriately.   At some point in the past, she had run out of her meds and he had not had them refilled, and she was in ICU for 8 days due to status epilepticus. The patient tells me she has no warning prior to her seizures. She describes them as being \"grand mal\" lasting 20-30 minutes, but also states she can talk nonstop and crazy when they occur, and believes she had 3 in the last month. She is currently on Dilantin 300 mg a day, Vimpat 150 mg b.i.d., Keppra 500 mg b.i.d. She is an end-stage renal disease patient, on peritoneal dialysis. CT of the head here shows increased ventricular size when compared to MRI from 05/2016. She had an MRI of her brain in 05/2016, which showed the ventriculoperitoneal shunt, right frontal and some focal encephalomalacia and hemorrhage around this as well as bilateral subdurals, left greater than right that were chronic. Echocardiogram here with EF of 25%, and she has known AFib. She is on Plavix 75 mg a day at home and possibly aspirin 81 mg a day, it is not listed as a home medication but she has been started on it here with her Plavix. PAST MEDICAL HISTORY:  1.   shunt at [facility] in 04/2015 for idiopathic intracranial hypertension. 2.  Legally blind secondary to her idiopathic intracranial hypertension. 3.  New-onset seizure in 10/2015. 4.  History of fall due to seizure in 2017 while in [place] resulting in intracranial hemorrhage of some sort and subsequent surgeries with prolonged hospitalization. 5.  Reported history of stroke in 04/2019, details not known. 6.  Migraine headaches. 7.  Hypertension. 8.  End-stage renal disease, on peritoneal dialysis. 9.  Coronary artery disease status post stenting. 10.  Hyperlipidemia. 11.  Congestive heart failure. 12.  Peripheral neuropathy. 13.  AFib. 14.  COPD. 15.  History of DVT in her left arm. REVIEW OF SYSTEMS:  Limited due to patient factors.   The patient reports her family or nurses here have told her that she is sleeping a lot.    MEDICATIONS:  1. Dilantin 300 mg a day. 2.  Vimpat 150 mg b.i.d.  3.  Keppra 500 mg b.i.d.  4.  Plavix 75 mg a day. 5.  Amiodarone. 6.  Lipitor 40 mg a day. 7.  Cozaar 25 mg a day. 8.  Colace. 9.  Imdur 60 mg a day. 10.  Carvedilol 6.25 mg a day. 11.  Alphagan eyedrops. 12.  Gentamicin eyedrops. 13.  Renagel 1600 mg three times a day. 14.  Prilosec 20 mg a day. 15.  Iron and multivitamin. 16.  Potassium chloride. ALLERGIES:  GABAPENTIN CAUSED PSYCHOSIS. SOCIAL HISTORY:  No alcohol or drug use. Chart mentions marijuana use. She tells me she is living with her nephew. FAMILY HISTORY:  Niece with seizures. PHYSICAL EXAMINATION:  VITAL SIGNS:  Blood pressure 120/74 at presentation, currently 132/63; pulse 65, respiratory rate 16, saturating 100% on 2 L nasal cannula, temperature is 98.2. BMI of 35.3. GENERAL:  She is well-nourished, well-developed obese female, lying in bed, undergoing peritoneal dialysis currently, in no distress. HEART:  Regular rate and rhythm without murmur. EXTREMITIES:  Warm. 2+ radial pulses. No edema. NEUROLOGIC:  Mental Status:  She is alert, oriented to person, place, situation. Clearly has memory impairment in details of her history. Her speech is not dysarthric. Language is intact. Cranial nerves:  No asymmetry or ptosis. Her extraocular eye movements are intact without nystagmus seen. No diplopia reported. Pupils are round and reactive. Visual fields are impaired. Her strength, sensation, hearing intact. Tongue is midline. Palate elevated symmetrically. Trapezius and sternocleidomastoid are 5/5. Her motor exam shows 5/5 strength in bilateral  and biceps, 5/5 dorsiflexion, at least 4/5 hip flexors versus difficulty due to positioning. Sensory Exam:  She reported intact to pinprick throughout. Reflexes were symmetric. Her coordination was intact to finger-to-nose, rapid alternating movements.   Heel-to-shin was not attempted at this time. Gait not assessed at this time due to peritoneal dialysis. STUDIES AND REPORTS:  In addition to that mentioned above, her phenytoin level is 9.6. Hemoglobin is 10, sodium 134, BUN 50, creatinine 9.4. ASSESSMENT AND PLAN:  This is a 46-year-old right-handed female with complex past neurological and neurosurgical history with ventriculoperitoneal shunt placed at [facility] in 04/2015 for idiopathic intracranial hypertension that has caused significant vision loss, subsequent onset of seizures in 10/2015 and history of a head injury with intracranial hemorrhage in 2017 after a fall due to seizure as well as the patient reporting a history of stroke of which the details are not known, multiple ischemic stroke risk factors, as well as embolic stroke risk factors of EF of 25% and atrial fibrillation, with an episode of unresponsiveness earlier today. Given her refractory epilepsy, certainly this could have been a breakthrough seizure. I do not believe that we are going to be able to get control of her refractory epilepsy during this admission. Continue home AEDs: Dilantin 300 mg a day, Vimpat 150 mg b.i.d., Keppra 500 mg b.i.d. Keppra and Vimpat levels are pending, this will help verify that she has been compliant with these medications at home. Her Dilantin level was slightly low, would not adjust the dose at this point, will wait and see what her other drug levels look like. Certainly, the patient is at ongoing risk for breakthrough seizures due to electrolyte shifts and imbalances, she did have a slightly low sodium, uremia which could be contributing, and apparently was on tramadol at home according to the nurse, though I do not see it listed in her home meds, and if she was still taking this at home that could be causing her to have breakthrough seizures. She does have an EEG that is pending which could aid in diagnosis, but likely we will not  at this point.  To really get a clear idea of her epilepsy and frequency of her events and to clarify they are indeed epileptic, she may require EMU admission at some point in the future. I did order an MRI of her brain given the increased ventricular size on the CT of her head and to assess strokes. We will ask Neurosurgery to see her regarding her shunt and increased ventricular size pending results. Though at this point, if the shunt was placed for idiopathic intracranial hypertension and there is no other issue going on causing hydrocephalus, I do not feel the shunt would need to be adjusted given that she is already legally blind from this issue but we will defer to Neurosurgery for their opinion.       MD LOU [email or web address]  D:  09/11/2019 7:46  T:  09/11/2019 7:56  JOB #:  [id number]

## 2019-09-11 NOTE — PROGRESS NOTES
Pt. C/o being constipated and feels like impacted. Pt. Keep on straining, pt. Requesting for a enema. Given prn lactulose. 2308 pt. Still c/o being constipated explain to pt. Have to wait till the medication takes effect, pt. Insisted on having another medication to relieve her constipation/impaction still wanting the enema. Notified kika sanchez NP no enema orders was given. Given colace prn.

## 2019-09-11 NOTE — PROGRESS NOTES
Reason for Admission:   Shortness of breath               RRAT Score:     26, high risk of readmission based primarily on multiple medical comorbities including ESRd and peritoneal dialysis             Resources/supports as identified by patient/family:   CM spoke with nurse. Pt has report issues with symptoms including nausea and vomiting, still needs stress test but didn't consent to that today. Per cardiologist this will need to happen this admission. Pt is currently asleep and CM deferred waking her to complete initial bedside assessment. Information from chart and nurse used to complete. Top Challenges facing patient (as identified by patient/family and CM): Finances/Medication cost?                    Transportation? At prior discharge pt rode with friend in private vehicle and did confirm she uses medicaid transport to doctor's appointments as needed. Support system or lack thereof? Living arrangements? Self-care/ADLs/Cognition? Normally is independent in self care including peritoneal dialysis. Current Advanced Directive/Advance Care Plan:                            Plan for utilizing home health:    Unusre, pt did have H2H follow up ordered after last hospitalization but HH was deferred as she needed to establish with a new PCP. Transition of Care Plan:      TBD.     PAMELA Ramos

## 2019-09-11 NOTE — PROGRESS NOTES
Pt. Uncooperative and difficult this morning trying to complete MRI check list, admission data base and give PO medication but pt. Keep on telling give me one minute everytime pt. Fall asleep. And keeps on getting angry with this nurse everytime I wake her up, pt. Will not answer this nurse, when ask if pt. Wants to drink  Isosorbide and protonix this morning pt. Only goes back to sleep, when this nurse wake her up pt. Gets angry and said give me a minute. Pt. Was able to finish MRI questionaire but this nurse is uncertain if pt. Able to answer all question appropriately or heard the question being ask properly.

## 2019-09-11 NOTE — PROGRESS NOTES
TRANSFER - OUT REPORT:    Verbal report given to Davion Jameskristine on Deedee Hickey  being transferred to 6S(unit) for routine progression of care       Report consisted of patients Situation, Background, Assessment and   Recommendations(SBAR). Information from the following report(s) SBAR and Kardex was reviewed with the receiving nurse. Lines:   Peripheral IV 09/09/19 Right Antecubital (Active)   Site Assessment Clean, dry, & intact 9/11/2019  9:44 AM   Phlebitis Assessment 0 9/11/2019  9:44 AM   Infiltration Assessment 0 9/11/2019  9:44 AM   Dressing Status Clean, dry, & intact 9/11/2019  9:44 AM   Dressing Type Transparent 9/10/2019  9:00 AM   Hub Color/Line Status Pink;Flushed;Patent 9/9/2019 12:08 PM   Action Taken Blood drawn 9/9/2019 12:08 PM        Opportunity for questions and clarification was provided. Patient transported with:   O2 @ 1 liters      Pt going to MRI of brain first. Then transferring from there to [facility]. Dr Sandie Segundo at [facility] station, [facility] with pt going off tele to MRI then to 6th floor. All personal belongings dropped off to pts new room with cane, as well as PD bags and supplies.

## 2019-09-11 NOTE — PROCEDURES
PROCEDURE: ROUTINE INPATIENT EEG  NAME:   Estela Civil NUMBER : [de-identified]  MRN:   [id number]  DATE OF SERVICE: 9/11/2019     HISTORY/INDICATION: Pt is a 62yo female with h/o  shunt for IIH, head injury with ICH and surgical intervention, and medically refractory seizures. EEG is performed to aid in diagnosis.     MEDICATIONS:   Current Facility-Administered Medications   Medication Dose Route Frequency Provider Last Rate Last Dose    regadenoson (LEXISCAN) injection 0.4 mg  0.4 mg IntraVENous LORI Rowell MD        saline peripheral flush soln 20 mL  20 mL InterCATHeter LORI Rowell MD        acetaminophen (TYLENOL) tablet 650 mg  650 mg Oral Q4H PRN Felipe Delfino, NP   650 mg at 09/10/19 2104    LORazepam (ATIVAN) injection 1 mg  1 mg IntraVENous Q4H PRN Brody Hernandez MD        bisacodyl (DULCOLAX) suppository 10 mg  10 mg Rectal DAILY PRN Felipe Delfino, NP        sodium chloride (NS) flush 5-40 mL  5-40 mL IntraVENous Q8H Hubert Baez MD   10 mL at 09/11/19 0600    sodium chloride (NS) flush 5-40 mL  5-40 mL IntraVENous PRN Hubert Baez MD        aspirin delayed-release tablet 81 mg  81 mg Oral DAILY Hubert Baez MD   81 mg at 09/11/19 0925    atorvastatin (LIPITOR) tablet 40 mg  40 mg Oral DAILY Hubert Baez MD   40 mg at 09/11/19 0925    brimonidine (ALPHAGAN) 0.2 % ophthalmic solution 1 Drop  1 Drop Both Eyes BID Hubert Baez MD   1 Drop at 09/11/19 0925    [START ON 9/12/2019] calcitRIOL (ROCALTROL) capsule 0.25 mcg  0.25 mcg Oral Once per day on Thu Sat Hubert Baez MD        carvedilol (COREG) tablet 12.5 mg  12.5 mg Oral BID WITH MEALS Hubert Baez MD   12.5 mg at 09/11/19 0800    clopidogrel (PLAVIX) tablet 75 mg  75 mg Oral DAILY Hubert Baez MD   75 mg at 09/11/19 0925    isosorbide mononitrate ER (IMDUR) tablet 60 mg  60 mg Oral 7am Hubert Baez MD   60 mg at 09/10/19 0635    lactulose (CHRONULAC) 10 gram/15 mL solution 30 mL  20 g Oral BID PRN Jessenia Parmar MD   30 mL at 09/10/19 2217    levETIRAcetam (KEPPRA) tablet 500 mg  500 mg Oral BID Jessenia Parmar MD   500 mg at 09/11/19 0926    pantoprazole (PROTONIX) tablet 40 mg  40 mg Oral ACB Jessenia Parmar MD   40 mg at 09/10/19 0640    sevelamer carbonate (RENVELA) tab 1,600 mg  1,600 mg Oral TID WITH MEALS Jessenia Parmar MD   Stopped at 09/11/19 0800    furosemide (LASIX) injection 40 mg  40 mg IntraVENous DAILY Jessenia Parmar MD   40 mg at 09/09/19 2319    docusate sodium (COLACE) capsule 100 mg  100 mg Oral BID PRN Jessenia Parmar MD   100 mg at 09/10/19 2308    B complex-vitaminC-folic acid (NEPHROCAP) cap  1 Cap Oral DAILY Jessenia Parmar MD   1 Cap at 09/11/19 3462    lacosamide (VIMPAT) tablet 150 mg  150 mg Oral BID Jessenia Parmar MD   150 mg at 09/11/19 4130    losartan (COZAAR) tablet 25 mg  25 mg Oral DAILY Jessenia Parmar MD   25 mg at 09/11/19 6075    phenytoin ER (DILANTIN ER) ER capsule 300 mg  300 mg Oral QHS Jessenia Parmar MD   300 mg at 09/10/19 2103    potassium chloride (KLOR-CON) packet for solution 20 mEq  20 mEq Oral DAILY Jessenia Parmar MD   20 mEq at 09/11/19 0925    albuterol-ipratropium (DUO-NEB) 2.5 MG-0.5 MG/3 ML  3 mL Nebulization Q6H PRN Jessenia Parmar MD        peritoneal dialysis DEXTROSE 4.25% (2.5 mEq/L low calcium) solution 2,500 mL  2,500 mL IntraPERitoneal QID Clearance MD Jean   2,500 mL at 09/11/19 1000    amiodarone (CORDARONE) tablet 100 mg  100 mg Oral DAILY Jessenia Pamrar MD   100 mg at 09/11/19 0900       CONDITIONS OF RECORDING: This is a routine 21-channel EEG recording performed in accordance with the international 10-20 system with one channel devoted to limited EKG. This study was done during a state of wakefulness. Photic stimulation and hyperventilation were performed as activating procedures. DESCRIPTION:   Upon maximal arousal the posterior dominant rhythm has a frequency of 9Hz with an amplitude of 20uV.  This activity is symmetric over the bilateral posterior derivations and attenuates with eye opening. Photic stimulation and hyperventilation do not significantly alter the tracing. No sleep is seen. There are no focal abnormalities, epileptiform discharges, or electrographic seizures seen. INTERPRETATION: Normal awake EEG    CLINICAL CORRELATION: A normal EEG does not definitively exclude a diagnosis of epilepsy if clinical suspicion is high consider sleep deprived EEG or more prolonged monitoring.      Devi Elaine MD

## 2019-09-11 NOTE — PROGRESS NOTES
Name: Maday Arnett MRN: [id number]   : 1955 Hospital: . [address]   Date: 2019        IMPRESSION:   · ESRD on PD. The PD is progressing well. Patient is in negative fluid balance. Patient reports new symptoms of abdominal pain and vomiting. Need to r/o PD associated peritonitis. · SOB without edema and with clear CXR  · Anemia with significant drop in Hb. ?bleeding  · Severe hypoalbuminemia. PLAN:   · Continue PD with changed dextrose  to 2.5%  · Start protein supplements  · Check exchanged fluid for C&S  · Anemia management- check iron profile, give a dose of Epo and check for occult GI bleed. · Repeat the renal panel and CBC in AM     Subjective/Interval History:   I have reviewed the flowsheet and previous days notes. ROS:A comprehensive review of systems was negative. Objective:   Vital Signs:    Visit Vitals  BP (!) 142/93   Pulse 77   Temp 98 °F (36.7 °C)   Resp 18   Ht 5' 3\" (1.6 m)   Wt 96.9 kg (213 lb 10 oz)   SpO2 100%   BMI 37.84 kg/m²       O2 Device: Nasal cannula   O2 Flow Rate (L/min): 1 l/min   Temp (24hrs), Av °F (36.7 °C), Min:97.6 °F (36.4 °C), Max:98.2 °F (36.8 °C)       Intake/Output:   Last shift:      No intake/output data recorded. Last 3 shifts:  1901 -  0700  In: 98365 [I.V.:100]  Out: 89388     Intake/Output Summary (Last 24 hours) at 2019 1208  Last data filed at 2019 0210  Gross per 24 hour   Intake 8700 ml   Output 9800 ml   Net -1100 ml        Physical Exam:  General:    Awake, more alert, c/o pain and vomiting, no distress, appears stated age. Head:   Normocephalic, without obvious abnormality, atraumatic. Eyes:   Anicteric. Nose:  Nares normal. No drainage or sinus tenderness. Neck:  Supple, symmetrical,  no adenopathy, thyroid: non tender    no carotid bruit and no JVD. Lungs:   Clear to auscultation bilaterally. No Wheezing or Rhonchi. No rales. Chest wall:  No tenderness or deformity.  No Accessory muscle use. PD catheter exit site is just above the left breast.  Heart:   Regular rate and rhythm,  no murmur, rub or gallop. Abdomen:   Soft, +++ tender. Distended. Bowel sounds normal. No masses  Extremities: Extremities normal, atraumatic, No cyanosis. No edema. No clubbing  Skin:     Texture, turgor normal. No rashes or lesions. Not Jaundiced  Psych:  Not anxious or agitated. DATA:  Labs:  Recent Labs     09/10/19  0543 09/09/19  1204   * 134*   K 3.4* HEMOLYZED,RECOLLECT REQUESTED   CL 98 97   CO2 27 29   BUN 56* 50*   CREA 10.20* 9.41*   CA 8.4* 8.6   ALB 1.9* 2.2*   PHOS 3.3 3.4   MG 2.2 2.4     Recent Labs     09/10/19  0543 09/09/19  1204   WBC 8.0 9.5   HGB 8.7* 10.0*   HCT 28.4* 33.0*    329     No results for input(s): CLARISA, KU, CLU, CREAU in the last 72 hours.     No lab exists for component: PROU    Total time spent with patient:  35 minutes    [] Critical Care Provided    Care Plan discussed with:   Maeve Mccray MD

## 2019-09-11 NOTE — PROGRESS NOTES
Pt. Refuse blood this morning, pt. State \"i'm sick of getting stuck all the time, can't the the doctors make a educational  Guess with out having to poke patients\" explain to pt. Doctors need a numerical data in order to treat illnesses. Pt. State still refuse.

## 2019-09-11 NOTE — CONSULTS
[address]    Name:  Paco Canas  MR#:  [id number]  :  1955  ACCOUNT #:  [de-identified]  DATE OF SERVICE:  09/10/2019      HISTORY OF PRESENT ILLNESS:  Consult requested to assist in management and assessment of the patient. She was admitted with worsening shortness of breath. This information was garnered from the chart and from talking to her sister. The patient apparently for several days has complained of shortness of breath and by description orthopnea plus or minus vague chest discomfort. She does have a history of coronary disease and the history indicates that the patient had PCI in [place] in the last couple of years. She has a longstanding history of congestive heart failure as well. She has chronic kidney disease as well as hypertension. She is legally blind, history of paroxysmal atrial fibrillation, and most recent previous admission was for status epilepticus. MEDICATIONS PRIOR TO ADMISSION:  Listed in the clinic here as follows:  1. Gentamicin topical cream as directed. 2.  Dilantin  mg daily. 3.  Vimpat 150 mg two times a day. 4.  Losartan 25 mg daily. 5.  Docusate sodium 100 mg two times a day. 6.  Clopidogrel 75 mg daily. 7.  Isosorbide mononitrate 60 mg daily. 8.  Carvedilol 6.25 mg two times a day. 9.  Levetiracetam 500 mg two times a day. 10.  Amiodarone 200 mg tablets, she takes 100 mg daily. 11.  Atorvastatin 40 mg daily. 12.  Renagel 800 mg, she takes 1600 mg three times a day with meals. 13.  Omeprazole 20 mg daily. 14.  Potassium 20 mEq daily. 15.  Brimonidine eyedrops. ALLERGIES:  GABAPENTIN. REVIEW OF SYSTEMS:  Not really obtainable. The patient is very groggy and minimally responsive and speech is somewhat garbled. PHYSICAL EXAMINATION:  GENERAL:  This is a well-developed, large middle-aged woman in no distress. lying on her left side on hospital bed.   VITAL SIGNS:  Heart rate is 69, respirations 18, blood pressure 141/95, sats 100%. HEENT:  Unremarkable. NECK:  Supple. No adenopathy. CHEST:  Chest wall is nontender. LUNGS:  Anteriorly clear. HEART:  Regular. Moderate rhythm. No S3 gallop or friction rub. No thrills, lifts, or heaves. ABDOMEN:  Soft and nontender. No bruits. NEUROLOGIC:  Somewhat difficult to assess due to the patient's lack of mobility. LABORATORY DATA:  An echocardiogram was done which demonstrates low ejection fraction and a hypertrabeculation consistent with ventricular noncompaction. Ejection fraction was estimated at 25%. OTHER LABS:  Moderate hypochromic anemia, hemoglobin 8.7. Platelet count is normal.  White count normal.  Chemistries:  BUN 56, creatinine 10.20. Troponin was 0.06. Chest x-ray was personally reviewed, demonstrates large cardiac silhouette, but no pulmonary edema. EKG was done which demonstrates left anterior fascicular block, LVH, T-wave changes in the lateral leads. IMPRESSION:  1. This patient presented with dyspnea, prominent orthopnea, is better now after peritoneal dialysis. I question if she is getting adequate peritoneal dialysis at home. 2.  The patient does have cardiomyopathy with a low ejection fraction. 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 3.  The patient apparently has had a stroke and has mention of subdural hemorrhage. 4.  Hypertension. 5.  Chronic kidney disease, on peritoneal dialysis. 6.  Seizure disorder with a recent episode of status epilepticus. RECOMMENDATIONS:  Offered mental status workup per hospitalist's team.  Continue present treatment for now. Stress testing to assess for coronary insufficiency. Further recommendations to follow.     Thank you for this referral.      Rayna Bledsoe MD [email or web address]  D:  09/10/2019 15:43  T:  09/10/2019 17:55  JOB #:  [id number]

## 2019-09-12 ENCOUNTER — APPOINTMENT (OUTPATIENT)
Dept: NON INVASIVE DIAGNOSTICS | Age: 64
DRG: 291 | End: 2019-09-12
Attending: INTERNAL MEDICINE
Payer: MEDICARE

## 2019-09-12 ENCOUNTER — APPOINTMENT (OUTPATIENT)
Dept: NUCLEAR MEDICINE | Age: 64
DRG: 291 | End: 2019-09-12
Attending: INTERNAL MEDICINE
Payer: MEDICARE

## 2019-09-12 PROBLEM — R41.82 ALTERED MENTAL STATE: Status: ACTIVE | Noted: 2019-09-12

## 2019-09-12 LAB
ALBUMIN SERPL-MCNC: 1.9 G/DL (ref 3.5–5)
ANION GAP SERPL CALC-SCNC: 12 MMOL/L (ref 5–15)
BASOPHILS # BLD: 0.1 K/UL (ref 0–0.1)
BASOPHILS NFR BLD: 1 % (ref 0–1)
BUN SERPL-MCNC: 49 MG/DL (ref 6–20)
BUN/CREAT SERPL: 5 (ref 12–20)
CALCIUM SERPL-MCNC: 8.4 MG/DL (ref 8.5–10.1)
CHLORIDE SERPL-SCNC: 98 MMOL/L (ref 97–108)
CO2 SERPL-SCNC: 24 MMOL/L (ref 21–32)
CREAT SERPL-MCNC: 10.3 MG/DL (ref 0.55–1.02)
DIFFERENTIAL METHOD BLD: ABNORMAL
EOSINOPHIL # BLD: 0.6 K/UL (ref 0–0.4)
EOSINOPHIL NFR BLD: 7 % (ref 0–7)
ERYTHROCYTE [DISTWIDTH] IN BLOOD BY AUTOMATED COUNT: 18.5 % (ref 11.5–14.5)
GLUCOSE SERPL-MCNC: 76 MG/DL (ref 65–100)
HCT VFR BLD AUTO: 30.8 % (ref 35–47)
HGB BLD-MCNC: 9.5 G/DL (ref 11.5–16)
IMM GRANULOCYTES # BLD AUTO: 0.1 K/UL (ref 0–0.04)
IMM GRANULOCYTES NFR BLD AUTO: 1 % (ref 0–0.5)
IRON SATN MFR SERPL: 26 % (ref 20–50)
IRON SERPL-MCNC: 77 UG/DL (ref 35–150)
LYMPHOCYTES # BLD: 1.5 K/UL (ref 0.8–3.5)
LYMPHOCYTES NFR BLD: 18 % (ref 12–49)
MCH RBC QN AUTO: 27.7 PG (ref 26–34)
MCHC RBC AUTO-ENTMCNC: 30.8 G/DL (ref 30–36.5)
MCV RBC AUTO: 89.8 FL (ref 80–99)
MONOCYTES # BLD: 0.9 K/UL (ref 0–1)
MONOCYTES NFR BLD: 10 % (ref 5–13)
NEUTS SEG # BLD: 5.5 K/UL (ref 1.8–8)
NEUTS SEG NFR BLD: 63 % (ref 32–75)
NRBC # BLD: 0 K/UL (ref 0–0.01)
NRBC BLD-RTO: 0 PER 100 WBC
PHOSPHATE SERPL-MCNC: 3.9 MG/DL (ref 2.6–4.7)
PLATELET # BLD AUTO: 269 K/UL (ref 150–400)
PMV BLD AUTO: 12.7 FL (ref 8.9–12.9)
POTASSIUM SERPL-SCNC: 4.1 MMOL/L (ref 3.5–5.1)
RBC # BLD AUTO: 3.43 M/UL (ref 3.8–5.2)
SODIUM SERPL-SCNC: 134 MMOL/L (ref 136–145)
STRESS BASELINE DIAS BP: 96 MMHG
STRESS BASELINE HR: 71 BPM
STRESS BASELINE SYS BP: 155 MMHG
STRESS ESTIMATED WORKLOAD: 1 METS
STRESS EXERCISE DUR MIN: NORMAL
STRESS PEAK DIAS BP: 98 MMHG
STRESS PEAK SYS BP: 168 MMHG
STRESS PERCENT HR ACHIEVED: 54 %
STRESS POST PEAK HR: 85 BPM
STRESS RATE PRESSURE PRODUCT: NORMAL BPM*MMHG
STRESS TARGET HR: 157 BPM
TIBC SERPL-MCNC: 291 UG/DL (ref 250–450)
WBC # BLD AUTO: 8.7 K/UL (ref 3.6–11)

## 2019-09-12 PROCEDURE — A9500 TC99M SESTAMIBI: HCPCS

## 2019-09-12 PROCEDURE — 80069 RENAL FUNCTION PANEL: CPT

## 2019-09-12 PROCEDURE — 74011250636 HC RX REV CODE- 250/636: Performed by: INTERNAL MEDICINE

## 2019-09-12 PROCEDURE — 85025 COMPLETE CBC W/AUTO DIFF WBC: CPT

## 2019-09-12 PROCEDURE — 65660000000 HC RM CCU STEPDOWN

## 2019-09-12 PROCEDURE — A4722 DIALYS SOL FLD VOL > 1999CC: HCPCS | Performed by: INTERNAL MEDICINE

## 2019-09-12 PROCEDURE — 78452 HT MUSCLE IMAGE SPECT MULT: CPT

## 2019-09-12 PROCEDURE — 83540 ASSAY OF IRON: CPT

## 2019-09-12 PROCEDURE — 36600 WITHDRAWAL OF ARTERIAL BLOOD: CPT

## 2019-09-12 PROCEDURE — 74011250637 HC RX REV CODE- 250/637: Performed by: HOSPITALIST

## 2019-09-12 PROCEDURE — 36415 COLL VENOUS BLD VENIPUNCTURE: CPT

## 2019-09-12 RX ORDER — SODIUM CHLORIDE 0.9 % (FLUSH) 0.9 %
20 SYRINGE (ML) INJECTION
Status: COMPLETED | OUTPATIENT
Start: 2019-09-12 | End: 2019-09-12

## 2019-09-12 RX ADMIN — Medication 20 ML: at 09:56

## 2019-09-12 RX ADMIN — BRIMONIDINE TARTRATE 1 DROP: 2 SOLUTION OPHTHALMIC at 17:30

## 2019-09-12 RX ADMIN — SODIUM CHLORIDE, SODIUM LACTATE, CALCIUM CHLORIDE, MAGNESIUM CHLORIDE AND DEXTROSE 2000 ML: 2.5; 538; 448; 18.3; 5.08 INJECTION, SOLUTION INTRAPERITONEAL at 21:53

## 2019-09-12 RX ADMIN — Medication 10 ML: at 15:00

## 2019-09-12 RX ADMIN — SODIUM CHLORIDE, SODIUM LACTATE, CALCIUM CHLORIDE, MAGNESIUM CHLORIDE AND DEXTROSE 2000 ML: 2.5; 538; 448; 18.3; 5.08 INJECTION, SOLUTION INTRAPERITONEAL at 13:25

## 2019-09-12 RX ADMIN — SEVELAMER CARBONATE 1600 MG: 800 TABLET, FILM COATED ORAL at 18:00

## 2019-09-12 RX ADMIN — AMIODARONE HYDROCHLORIDE 100 MG: 200 TABLET ORAL at 11:38

## 2019-09-12 RX ADMIN — Medication 10 ML: at 21:53

## 2019-09-12 RX ADMIN — ASPIRIN 81 MG: 81 TABLET, COATED ORAL at 11:39

## 2019-09-12 RX ADMIN — BRIMONIDINE TARTRATE 1 DROP: 2 SOLUTION OPHTHALMIC at 11:40

## 2019-09-12 RX ADMIN — CLOPIDOGREL BISULFATE 75 MG: 75 TABLET ORAL at 11:39

## 2019-09-12 RX ADMIN — REGADENOSON 0.4 MG: 0.08 INJECTION, SOLUTION INTRAVENOUS at 09:56

## 2019-09-12 RX ADMIN — CALCITRIOL CAPSULES 0.25 MCG 0.25 MCG: 0.25 CAPSULE ORAL at 17:39

## 2019-09-12 RX ADMIN — LEVETIRACETAM 500 MG: 500 TABLET, FILM COATED ORAL at 17:28

## 2019-09-12 RX ADMIN — Medication 10 ML: at 06:18

## 2019-09-12 RX ADMIN — LEVETIRACETAM 500 MG: 500 TABLET, FILM COATED ORAL at 11:39

## 2019-09-12 RX ADMIN — SODIUM CHLORIDE, SODIUM LACTATE, CALCIUM CHLORIDE, MAGNESIUM CHLORIDE AND DEXTROSE 2000 ML: 2.5; 538; 448; 18.3; 5.08 INJECTION, SOLUTION INTRAPERITONEAL at 17:29

## 2019-09-12 RX ADMIN — CARVEDILOL 12.5 MG: 12.5 TABLET, FILM COATED ORAL at 17:28

## 2019-09-12 RX ADMIN — LACOSAMIDE 150 MG: 50 TABLET, FILM COATED ORAL at 11:37

## 2019-09-12 RX ADMIN — ASCORBIC ACID, THIAMINE MONONITRATE,RIBOFLAVIN, NIACINAMIDE, PYRIDOXINE HYDROCHLORIDE, FOLIC ACID, CYANOCOBALAMIN, BIOTIN, CALCIUM PANTOTHENATE, 1 CAPSULE: 100; 1.5; 1.7; 20; 10; 1; 6000; 150000; 5 CAPSULE, LIQUID FILLED ORAL at 11:38

## 2019-09-12 RX ADMIN — ISOSORBIDE MONONITRATE 60 MG: 60 TABLET, EXTENDED RELEASE ORAL at 06:18

## 2019-09-12 RX ADMIN — POTASSIUM CHLORIDE 20 MEQ: 1.5 POWDER, FOR SOLUTION ORAL at 11:40

## 2019-09-12 RX ADMIN — CARVEDILOL 12.5 MG: 12.5 TABLET, FILM COATED ORAL at 11:49

## 2019-09-12 RX ADMIN — PHENYTOIN SODIUM 300 MG: 100 CAPSULE ORAL at 21:52

## 2019-09-12 RX ADMIN — SODIUM CHLORIDE, SODIUM LACTATE, CALCIUM CHLORIDE, MAGNESIUM CHLORIDE AND DEXTROSE 2000 ML: 2.5; 538; 448; 18.3; 5.08 INJECTION, SOLUTION INTRAPERITONEAL at 00:29

## 2019-09-12 RX ADMIN — LACOSAMIDE 150 MG: 50 TABLET, FILM COATED ORAL at 17:28

## 2019-09-12 RX ADMIN — ATORVASTATIN CALCIUM 40 MG: 40 TABLET, FILM COATED ORAL at 11:38

## 2019-09-12 RX ADMIN — PANTOPRAZOLE SODIUM 40 MG: 40 TABLET, DELAYED RELEASE ORAL at 06:18

## 2019-09-12 RX ADMIN — LOSARTAN POTASSIUM 25 MG: 25 TABLET ORAL at 11:39

## 2019-09-12 NOTE — PROGRESS NOTES
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 Given her refractory epilepsy, certainly this could have been a breakthrough seizure. I do not believe that we are going to be able to get control of her refractory epilepsy during this admission. Exam refusing to participate in exam.     EEG 19 is a normal study  MRI brain wo contrast 19 - Moderate CIWM changes, chronic lacunar infarcts, with progression since 2016. Unchanged ventricular size unchanged right superior frontal lobe encephalomalacia and right frontal ventriculostomy catheter with tip in left frontal horn, small chronic extra-axial collections, present since at least , unchanged on the right and smaller on the left since that time. Plan:   -Continue Keppra 500mg bid  -Continue Vimpat 150mg bid  -Continue PHT 300mg daily  -Keppra and Vimpat levels still pending.   -Pt is welcome to f/u in clinic with me for management of her epilepsy if needed, may have a primary neurologist already.   -Please call if needed. Subjective:   Pt is taking a nap after lunch.   When I wake her up and tell her I'm sorry for waking her, she stated, \"And you did it anyway. \"      Objective:   Chart reviewed since last seen    Current Facility-Administered Medications   Medication Dose Route Frequency    peritoneal dialysis DEXTROSE 2.5% (2.5 mEq/L low calcium) solution 2,000 mL  2,000 mL IntraPERitoneal QID    acetaminophen (TYLENOL) tablet 650 mg  650 mg Oral Q4H PRN    LORazepam (ATIVAN) injection 1 mg  1 mg IntraVENous Q4H PRN    bisacodyl (DULCOLAX) suppository 10 mg  10 mg Rectal DAILY PRN    sodium chloride (NS) flush 5-40 mL  5-40 mL IntraVENous Q8H    sodium chloride (NS) flush 5-40 mL  5-40 mL IntraVENous PRN    aspirin delayed-release tablet 81 mg  81 mg Oral DAILY    atorvastatin (LIPITOR) tablet 40 mg  40 mg Oral DAILY    brimonidine (ALPHAGAN) 0.2 % ophthalmic solution 1 Drop  1 Drop Both Eyes BID    calcitRIOL (ROCALTROL) capsule 0.25 mcg  0.25 mcg Oral Once per day on Thu Sat    carvedilol (COREG) tablet 12.5 mg  12.5 mg Oral BID WITH MEALS    clopidogrel (PLAVIX) tablet 75 mg  75 mg Oral DAILY    isosorbide mononitrate ER (IMDUR) tablet 60 mg  60 mg Oral 7am    lactulose (CHRONULAC) 10 gram/15 mL solution 30 mL  20 g Oral BID PRN    levETIRAcetam (KEPPRA) tablet 500 mg  500 mg Oral BID    pantoprazole (PROTONIX) tablet 40 mg  40 mg Oral ACB    sevelamer carbonate (RENVELA) tab 1,600 mg  1,600 mg Oral TID WITH MEALS    furosemide (LASIX) injection 40 mg  40 mg IntraVENous DAILY    docusate sodium (COLACE) capsule 100 mg  100 mg Oral BID PRN    B complex-vitaminC-folic acid (NEPHROCAP) cap  1 Cap Oral DAILY    lacosamide (VIMPAT) tablet 150 mg  150 mg Oral BID    losartan (COZAAR) tablet 25 mg  25 mg Oral DAILY    phenytoin ER (DILANTIN ER) ER capsule 300 mg  300 mg Oral QHS    potassium chloride (KLOR-CON) packet for solution 20 mEq  20 mEq Oral DAILY    albuterol-ipratropium (DUO-NEB) 2.5 MG-0.5 MG/3 ML  3 mL Nebulization Q6H PRN    amiodarone (CORDARONE) tablet 100 mg  100 mg Oral DAILY       Visit Vitals  /73 (BP 1 Location: Left arm, BP Patient Position: At rest)   Pulse 69   Temp 98 °F (36.7 °C)   Resp 23   Ht 5' 3\" (1.6 m)   Wt 91.6 kg (202 lb)   SpO2 98%   Breastfeeding? No   BMI 35.78 kg/m²     Temp (24hrs), Av.1 °F (36.7 °C), Min:97.3 °F (36.3 °C), Max:98.6 °F (37 °C)      No intake/output data recorded. 09/10 1901 -  0700  In: 70493   Out: [id number]       Physical Exam:  General: Well developed well nourished patient in no apparent distress. Cardiac: Regular rate and rhythm with no murmurs. Extremities: 2+ Radial pulses, no cyanosis or edema    Neurological Exam:  Mental Status: Asleep, easily awaken, refused to participate in exam. No dysarthria. Cranial Nerves:   Resisted eye opening, no facial asym.    Motor:      Reflexes:      Sensory:      Gait:     Cerebellar:           Lab Review   Recent Results (from the past 24 hour(s))   CULTURE, BODY FLUID W GRAM STAIN    Collection Time: 19  3:32 PM   Result Value Ref Range    Special Requests: NO SPECIAL REQUESTS      GRAM STAIN NO WBC'S SEEN      GRAM STAIN NO ORGANISMS SEEN      Culture result: NO GROWTH AFTER 15 HOURS     IRON PROFILE    Collection Time: 19  5:45 AM   Result Value Ref Range    Iron 77 35 - 150 ug/dL    TIBC 291 250 - 450 ug/dL    Iron % saturation 26 20 - 50 %   RENAL FUNCTION PANEL    Collection Time: 19  5:45 AM   Result Value Ref Range    Sodium 134 (L) 136 - 145 mmol/L    Potassium 4.1 3.5 - 5.1 mmol/L    Chloride 98 97 - 108 mmol/L    CO2 24 21 - 32 mmol/L    Anion gap 12 5 - 15 mmol/L    Glucose 76 65 - 100 mg/dL    BUN 49 (H) 6 - 20 MG/DL    Creatinine 10.30 (H) 0.55 - 1.02 MG/DL    BUN/Creatinine ratio 5 (L) 12 - 20      GFR est AA 5 (L) >60 ml/min/1.73m2    GFR est non-AA 4 (L) >60 ml/min/1.73m2    Calcium 8.4 (L) 8.5 - 10.1 MG/DL    Phosphorus 3.9 2.6 - 4.7 MG/DL    Albumin 1.9 (L) 3.5 - 5.0 g/dL   CBC WITH AUTOMATED DIFF    Collection Time: 19  5:45 AM   Result Value Ref Range    WBC 8.7 3.6 - 11.0 K/uL    RBC 3.43 (L) 3.80 - 5.20 M/uL    HGB 9.5 (L) 11.5 - 16.0 g/dL    HCT 30.8 (L) 35.0 - 47.0 %    MCV 89.8 80.0 - 99.0 FL    MCH 27.7 26.0 - 34.0 PG    MCHC 30.8 30.0 - 36.5 g/dL    RDW 18.5 (H) 11.5 - 14.5 %    PLATELET 933 022 - 220 K/uL    MPV 12.7 8.9 - 12.9 FL    NRBC 0.0 0  WBC    ABSOLUTE NRBC 0.00 0.00 - 0.01 K/uL    NEUTROPHILS 63 32 - 75 %    LYMPHOCYTES 18 12 - 49 %    MONOCYTES 10 5 - 13 %    EOSINOPHILS 7 0 - 7 %    BASOPHILS 1 0 - 1 %    IMMATURE GRANULOCYTES 1 (H) 0.0 - 0.5 %    ABS. NEUTROPHILS 5.5 1.8 - 8.0 K/UL    ABS. LYMPHOCYTES 1.5 0.8 - 3.5 K/UL    ABS. MONOCYTES 0.9 0.0 - 1.0 K/UL    ABS. EOSINOPHILS 0.6 (H) 0.0 - 0.4 K/UL    ABS. BASOPHILS 0.1 0.0 - 0.1 K/UL    ABS. IMM. GRANS. 0.1 (H) 0.00 - 0.04 K/UL    DF AUTOMATED     NUCLEAR CARDIAC STRESS TEST    Collection Time: 09/12/19 11:22 AM   Result Value Ref Range    Target  bpm    Exercise duration time 00:03:00     Stress Base Systolic  mmHg    Stress Base Diastolic BP 98 mmHg    Post peak HR 85 BPM    Baseline HR 71 BPM    Estimated workload 1.0 METS    Baseline  mmHg    Percent HR 54 %    Stress Base Diastolic BP 96 mmHg    Stress Rate Pressure Product 14,280 BPM*mmHg       Additional comments:  I have reviewed the patient's new clinical lab test results. I have personally reviewed the patient's radiographs. MRI   MRI Results (most recent): MRI Results (most recent):  Results from [place] encounter on 09/09/19   MRI BRAIN WO CONT    Narrative PRELIMINARY REPORT:    Chronic appearing right greater than left subdural collection. Hemosiderin staining and encephalomalacia in the right frontal lobe adjacent to  the right frontal shunt. Ventricular size not significant changed. No acute infarct. Encephalomalacia in the left frontal lobe with mild high  signal within the periventricular white matter    Preliminary report was provided by Dr. Ryne Antonio, the on-call radiologist, at 3063    Final report to follow.     FINAL REPORT:    EXAM: MRI BRAIN WO CONT    TECHNIQUE: Sagittal and axial T1-weighted images axial FLAIR, T2-weighted,  diffusion weighted, gradient echo,  coronal T2    IV CONTRAST:  None    INDICATION:  Evaluate for hydrocephalus, prior stroke, epileptogenic focus    COMPARISON:  CT head of 9/10/2019, brain MRI of 5/5/2016    FINDINGS:  BRAIN PARENCHYMA:  No acute infarct. Moderate predominantly confluent FLAIR/T2  hyperintensity in the deep cerebral white matter, slightly increased since 2016,  likely due to intracranial small vessel disease. Chronic left basal ganglia,  right corona radiata, and right cerebellar lacunar infarcts. Unchanged right  superior frontal lobe encephalomalacia  INTRACRANIAL HEMORRHAGE: No acute hemorrhage. Small bilateral chronic  extra-axial collections demonstrating mild T1 hypointensity and overall T2  hyperintensity. Unchanged on the right and smaller on the left when compared to  2016. Chronic hemorrhage in the region of the right frontal encephalomalacia. CSF SPACES:  Right frontal ventriculostomy catheter with the tip in the region  of the left frontal horn. Normal and unchanged ventricular size. BASAL CISTERNS:  Patent. MIDLINE SHIFT: None. VASCULAR SYSTEM:  Normal flow voids. PARANASAL SINUSES AND MASTOID AIR CELLS:  No significant opacification. VISUALIZED ORBITS:  No significant abnormalities. VISUALIZED UPPER CERVICAL SPINE:  No significant abnormalities. SELLA:  No enlargement or  focal abnormality. SKULL BASE:  No significant abnormalities. Cerebellar tonsils in normal  position. CALVARIUM:  Intact. Impression IMPRESSION:    1. No acute findings. 2. Moderate cerebral white matter signal abnormality and chronic lacunar  infarcts, consistent with chronic small vessel ischemic change, with progression  since 2019.  3. Unchanged ventricular size.  Small chronic extra-axial collections, present  since at least 2016, unchanged on the right and smaller on the left since that  time. Results from [place] encounter on 05/05/16   MRI BRAIN WO CONT    Narrative **Final Report**       ICD Codes / Adm. Diagnosis: 850.0  V45.2 / Concussion with no loss of con    Presence of cerebrospinal fl  Examination:  MR BRAIN WO CON  - [id number] - May  5 2016  1:12PM  Accession No:  [id number]  Reason:  60F with VPS s/p fall. Continued pain at VPS site. REPORT:  EXAM:  MR BRAIN WO CON  INDICATION:  60F with ventriculoperitoneal shunt s/p fall. Continued pain at   VPS site., [employer] S06.0X0a, Z98.2, C86.69, shot initially placed 2/20/16,   nausea and vomiting, hand spasms. Dialysis patient. TECHNIQUE: Sagittal T1, axial FLAIR, T2,T1 and gradient echo images as well   as coronal T2 weighted images and axial diffusion weighted images of the   head were obtained. COMPARISON:  CT head 10/12/15  FINDINGS:  The patient's right frontal ventriculoperitoneal shunt tubing is again   demonstrated. There is evidence of chronic hemorrhage along the   ventriculostomy track and in the right frontal lobe. The ventricular size is   within normal limits and increased when compared to the CT 10/12/15. Ventricles are smaller when compared to preshunt CT 4/3/15. There are flow-voids present in the aqueduct and foramen Magendie without   evidence of obstructive hydrocephalus. The patient has a left chronic subdural hematoma which appears to have some   loculated areas within it. No definitive acute hemorrhage. There is a smaller right convexity chronic subdural collection. Multifocal and confluent white matter T2 hyperintensity corresponds to   previously described areas of decreased attenuation on CT and appears to be   chronic. Visualized structures of the skull base including paranasal sinuses are   unremarkable. Flow voids are present in vertebral basilar and carotid artery systems. The craniocervical junction is unremarkable. IMPRESSION:  1.  Right frontal ventriculoperitoneal shunt with focal encephalomalacia and   hemorrhage at the insertion site in the right frontal lobe. No definite   acute hemorrhage. 2. Ventricular size is within normal limits. 3. Small left greater than right chronic appearing subdural hematomas. 4. Chronic nonspecific periventricular white matter T2 hyperintensity. Signing/Reading Doctor: Remigio Gutierrez ([id number])    Approved: Remigio Gutierrez ([id number])  May  5 2016  2:02PM                                 CT Results (most recent):  Results from Hospital Encounter encounter on 09/09/19   CT HEAD WO CONT    Narrative EXAM: CT HEAD WO CONT    INDICATION: Confusion/delirium, altered LOC, unexplained    COMPARISON: 2015. CONTRAST: None. TECHNIQUE: Unenhanced CT of the head was performed using 5 mm images. Brain and  bone windows were generated. CT dose reduction was achieved through use of a  standardized protocol tailored for this examination and automatic exposure  control for dose modulation. FINDINGS:  The ventricles are enlarged compared to prior examination of 2015. There is a  ventriculoperitoneal shunt in place with the tip in the region of the right  lateral ventricle. There is periventricular hypoattenuation compatible with  chronic small vessel ischemic changes. There is encephalomalacia in the left  frontal lobe, likely in an area of prior ventriculostomy tube. There is a small  low density collection in the right subdural space which appears chronic. There  is no midline shift. There is encephalomalacia in the right frontal lobe. The  basilar cisterns are open. No acute infarct is identified. The bone windows  demonstrate there is evidence of left parietal bone surgery. There is left  maxillary sinus disease. There is calcification in the left extra-axial space. Impression IMPRESSION: Interval enlargement of ventricular size since prior study with  ventriculostomy tube in place.            Care Plan discussed with:  Patient    Family    RN    Care Manager    Consultant/Specialist:       Signed: Jocy Pedersen MD

## 2019-09-12 NOTE — PROGRESS NOTES
Problem: Heart Failure: Day 1  Goal: Diagnostic Test/Procedures  Outcome: Progressing Towards Goal   Patient down for stress test this a.m.

## 2019-09-12 NOTE — PROGRESS NOTES
9/12/2019     Admit Date: 9/9/2019    Admit Diagnosis: Shortness of breath [R06.02]    Principal Problem:    Altered mental state (9/12/2019)    Active Problems:    Shortness of breath (9/9/2019)        Assessment/Plan: Today's MPI shows large areas of infarction, no ischemia, EF 18%  Echo on 9/10/19 shows similar severe LV systolic dysfunction and ventricular non compaction  Agree with hospitalist: poor candidate for PD. Home situation seems unreliable. She admits that she has been told that she must adhere to PD strictly during other recent admissions for HF. May need to change to HD at the nephrologist's discretion  No need for a procedure from my end and she can be discharged at the hospitalist's discretion. Please have her follow up with me some time next week  Adding a diuretic may be useful since she does make some urine  Thanks  Paty Castellanos MD  Interventional Cardiologist  [facility]  [phone]     Subjective:  Feels better today. Result of her MPI reviewed with her       Brianda Do denies chest pain. Objective:     Visit Vitals  /84 (BP 1 Location: Left arm, BP Patient Position: At rest)   Pulse 68   Temp 98.3 °F (36.8 °C)   Resp 23   Ht 160 cm (63\")   Wt 91.6 kg (202 lb)   SpO2 97%   Breastfeeding? No   BMI 35.78 kg/m²        Physical Exam:  Neck: supple, symmetrical, trachea midline, no adenopathy, thyroid: not enlarged, symmetric, no tenderness/mass/nodules, no carotid bruit and no JVD  Heart: regular rate and rhythm, S1, S2 normal, no S3 or S4, no rub  Lungs: clear to auscultation bilaterally, normal percussion bilaterally  Abdomen: soft, non-tender.  Bowel sounds normal. No masses,  no organomegaly  Extremities: extremities normal, atraumatic, no cyanosis or edema  Pulses: 2+ and symmetric  Neurologic: Grossly normal      Labs:    Recent Labs     09/10/19  0543   TROIQ 0.06*     Recent Labs     09/12/19  0545   *   K 4.1   CL 98   BUN 49*   CREA 10.30*   GLU 76   PHOS 3.9   CA 8.4*     Recent Labs     09/12/19  0545   WBC 8.7   HGB 9.5*   HCT 30.8*        No results for input(s): CHOL, LDLC in the last 72 hours.     No lab exists for component: TGL, HDLC,  HBA1C    Telemetry: normal sinus rhythm     Data Review: current meds, labs,recent radiology, intake/output/weight and problem list reviewed

## 2019-09-12 NOTE — PROGRESS NOTES
Hospitalist Progress Note  Gina Kay MD  Answering service: [phone] OR [phone] from in house phone        Date of Service:  2019  NAME:  Ana Avelar  :  1955  MRN:  [id number]  PCP: Unknown, Provider    Chief Complaint:   Chief Complaint   Patient presents with    Shortness of Breath         Admission Summary:     Ana Avelar is a 62 y.o. female   The patient is a 71-year-old female who has previous history significant for systolic heart failure, end-stage renal disease on peritoneal dialysis, history of coronary artery disease status post angioplasty and stent placement approximately 1-1/2 years ago at [facility], history of hypertension, previous history of stroke, hyperlipidemia, seizure disorder, comes to the emergency room due to worsening shortness of breath.  The patient tells me that she has been short of breath for the last 4-5 days,in the past shortness of breath   was mainly when she was lying down.  Now, she is getting short of breath even when she is sitting up. Jacinta Duncan denies having any significant chest pain.  She coughs and brings up white sputum.  She has no fever or chills.  The patient tells me at one time she was on oxygen; however, her sister never picked up her oxygen tank.  In the emergency room, the patient was given 2 breathing treatments by ER physician. José Torres further questioning, the patient says that she has gained a lot of weight and has noticed worsening leg swelling. Interval history / Subjective:     No SOB, no active complaints. Assessment & Plan: Altered mental status 9/10  Abg is normal , no resp acidosis   Waxing and waning mental status    EEG , stat CT head shows ventricular dilatation   Given keppra 1 g now ad check levels of vimpat , keppra and dilantin , pending   NSG consult given CT findings   MRI brain no acute findings   Neurology recommended Melissa Hernández .  Follow up as an outpatient.         Shortness of breath POA , resolving   2/2 HF with ESRD , no PNA on CXR   cxr with central vascular prominence   -Volume overload likely poor PD  by pt at home ,might be not a good candidate anymore but will defer to nephrology   -Nephro on Board, continue Fluid removal as per Nephro     Acute on Chronic Systolic and diastolic heart failure POA   Echo with 26-30% EF and Grade III diastolic dysfunction. On PD and gets iv lasix as well   Strict in and o  Cards has been consulted   Stress test is incolclusive   Will follow card recommendations      Paroxysmal Afib  -onBB and amiodarone   -Avoid anticoagulation, H/O SDH and required  shunt     SDH in past    Right frontal ventriculoperitoneal shunt      History of seizures   See above ,on AED      Trop of .06   Cards on board   Stress testing to assess for coronary insufficiency either prior to discharge or as an OP per Dr Riley Sarmiento   Scheduled for today   Coreg , imdur and asa and plavix      CAD s/p PCI   [facility] (2015) - mid LAD 70-80%, distal LAD 50%, diagonal high-grade disease, OMB high-grade disease, RCA total occlusion - medically managed  2. [facility] 8/2018 - heavily calcified vessels with severe early mid LAD stenosis, occluded RCA with faint L-R collateral filling and occluded 1st marginal - felt to be high risk for CABG / patient refused  3.  [facility] 9/4/18 - S/p successful PCI of the prox LAD using 3.0 x 24 Synergy ANDER      COPD   Stable      STROKE X3 , left hemiparesisis     LEGALLY BLIND      HTN: c/w Home regimen, use labetalol prn.           Social : lives with nephew in [facility]  Has daughter in [place] and in [place]  Was in [place] rehab 2 months prior to it        Code status: full   DVT prophylaxis: [address] discussed with: Patient/Family  Disposition: Home w/Family and TBD    Code status: Full Code      Hospital Problems  Date Reviewed: 7/18/2019          Codes Class Noted POA    Shortness of breath ICD-10-CM: R06.02  ICD-9-CM: 786.05  9/9/2019 Unknown Review of Systems:   A comprehensive review of systems was negative except for that written in the HPI. Physical Examination:     Visit Vitals  /73 (BP 1 Location: Left arm, BP Patient Position: At rest)   Pulse 72   Temp 98 °F (36.7 °C)   Resp 23   Ht 5' 3\" (1.6 m)   Wt 91.6 kg (202 lb)   SpO2 98%   Breastfeeding? No   BMI 35.78 kg/m²     General:  Alert, cooperative, no distress, appears stated age. Back:   Symmetric, no curvature. ROM normal. No CVA tenderness. Lungs:   Clear to auscultation bilaterally. Chest wall:  No tenderness or deformity. Heart:  Regular rate and rhythm, S1, S2 normal, no murmur, click, rub or gallop. Abdomen:   Soft, non-tender. Bowel sounds normal. No masses,  No organomegaly. Extremities: Extremities normal, atraumatic, no cyanosis or edema. Pulses: 2+ and symmetric all extremities. Neurologic: CNII-XII intact. Normal strength, sensation            Vital Signs:    Last 24hrs VS reviewed since prior progress note. Most recent are:    Visit Vitals  /73 (BP 1 Location: Left arm, BP Patient Position: At rest)   Pulse 72   Temp 98 °F (36.7 °C)   Resp 23   Ht 5' 3\" (1.6 m)   Wt 91.6 kg (202 lb)   SpO2 98%   Breastfeeding? No   BMI 35.78 kg/m²         Intake/Output Summary (Last 24 hours) at 2019 1800  Last data filed at 2019 0050  Gross per 24 hour   Intake 4000 ml   Output 2400 ml   Net 1600 ml        Tmax:  Temp (24hrs), Av.1 °F (36.7 °C), Min:97.3 °F (36.3 °C), Max:98.6 °F (37 °C)      Data Review:   Data reviewed by myself:  Mri Brain Wo Cont    Result Date: 2019  PRELIMINARY REPORT: Chronic appearing right greater than left subdural collection. Hemosiderin staining and encephalomalacia in the right frontal lobe adjacent to the right frontal shunt. Ventricular size not significant changed. No acute infarct.  Encephalomalacia in the left frontal lobe with mild high signal within the periventricular white matter Preliminary report was provided by Dr. Rosa Lomeli, the on-call radiologist, at 9819 Final report to follow. FINAL REPORT: EXAM: MRI BRAIN WO CONT TECHNIQUE: Sagittal and axial T1-weighted images axial FLAIR, T2-weighted, diffusion weighted, gradient echo,  coronal T2 IV CONTRAST:  None INDICATION:  Evaluate for hydrocephalus, prior stroke, epileptogenic focus COMPARISON:  CT head of 9/10/2019, brain MRI of 5/5/2016 FINDINGS: BRAIN PARENCHYMA:  No acute infarct. Moderate predominantly confluent FLAIR/T2 hyperintensity in the deep cerebral white matter, slightly increased since 2016, likely due to intracranial small vessel disease. Chronic left basal ganglia, right corona radiata, and right cerebellar lacunar infarcts. Unchanged right superior frontal lobe encephalomalacia INTRACRANIAL HEMORRHAGE: No acute hemorrhage. Small bilateral chronic extra-axial collections demonstrating mild T1 hypointensity and overall T2 hyperintensity. Unchanged on the right and smaller on the left when compared to 2016. Chronic hemorrhage in the region of the right frontal encephalomalacia. CSF SPACES:  Right frontal ventriculostomy catheter with the tip in the region of the left frontal horn. Normal and unchanged ventricular size. BASAL CISTERNS:  Patent. MIDLINE SHIFT: None. VASCULAR SYSTEM:  Normal flow voids. PARANASAL SINUSES AND MASTOID AIR CELLS:  No significant opacification. VISUALIZED ORBITS:  No significant abnormalities. VISUALIZED UPPER CERVICAL SPINE:  No significant abnormalities. SELLA:  No enlargement or  focal abnormality. SKULL BASE:  No significant abnormalities. Cerebellar tonsils in normal position. CALVARIUM:  Intact. IMPRESSION:  1. No acute findings. 2. Moderate cerebral white matter signal abnormality and chronic lacunar infarcts, consistent with chronic small vessel ischemic change, with progression since 2019. 3. Unchanged ventricular size.  Small chronic extra-axial collections, present since at least 2016, unchanged on the right and smaller on the left since that time. Ct Head Wo Cont    Result Date: 9/10/2019  EXAM: CT HEAD WO CONT INDICATION: Confusion/delirium, altered LOC, unexplained COMPARISON: 2015. CONTRAST: None. TECHNIQUE: Unenhanced CT of the head was performed using 5 mm images. Brain and bone windows were generated. CT dose reduction was achieved through use of a standardized protocol tailored for this examination and automatic exposure control for dose modulation. FINDINGS: The ventricles are enlarged compared to prior examination of 2015. There is a ventriculoperitoneal shunt in place with the tip in the region of the right lateral ventricle. There is periventricular hypoattenuation compatible with chronic small vessel ischemic changes. There is encephalomalacia in the left frontal lobe, likely in an area of prior ventriculostomy tube. There is a small low density collection in the right subdural space which appears chronic. There is no midline shift. There is encephalomalacia in the right frontal lobe. The basilar cisterns are open. No acute infarct is identified. The bone windows demonstrate there is evidence of left parietal bone surgery. There is left maxillary sinus disease. There is calcification in the left extra-axial space. IMPRESSION: Interval enlargement of ventricular size since prior study with ventriculostomy tube in place. Xr Chest Port    Result Date: 9/9/2019  EXAM: XR CHEST PORT INDICATION: CHF COMPARISON: 7/19/2019 FINDINGS: A portable AP radiograph of the chest was obtained at 1411 hours. The patient is on a cardiac monitor. The lungs are clear. The cardiac and mediastinal contours and pulmonary vascularity are remarkable for a central prominence of the right hilum. There is a linear band of subsegmental atelectasis along the left major fissure. The bones and soft tissues are grossly within normal limits.      IMPRESSION: No acute process identified    No results found for: SDES  Lab Results   Component Value Date/Time    Culture result: NO GROWTH AFTER 15 HOURS 09/11/2019 03:32 PM    Culture result: Culture performed on Fluid swab specimen 07/17/2019 11:09 AM    Culture result: NO GROWTH 4 DAYS 07/17/2019 11:09 AM     All Micro Results     Procedure Component Value Units Date/Time    CULTURE, BODY FLUID Kirill Jaun STAIN [[id number]] Collected:  09/11/19 1532    Order Status:  Completed Specimen:  Dialysate Updated:  09/12/19 1027     Special Requests: NO SPECIAL REQUESTS        GRAM STAIN NO WBC'S SEEN         NO ORGANISMS SEEN        Culture result: NO GROWTH AFTER 15 HOURS             Labs: reviewed by myself. Recent Labs     09/12/19 0545 09/10/19  0543   WBC 8.7 8.0   HGB 9.5* 8.7*   HCT 30.8* 28.4*    251     Recent Labs     09/12/19 0545 09/10/19  0543   * 135*   K 4.1 3.4*   CL 98 98   CO2 24 27   BUN 49* 56*   CREA 10.30* 10.20*   GLU 76 107*   CA 8.4* 8.4*   MG  --  2.2   PHOS 3.9 3.3     Recent Labs     09/12/19 0545 09/10/19  0543   SGOT  --  15   ALT  --  13   AP  --  164*   TBILI  --  0.2   TP  --  6.2*   ALB 1.9* 1.9*   GLOB  --  4.3*     No results for input(s): INR, PTP, APTT in the last 72 hours. No lab exists for component: INREXT   Recent Labs     09/12/19 0545   TIBC 291   PSAT 26      No results found for: FOL, RBCF   No results for input(s): PH, PCO2, PO2 in the last 72 hours.   Recent Labs     09/10/19  0543   TROIQ 0.06*     No results found for: CHOL, CHOLX, CHLST, CHOLV, HDL, HDLP, LDL, LDLC, DLDLP, TGLX, TRIGL, TRIGP, CHHD, CHHDX  Lab Results   Component Value Date/Time    Glucose (POC) 87 07/22/2019 11:16 AM    Glucose (POC) 162 (H) 07/17/2019 03:21 AM     Lab Results   Component Value Date/Time    Color YELLOW/STRAW 04/03/2015 08:31 PM    Appearance CLOUDY (A) 04/03/2015 08:31 PM    Specific gravity 1.013 04/03/2015 08:31 PM    pH (UA) 7.5 04/03/2015 08:31 PM    Protein 300 (A) 04/03/2015 08:31 PM    Glucose NEGATIVE  04/03/2015 08:31 PM    Ketone NEGATIVE  04/03/2015 08:31 PM    Bilirubin NEGATIVE  04/03/2015 08:31 PM    Urobilinogen 0.2 04/03/2015 08:31 PM    Nitrites NEGATIVE  04/03/2015 08:31 PM    Leukocyte Esterase NEGATIVE  04/03/2015 08:31 PM    Epithelial cells MANY (A) 04/03/2015 08:31 PM    Bacteria 1+ (A) 04/03/2015 08:31 PM    WBC 0-4 04/03/2015 08:31 PM    RBC 0-5 04/03/2015 08:31 PM         Medications Reviewed:     Current Facility-Administered Medications   Medication Dose Route Frequency    peritoneal dialysis DEXTROSE 2.5% (2.5 mEq/L low calcium) solution 2,000 mL  2,000 mL IntraPERitoneal QID    acetaminophen (TYLENOL) tablet 650 mg  650 mg Oral Q4H PRN    LORazepam (ATIVAN) injection 1 mg  1 mg IntraVENous Q4H PRN    bisacodyl (DULCOLAX) suppository 10 mg  10 mg Rectal DAILY PRN    sodium chloride (NS) flush 5-40 mL  5-40 mL IntraVENous Q8H    sodium chloride (NS) flush 5-40 mL  5-40 mL IntraVENous PRN    aspirin delayed-release tablet 81 mg  81 mg Oral DAILY    atorvastatin (LIPITOR) tablet 40 mg  40 mg Oral DAILY    brimonidine (ALPHAGAN) 0.2 % ophthalmic solution 1 Drop  1 Drop Both Eyes BID    calcitRIOL (ROCALTROL) capsule 0.25 mcg  0.25 mcg Oral Once per day on Thu Sat    carvedilol (COREG) tablet 12.5 mg  12.5 mg Oral BID WITH MEALS    clopidogrel (PLAVIX) tablet 75 mg  75 mg Oral DAILY    isosorbide mononitrate ER (IMDUR) tablet 60 mg  60 mg Oral 7am    lactulose (CHRONULAC) 10 gram/15 mL solution 30 mL  20 g Oral BID PRN    levETIRAcetam (KEPPRA) tablet 500 mg  500 mg Oral BID    pantoprazole (PROTONIX) tablet 40 mg  40 mg Oral ACB    sevelamer carbonate (RENVELA) tab 1,600 mg  1,600 mg Oral TID WITH MEALS    furosemide (LASIX) injection 40 mg  40 mg IntraVENous DAILY    docusate sodium (COLACE) capsule 100 mg  100 mg Oral BID PRN    B complex-vitaminC-folic acid (NEPHROCAP) cap  1 Cap Oral DAILY    lacosamide (VIMPAT) tablet 150 mg  150 mg Oral BID    losartan (COZAAR) tablet 25 mg  25 mg Oral DAILY    phenytoin ER (DILANTIN ER) ER capsule 300 mg  300 mg Oral QHS    potassium chloride (KLOR-CON) packet for solution 20 mEq  20 mEq Oral DAILY    albuterol-ipratropium (DUO-NEB) 2.5 MG-0.5 MG/3 ML  3 mL Nebulization Q6H PRN    amiodarone (CORDARONE) tablet 100 mg  100 mg Oral DAILY     ______________________________________________________________________  EXPECTED LENGTH OF STAY: 4d 2h  ACTUAL LENGTH OF STAY:          3                 Justina Marquez MD     Patient's emergency contacts:  Extended Emergency Contact Information  Primary Emergency Contact: [address] [place] [address] Phone: [phone]  Relation: Sister  Secondary Emergency Contact: [address] [place] [address] Phone: [phone]  Relation: Child

## 2019-09-12 NOTE — PROGRESS NOTES
TRANSFER - IN REPORT:    Verbal report received from Meliza Middleton RN(name) on [place]  being received from 3N(unit) for routine progression of care      Report consisted of patients Situation, Background, Assessment and   Recommendations(SBAR). Information from the following report(s) SBAR, Kardex and Recent Results was reviewed with the receiving nurse. Opportunity for questions and clarification was provided. Assessment completed upon patients arrival to unit and care assumed.

## 2019-09-12 NOTE — PROGRESS NOTES
Problem: Falls - Risk of  Goal: *Absence of Falls  Description  Document Kin Lott Fall Risk and appropriate interventions in the flowsheet. Outcome: Progressing Towards Goal  Note:   Fall Risk Interventions:  Mobility Interventions: Communicate number of staff needed for ambulation/transfer, OT consult for ADLs, Patient to call before getting OOB, PT Consult for mobility concerns, PT Consult for assist device competence, Strengthening exercises (ROM-active/passive), Utilize walker, cane, or other assistive device, Utilize gait belt for transfers/ambulation         Medication Interventions: Assess postural VS orthostatic hypotension, Evaluate medications/consider consulting pharmacy, Patient to call before getting OOB, Teach patient to arise slowly, Utilize gait belt for transfers/ambulation    Elimination Interventions: Bed/chair exit alarm, Call light in reach, Patient to call for help with toileting needs, Stay With Me (per policy), Toileting schedule/hourly rounds              Problem: Patient Education: Go to Patient Education Activity  Goal: Patient/Family Education  Outcome: Progressing Towards Goal     Problem: Pressure Injury - Risk of  Goal: *Prevention of pressure injury  Description  Document Luis Scale and appropriate interventions in the flowsheet.   Outcome: Progressing Towards Goal  Note:   Pressure Injury Interventions:  Sensory Interventions: Assess changes in LOC, Avoid rigorous massage over bony prominences, Check visual cues for pain, Discuss PT/OT consult with provider, Float heels, Keep linens dry and wrinkle-free, Maintain/enhance activity level, Minimize linen layers, Monitor skin under medical devices, Pad between skin to skin, Pressure redistribution bed/mattress (bed type)         Activity Interventions: Increase time out of bed, Pressure redistribution bed/mattress(bed type), PT/OT evaluation    Mobility Interventions: Float heels, Pressure redistribution bed/mattress (bed type), PT/OT evaluation    Nutrition Interventions: Document food/fluid/supplement intake, Offer support with meals,snacks and hydration    Friction and Shear Interventions: Apply protective barrier, creams and emollients, Foam dressings/transparent film/skin sealants, Lift team/patient mobility team, Minimize layers                Problem: Patient Education: Go to Patient Education Activity  Goal: Patient/Family Education  Outcome: Progressing Towards Goal     Problem: Pain  Goal: *Control of Pain  Outcome: Progressing Towards Goal     Problem: Patient Education: Go to Patient Education Activity  Goal: Patient/Family Education  Outcome: Progressing Towards Goal

## 2019-09-12 NOTE — PROGRESS NOTES
Name: Rupert Silva MRN: [id number]   : 1955 Hospital: . [address]   Date: 2019        IMPRESSION:   · ESRD on PD. The PD is progressing well. Patient is in negative fluid balance. Patient is scheduled to have a stress test later today. SOB without edema and with clear CXR  · Anemia with significant drop in Hb. ?bleeding  · Severe hypoalbuminemia. PLAN:   · Continue PD with  dextrose  to 2.5%  · Continue protein supplements  · Check exchanged fluid for C&S  · Anemia management- check iron profile, give a dose of Epo and check for occult GI bleed. · Repeat the renal panel and CBC in AM     Subjective/Interval History:   I have reviewed the flowsheet and previous days notes. ROS:A comprehensive review of systems was negative. Objective:   Vital Signs:    Visit Vitals  /88 (BP 1 Location: Left arm, BP Patient Position: At rest)   Pulse 75   Temp 97.3 °F (36.3 °C)   Resp 19   Ht 5' 3\" (1.6 m)   Wt 91.6 kg (202 lb)   SpO2 98%   Breastfeeding? No   BMI 35.78 kg/m²       O2 Device: Nasal cannula   O2 Flow Rate (L/min): 1 l/min   Temp (24hrs), Av.2 °F (36.8 °C), Min:97.3 °F (36.3 °C), Max:98.6 °F (37 °C)       Intake/Output:   Last shift:      No intake/output data recorded. Last 3 shifts: 09/10 1901 -  0700  In: 31816   Out: 38563     Intake/Output Summary (Last 24 hours) at 2019 1138  Last data filed at 2019 0050  Gross per 24 hour   Intake 6000 ml   Output 5500 ml   Net 500 ml        Physical Exam:  General:    Awake, more alert, c/o pain and vomiting, no distress, appears stated age. Head:   Normocephalic, without obvious abnormality, atraumatic. Eyes:   Anicteric. Nose:  Nares normal. No drainage or sinus tenderness. Neck:  Supple, symmetrical,  no adenopathy, thyroid: non tender    no carotid bruit and no JVD. Lungs:   Clear to auscultation bilaterally. No Wheezing or Rhonchi. No rales. Chest wall:  No tenderness or deformity.  No Accessory muscle use. PD catheter exit site is just above the left breast.  Heart:   Regular rate and rhythm,  no murmur, rub or gallop. Abdomen:   Soft, +++ tender. Distended. Bowel sounds normal. No masses  Extremities: Extremities normal, atraumatic, No cyanosis. No edema. No clubbing  Skin:     Texture, turgor normal. No rashes or lesions. Not Jaundiced  Psych:  Not anxious or agitated. DATA:  Labs:  Recent Labs     09/12/19  0545 09/10/19  0543 09/09/19  1204   * 135* 134*   K 4.1 3.4* HEMOLYZED,RECOLLECT REQUESTED   CL 98 98 97   CO2 24 27 29   BUN 49* 56* 50*   CREA 10.30* 10.20* 9.41*   CA 8.4* 8.4* 8.6   ALB 1.9* 1.9* 2.2*   PHOS 3.9 3.3 3.4   MG  --  2.2 2.4     Recent Labs     09/12/19  0545 09/10/19  0543 09/09/19  1204   WBC 8.7 8.0 9.5   HGB 9.5* 8.7* 10.0*   HCT 30.8* 28.4* 33.0*    251 329     No results for input(s): CLARISA, KU, CLU, CREAU in the last 72 hours.     No lab exists for component: PROU    Total time spent with patient:  35 minutes    [] Critical Care Provided    Care Plan discussed with:   Eliza Osborn MD

## 2019-09-13 LAB
LACOSAMIDE SERPL-MCNC: 2.4 UG/ML (ref 5–10)
LEVETIRACETAM SERPL-MCNC: 19.2 UG/ML (ref 10–40)

## 2019-09-13 PROCEDURE — 74011250636 HC RX REV CODE- 250/636: Performed by: INTERNAL MEDICINE

## 2019-09-13 PROCEDURE — 74011250637 HC RX REV CODE- 250/637: Performed by: HOSPITALIST

## 2019-09-13 PROCEDURE — A4722 DIALYS SOL FLD VOL > 1999CC: HCPCS | Performed by: INTERNAL MEDICINE

## 2019-09-13 PROCEDURE — 77030040361 HC SLV COMPR DVT MDII -B

## 2019-09-13 PROCEDURE — 74011250636 HC RX REV CODE- 250/636: Performed by: HOSPITALIST

## 2019-09-13 PROCEDURE — 65660000000 HC RM CCU STEPDOWN

## 2019-09-13 PROCEDURE — 74011250637 HC RX REV CODE- 250/637: Performed by: NURSE PRACTITIONER

## 2019-09-13 RX ADMIN — AMIODARONE HYDROCHLORIDE 100 MG: 200 TABLET ORAL at 09:29

## 2019-09-13 RX ADMIN — BRIMONIDINE TARTRATE 1 DROP: 2 SOLUTION OPHTHALMIC at 09:30

## 2019-09-13 RX ADMIN — LEVETIRACETAM 500 MG: 500 TABLET, FILM COATED ORAL at 09:28

## 2019-09-13 RX ADMIN — POTASSIUM CHLORIDE 20 MEQ: 1.5 POWDER, FOR SOLUTION ORAL at 09:25

## 2019-09-13 RX ADMIN — CARVEDILOL 12.5 MG: 12.5 TABLET, FILM COATED ORAL at 17:51

## 2019-09-13 RX ADMIN — CLOPIDOGREL BISULFATE 75 MG: 75 TABLET ORAL at 09:29

## 2019-09-13 RX ADMIN — LEVETIRACETAM 500 MG: 500 TABLET, FILM COATED ORAL at 17:51

## 2019-09-13 RX ADMIN — SODIUM CHLORIDE, SODIUM LACTATE, CALCIUM CHLORIDE, MAGNESIUM CHLORIDE AND DEXTROSE 2000 ML: 2.5; 538; 448; 18.3; 5.08 INJECTION, SOLUTION INTRAPERITONEAL at 22:05

## 2019-09-13 RX ADMIN — SODIUM CHLORIDE, SODIUM LACTATE, CALCIUM CHLORIDE, MAGNESIUM CHLORIDE AND DEXTROSE 2000 ML: 2.5; 538; 448; 18.3; 5.08 INJECTION, SOLUTION INTRAPERITONEAL at 18:37

## 2019-09-13 RX ADMIN — ISOSORBIDE MONONITRATE 60 MG: 60 TABLET, EXTENDED RELEASE ORAL at 06:44

## 2019-09-13 RX ADMIN — SODIUM CHLORIDE, SODIUM LACTATE, CALCIUM CHLORIDE, MAGNESIUM CHLORIDE AND DEXTROSE 2000 ML: 2.5; 538; 448; 18.3; 5.08 INJECTION, SOLUTION INTRAPERITONEAL at 09:30

## 2019-09-13 RX ADMIN — ASCORBIC ACID, THIAMINE MONONITRATE,RIBOFLAVIN, NIACINAMIDE, PYRIDOXINE HYDROCHLORIDE, FOLIC ACID, CYANOCOBALAMIN, BIOTIN, CALCIUM PANTOTHENATE, 1 CAPSULE: 100; 1.5; 1.7; 20; 10; 1; 6000; 150000; 5 CAPSULE, LIQUID FILLED ORAL at 09:28

## 2019-09-13 RX ADMIN — LOSARTAN POTASSIUM 25 MG: 25 TABLET ORAL at 09:29

## 2019-09-13 RX ADMIN — Medication 10 ML: at 22:05

## 2019-09-13 RX ADMIN — CARVEDILOL 12.5 MG: 12.5 TABLET, FILM COATED ORAL at 09:27

## 2019-09-13 RX ADMIN — SODIUM CHLORIDE, SODIUM LACTATE, CALCIUM CHLORIDE, MAGNESIUM CHLORIDE AND DEXTROSE 2000 ML: 2.5; 538; 448; 18.3; 5.08 INJECTION, SOLUTION INTRAPERITONEAL at 13:13

## 2019-09-13 RX ADMIN — LACOSAMIDE 150 MG: 50 TABLET, FILM COATED ORAL at 09:27

## 2019-09-13 RX ADMIN — Medication 10 ML: at 06:50

## 2019-09-13 RX ADMIN — LACOSAMIDE 150 MG: 50 TABLET, FILM COATED ORAL at 17:51

## 2019-09-13 RX ADMIN — ATORVASTATIN CALCIUM 40 MG: 40 TABLET, FILM COATED ORAL at 09:28

## 2019-09-13 RX ADMIN — SEVELAMER CARBONATE 1600 MG: 800 TABLET, FILM COATED ORAL at 13:11

## 2019-09-13 RX ADMIN — SEVELAMER CARBONATE 1600 MG: 800 TABLET, FILM COATED ORAL at 17:51

## 2019-09-13 RX ADMIN — PANTOPRAZOLE SODIUM 40 MG: 40 TABLET, DELAYED RELEASE ORAL at 09:29

## 2019-09-13 RX ADMIN — ACETAMINOPHEN 650 MG: 325 TABLET, FILM COATED ORAL at 22:35

## 2019-09-13 RX ADMIN — SEVELAMER CARBONATE 1600 MG: 800 TABLET, FILM COATED ORAL at 09:27

## 2019-09-13 RX ADMIN — Medication 10 ML: at 13:11

## 2019-09-13 RX ADMIN — BRIMONIDINE TARTRATE 1 DROP: 2 SOLUTION OPHTHALMIC at 17:52

## 2019-09-13 RX ADMIN — PHENYTOIN SODIUM 300 MG: 100 CAPSULE ORAL at 22:05

## 2019-09-13 RX ADMIN — ASPIRIN 81 MG: 81 TABLET, COATED ORAL at 09:27

## 2019-09-13 NOTE — PROGRESS NOTES
Hospitalist Progress Note  Aj Escalera MD  Answering service: [phone] OR [phone] from in house phone        Date of Service:  2019  NAME:  Mare Milan  :  1955  MRN:  [id number]  PCP: Unknown, Provider    Chief Complaint:   Chief Complaint   Patient presents with    Shortness of Breath         Admission Summary:     The patient is a 58-year-old female who has previous history significant for systolic heart failure, end-stage renal disease on peritoneal dialysis, history of coronary artery disease status post angioplasty and stent placement approximately 1-1/2 years ago at [facility], history of hypertension, previous history of stroke, hyperlipidemia, seizure disorder, comes to the emergency room due to worsening shortness of breath. Interval history / Subjective:     Seen and  Examined. Sitting in chair. On oxygen via NC. She does not feel she is back to her norm yet. No chest pain. Assessment & Plan:    Acute on Chronic Systolic and diastolic heart failure POA   Echo with 26-30% EF and Grade III diastolic dysfunction. On coreg,losartan,lasix. On PD and gets iv lasix as well. Strict in and o  Stress test is inconclusive,no further cardiac work up      Altered mental status 9/10  -Resolved. -MRI brain ,no acute process except the chronic changes. EEg normal.Continue AEds. Neurology evaluated.            Paroxysmal Afib  -on BB and amiodarone   -Avoid anticoagulation, H/O SDH and required  shunt     SDH in past    Right frontal ventriculoperitoneal shunt      History of seizures   See above ,on AED      Trop of .06   Cards on board   Stress testing to assess for coronary insufficiency either prior to discharge or as an OP per Dr Leny Morales   Scheduled for today   Coreg , imdur and asa and plavix      CAD s/p PCI   [facility] () - mid LAD 70-80%, distal LAD 50%, diagonal high-grade disease, OMB high-grade disease, RCA total occlusion - medically managed  2.  [facility] 2018 Catarina Maverick calcified vessels with severe early mid LAD stenosis, occluded RCA with faint L-R collateral filling and occluded 1st marginal - felt to be high risk for CABG / patient refused  3. [facility] 9/4/18 - S/p successful PCI of the prox LAD using 3.0 x 24 Synergy ANDER      COPD   Stable      STROKE X3 , left hemiparesisis     LEGALLY BLIND      HTN: c/w Home regimen, use labetalol prn.           Social : lives with nephew in [facility]  Has daughter in [place] and in [place]  Was in [place] rehab 2 months prior to it        Code status: full   DVT prophylaxis: [address] discussed with: Patient/Family  Disposition: Home w/Family and TBD    Code status: Full Code      Hospital Problems  Date Reviewed: 7/18/2019          Codes Class Noted POA    * (Principal) Altered mental state ICD-10-CM: R41.82  ICD-9-CM: 780.97  9/12/2019 Yes        Shortness of breath ICD-10-CM: R06.02  ICD-9-CM: 786.05  9/9/2019 Unknown                Review of Systems:   A comprehensive review of systems was negative except for that written in the HPI. Physical Examination:     Visit Vitals  /80   Pulse 73   Temp 98 °F (36.7 °C)   Resp 15   Ht 5' 3\" (1.6 m)   Wt 93.8 kg (206 lb 12.7 oz)   SpO2 100%   Breastfeeding? No   BMI 36.63 kg/m²     General:  Alert, cooperative, no distress, appears stated age. Back:   Symmetric, no curvature. ROM normal. No CVA tenderness. Lungs:   Clear to auscultation bilaterally. Chest wall:  No tenderness or deformity. Heart:  Regular rate and rhythm, S1, S2 normal, no murmur, click, rub or gallop. Abdomen:   Soft, non-tender. Bowel sounds normal. No masses,  No organomegaly. Extremities: Extremities normal, atraumatic, no cyanosis or edema. Pulses: 2+ and symmetric all extremities. Neurologic: Alert and awake. Legally blind. Moves extremities. Vital Signs:    Last 24hrs VS reviewed since prior progress note.  Most recent are:    Visit Vitals  /80   Pulse 73   Temp 98 °F (36.7 °C)   Resp 15   Ht 5' 3\" (1.6 m)   Wt 93.8 kg (206 lb 12.7 oz)   SpO2 100%   Breastfeeding? No   BMI 36.63 kg/m²         Intake/Output Summary (Last 24 hours) at 2019 1843  Last data filed at 2019 1301  Gross per 24 hour   Intake 6000 ml   Output 6350 ml   Net -350 ml        Tmax:  Temp (24hrs), Av.1 °F (36.7 °C), Min:97.6 °F (36.4 °C), Max:98.9 °F (37.2 °C)      Data Review:   Data reviewed by myself:  Mri Brain Wo Cont    Result Date: 2019  PRELIMINARY REPORT: Chronic appearing right greater than left subdural collection. Hemosiderin staining and encephalomalacia in the right frontal lobe adjacent to the right frontal shunt. Ventricular size not significant changed. No acute infarct. Encephalomalacia in the left frontal lobe with mild high signal within the periventricular white matter Preliminary report was provided by Dr. Lenny Freeman, the on-call radiologist, at 4911 Final report to follow. FINAL REPORT: EXAM: MRI BRAIN WO CONT TECHNIQUE: Sagittal and axial T1-weighted images axial FLAIR, T2-weighted, diffusion weighted, gradient echo,  coronal T2 IV CONTRAST:  None INDICATION:  Evaluate for hydrocephalus, prior stroke, epileptogenic focus COMPARISON:  CT head of 9/10/2019, brain MRI of 2016 FINDINGS: BRAIN PARENCHYMA:  No acute infarct. Moderate predominantly confluent FLAIR/T2 hyperintensity in the deep cerebral white matter, slightly increased since 2016, likely due to intracranial small vessel disease. Chronic left basal ganglia, right corona radiata, and right cerebellar lacunar infarcts. Unchanged right superior frontal lobe encephalomalacia INTRACRANIAL HEMORRHAGE: No acute hemorrhage. Small bilateral chronic extra-axial collections demonstrating mild T1 hypointensity and overall T2 hyperintensity. Unchanged on the right and smaller on the left when compared to 2016. Chronic hemorrhage in the region of the right frontal encephalomalacia.  CSF SPACES:  Right frontal ventriculostomy catheter with the tip in the region of the left frontal horn. Normal and unchanged ventricular size. BASAL CISTERNS:  Patent. MIDLINE SHIFT: None. VASCULAR SYSTEM:  Normal flow voids. PARANASAL SINUSES AND MASTOID AIR CELLS:  No significant opacification. VISUALIZED ORBITS:  No significant abnormalities. VISUALIZED UPPER CERVICAL SPINE:  No significant abnormalities. SELLA:  No enlargement or  focal abnormality. SKULL BASE:  No significant abnormalities. Cerebellar tonsils in normal position. CALVARIUM:  Intact. IMPRESSION:  1. No acute findings. 2. Moderate cerebral white matter signal abnormality and chronic lacunar infarcts, consistent with chronic small vessel ischemic change, with progression since 2019. 3. Unchanged ventricular size. Small chronic extra-axial collections, present since at least 2016, unchanged on the right and smaller on the left since that time. Ct Head Wo Cont    Result Date: 9/10/2019  EXAM: CT HEAD WO CONT INDICATION: Confusion/delirium, altered LOC, unexplained COMPARISON: 2015. CONTRAST: None. TECHNIQUE: Unenhanced CT of the head was performed using 5 mm images. Brain and bone windows were generated. CT dose reduction was achieved through use of a standardized protocol tailored for this examination and automatic exposure control for dose modulation. FINDINGS: The ventricles are enlarged compared to prior examination of 2015. There is a ventriculoperitoneal shunt in place with the tip in the region of the right lateral ventricle. There is periventricular hypoattenuation compatible with chronic small vessel ischemic changes. There is encephalomalacia in the left frontal lobe, likely in an area of prior ventriculostomy tube. There is a small low density collection in the right subdural space which appears chronic. There is no midline shift. There is encephalomalacia in the right frontal lobe. The basilar cisterns are open. No acute infarct is identified.  The bone windows demonstrate there is evidence of left parietal bone surgery. There is left maxillary sinus disease. There is calcification in the left extra-axial space. IMPRESSION: Interval enlargement of ventricular size since prior study with ventriculostomy tube in place. Xr Chest Port    Result Date: 9/9/2019  EXAM: XR CHEST PORT INDICATION: CHF COMPARISON: 7/19/2019 FINDINGS: A portable AP radiograph of the chest was obtained at 1411 hours. The patient is on a cardiac monitor. The lungs are clear. The cardiac and mediastinal contours and pulmonary vascularity are remarkable for a central prominence of the right hilum. There is a linear band of subsegmental atelectasis along the left major fissure. The bones and soft tissues are grossly within normal limits. IMPRESSION: No acute process identified    No results found for: MAX  Lab Results   Component Value Date/Time    Culture result: NO GROWTH 2 DAYS 09/11/2019 03:32 PM    Culture result: Culture performed on Fluid swab specimen 07/17/2019 11:09 AM    Culture result: NO GROWTH 4 DAYS 07/17/2019 11:09 AM     All Micro Results     Procedure Component Value Units Date/Time    CULTURE, BODY FLUID Summer Mcdowell STAIN [[id number]] Collected:  09/11/19 1532    Order Status:  Completed Specimen:  Dialysate Updated:  09/13/19 1325     Special Requests: NO SPECIAL REQUESTS        GRAM STAIN NO WBC'S SEEN         NO ORGANISMS SEEN        Culture result: NO GROWTH 2 DAYS             Labs: reviewed by myself. Recent Labs     09/12/19  0545   WBC 8.7   HGB 9.5*   HCT 30.8*        Recent Labs     09/12/19  0545   *   K 4.1   CL 98   CO2 24   BUN 49*   CREA 10.30*   GLU 76   CA 8.4*   PHOS 3.9     Recent Labs     09/12/19  0545   ALB 1.9*     No results for input(s): INR, PTP, APTT in the last 72 hours.     No lab exists for component: INREXT, INREXT   Recent Labs     09/12/19  0545   TIBC 291   PSAT 26      No results found for: FOL, RBCF   No results for input(s): PH, PCO2, PO2 in the last 72 hours. No results for input(s): CPK, CKNDX, TROIQ in the last 72 hours.     No lab exists for component: CPKMB  No results found for: CHOL, CHOLX, CHLST, CHOLV, HDL, HDLP, LDL, LDLC, DLDLP, TGLX, TRIGL, TRIGP, CHHD, CHHDX  Lab Results   Component Value Date/Time    Glucose (POC) 87 07/22/2019 11:16 AM    Glucose (POC) 162 (H) 07/17/2019 03:21 AM     Lab Results   Component Value Date/Time    Color YELLOW/STRAW 04/03/2015 08:31 PM    Appearance CLOUDY (A) 04/03/2015 08:31 PM    Specific gravity 1.013 04/03/2015 08:31 PM    pH (UA) 7.5 04/03/2015 08:31 PM    Protein 300 (A) 04/03/2015 08:31 PM    Glucose NEGATIVE  04/03/2015 08:31 PM    Ketone NEGATIVE  04/03/2015 08:31 PM    Bilirubin NEGATIVE  04/03/2015 08:31 PM    Urobilinogen 0.2 04/03/2015 08:31 PM    Nitrites NEGATIVE  04/03/2015 08:31 PM    Leukocyte Esterase NEGATIVE  04/03/2015 08:31 PM    Epithelial cells MANY (A) 04/03/2015 08:31 PM    Bacteria 1+ (A) 04/03/2015 08:31 PM    WBC 0-4 04/03/2015 08:31 PM    RBC 0-5 04/03/2015 08:31 PM         Medications Reviewed:     Current Facility-Administered Medications   Medication Dose Route Frequency    peritoneal dialysis DEXTROSE 2.5% (2.5 mEq/L low calcium) solution 2,000 mL  2,000 mL IntraPERitoneal QID    acetaminophen (TYLENOL) tablet 650 mg  650 mg Oral Q4H PRN    LORazepam (ATIVAN) injection 1 mg  1 mg IntraVENous Q4H PRN    bisacodyl (DULCOLAX) suppository 10 mg  10 mg Rectal DAILY PRN    sodium chloride (NS) flush 5-40 mL  5-40 mL IntraVENous Q8H    sodium chloride (NS) flush 5-40 mL  5-40 mL IntraVENous PRN    aspirin delayed-release tablet 81 mg  81 mg Oral DAILY    atorvastatin (LIPITOR) tablet 40 mg  40 mg Oral DAILY    brimonidine (ALPHAGAN) 0.2 % ophthalmic solution 1 Drop  1 Drop Both Eyes BID    calcitRIOL (ROCALTROL) capsule 0.25 mcg  0.25 mcg Oral Once per day on Thu Sat    carvedilol (COREG) tablet 12.5 mg  12.5 mg Oral BID WITH MEALS    clopidogrel (PLAVIX) tablet 75 mg  75 mg Oral DAILY    isosorbide mononitrate ER (IMDUR) tablet 60 mg  60 mg Oral 7am    lactulose (CHRONULAC) 10 gram/15 mL solution 30 mL  20 g Oral BID PRN    levETIRAcetam (KEPPRA) tablet 500 mg  500 mg Oral BID    pantoprazole (PROTONIX) tablet 40 mg  40 mg Oral ACB    sevelamer carbonate (RENVELA) tab 1,600 mg  1,600 mg Oral TID WITH MEALS    furosemide (LASIX) injection 40 mg  40 mg IntraVENous DAILY    docusate sodium (COLACE) capsule 100 mg  100 mg Oral BID PRN    B complex-vitaminC-folic acid (NEPHROCAP) cap  1 Cap Oral DAILY    lacosamide (VIMPAT) tablet 150 mg  150 mg Oral BID    losartan (COZAAR) tablet 25 mg  25 mg Oral DAILY    phenytoin ER (DILANTIN ER) ER capsule 300 mg  300 mg Oral QHS    potassium chloride (KLOR-CON) packet for solution 20 mEq  20 mEq Oral DAILY    albuterol-ipratropium (DUO-NEB) 2.5 MG-0.5 MG/3 ML  3 mL Nebulization Q6H PRN    amiodarone (CORDARONE) tablet 100 mg  100 mg Oral DAILY     ______________________________________________________________________  EXPECTED LENGTH OF STAY: 4d 2h  ACTUAL LENGTH OF STAY:          4                 Coty Lin MD     Patient's emergency contacts:  Extended Emergency Contact Information  Primary Emergency Contact: [address] [facility] Phone: [phone]  Relation: Sister  Secondary Emergency Contact: [address] [facility] Phone: [phone]  Relation: Child

## 2019-09-13 NOTE — PROGRESS NOTES
Name: Fabian Hickey MRN: [id number]   : 1955 Hospital: [address]   Date: 2019        IMPRESSION:   · ESRD on PD. The PD is progressing well. Patient is in negative fluid balance. Patient had a stress test done. The results are noted. · Anemia with significant drop in Hb. ?bleeding  · Severe hypoalbuminemia. PLAN:   · Continue PD with  dextrose  to 2.5%  · Continue protein supplements  · Check exchanged fluid for C&S  · Anemia management- check iron profile, give a dose of Epo and check for occult GI bleed. · Repeat the renal panel and CBC in AM  · As per PD nurse Minh Sparks Ms. Sam Guillory has been not compliant with her PD treatments at home due to blindness and home conditions. She may be better on HD. I will defer this decision to patient's primary nephrologist Dr. Cathy Felix. Subjective/Interval History:   I have reviewed the flowsheet and previous days notes. ROS:A comprehensive review of systems was negative. Objective:   Vital Signs:    Visit Vitals  /84   Pulse 71   Temp 97.6 °F (36.4 °C)   Resp 14   Ht 5' 3\" (1.6 m)   Wt 93.8 kg (206 lb 12.7 oz)   SpO2 100%   Breastfeeding? No   BMI 36.63 kg/m²       O2 Device: Nasal cannula   O2 Flow Rate (L/min): 1 l/min   Temp (24hrs), Av.2 °F (36.8 °C), Min:97.6 °F (36.4 °C), Max:98.9 °F (37.2 °C)       Intake/Output:   Last shift:      701 - 1900  In:    Out:    Last 3 shifts: 1901 -  0700  In: 6000   Out: 4550     Intake/Output Summary (Last 24 hours) at 2019 1301  Last data filed at 2019 0952  Gross per 24 hour   Intake 4000 ml   Output 4150 ml   Net -150 ml        Physical Exam:  General:    Awake, more alert, c/o pain and vomiting, no distress, appears stated age. Head:   Normocephalic, without obvious abnormality, atraumatic. Eyes:   Anicteric. Nose:  Nares normal. No drainage or sinus tenderness.   Neck:  Supple, symmetrical,  no adenopathy, thyroid: non tender    no carotid bruit and no JVD. Lungs:   Clear to auscultation bilaterally. No Wheezing or Rhonchi. No rales. Chest wall:  No tenderness or deformity. No Accessory muscle use. PD catheter exit site is just above the left breast.  Heart:   Regular rate and rhythm,  no murmur, rub or gallop. Abdomen:   Soft, +++ tender. Distended. Bowel sounds normal. No masses  Extremities: Extremities normal, atraumatic, No cyanosis. No edema. No clubbing  Skin:     Texture, turgor normal. No rashes or lesions. Not Jaundiced  Psych:  Not anxious or agitated. DATA:  Labs:  Recent Labs     09/12/19  0545   *   K 4.1   CL 98   CO2 24   BUN 49*   CREA 10.30*   CA 8.4*   ALB 1.9*   PHOS 3.9     Recent Labs     09/12/19  0545   WBC 8.7   HGB 9.5*   HCT 30.8*        No results for input(s): CLARISA, KU, CLU, CREAU in the last 72 hours.     No lab exists for component: PROU    Total time spent with patient:  35 minutes    [] Critical Care Provided    Care Plan discussed with:   Pervis Alpers, MD

## 2019-09-13 NOTE — ROUTINE PROCESS
Bedside and Verbal shift change report given to Duane Herrmann RN  (oncoming nurse) by Radha Romo RN  (offgoing nurse). Report included the following information SBAR, Kardex, ED Summary, Procedure Summary, Intake/Output, MAR, Recent Results, Med Rec Status, Cardiac Rhythm NSR. , Alarm Parameters  and Procedure Verification.

## 2019-09-13 NOTE — NURSE NAVIGATOR
Follow up scheduled with [facility] with Dr. Monica Pemberton (cardiology) for 9/19/18 at 9:45 am.  Information on After Visit Summary.

## 2019-09-13 NOTE — PROGRESS NOTES
Bedside shift change report given to Devin Huynh (oncoming nurse) by Jennifer (offgoing nurse). Report included the following information SBAR, Kardex, ED Summary, MAR, Recent Results and Cardiac Rhythm NSR.

## 2019-09-13 NOTE — PROGRESS NOTES
Problem: Heart Failure: Day 1  Goal: Diagnostic Test/Procedures  Outcome: Progressing Towards Goal  Goal: Nutrition/Diet  Outcome: Progressing Towards Goal  Goal: Discharge Planning  Outcome: Progressing Towards Goal  Goal: Medications  Outcome: Progressing Towards Goal  Goal: *Optimal pain control at patient's stated goal  Outcome: Progressing Towards Goal  Goal: *Anxiety reduced or absent  Outcome: Progressing Towards Goal

## 2019-09-14 ENCOUNTER — APPOINTMENT (OUTPATIENT)
Dept: GENERAL RADIOLOGY | Age: 64
DRG: 291 | End: 2019-09-14
Attending: HOSPITALIST
Payer: MEDICARE

## 2019-09-14 PROCEDURE — 74011250637 HC RX REV CODE- 250/637: Performed by: NURSE PRACTITIONER

## 2019-09-14 PROCEDURE — A4722 DIALYS SOL FLD VOL > 1999CC: HCPCS | Performed by: INTERNAL MEDICINE

## 2019-09-14 PROCEDURE — 73502 X-RAY EXAM HIP UNI 2-3 VIEWS: CPT

## 2019-09-14 PROCEDURE — 74011250636 HC RX REV CODE- 250/636: Performed by: HOSPITALIST

## 2019-09-14 PROCEDURE — 74011250636 HC RX REV CODE- 250/636: Performed by: INTERNAL MEDICINE

## 2019-09-14 PROCEDURE — 65660000000 HC RM CCU STEPDOWN

## 2019-09-14 PROCEDURE — 74011250637 HC RX REV CODE- 250/637: Performed by: HOSPITALIST

## 2019-09-14 RX ORDER — OXYCODONE AND ACETAMINOPHEN 5; 325 MG/1; MG/1
1 TABLET ORAL
Status: DISCONTINUED | OUTPATIENT
Start: 2019-09-14 | End: 2019-09-19 | Stop reason: HOSPADM

## 2019-09-14 RX ADMIN — CARVEDILOL 12.5 MG: 12.5 TABLET, FILM COATED ORAL at 18:37

## 2019-09-14 RX ADMIN — PHENYTOIN SODIUM 300 MG: 100 CAPSULE ORAL at 22:23

## 2019-09-14 RX ADMIN — ASCORBIC ACID, THIAMINE MONONITRATE,RIBOFLAVIN, NIACINAMIDE, PYRIDOXINE HYDROCHLORIDE, FOLIC ACID, CYANOCOBALAMIN, BIOTIN, CALCIUM PANTOTHENATE, 1 CAPSULE: 100; 1.5; 1.7; 20; 10; 1; 6000; 150000; 5 CAPSULE, LIQUID FILLED ORAL at 09:15

## 2019-09-14 RX ADMIN — SODIUM CHLORIDE, SODIUM LACTATE, CALCIUM CHLORIDE, MAGNESIUM CHLORIDE AND DEXTROSE 2000 ML: 2.5; 538; 448; 18.3; 5.08 INJECTION, SOLUTION INTRAPERITONEAL at 22:21

## 2019-09-14 RX ADMIN — POTASSIUM CHLORIDE 20 MEQ: 1.5 POWDER, FOR SOLUTION ORAL at 09:15

## 2019-09-14 RX ADMIN — LEVETIRACETAM 500 MG: 500 TABLET, FILM COATED ORAL at 18:37

## 2019-09-14 RX ADMIN — PANTOPRAZOLE SODIUM 40 MG: 40 TABLET, DELAYED RELEASE ORAL at 07:02

## 2019-09-14 RX ADMIN — SODIUM CHLORIDE, SODIUM LACTATE, CALCIUM CHLORIDE, MAGNESIUM CHLORIDE AND DEXTROSE 2000 ML: 2.5; 538; 448; 18.3; 5.08 INJECTION, SOLUTION INTRAPERITONEAL at 13:30

## 2019-09-14 RX ADMIN — AMIODARONE HYDROCHLORIDE 100 MG: 200 TABLET ORAL at 09:39

## 2019-09-14 RX ADMIN — OXYCODONE HYDROCHLORIDE AND ACETAMINOPHEN 1 TABLET: 5; 325 TABLET ORAL at 14:46

## 2019-09-14 RX ADMIN — ACETAMINOPHEN 650 MG: 325 TABLET, FILM COATED ORAL at 09:42

## 2019-09-14 RX ADMIN — SODIUM CHLORIDE, SODIUM LACTATE, CALCIUM CHLORIDE, MAGNESIUM CHLORIDE AND DEXTROSE 2000 ML: 2.5; 538; 448; 18.3; 5.08 INJECTION, SOLUTION INTRAPERITONEAL at 09:14

## 2019-09-14 RX ADMIN — BRIMONIDINE TARTRATE 1 DROP: 2 SOLUTION OPHTHALMIC at 19:02

## 2019-09-14 RX ADMIN — SODIUM CHLORIDE, SODIUM LACTATE, CALCIUM CHLORIDE, MAGNESIUM CHLORIDE AND DEXTROSE 2000 ML: 2.5; 538; 448; 18.3; 5.08 INJECTION, SOLUTION INTRAPERITONEAL at 18:38

## 2019-09-14 RX ADMIN — ASPIRIN 81 MG: 81 TABLET, COATED ORAL at 09:16

## 2019-09-14 RX ADMIN — Medication 10 ML: at 13:47

## 2019-09-14 RX ADMIN — CARVEDILOL 12.5 MG: 12.5 TABLET, FILM COATED ORAL at 09:15

## 2019-09-14 RX ADMIN — LEVETIRACETAM 500 MG: 500 TABLET, FILM COATED ORAL at 09:15

## 2019-09-14 RX ADMIN — Medication 10 ML: at 07:05

## 2019-09-14 RX ADMIN — CALCITRIOL CAPSULES 0.25 MCG 0.25 MCG: 0.25 CAPSULE ORAL at 19:02

## 2019-09-14 RX ADMIN — ISOSORBIDE MONONITRATE 60 MG: 60 TABLET, EXTENDED RELEASE ORAL at 07:02

## 2019-09-14 RX ADMIN — CLOPIDOGREL BISULFATE 75 MG: 75 TABLET ORAL at 09:16

## 2019-09-14 RX ADMIN — LACOSAMIDE 150 MG: 50 TABLET, FILM COATED ORAL at 09:15

## 2019-09-14 RX ADMIN — BRIMONIDINE TARTRATE 1 DROP: 2 SOLUTION OPHTHALMIC at 09:17

## 2019-09-14 RX ADMIN — SEVELAMER CARBONATE 1600 MG: 800 TABLET, FILM COATED ORAL at 18:38

## 2019-09-14 RX ADMIN — LOSARTAN POTASSIUM 25 MG: 25 TABLET ORAL at 09:15

## 2019-09-14 RX ADMIN — ATORVASTATIN CALCIUM 40 MG: 40 TABLET, FILM COATED ORAL at 09:15

## 2019-09-14 RX ADMIN — SEVELAMER CARBONATE 1600 MG: 800 TABLET, FILM COATED ORAL at 09:16

## 2019-09-14 RX ADMIN — LACOSAMIDE 150 MG: 50 TABLET, FILM COATED ORAL at 18:37

## 2019-09-14 RX ADMIN — SEVELAMER CARBONATE 1600 MG: 800 TABLET, FILM COATED ORAL at 12:44

## 2019-09-14 RX ADMIN — Medication 10 ML: at 22:30

## 2019-09-14 NOTE — PROGRESS NOTES
Patient peritoneal dialysis performed as bedside. Patient resp status improving. Less SOB. Weaning off O2.

## 2019-09-14 NOTE — PROGRESS NOTES
Hospitalist Progress Note  Андрей Guerrero MD  Answering service: [phone] OR [phone] from in house phone        Date of Service:  2019  NAME:  Lorelei Rosado  :  1955  MRN:  [id number]  PCP: Unknown, Provider    Chief Complaint:   Chief Complaint   Patient presents with    Shortness of Breath         Admission Summary:     The patient is a 64-year-old female who has previous history significant for systolic heart failure, end-stage renal disease on peritoneal dialysis, history of coronary artery disease status post angioplasty and stent placement approximately 1-1/2 years ago at [facility], history of hypertension, previous history of stroke, hyperlipidemia, seizure disorder, comes to the emergency room due to worsening shortness of breath. Interval history / Subjective:     Seen and  Examined  She complains of right hip pain which started when she woke up from a nap this afternoon. Assessment & Plan:    Acute on Chronic Systolic and diastolic heart failure POA   Echo with 26-30% EF and Grade III diastolic dysfunction. On coreg,losartan,lasix. On PD and gets iv lasix as well. Strict in and o  Stress test is inconclusive,no further cardiac work up      Altered mental status 9/10  -Resolved. -MRI brain ,no acute process except the chronic changes. EEg normal.Continue AEds. Neurology evaluated.            Paroxysmal Afib  -on BB and amiodarone   -Avoid anticoagulation, H/O SDH and required  shunt     SDH in past    Right frontal ventriculoperitoneal shunt      History of seizures   See above ,on AED      Trop of .06   Cards on board   Stress testing to assess for coronary insufficiency either prior to discharge or as an OP per Dr East Draft   Scheduled for today   Coreg , imdur and asa and plavix      CAD s/p PCI   [facility] () - mid LAD 70-80%, distal LAD 50%, diagonal high-grade disease, OMB high-grade disease, RCA total occlusion - medically managed  2.  [facility] 2018 Krysta Bryan calcified vessels with severe early mid LAD stenosis, occluded RCA with faint L-R collateral filling and occluded 1st marginal - felt to be high risk for CABG / patient refused  3. [facility] 9/4/18 - S/p successful PCI of the prox LAD using 3.0 x 24 Synergy ANDER      COPD   Stable      STROKE X3 , left hemiparesisis     LEGALLY BLIND      HTN: c/w Home regimen, use labetalol prn. Right hip pain: xray. pain control.           Social : lives with nephew in [facility]  Has daughter in [place] and in [place]  Was in [place] rehab 2 months prior to it        Code status: full   DVT prophylaxis: [address] discussed with: Patient/Family  Disposition: Home w/Family and TBD    Code status: Full Code      Hospital Problems  Date Reviewed: 7/18/2019          Codes Class Noted POA    * (Principal) Altered mental state ICD-10-CM: R41.82  ICD-9-CM: 780.97  9/12/2019 Yes        Shortness of breath ICD-10-CM: R06.02  ICD-9-CM: 786.05  9/9/2019 Unknown                Review of Systems:   A comprehensive review of systems was negative except for that written in the HPI. Physical Examination:     Visit Vitals  /79 (BP 1 Location: Left arm, BP Patient Position: At rest)   Pulse 65   Temp 97.9 °F (36.6 °C)   Resp 12   Ht 5' 3\" (1.6 m)   Wt 93.5 kg (206 lb 2.1 oz)   SpO2 100%   Breastfeeding? No   BMI 36.51 kg/m²     General:  Alert, cooperative, no distress, appears stated age. Back:   Symmetric, no curvature. ROM normal. No CVA tenderness. Lungs:   Clear to auscultation bilaterally. Chest wall:  No tenderness or deformity. Heart:  Regular rate and rhythm, S1, S2 normal, no murmur, click, rub or gallop. Abdomen:   Soft, non-tender. Bowel sounds normal. No masses,  No organomegaly. Extremities: Pain on ROM right hip   Pulses: 2+ and symmetric all extremities. Neurologic: Alert and awake. Legally blind. Moves extremities. Vital Signs:    Last 24hrs VS reviewed since prior progress note. Most recent are:    Visit Vitals  /79 (BP 1 Location: Left arm, BP Patient Position: At rest)   Pulse 65   Temp 97.9 °F (36.6 °C)   Resp 12   Ht 5' 3\" (1.6 m)   Wt 93.5 kg (206 lb 2.1 oz)   SpO2 100%   Breastfeeding? No   BMI 36.51 kg/m²         Intake/Output Summary (Last 24 hours) at 2019 1432  Last data filed at 2019 0945  Gross per 24 hour   Intake 4000 ml   Output 3900 ml   Net 100 ml        Tmax:  Temp (24hrs), Av.9 °F (36.6 °C), Min:97.2 °F (36.2 °C), Max:98.5 °F (36.9 °C)      Data Review:   Data reviewed by myself:  Mri Brain Wo Cont    Result Date: 2019  PRELIMINARY REPORT: Chronic appearing right greater than left subdural collection. Hemosiderin staining and encephalomalacia in the right frontal lobe adjacent to the right frontal shunt. Ventricular size not significant changed. No acute infarct. Encephalomalacia in the left frontal lobe with mild high signal within the periventricular white matter Preliminary report was provided by Dr. Navdeep Mtz, the on-call radiologist, at 7948 Final report to follow. FINAL REPORT: EXAM: MRI BRAIN WO CONT TECHNIQUE: Sagittal and axial T1-weighted images axial FLAIR, T2-weighted, diffusion weighted, gradient echo,  coronal T2 IV CONTRAST:  None INDICATION:  Evaluate for hydrocephalus, prior stroke, epileptogenic focus COMPARISON:  CT head of 9/10/2019, brain MRI of 2016 FINDINGS: BRAIN PARENCHYMA:  No acute infarct. Moderate predominantly confluent FLAIR/T2 hyperintensity in the deep cerebral white matter, slightly increased since 2016, likely due to intracranial small vessel disease. Chronic left basal ganglia, right corona radiata, and right cerebellar lacunar infarcts. Unchanged right superior frontal lobe encephalomalacia INTRACRANIAL HEMORRHAGE: No acute hemorrhage. Small bilateral chronic extra-axial collections demonstrating mild T1 hypointensity and overall T2 hyperintensity.  Unchanged on the right and smaller on the left when compared to 2016. Chronic hemorrhage in the region of the right frontal encephalomalacia. CSF SPACES:  Right frontal ventriculostomy catheter with the tip in the region of the left frontal horn. Normal and unchanged ventricular size. BASAL CISTERNS:  Patent. MIDLINE SHIFT: None. VASCULAR SYSTEM:  Normal flow voids. PARANASAL SINUSES AND MASTOID AIR CELLS:  No significant opacification. VISUALIZED ORBITS:  No significant abnormalities. VISUALIZED UPPER CERVICAL SPINE:  No significant abnormalities. SELLA:  No enlargement or  focal abnormality. SKULL BASE:  No significant abnormalities. Cerebellar tonsils in normal position. CALVARIUM:  Intact. IMPRESSION:  1. No acute findings. 2. Moderate cerebral white matter signal abnormality and chronic lacunar infarcts, consistent with chronic small vessel ischemic change, with progression since 2019. 3. Unchanged ventricular size. Small chronic extra-axial collections, present since at least 2016, unchanged on the right and smaller on the left since that time. Ct Head Wo Cont    Result Date: 9/10/2019  EXAM: CT HEAD WO CONT INDICATION: Confusion/delirium, altered LOC, unexplained COMPARISON: 2015. CONTRAST: None. TECHNIQUE: Unenhanced CT of the head was performed using 5 mm images. Brain and bone windows were generated. CT dose reduction was achieved through use of a standardized protocol tailored for this examination and automatic exposure control for dose modulation. FINDINGS: The ventricles are enlarged compared to prior examination of 2015. There is a ventriculoperitoneal shunt in place with the tip in the region of the right lateral ventricle. There is periventricular hypoattenuation compatible with chronic small vessel ischemic changes. There is encephalomalacia in the left frontal lobe, likely in an area of prior ventriculostomy tube. There is a small low density collection in the right subdural space which appears chronic. There is no midline shift.  There is encephalomalacia in the right frontal lobe. The basilar cisterns are open. No acute infarct is identified. The bone windows demonstrate there is evidence of left parietal bone surgery. There is left maxillary sinus disease. There is calcification in the left extra-axial space. IMPRESSION: Interval enlargement of ventricular size since prior study with ventriculostomy tube in place. Xr Chest Port    Result Date: 9/9/2019  EXAM: XR CHEST PORT INDICATION: CHF COMPARISON: 7/19/2019 FINDINGS: A portable AP radiograph of the chest was obtained at 1411 hours. The patient is on a cardiac monitor. The lungs are clear. The cardiac and mediastinal contours and pulmonary vascularity are remarkable for a central prominence of the right hilum. There is a linear band of subsegmental atelectasis along the left major fissure. The bones and soft tissues are grossly within normal limits. IMPRESSION: No acute process identified    No results found for: SDES  Lab Results   Component Value Date/Time    Culture result: NO GROWTH 3 DAYS 09/11/2019 03:32 PM    Culture result: Culture performed on Fluid swab specimen 07/17/2019 11:09 AM    Culture result: NO GROWTH 4 DAYS 07/17/2019 11:09 AM     All Micro Results     Procedure Component Value Units Date/Time    CULTURE, BODY FLUID Hulon Bob STAIN [[id number]] Collected:  09/11/19 1532    Order Status:  Completed Specimen:  Dialysate Updated:  09/14/19 1211     Special Requests: NO SPECIAL REQUESTS        GRAM STAIN NO WBC'S SEEN         NO ORGANISMS SEEN        Culture result: NO GROWTH 3 DAYS             Labs: reviewed by myself. Recent Labs     09/12/19  0545   WBC 8.7   HGB 9.5*   HCT 30.8*        Recent Labs     09/12/19  0545   *   K 4.1   CL 98   CO2 24   BUN 49*   CREA 10.30*   GLU 76   CA 8.4*   PHOS 3.9     Recent Labs     09/12/19  0545   ALB 1.9*     No results for input(s): INR, PTP, APTT in the last 72 hours.     No lab exists for component: INREXT, INREXT   Recent Labs     09/12/19  0545   TIBC 291   PSAT 26      No results found for: FOL, RBCF   No results for input(s): PH, PCO2, PO2 in the last 72 hours. No results for input(s): CPK, CKNDX, TROIQ in the last 72 hours.     No lab exists for component: CPKMB  No results found for: CHOL, CHOLX, CHLST, CHOLV, HDL, HDLP, LDL, LDLC, DLDLP, TGLX, TRIGL, TRIGP, CHHD, CHHDX  Lab Results   Component Value Date/Time    Glucose (POC) 87 07/22/2019 11:16 AM    Glucose (POC) 162 (H) 07/17/2019 03:21 AM     Lab Results   Component Value Date/Time    Color YELLOW/STRAW 04/03/2015 08:31 PM    Appearance CLOUDY (A) 04/03/2015 08:31 PM    Specific gravity 1.013 04/03/2015 08:31 PM    pH (UA) 7.5 04/03/2015 08:31 PM    Protein 300 (A) 04/03/2015 08:31 PM    Glucose NEGATIVE  04/03/2015 08:31 PM    Ketone NEGATIVE  04/03/2015 08:31 PM    Bilirubin NEGATIVE  04/03/2015 08:31 PM    Urobilinogen 0.2 04/03/2015 08:31 PM    Nitrites NEGATIVE  04/03/2015 08:31 PM    Leukocyte Esterase NEGATIVE  04/03/2015 08:31 PM    Epithelial cells MANY (A) 04/03/2015 08:31 PM    Bacteria 1+ (A) 04/03/2015 08:31 PM    WBC 0-4 04/03/2015 08:31 PM    RBC 0-5 04/03/2015 08:31 PM         Medications Reviewed:     Current Facility-Administered Medications   Medication Dose Route Frequency    oxyCODONE-acetaminophen (PERCOCET) 5-325 mg per tablet 1 Tab  1 Tab Oral Q4H PRN    peritoneal dialysis DEXTROSE 2.5% (2.5 mEq/L low calcium) solution 2,000 mL  2,000 mL IntraPERitoneal QID    acetaminophen (TYLENOL) tablet 650 mg  650 mg Oral Q4H PRN    LORazepam (ATIVAN) injection 1 mg  1 mg IntraVENous Q4H PRN    bisacodyl (DULCOLAX) suppository 10 mg  10 mg Rectal DAILY PRN    sodium chloride (NS) flush 5-40 mL  5-40 mL IntraVENous Q8H    sodium chloride (NS) flush 5-40 mL  5-40 mL IntraVENous PRN    aspirin delayed-release tablet 81 mg  81 mg Oral DAILY    atorvastatin (LIPITOR) tablet 40 mg  40 mg Oral DAILY    brimonidine (ALPHAGAN) 0.2 % ophthalmic solution 1 Drop  1 Drop Both Eyes BID    calcitRIOL (ROCALTROL) capsule 0.25 mcg  0.25 mcg Oral Once per day on Thu Sat    carvedilol (COREG) tablet 12.5 mg  12.5 mg Oral BID WITH MEALS    clopidogrel (PLAVIX) tablet 75 mg  75 mg Oral DAILY    isosorbide mononitrate ER (IMDUR) tablet 60 mg  60 mg Oral 7am    lactulose (CHRONULAC) 10 gram/15 mL solution 30 mL  20 g Oral BID PRN    levETIRAcetam (KEPPRA) tablet 500 mg  500 mg Oral BID    pantoprazole (PROTONIX) tablet 40 mg  40 mg Oral ACB    sevelamer carbonate (RENVELA) tab 1,600 mg  1,600 mg Oral TID WITH MEALS    furosemide (LASIX) injection 40 mg  40 mg IntraVENous DAILY    docusate sodium (COLACE) capsule 100 mg  100 mg Oral BID PRN    B complex-vitaminC-folic acid (NEPHROCAP) cap  1 Cap Oral DAILY    lacosamide (VIMPAT) tablet 150 mg  150 mg Oral BID    losartan (COZAAR) tablet 25 mg  25 mg Oral DAILY    phenytoin ER (DILANTIN ER) ER capsule 300 mg  300 mg Oral QHS    potassium chloride (KLOR-CON) packet for solution 20 mEq  20 mEq Oral DAILY    albuterol-ipratropium (DUO-NEB) 2.5 MG-0.5 MG/3 ML  3 mL Nebulization Q6H PRN    amiodarone (CORDARONE) tablet 100 mg  100 mg Oral DAILY     ______________________________________________________________________  EXPECTED LENGTH OF STAY: 4d 2h  ACTUAL LENGTH OF STAY:          5                 Tom Escamilla MD     Patient's emergency contacts:  Extended Emergency Contact Information  Primary Emergency Contact: [address] [facility] Phone: [phone]  Relation: Sister  Secondary Emergency Contact: [address] [facility] Phone: [phone]  Relation: Child

## 2019-09-14 NOTE — PROGRESS NOTES
Problem: Heart Failure: Day 4  Goal: Diagnostic Test/Procedures  Outcome: Progressing Towards Goal  Goal: Nutrition/Diet  Outcome: Progressing Towards Goal  Goal: Psychosocial  Outcome: Progressing Towards Goal  Goal: *Optimal pain control at patient's stated goal  Outcome: Progressing Towards Goal  Goal: *Anxiety reduced or absent  Outcome: Not Progressing Towards Goal

## 2019-09-14 NOTE — PROGRESS NOTES
Bedside shift change report given to Damaris (oncoming nurse) by Jennifer (offgoing nurse). Report included the following information SBAR, ED Summary, Intake/Output, MAR, Accordion and Cardiac Rhythm NSR.

## 2019-09-14 NOTE — PROGRESS NOTES
Name: Jessenia Sanchez MRN: [id number]   : 1955 Hospital: [address]   Date: 2019        IMPRESSION:   · ESRD on PD. The PD is progressing well. Patient is in negative fluid balance. Patient had a stress test done. The results are noted. · Hypervolemia- resolved. · Severe hypoalbuminemia. PLAN:   · Continue PD with  dextrose  to 2.5%  · Continue protein supplements  · Anemia management  · Repeat the renal panel and CBC in AM  · As per PD nurse Nikolai Bernal Ms. Brent Brunner has been not compliant with her PD treatments at home due to blindness and home conditions. She may be better on HD. I will defer this decision to patient's primary nephrologist Dr. Libby Reddy. When I approached her regarding switching to HD patient made it clear that this is not an option she is agreeable with. Subjective/Interval History:   I have reviewed the flowsheet and previous days notes. ROS:A comprehensive review of systems was negative. Objective:   Vital Signs:    Visit Vitals  /84   Pulse 73   Temp 97.9 °F (36.6 °C)   Resp 22   Ht 5' 3\" (1.6 m)   Wt 93.5 kg (206 lb 2.1 oz)   SpO2 100%   Breastfeeding? No   BMI 36.51 kg/m²       O2 Device: Room air   O2 Flow Rate (L/min): 1 l/min   Temp (24hrs), Av.8 °F (36.6 °C), Min:97.2 °F (36.2 °C), Max:98.5 °F (36.9 °C)       Intake/Output:   Last shift:      No intake/output data recorded. Last 3 shifts:  1901 -  0700  In: 8000   Out: 8350     Intake/Output Summary (Last 24 hours) at 2019 1015  Last data filed at 2019 1934  Gross per 24 hour   Intake 4000 ml   Output 4200 ml   Net -200 ml        Physical Exam:  General:    Awake, more alert, c/o pain and vomiting, no distress, appears stated age. Head:   Normocephalic, without obvious abnormality, atraumatic. Eyes:   Anicteric. Nose:  Nares normal. No drainage or sinus tenderness.   Neck:  Supple, symmetrical,  no adenopathy, thyroid: non tender    no carotid bruit and no JVD.  Lungs:   Clear to auscultation bilaterally. No Wheezing or Rhonchi. No rales. Chest wall:  No tenderness or deformity. No Accessory muscle use. PD catheter exit site is just above the left breast.  Heart:   Regular rate and rhythm,  no murmur, rub or gallop. Abdomen:   Soft, +++ tender. Distended. Bowel sounds normal. No masses  Extremities: Extremities normal, atraumatic, No cyanosis. No edema. No clubbing  Skin:     Texture, turgor normal. No rashes or lesions. Not Jaundiced  Psych:  Not anxious or agitated. DATA:  Labs:  Recent Labs     09/12/19  0545   *   K 4.1   CL 98   CO2 24   BUN 49*   CREA 10.30*   CA 8.4*   ALB 1.9*   PHOS 3.9     Recent Labs     09/12/19  0545   WBC 8.7   HGB 9.5*   HCT 30.8*        No results for input(s): CLARISA, KU, CLU, CREAU in the last 72 hours.     No lab exists for component: PROU    Total time spent with patient:  35 minutes    [] Critical Care Provided    Care Plan discussed with:   Kristofer Cordova MD

## 2019-09-15 LAB
BACTERIA SPEC CULT: NORMAL
GRAM STN SPEC: NORMAL
GRAM STN SPEC: NORMAL
SERVICE CMNT-IMP: NORMAL

## 2019-09-15 PROCEDURE — 74011250636 HC RX REV CODE- 250/636: Performed by: INTERNAL MEDICINE

## 2019-09-15 PROCEDURE — 77030018846 HC SOL IRR STRL H20 ICUM -A

## 2019-09-15 PROCEDURE — 74011250637 HC RX REV CODE- 250/637: Performed by: HOSPITALIST

## 2019-09-15 PROCEDURE — A4722 DIALYS SOL FLD VOL > 1999CC: HCPCS | Performed by: INTERNAL MEDICINE

## 2019-09-15 PROCEDURE — 65660000000 HC RM CCU STEPDOWN

## 2019-09-15 PROCEDURE — 74011250637 HC RX REV CODE- 250/637: Performed by: NURSE PRACTITIONER

## 2019-09-15 PROCEDURE — 74011250636 HC RX REV CODE- 250/636: Performed by: HOSPITALIST

## 2019-09-15 RX ORDER — ONDANSETRON 2 MG/ML
4 INJECTION INTRAMUSCULAR; INTRAVENOUS
Status: DISCONTINUED | OUTPATIENT
Start: 2019-09-15 | End: 2019-09-19 | Stop reason: HOSPADM

## 2019-09-15 RX ADMIN — LEVETIRACETAM 500 MG: 500 TABLET, FILM COATED ORAL at 09:07

## 2019-09-15 RX ADMIN — SODIUM CHLORIDE, SODIUM LACTATE, CALCIUM CHLORIDE, MAGNESIUM CHLORIDE AND DEXTROSE 2000 ML: 2.5; 538; 448; 18.3; 5.08 INJECTION, SOLUTION INTRAPERITONEAL at 13:30

## 2019-09-15 RX ADMIN — ASPIRIN 81 MG: 81 TABLET, COATED ORAL at 09:07

## 2019-09-15 RX ADMIN — Medication 10 ML: at 14:18

## 2019-09-15 RX ADMIN — ASCORBIC ACID, THIAMINE MONONITRATE,RIBOFLAVIN, NIACINAMIDE, PYRIDOXINE HYDROCHLORIDE, FOLIC ACID, CYANOCOBALAMIN, BIOTIN, CALCIUM PANTOTHENATE, 1 CAPSULE: 100; 1.5; 1.7; 20; 10; 1; 6000; 150000; 5 CAPSULE, LIQUID FILLED ORAL at 09:07

## 2019-09-15 RX ADMIN — SEVELAMER CARBONATE 1600 MG: 800 TABLET, FILM COATED ORAL at 13:02

## 2019-09-15 RX ADMIN — CLOPIDOGREL BISULFATE 75 MG: 75 TABLET ORAL at 09:06

## 2019-09-15 RX ADMIN — Medication 10 ML: at 22:23

## 2019-09-15 RX ADMIN — LEVETIRACETAM 500 MG: 500 TABLET, FILM COATED ORAL at 18:56

## 2019-09-15 RX ADMIN — BRIMONIDINE TARTRATE 1 DROP: 2 SOLUTION OPHTHALMIC at 09:09

## 2019-09-15 RX ADMIN — SODIUM CHLORIDE, SODIUM LACTATE, CALCIUM CHLORIDE, MAGNESIUM CHLORIDE AND DEXTROSE 2000 ML: 2.5; 538; 448; 18.3; 5.08 INJECTION, SOLUTION INTRAPERITONEAL at 18:01

## 2019-09-15 RX ADMIN — POTASSIUM CHLORIDE 20 MEQ: 1.5 POWDER, FOR SOLUTION ORAL at 09:07

## 2019-09-15 RX ADMIN — SEVELAMER CARBONATE 1600 MG: 800 TABLET, FILM COATED ORAL at 09:06

## 2019-09-15 RX ADMIN — LACOSAMIDE 150 MG: 50 TABLET, FILM COATED ORAL at 18:55

## 2019-09-15 RX ADMIN — CARVEDILOL 12.5 MG: 12.5 TABLET, FILM COATED ORAL at 09:06

## 2019-09-15 RX ADMIN — FUROSEMIDE 40 MG: 10 INJECTION, SOLUTION INTRAMUSCULAR; INTRAVENOUS at 09:10

## 2019-09-15 RX ADMIN — BISACODYL 10 MG: 10 SUPPOSITORY RECTAL at 06:34

## 2019-09-15 RX ADMIN — CARVEDILOL 12.5 MG: 12.5 TABLET, FILM COATED ORAL at 18:56

## 2019-09-15 RX ADMIN — ISOSORBIDE MONONITRATE 60 MG: 60 TABLET, EXTENDED RELEASE ORAL at 06:58

## 2019-09-15 RX ADMIN — SODIUM CHLORIDE, SODIUM LACTATE, CALCIUM CHLORIDE, MAGNESIUM CHLORIDE AND DEXTROSE 2000 ML: 2.5; 538; 448; 18.3; 5.08 INJECTION, SOLUTION INTRAPERITONEAL at 09:09

## 2019-09-15 RX ADMIN — SODIUM CHLORIDE, SODIUM LACTATE, CALCIUM CHLORIDE, MAGNESIUM CHLORIDE AND DEXTROSE 2000 ML: 2.5; 538; 448; 18.3; 5.08 INJECTION, SOLUTION INTRAPERITONEAL at 22:22

## 2019-09-15 RX ADMIN — ATORVASTATIN CALCIUM 40 MG: 40 TABLET, FILM COATED ORAL at 09:06

## 2019-09-15 RX ADMIN — Medication 10 ML: at 05:26

## 2019-09-15 RX ADMIN — PHENYTOIN SODIUM 300 MG: 100 CAPSULE ORAL at 22:21

## 2019-09-15 RX ADMIN — PANTOPRAZOLE SODIUM 40 MG: 40 TABLET, DELAYED RELEASE ORAL at 06:58

## 2019-09-15 RX ADMIN — BRIMONIDINE TARTRATE 1 DROP: 2 SOLUTION OPHTHALMIC at 18:55

## 2019-09-15 RX ADMIN — AMIODARONE HYDROCHLORIDE 100 MG: 200 TABLET ORAL at 09:07

## 2019-09-15 RX ADMIN — LOSARTAN POTASSIUM 25 MG: 25 TABLET ORAL at 09:06

## 2019-09-15 RX ADMIN — LACOSAMIDE 150 MG: 50 TABLET, FILM COATED ORAL at 09:08

## 2019-09-15 RX ADMIN — SEVELAMER CARBONATE 1600 MG: 800 TABLET, FILM COATED ORAL at 18:56

## 2019-09-15 NOTE — PROGRESS NOTES
Problem: Heart Failure: Day 5  Goal: Off Pathway (Use only if patient is Off Pathway)  Outcome: Progressing Towards Goal  Goal: Activity/Safety  Outcome: Progressing Towards Goal  Goal: Nutrition/Diet  Outcome: Progressing Towards Goal  Goal: Respiratory  Outcome: Progressing Towards Goal  Goal: Psychosocial  Outcome: Progressing Towards Goal

## 2019-09-15 NOTE — PROGRESS NOTES
Bedside shift change report given to John Mallory RN (oncoming nurse) by Leeroy Doan RN (offgoing nurse). Report included the following information SBAR, Kardex, Intake/Output, MAR, Recent Results and Cardiac Rhythm NSR/SA       I have read and agree with Morgan Chapman's documentation as her preceptor.

## 2019-09-15 NOTE — PROGRESS NOTES
Hospitalist Progress Note  Nahid Warner MD  Answering service: [phone] OR [phone] from in house phone        Date of Service:  9/15/2019  NAME:  Scott Sahni  :  1955  MRN:  [id number]  PCP: Unknown, Provider    Chief Complaint:   Chief Complaint   Patient presents with    Shortness of Breath         Admission Summary:     The patient is a 60-year-old female who has previous history significant for systolic heart failure, end-stage renal disease on peritoneal dialysis, history of coronary artery disease status post angioplasty and stent placement approximately 1-1/2 years ago at [facility], history of hypertension, previous history of stroke, hyperlipidemia, seizure disorder, comes to the emergency room due to worsening shortness of breath. Interval history / Subjective:     Seen and  Examined  No complaints today;pain from right hip she complained about yesterday is gone. Assessment & Plan:    Acute on Chronic Systolic and diastolic heart failure POA   Echo with 26-30% EF and Grade III diastolic dysfunction. On coreg,losartan,lasix. On PD and gets iv lasix as well. Strict in and o  Stress test is inconclusive,no further cardiac work up      Altered mental status 9/10  -Resolved. -MRI brain ,no acute process except the chronic changes. EEg normal.Continue AEds. Neurology evaluated. Paroxysmal Afib  -on BB and amiodarone   -Avoid anticoagulation, H/O SDH and required  shunt     SDH in past    Right frontal ventriculoperitoneal shunt      History of seizures   See above ,on AED      Trop of .06   Cards on board   Stress testing to assess for coronary insufficiency either prior to discharge or as an OP per Dr Halle Curiel   Scheduled for today   Coreg , imdur and asa and plavix      CAD s/p PCI   [facility] () - mid LAD 70-80%, distal LAD 50%, diagonal high-grade disease, OMB high-grade disease, RCA total occlusion - medically managed  2.  [facility] 2018 - heavily calcified vessels with severe early mid LAD stenosis, occluded RCA with faint L-R collateral filling and occluded 1st marginal - felt to be high risk for CABG / patient refused  3. [facility] 9/4/18 - S/p successful PCI of the prox LAD using 3.0 x 24 Synergy ANDER      COPD   Stable      STROKE X3 , left hemiparesisis     LEGALLY BLIND      HTN: c/w Home regimen, use labetalol prn. Right hip pain: xray,negative. pain control.  -Pain resolved.           Social : lives with nephew in [facility]  Has daughter in [place] and in [place]  Was in [place] rehab 2 months prior to it        Code status: full   DVT prophylaxis: [address] discussed with: Patient/Family  Disposition: Home w/Family and TBD    Code status: Full Code      Hospital Problems  Date Reviewed: 7/18/2019          Codes Class Noted POA    * (Principal) Altered mental state ICD-10-CM: R41.82  ICD-9-CM: 780.97  9/12/2019 Yes        Shortness of breath ICD-10-CM: R06.02  ICD-9-CM: 786.05  9/9/2019 Unknown                Review of Systems:   A comprehensive review of systems was negative except for that written in the HPI. Physical Examination:     Visit Vitals  BP (!) 139/92 (BP 1 Location: Left arm, BP Patient Position: Sitting)   Pulse 74   Temp 99.4 °F (37.4 °C)   Resp 19   Ht 5' 3\" (1.6 m)   Wt 94.9 kg (209 lb 3.5 oz)   SpO2 100%   Breastfeeding? No   BMI 37.06 kg/m²     General:  Alert, cooperative, no distress, appears stated age. Back:   Symmetric, no curvature. ROM normal. No CVA tenderness. Lungs:   Clear to auscultation bilaterally. Chest wall:  No tenderness or deformity. Heart:  Regular rate and rhythm, S1, S2 normal, no murmur, click, rub or gallop. Abdomen:   Soft, non-tender. Bowel sounds normal. No masses,  No organomegaly. Extremities: Pain on ROM right hip   Pulses: 2+ and symmetric all extremities. Neurologic: Alert and awake. Legally blind. Moves extremities.             Vital Signs:    Last 24hrs VS reviewed since prior progress note. Most recent are:    Visit Vitals  BP (!) 139/92 (BP 1 Location: Left arm, BP Patient Position: Sitting)   Pulse 74   Temp 99.4 °F (37.4 °C)   Resp 19   Ht 5' 3\" (1.6 m)   Wt 94.9 kg (209 lb 3.5 oz)   SpO2 100%   Breastfeeding? No   BMI 37.06 kg/m²         Intake/Output Summary (Last 24 hours) at 9/15/2019 1229  Last data filed at 2019 2218  Gross per 24 hour   Intake 6000 ml   Output 6150 ml   Net -150 ml        Tmax:  Temp (24hrs), Av.2 °F (36.8 °C), Min:97.6 °F (36.4 °C), Max:99.4 °F (37.4 °C)      Data Review:   Data reviewed by myself:  Mri Brain Wo Cont    Result Date: 2019  PRELIMINARY REPORT: Chronic appearing right greater than left subdural collection. Hemosiderin staining and encephalomalacia in the right frontal lobe adjacent to the right frontal shunt. Ventricular size not significant changed. No acute infarct. Encephalomalacia in the left frontal lobe with mild high signal within the periventricular white matter Preliminary report was provided by Dr. Mario Hart, the on-call radiologist, at 7095 Final report to follow. FINAL REPORT: EXAM: MRI BRAIN WO CONT TECHNIQUE: Sagittal and axial T1-weighted images axial FLAIR, T2-weighted, diffusion weighted, gradient echo,  coronal T2 IV CONTRAST:  None INDICATION:  Evaluate for hydrocephalus, prior stroke, epileptogenic focus COMPARISON:  CT head of 9/10/2019, brain MRI of 2016 FINDINGS: BRAIN PARENCHYMA:  No acute infarct. Moderate predominantly confluent FLAIR/T2 hyperintensity in the deep cerebral white matter, slightly increased since 2016, likely due to intracranial small vessel disease. Chronic left basal ganglia, right corona radiata, and right cerebellar lacunar infarcts. Unchanged right superior frontal lobe encephalomalacia INTRACRANIAL HEMORRHAGE: No acute hemorrhage. Small bilateral chronic extra-axial collections demonstrating mild T1 hypointensity and overall T2 hyperintensity.  Unchanged on the right and smaller on the left when compared to 2016. Chronic hemorrhage in the region of the right frontal encephalomalacia. CSF SPACES:  Right frontal ventriculostomy catheter with the tip in the region of the left frontal horn. Normal and unchanged ventricular size. BASAL CISTERNS:  Patent. MIDLINE SHIFT: None. VASCULAR SYSTEM:  Normal flow voids. PARANASAL SINUSES AND MASTOID AIR CELLS:  No significant opacification. VISUALIZED ORBITS:  No significant abnormalities. VISUALIZED UPPER CERVICAL SPINE:  No significant abnormalities. SELLA:  No enlargement or  focal abnormality. SKULL BASE:  No significant abnormalities. Cerebellar tonsils in normal position. CALVARIUM:  Intact. IMPRESSION:  1. No acute findings. 2. Moderate cerebral white matter signal abnormality and chronic lacunar infarcts, consistent with chronic small vessel ischemic change, with progression since 2019. 3. Unchanged ventricular size. Small chronic extra-axial collections, present since at least 2016, unchanged on the right and smaller on the left since that time. Ct Head Wo Cont    Result Date: 9/10/2019  EXAM: CT HEAD WO CONT INDICATION: Confusion/delirium, altered LOC, unexplained COMPARISON: 2015. CONTRAST: None. TECHNIQUE: Unenhanced CT of the head was performed using 5 mm images. Brain and bone windows were generated. CT dose reduction was achieved through use of a standardized protocol tailored for this examination and automatic exposure control for dose modulation. FINDINGS: The ventricles are enlarged compared to prior examination of 2015. There is a ventriculoperitoneal shunt in place with the tip in the region of the right lateral ventricle. There is periventricular hypoattenuation compatible with chronic small vessel ischemic changes. There is encephalomalacia in the left frontal lobe, likely in an area of prior ventriculostomy tube. There is a small low density collection in the right subdural space which appears chronic.  There is no midline shift. There is encephalomalacia in the right frontal lobe. The basilar cisterns are open. No acute infarct is identified. The bone windows demonstrate there is evidence of left parietal bone surgery. There is left maxillary sinus disease. There is calcification in the left extra-axial space. IMPRESSION: Interval enlargement of ventricular size since prior study with ventriculostomy tube in place. Xr Chest Port    Result Date: 9/9/2019  EXAM: XR CHEST PORT INDICATION: CHF COMPARISON: 7/19/2019 FINDINGS: A portable AP radiograph of the chest was obtained at 1411 hours. The patient is on a cardiac monitor. The lungs are clear. The cardiac and mediastinal contours and pulmonary vascularity are remarkable for a central prominence of the right hilum. There is a linear band of subsegmental atelectasis along the left major fissure. The bones and soft tissues are grossly within normal limits. IMPRESSION: No acute process identified    No results found for: SDES  Lab Results   Component Value Date/Time    Culture result: NO GROWTH 4 DAYS 09/11/2019 03:32 PM    Culture result: Culture performed on Fluid swab specimen 07/17/2019 11:09 AM    Culture result: NO GROWTH 4 DAYS 07/17/2019 11:09 AM     All Micro Results     Procedure Component Value Units Date/Time    CULTURE, BODY FLUID [place] STAIN [[id number]] Collected:  09/11/19 1532    Order Status:  Completed Specimen:  Dialysate Updated:  09/15/19 0928     Special Requests: NO SPECIAL REQUESTS        GRAM STAIN NO WBC'S SEEN         NO ORGANISMS SEEN        Culture result: NO GROWTH 4 DAYS             Labs: reviewed by myself. No results for input(s): WBC, HGB, HCT, PLT, HGBEXT, HCTEXT, PLTEXT, HGBEXT, HCTEXT, PLTEXT in the last 72 hours. No results for input(s): NA, K, CL, CO2, BUN, CREA, GLU, CA, MG, PHOS, URICA in the last 72 hours. No results for input(s): SGOT, GPT, ALT, AP, TBIL, TBILI, TP, ALB, GLOB, GGT, AML, LPSE in the last 72 hours.     No lab exists for component: AMYP, HLPSE  No results for input(s): INR, PTP, APTT in the last 72 hours. No lab exists for component: INREXT, INREXT   No results for input(s): FE, TIBC, PSAT, FERR in the last 72 hours. No results found for: FOL, RBCF   No results for input(s): PH, PCO2, PO2 in the last 72 hours. No results for input(s): CPK, CKNDX, TROIQ in the last 72 hours.     No lab exists for component: CPKMB  No results found for: CHOL, CHOLX, CHLST, CHOLV, HDL, HDLP, LDL, LDLC, DLDLP, TGLX, TRIGL, TRIGP, CHHD, CHHDX  Lab Results   Component Value Date/Time    Glucose (POC) 87 07/22/2019 11:16 AM    Glucose (POC) 162 (H) 07/17/2019 03:21 AM     Lab Results   Component Value Date/Time    Color YELLOW/STRAW 04/03/2015 08:31 PM    Appearance CLOUDY (A) 04/03/2015 08:31 PM    Specific gravity 1.013 04/03/2015 08:31 PM    pH (UA) 7.5 04/03/2015 08:31 PM    Protein 300 (A) 04/03/2015 08:31 PM    Glucose NEGATIVE  04/03/2015 08:31 PM    Ketone NEGATIVE  04/03/2015 08:31 PM    Bilirubin NEGATIVE  04/03/2015 08:31 PM    Urobilinogen 0.2 04/03/2015 08:31 PM    Nitrites NEGATIVE  04/03/2015 08:31 PM    Leukocyte Esterase NEGATIVE  04/03/2015 08:31 PM    Epithelial cells MANY (A) 04/03/2015 08:31 PM    Bacteria 1+ (A) 04/03/2015 08:31 PM    WBC 0-4 04/03/2015 08:31 PM    RBC 0-5 04/03/2015 08:31 PM         Medications Reviewed:     Current Facility-Administered Medications   Medication Dose Route Frequency    ondansetron (ZOFRAN) injection 4 mg  4 mg IntraVENous Q4H PRN    oxyCODONE-acetaminophen (PERCOCET) 5-325 mg per tablet 1 Tab  1 Tab Oral Q4H PRN    peritoneal dialysis DEXTROSE 2.5% (2.5 mEq/L low calcium) solution 2,000 mL  2,000 mL IntraPERitoneal QID    acetaminophen (TYLENOL) tablet 650 mg  650 mg Oral Q4H PRN    LORazepam (ATIVAN) injection 1 mg  1 mg IntraVENous Q4H PRN    bisacodyl (DULCOLAX) suppository 10 mg  10 mg Rectal DAILY PRN    sodium chloride (NS) flush 5-40 mL  5-40 mL IntraVENous Q8H    sodium chloride (NS) flush 5-40 mL  5-40 mL IntraVENous PRN    aspirin delayed-release tablet 81 mg  81 mg Oral DAILY    atorvastatin (LIPITOR) tablet 40 mg  40 mg Oral DAILY    brimonidine (ALPHAGAN) 0.2 % ophthalmic solution 1 Drop  1 Drop Both Eyes BID    calcitRIOL (ROCALTROL) capsule 0.25 mcg  0.25 mcg Oral Once per day on Thu Sat    carvedilol (COREG) tablet 12.5 mg  12.5 mg Oral BID WITH MEALS    clopidogrel (PLAVIX) tablet 75 mg  75 mg Oral DAILY    isosorbide mononitrate ER (IMDUR) tablet 60 mg  60 mg Oral 7am    lactulose (CHRONULAC) 10 gram/15 mL solution 30 mL  20 g Oral BID PRN    levETIRAcetam (KEPPRA) tablet 500 mg  500 mg Oral BID    pantoprazole (PROTONIX) tablet 40 mg  40 mg Oral ACB    sevelamer carbonate (RENVELA) tab 1,600 mg  1,600 mg Oral TID WITH MEALS    furosemide (LASIX) injection 40 mg  40 mg IntraVENous DAILY    docusate sodium (COLACE) capsule 100 mg  100 mg Oral BID PRN    B complex-vitaminC-folic acid (NEPHROCAP) cap  1 Cap Oral DAILY    lacosamide (VIMPAT) tablet 150 mg  150 mg Oral BID    losartan (COZAAR) tablet 25 mg  25 mg Oral DAILY    phenytoin ER (DILANTIN ER) ER capsule 300 mg  300 mg Oral QHS    potassium chloride (KLOR-CON) packet for solution 20 mEq  20 mEq Oral DAILY    albuterol-ipratropium (DUO-NEB) 2.5 MG-0.5 MG/3 ML  3 mL Nebulization Q6H PRN    amiodarone (CORDARONE) tablet 100 mg  100 mg Oral DAILY     ______________________________________________________________________  EXPECTED LENGTH OF STAY: 4d 2h  ACTUAL LENGTH OF STAY:          6                 Leatha Broderick MD     Patient's emergency contacts:  Extended Emergency Contact Information  Primary Emergency Contact: [address] [facility] Phone: [phone]  Relation: Sister  Secondary Emergency Contact: [address] [facility] Phone: [phone]  Relation: Child

## 2019-09-15 NOTE — PROGRESS NOTES
Name: Deedee Hickey MRN: [id number]   : 1955 Hospital: [address]   Date: 9/15/2019        IMPRESSION:   · ESRD on PD. The PD is progressing well. Patient is in negative fluid balance. Patient had a stress test done. The results are noted. · Hypervolemia- resolved. · Severe hypoalbuminemia. PLAN:   · Continue PD with  dextrose  to 2.5%  · Continue protein supplements  · Anemia management  · Repeat the renal panel and CBC in AM  · As per PD nurse Daryl Lee Ms. Adids Stacy has been not compliant with her PD treatments at home due to blindness and home conditions. She may be better on HD. I will defer this decision to patient's primary nephrologist Dr. Ankur Whitfield. When I approached her regarding switching to HD patient made it clear that this is not an option she is agreeable with. Agree that serum alb 1.9 may be a reason to engage in a PD respite catia if patient going to SNF  · Push protein intake     Subjective/Interval History:   I have reviewed the flowsheet and previous days notes. ROS:A comprehensive review of systems was negative. Objective:   Vital Signs:    Visit Vitals  BP (!) 139/92 (BP 1 Location: Left arm, BP Patient Position: Sitting)   Pulse 74   Temp 99.4 °F (37.4 °C)   Resp 19   Ht 5' 3\" (1.6 m)   Wt 94.9 kg (209 lb 3.5 oz)   SpO2 100%   Breastfeeding? No   BMI 37.06 kg/m²       O2 Device: Room air   O2 Flow Rate (L/min): 1 l/min   Temp (24hrs), Av.2 °F (36.8 °C), Min:97.6 °F (36.4 °C), Max:99.4 °F (37.4 °C)       Intake/Output:   Last shift:      No intake/output data recorded. Last 3 shifts:  1901 - 09/15 0700  In: 39396   Out: 30392     Intake/Output Summary (Last 24 hours) at 9/15/2019 1230  Last data filed at 2019 2218  Gross per 24 hour   Intake 6000 ml   Output 6150 ml   Net -150 ml        Physical Exam:  General:    Awake, alert. Head:   Normocephalic, without obvious abnormality, atraumatic. Eyes:   Anicteric. Velora Sis   Neck:  Supple, symmetrical,  no JVD.  Lungs:   Clear to auscultation bilaterally. No Wheezing or Rhonchi. No rales. Chest wall:  No tenderness or deformity. No Accessory muscle use. PD catheter exit site is just above the left breast.  Heart:   Regular rate and rhythm,  no murmur, rub or gallop. Abdomen:   Soft, +++ tender. Distended. Bowel sounds normal. No masses, no abd wall edema  Extremities: Extremities normal, atraumatic  No edema  Skin:     Texture, turgor normal. No rashes or lesions. Not Jaundiced  Psych:  Not anxious or agitated. DATA:  Labs:  No results for input(s): NA, K, CL, CO2, BUN, CREA, CA, ALB, PHOS, MG in the last 72 hours. No results for input(s): WBC, HGB, HCT, PLT, HGBEXT, HCTEXT, PLTEXT, HGBEXT, HCTEXT, PLTEXT in the last 72 hours. No results for input(s): CLARISA, KU, CLU, CREAU in the last 72 hours.     No lab exists for component: PROU    Total time spent with patient:  35 minutes    [] Critical Care Provided    Care Plan discussed with:   patient and Dr. Fidelia Delacruz MD

## 2019-09-15 NOTE — ROUTINE PROCESS
TRANSFER - OUT REPORT:    Verbal report given to Joaquin Olivia RN(name) on Gomez Contreras  being transferred to (unit) for routine progression of care       Report consisted of patients Situation, Background, Assessment and   Recommendations(SBAR). Information from the following report(s) SBAR, Kardex, Intake/Output, MAR, Recent Results, Med Rec Status and Cardiac Rhythm SR BBB was reviewed with the receiving nurse. Lines:   Peripheral IV 09/09/19 Right Antecubital (Active)   Site Assessment Clean, dry, & intact 9/15/2019 12:00 PM   Phlebitis Assessment 0 9/15/2019 12:00 PM   Infiltration Assessment 0 9/15/2019 12:00 PM   Dressing Status Clean, dry, & intact 9/15/2019 12:00 PM   Dressing Type Transparent 9/15/2019 12:00 PM   Hub Color/Line Status Pink;Capped;Flushed 9/15/2019 12:00 PM   Action Taken Open ports on tubing capped 9/15/2019  4:00 AM   Alcohol Cap Used Yes 9/15/2019 12:00 PM        Opportunity for questions and clarification was provided.       Patient transported with:   Monitor  Patient-specific medications from Pharmacy  Registered Nurse

## 2019-09-16 ENCOUNTER — DOCUMENTATION ONLY (OUTPATIENT)
Dept: CASE MANAGEMENT | Age: 64
End: 2019-09-16

## 2019-09-16 PROCEDURE — A4722 DIALYS SOL FLD VOL > 1999CC: HCPCS | Performed by: INTERNAL MEDICINE

## 2019-09-16 PROCEDURE — 74011250636 HC RX REV CODE- 250/636: Performed by: INTERNAL MEDICINE

## 2019-09-16 PROCEDURE — 97530 THERAPEUTIC ACTIVITIES: CPT

## 2019-09-16 PROCEDURE — 74011250636 HC RX REV CODE- 250/636: Performed by: HOSPITALIST

## 2019-09-16 PROCEDURE — 65660000000 HC RM CCU STEPDOWN

## 2019-09-16 PROCEDURE — 97161 PT EVAL LOW COMPLEX 20 MIN: CPT

## 2019-09-16 PROCEDURE — 74011250637 HC RX REV CODE- 250/637: Performed by: HOSPITALIST

## 2019-09-16 RX ORDER — FUROSEMIDE 40 MG/1
40 TABLET ORAL DAILY
Status: DISCONTINUED | OUTPATIENT
Start: 2019-09-17 | End: 2019-09-19 | Stop reason: HOSPADM

## 2019-09-16 RX ADMIN — LEVETIRACETAM 500 MG: 500 TABLET, FILM COATED ORAL at 09:01

## 2019-09-16 RX ADMIN — ATORVASTATIN CALCIUM 40 MG: 40 TABLET, FILM COATED ORAL at 09:03

## 2019-09-16 RX ADMIN — Medication 10 ML: at 08:00

## 2019-09-16 RX ADMIN — PANTOPRAZOLE SODIUM 40 MG: 40 TABLET, DELAYED RELEASE ORAL at 09:03

## 2019-09-16 RX ADMIN — FUROSEMIDE 40 MG: 10 INJECTION, SOLUTION INTRAMUSCULAR; INTRAVENOUS at 08:20

## 2019-09-16 RX ADMIN — LEVETIRACETAM 500 MG: 500 TABLET, FILM COATED ORAL at 17:38

## 2019-09-16 RX ADMIN — AMIODARONE HYDROCHLORIDE 100 MG: 200 TABLET ORAL at 09:02

## 2019-09-16 RX ADMIN — ASPIRIN 81 MG: 81 TABLET, COATED ORAL at 09:03

## 2019-09-16 RX ADMIN — ISOSORBIDE MONONITRATE 60 MG: 60 TABLET, EXTENDED RELEASE ORAL at 09:03

## 2019-09-16 RX ADMIN — SEVELAMER CARBONATE 1600 MG: 800 TABLET, FILM COATED ORAL at 17:37

## 2019-09-16 RX ADMIN — CARVEDILOL 12.5 MG: 12.5 TABLET, FILM COATED ORAL at 09:02

## 2019-09-16 RX ADMIN — CLOPIDOGREL BISULFATE 75 MG: 75 TABLET ORAL at 09:03

## 2019-09-16 RX ADMIN — LACOSAMIDE 150 MG: 50 TABLET, FILM COATED ORAL at 17:38

## 2019-09-16 RX ADMIN — SEVELAMER CARBONATE 1600 MG: 800 TABLET, FILM COATED ORAL at 09:03

## 2019-09-16 RX ADMIN — LACOSAMIDE 150 MG: 50 TABLET, FILM COATED ORAL at 09:01

## 2019-09-16 RX ADMIN — BRIMONIDINE TARTRATE 1 DROP: 2 SOLUTION OPHTHALMIC at 09:09

## 2019-09-16 RX ADMIN — BRIMONIDINE TARTRATE 1 DROP: 2 SOLUTION OPHTHALMIC at 17:41

## 2019-09-16 RX ADMIN — SODIUM CHLORIDE, SODIUM LACTATE, CALCIUM CHLORIDE, MAGNESIUM CHLORIDE AND DEXTROSE 2000 ML: 2.5; 538; 448; 18.3; 5.08 INJECTION, SOLUTION INTRAPERITONEAL at 21:33

## 2019-09-16 RX ADMIN — LOSARTAN POTASSIUM 25 MG: 25 TABLET ORAL at 09:01

## 2019-09-16 RX ADMIN — PHENYTOIN SODIUM 300 MG: 100 CAPSULE ORAL at 21:34

## 2019-09-16 RX ADMIN — Medication 10 ML: at 21:33

## 2019-09-16 RX ADMIN — CARVEDILOL 12.5 MG: 12.5 TABLET, FILM COATED ORAL at 17:38

## 2019-09-16 RX ADMIN — POTASSIUM CHLORIDE 20 MEQ: 1.5 POWDER, FOR SOLUTION ORAL at 08:58

## 2019-09-16 RX ADMIN — SODIUM CHLORIDE, SODIUM LACTATE, CALCIUM CHLORIDE, MAGNESIUM CHLORIDE AND DEXTROSE 2000 ML: 2.5; 538; 448; 18.3; 5.08 INJECTION, SOLUTION INTRAPERITONEAL at 17:53

## 2019-09-16 RX ADMIN — SODIUM CHLORIDE, SODIUM LACTATE, CALCIUM CHLORIDE, MAGNESIUM CHLORIDE AND DEXTROSE 2000 ML: 2.5; 538; 448; 18.3; 5.08 INJECTION, SOLUTION INTRAPERITONEAL at 14:22

## 2019-09-16 RX ADMIN — Medication 10 ML: at 14:22

## 2019-09-16 RX ADMIN — SODIUM CHLORIDE, SODIUM LACTATE, CALCIUM CHLORIDE, MAGNESIUM CHLORIDE AND DEXTROSE 2000 ML: 2.5; 538; 448; 18.3; 5.08 INJECTION, SOLUTION INTRAPERITONEAL at 08:38

## 2019-09-16 RX ADMIN — ASCORBIC ACID, THIAMINE MONONITRATE,RIBOFLAVIN, NIACINAMIDE, PYRIDOXINE HYDROCHLORIDE, FOLIC ACID, CYANOCOBALAMIN, BIOTIN, CALCIUM PANTOTHENATE, 1 CAPSULE: 100; 1.5; 1.7; 20; 10; 1; 6000; 150000; 5 CAPSULE, LIQUID FILLED ORAL at 09:01

## 2019-09-16 RX ADMIN — SEVELAMER CARBONATE 1600 MG: 800 TABLET, FILM COATED ORAL at 12:05

## 2019-09-16 RX ADMIN — EPOETIN ALFA-EPBX 4000 UNITS: 4000 INJECTION, SOLUTION INTRAVENOUS; SUBCUTANEOUS at 21:33

## 2019-09-16 NOTE — ROUTINE PROCESS
Bedside and Verbal shift change report given to Tamara Steve (oncoming nurse) by Tang Quesada (offgoing nurse). Report included the following information SBAR, Kardex, Intake/Output, Med Rec Status and Cardiac Rhythm NSR.

## 2019-09-16 NOTE — PROGRESS NOTES
Order acknowledged and chart reviewed. Per nursing, just started dialysis. Will f/u later today if able/appropriate. Thanks!

## 2019-09-16 NOTE — PROGRESS NOTES
Physical Therapy  9/16/2019    Order received, chart reviewed. Per nursing, patient just started dialysis. Will f/u later today as able. Thank you.     Nikole Pulido, PT, DPT

## 2019-09-16 NOTE — PROGRESS NOTES
Name: Harshil Rai MRN: [id number]   : 1955 Hospital: [facility]   Date: 2019        IMPRESSION:   ESRD - on PD  Hypervolemia- resolved. Severe hypoalbuminemia. Anemia in CKD      PLAN:   Continue PD with  dextrose  to 2.5%  Continue protein supplements. Add Nepro with meals. Ms. Myles Patterson indicated she liked this formulation. Add EPO. Agree temp HD may be required. There has been some resistance to HD in the past. The rationale for such a move was discussed this morning. Subjective/Interval History:       19    ---> Fair morning. Dyspepsia was reported. Ms. Myles Patterson was less than impressed with the dietary prescription. Objective:   Vital Signs:    Visit Vitals  BP (!) 152/99 (BP 1 Location: Left arm, BP Patient Position: At rest)   Pulse 68   Temp 97.4 °F (36.3 °C)   Resp 18   Ht 5' 3\" (1.6 m)   Wt 92.3 kg (203 lb 7.8 oz)   SpO2 98%   Breastfeeding? No   BMI 36.05 kg/m²       O2 Device: Room air   O2 Flow Rate (L/min): 1 l/min   Temp (24hrs), Av.2 °F (36.8 °C), Min:97.4 °F (36.3 °C), Max:99.4 °F (37.4 °C)       Intake/Output:   Last shift:      No intake/output data recorded. Last 3 shifts:  1901 -  0700  In: 6340 [P.O.:240]  Out: 8550     Intake/Output Summary (Last 24 hours) at 2019 0905  Last data filed at 9/15/2019 2200  Gross per 24 hour   Intake 4340 ml   Output 6400 ml   Net -2060 ml        Physical Exam:    Seen in Room 445. Her Nurse was in attendance    General:    Awake, alert. Head:   Normocephalic    Eyes:   Anicteric  . Lungs:   Clear to auscultation ,  No Wheezing ,  Rhonchi or rales. Chest wall:  PD catheter exit site is just above the left breast.    Heart:   No S 3 gallop. Abdomen:   Soft, Non-tender. Extremities:  No leg edema    Psych:  Not anxious or agitated. DATA:  Labs:  No results for input(s): NA, K, CL, CO2, BUN, CREA, CA, ALB, PHOS, MG in the last 72 hours.   No results for input(s): WBC, HGB, HCT, PLT, HGBEXT, HCTEXT, PLTEXT, HGBEXT, HCTEXT, PLTEXT in the last 72 hours. No results for input(s): CLARISA, KU, CLU, CREAU in the last 72 hours.     No lab exists for component: PROU    Total time spent with patient:         Care Plan discussed with:      Patient    Lexie Hernandez MD

## 2019-09-16 NOTE — PROGRESS NOTES
Hospitalist Progress Note  Nadia Maciel MD  Answering service: [phone] OR [phone] from in house phone        Date of Service:  2019  NAME:  Racheal Blackmon  :  1955  MRN:  [id number]  PCP: Unknown, Provider    Chief Complaint:   Chief Complaint   Patient presents with    Shortness of Breath         Admission Summary:     The patient is a 72-year-old female who has previous history significant for systolic heart failure, end-stage renal disease on peritoneal dialysis, history of coronary artery disease status post angioplasty and stent placement approximately 1-1/2 years ago at [facility], history of hypertension, previous history of stroke, hyperlipidemia, seizure disorder, comes to the emergency room due to worsening shortness of breath. Interval history / Subjective:     Seen and  Examined  Rounded on her at noon. She is up in chair and eating lunch. She is very alert today. We discussed GoC in detail     Assessment & Plan:    Acute on Chronic Systolic and diastolic heart failure POA   Echo with 26-30% EF and Grade III diastolic dysfunction. On coreg,losartan,lasix. On PD and gets lasix  Minimally elevated troponin   Coreg , imdur and asa and plavix   Stress test is inconclusive,no further cardiac work up        Altered mental status 9/10  -Resolved. -MRI brain ,no acute process except the chronic changes. EEg normal.Continue AEds. Neurology evaluated. Paroxysmal Afib  -on BB and amiodarone   -Avoid anticoagulation, H/O SDH and required  shunt     SDH in past    Right frontal ventriculoperitoneal shunt      History of seizures   See above ,on AED           CAD s/p PCI   [facility] () - mid LAD 70-80%, distal LAD 50%, diagonal high-grade disease, OMB high-grade disease, RCA total occlusion - medically managed  2.  [facility] 2018 - heavily calcified vessels with severe early mid LAD stenosis, occluded RCA with faint L-R collateral filling and occluded 1st marginal - felt to be high risk for CABG / patient refused  3. [facility] 9/4/18 - S/p successful PCI of the prox LAD using 3.0 x 24 Synergy ANDER      COPD   Stable      STROKE X3 , left hemiparesisis     LEGALLY BLIND      HTN: c/w Home regimen, use labetalol prn. Right hip pain: xray,negative. pain control.  -Pain resolved.           Social : lives with nephew in [facility]  Has daughter in [place] and in [facility] WITH DEVELOPMENTAL. Per patient daughter [facility] Sonya(phone # [phone]) is surrogate decision maker. Was in [facility] WITH DEVELOPMENTAL rehab 2 months prior to it        Code status: full   DVT prophylaxis: Marvin Woodruff 265 discussed with: Patient/Family  Disposition: likely Home with Huang Bravo Problems  Date Reviewed: 7/18/2019          Codes Class Noted POA    * (Principal) Altered mental state ICD-10-CM: R41.82  ICD-9-CM: 780.97  9/12/2019 Yes        Shortness of breath ICD-10-CM: R06.02  ICD-9-CM: 786.05  9/9/2019 Unknown                Review of Systems:   A comprehensive review of systems was negative except for that written in the HPI. Physical Examination:     Visit Vitals  /90 (BP 1 Location: Left arm, BP Patient Position: At rest)   Pulse 73   Temp 97.2 °F (36.2 °C)   Resp 16   Ht 5' 3\" (1.6 m)   Wt 92.3 kg (203 lb 7.8 oz)   SpO2 96%   Breastfeeding? No   BMI 36.05 kg/m²     General:  Alert, cooperative, no distress, appears stated age. Back:   Symmetric, no curvature. ROM normal. No CVA tenderness. Lungs:   Clear to auscultation bilaterally. Chest wall:  No tenderness or deformity. Heart:  Regular rate and rhythm, S1, S2 normal, no murmur, click, rub or gallop. Abdomen:   Soft, non-tender. Bowel sounds normal. No masses,  No organomegaly. Extremities: Pain on ROM right hip   Pulses: 2+ and symmetric all extremities. Neurologic: Alert and awake. Legally blind. Moves extremities. Vital Signs:    Last 24hrs VS reviewed since prior progress note.  Most recent are:    Visit Vitals  /90 (BP 1 Location: Left arm, BP Patient Position: At rest)   Pulse 73   Temp 97.2 °F (36.2 °C)   Resp 16   Ht 5' 3\" (1.6 m)   Wt 92.3 kg (203 lb 7.8 oz)   SpO2 96%   Breastfeeding? No   BMI 36.05 kg/m²         Intake/Output Summary (Last 24 hours) at 2019 1840  Last data filed at 2019 1615  Gross per 24 hour   Intake 4600 ml   Output 6300 ml   Net -1700 ml        Tmax:  Temp (24hrs), Av.8 °F (36.6 °C), Min:97.2 °F (36.2 °C), Max:98.3 °F (36.8 °C)      Data Review:   Data reviewed by myself:  Mri Brain Wo Cont    Result Date: 2019  PRELIMINARY REPORT: Chronic appearing right greater than left subdural collection. Hemosiderin staining and encephalomalacia in the right frontal lobe adjacent to the right frontal shunt. Ventricular size not significant changed. No acute infarct. Encephalomalacia in the left frontal lobe with mild high signal within the periventricular white matter Preliminary report was provided by Dr. Lulu Sifuentes, the on-call radiologist, at 3573 Final report to follow. FINAL REPORT: EXAM: MRI BRAIN WO CONT TECHNIQUE: Sagittal and axial T1-weighted images axial FLAIR, T2-weighted, diffusion weighted, gradient echo,  coronal T2 IV CONTRAST:  None INDICATION:  Evaluate for hydrocephalus, prior stroke, epileptogenic focus COMPARISON:  CT head of 9/10/2019, brain MRI of 2016 FINDINGS: BRAIN PARENCHYMA:  No acute infarct. Moderate predominantly confluent FLAIR/T2 hyperintensity in the deep cerebral white matter, slightly increased since 2016, likely due to intracranial small vessel disease. Chronic left basal ganglia, right corona radiata, and right cerebellar lacunar infarcts. Unchanged right superior frontal lobe encephalomalacia INTRACRANIAL HEMORRHAGE: No acute hemorrhage. Small bilateral chronic extra-axial collections demonstrating mild T1 hypointensity and overall T2 hyperintensity. Unchanged on the right and smaller on the left when compared to 2016.  Chronic hemorrhage in the region of the right frontal encephalomalacia. CSF SPACES:  Right frontal ventriculostomy catheter with the tip in the region of the left frontal horn. Normal and unchanged ventricular size. BASAL CISTERNS:  Patent. MIDLINE SHIFT: None. VASCULAR SYSTEM:  Normal flow voids. PARANASAL SINUSES AND MASTOID AIR CELLS:  No significant opacification. VISUALIZED ORBITS:  No significant abnormalities. VISUALIZED UPPER CERVICAL SPINE:  No significant abnormalities. SELLA:  No enlargement or  focal abnormality. SKULL BASE:  No significant abnormalities. Cerebellar tonsils in normal position. CALVARIUM:  Intact. IMPRESSION:  1. No acute findings. 2. Moderate cerebral white matter signal abnormality and chronic lacunar infarcts, consistent with chronic small vessel ischemic change, with progression since 2019. 3. Unchanged ventricular size. Small chronic extra-axial collections, present since at least 2016, unchanged on the right and smaller on the left since that time. Ct Head Wo Cont    Result Date: 9/10/2019  EXAM: CT HEAD WO CONT INDICATION: Confusion/delirium, altered LOC, unexplained COMPARISON: 2015. CONTRAST: None. TECHNIQUE: Unenhanced CT of the head was performed using 5 mm images. Brain and bone windows were generated. CT dose reduction was achieved through use of a standardized protocol tailored for this examination and automatic exposure control for dose modulation. FINDINGS: The ventricles are enlarged compared to prior examination of 2015. There is a ventriculoperitoneal shunt in place with the tip in the region of the right lateral ventricle. There is periventricular hypoattenuation compatible with chronic small vessel ischemic changes. There is encephalomalacia in the left frontal lobe, likely in an area of prior ventriculostomy tube. There is a small low density collection in the right subdural space which appears chronic. There is no midline shift. There is encephalomalacia in the right frontal lobe.   The basilar cisterns are open. No acute infarct is identified. The bone windows demonstrate there is evidence of left parietal bone surgery. There is left maxillary sinus disease. There is calcification in the left extra-axial space. IMPRESSION: Interval enlargement of ventricular size since prior study with ventriculostomy tube in place. Xr Chest Port    Result Date: 9/9/2019  EXAM: XR CHEST PORT INDICATION: CHF COMPARISON: 7/19/2019 FINDINGS: A portable AP radiograph of the chest was obtained at 1411 hours. The patient is on a cardiac monitor. The lungs are clear. The cardiac and mediastinal contours and pulmonary vascularity are remarkable for a central prominence of the right hilum. There is a linear band of subsegmental atelectasis along the left major fissure. The bones and soft tissues are grossly within normal limits. IMPRESSION: No acute process identified    No results found for: SDES  Lab Results   Component Value Date/Time    Culture result: NO GROWTH 4 DAYS 09/11/2019 03:32 PM    Culture result: Culture performed on Fluid swab specimen 07/17/2019 11:09 AM    Culture result: NO GROWTH 4 DAYS 07/17/2019 11:09 AM     All Micro Results     Procedure Component Value Units Date/Time    CULTURE, BODY FLUID Nirali Saucer STAIN [[id number]] Collected:  09/11/19 1532    Order Status:  Completed Specimen:  Dialysate Updated:  09/15/19 0928     Special Requests: NO SPECIAL REQUESTS        GRAM STAIN NO WBC'S SEEN         NO ORGANISMS SEEN        Culture result: NO GROWTH 4 DAYS             Labs: reviewed by myself. No results for input(s): WBC, HGB, HCT, PLT, HGBEXT, HCTEXT, PLTEXT, HGBEXT, HCTEXT, PLTEXT in the last 72 hours. No results for input(s): NA, K, CL, CO2, BUN, CREA, GLU, CA, MG, PHOS, URICA in the last 72 hours. No results for input(s): SGOT, GPT, ALT, AP, TBIL, TBILI, TP, ALB, GLOB, GGT, AML, LPSE in the last 72 hours.     No lab exists for component: AMYP, HLPSE  No results for input(s): INR, PTP, APTT in the last 72 hours.    No lab exists for component: INREXT, INREXT   No results for input(s): FE, TIBC, PSAT, FERR in the last 72 hours. No results found for: FOL, RBCF   No results for input(s): PH, PCO2, PO2 in the last 72 hours. No results for input(s): CPK, CKNDX, TROIQ in the last 72 hours.     No lab exists for component: CPKMB  No results found for: CHOL, CHOLX, CHLST, CHOLV, HDL, HDLP, LDL, LDLC, DLDLP, TGLX, TRIGL, TRIGP, CHHD, CHHDX  Lab Results   Component Value Date/Time    Glucose (POC) 87 07/22/2019 11:16 AM    Glucose (POC) 162 (H) 07/17/2019 03:21 AM     Lab Results   Component Value Date/Time    Color YELLOW/STRAW 04/03/2015 08:31 PM    Appearance CLOUDY (A) 04/03/2015 08:31 PM    Specific gravity 1.013 04/03/2015 08:31 PM    pH (UA) 7.5 04/03/2015 08:31 PM    Protein 300 (A) 04/03/2015 08:31 PM    Glucose NEGATIVE  04/03/2015 08:31 PM    Ketone NEGATIVE  04/03/2015 08:31 PM    Bilirubin NEGATIVE  04/03/2015 08:31 PM    Urobilinogen 0.2 04/03/2015 08:31 PM    Nitrites NEGATIVE  04/03/2015 08:31 PM    Leukocyte Esterase NEGATIVE  04/03/2015 08:31 PM    Epithelial cells MANY (A) 04/03/2015 08:31 PM    Bacteria 1+ (A) 04/03/2015 08:31 PM    WBC 0-4 04/03/2015 08:31 PM    RBC 0-5 04/03/2015 08:31 PM         Medications Reviewed:     Current Facility-Administered Medications   Medication Dose Route Frequency    epoetin tawny-epbx (RETACRIT) injection 4,000 Units  4,000 Units SubCUTAneous Q MON, WED & FRI    ondansetron (ZOFRAN) injection 4 mg  4 mg IntraVENous Q4H PRN    oxyCODONE-acetaminophen (PERCOCET) 5-325 mg per tablet 1 Tab  1 Tab Oral Q4H PRN    peritoneal dialysis DEXTROSE 2.5% (2.5 mEq/L low calcium) solution 2,000 mL  2,000 mL IntraPERitoneal QID    acetaminophen (TYLENOL) tablet 650 mg  650 mg Oral Q4H PRN    LORazepam (ATIVAN) injection 1 mg  1 mg IntraVENous Q4H PRN    bisacodyl (DULCOLAX) suppository 10 mg  10 mg Rectal DAILY PRN    sodium chloride (NS) flush 5-40 mL  5-40 mL IntraVENous Q8H  sodium chloride (NS) flush 5-40 mL  5-40 mL IntraVENous PRN    aspirin delayed-release tablet 81 mg  81 mg Oral DAILY    atorvastatin (LIPITOR) tablet 40 mg  40 mg Oral DAILY    brimonidine (ALPHAGAN) 0.2 % ophthalmic solution 1 Drop  1 Drop Both Eyes BID    calcitRIOL (ROCALTROL) capsule 0.25 mcg  0.25 mcg Oral Once per day on Thu Sat    carvedilol (COREG) tablet 12.5 mg  12.5 mg Oral BID WITH MEALS    clopidogrel (PLAVIX) tablet 75 mg  75 mg Oral DAILY    isosorbide mononitrate ER (IMDUR) tablet 60 mg  60 mg Oral 7am    lactulose (CHRONULAC) 10 gram/15 mL solution 30 mL  20 g Oral BID PRN    levETIRAcetam (KEPPRA) tablet 500 mg  500 mg Oral BID    pantoprazole (PROTONIX) tablet 40 mg  40 mg Oral ACB    sevelamer carbonate (RENVELA) tab 1,600 mg  1,600 mg Oral TID WITH MEALS    furosemide (LASIX) injection 40 mg  40 mg IntraVENous DAILY    docusate sodium (COLACE) capsule 100 mg  100 mg Oral BID PRN    B complex-vitaminC-folic acid (NEPHROCAP) cap  1 Cap Oral DAILY    lacosamide (VIMPAT) tablet 150 mg  150 mg Oral BID    losartan (COZAAR) tablet 25 mg  25 mg Oral DAILY    phenytoin ER (DILANTIN ER) ER capsule 300 mg  300 mg Oral QHS    potassium chloride (KLOR-CON) packet for solution 20 mEq  20 mEq Oral DAILY    albuterol-ipratropium (DUO-NEB) 2.5 MG-0.5 MG/3 ML  3 mL Nebulization Q6H PRN    amiodarone (CORDARONE) tablet 100 mg  100 mg Oral DAILY     ______________________________________________________________________  EXPECTED LENGTH OF STAY: 4d 2h  ACTUAL LENGTH OF STAY:          7                 Omid Crowell MD     Patient's emergency contacts:  Extended Emergency Contact Information  Primary Emergency Contact: [address] [facility] Phone: [phone]  Relation: Sister  Secondary Emergency Contact: [address] [facility] Phone: [phone]  Relation: Child

## 2019-09-16 NOTE — PROGRESS NOTES
Transitional Care Team: Follow-Up GODWING Note      Attempted to see patient today three times. First time this afternoon she was having her PD exchange and the last two times she was resting in bed with her eyes closed. Per nursing staff, she is more awake in the mornings. Will try to see her tomorrow.

## 2019-09-16 NOTE — PROGRESS NOTES
Problem: Mobility Impaired (Adult and Pediatric)  Goal: *Acute Goals and Plan of Care (Insert Text)  Description  FUNCTIONAL STATUS PRIOR TO ADMISSION: Patient was modified independent using a Rolling walker and Wheelchair for functional mobility. Reported primarily using w/c but upon d/c from rehab ~2 months ago, she could walk the hallways with a walker. Could not determine the cause of her report of now using w/c primarily. States she performed stand pivot to wheelchair independently. Did not go into the bathroom and instead soiled her briefs but performed her own hygiene after this (?). Reported that she bathed via pan baths and did not shower. HOME SUPPORT PRIOR TO ADMISSION: The patient lived with her nephew and his wife. States her nephew assisted with PD. Physical Therapy Goals  Initiated 9/16/2019  1. Patient will move from supine to sit and sit to supine , scoot up and down and roll side to side in bed with modified independence within 7 day(s). 2.  Patient will transfer from bed to chair and chair to bed with modified independence using the least restrictive device within 7 day(s). 3.  Patient will perform sit to stand with modified independence within 7 day(s). 4.  Patient will ambulate with modified independence for 10 feet with the least restrictive device within 7 day(s). Outcome: Progressing Towards Goal   PHYSICAL THERAPY EVALUATION  Patient: Roby Espinoza (5 y.o. female)  Date: 9/16/2019  Primary Diagnosis: Shortness of breath [R06.02]  Precautions:   Fall, Seizure(peritoneal dialysis)      ASSESSMENT  Based on the objective data described below, the patient presents with generalized weakness, impaired balance, decreased activity tolerance, and increased fall risk following admission for SOB. Patient seen during break from PD. Overall supervision-moderate assist  for OOB to chair with PT.  Anticipate she is below baseline at this time as she required assist for stand pivot to chair. Current Level of Function Impacting Discharge (mobility/balance): mod A for stand pivot to chair    Functional Outcome Measure: The patient scored 1/28 on the Tinetti outcome measure which is indicative of high fall risk. Other factors to consider for discharge: inconsistency with PD at home? Likelihood for readmission     Patient will benefit from skilled therapy intervention to address the above noted impairments. PLAN :  Recommendations and Planned Interventions: bed mobility training, transfer training, gait training, therapeutic exercises, neuromuscular re-education, edema management/control, patient and family training/education and therapeutic activities      Frequency/Duration: Patient will be followed by physical therapy:  5 times a week to address goals.     Recommendation for discharge: (in order for the patient to meet his/her long term goals)  Physical therapy at least 2 days/week in the home AND ensure assist and/or supervision for safety with transfers to chair **Pending progress with acute therapy**     This discharge recommendation:  Has not yet been discussed the attending provider and/or case management    Equipment recommendations for successful discharge (if) home: TBD          SUBJECTIVE:   Patient stated I was walking in rehab!    OBJECTIVE DATA SUMMARY:   HISTORY:    Past Medical History:   Diagnosis Date    Acute on chronic respiratory failure ([address] 7/18/2019    Blood clot in vein     left arm several years ago    CAD (coronary artery disease)     Chronic kidney disease     Chronic systolic heart failure ([address] 7/18/2019    Congestive heart failure ([address]     Hypercholesterolemia     Hypertension     Legally blind     PAF (paroxysmal atrial fibrillation) [address] 7/18/2019    Seizures ([facility]     Stroke [facility])      Past Surgical History:   Procedure Laterality Date    CARDIAC SURG PROCEDURE UNLIST      heart attack 12/15    HX ORTHOPAEDIC      right middle finger    HX OTHER SURGICAL      shunt placed in brain       Personal factors and/or comorbidities impacting plan of care: PMH    Home Situation  Home Environment: Private residence  # Steps to Enter: 0  Wheelchair Ramp: Yes  One/Two Story Residence: One story  Living Alone: No  Support Systems: Family member(s)  Patient Expects to be Discharged to[de-identified] Private residence  Current DME Used/Available at Home: Heddy Current, straight, Walker, rolling, Wheelchair(adjustable bed)  Tub or Shower Type: (performs pan baths)    EXAMINATION/PRESENTATION/DECISION MAKING:   Critical Behavior:  Neurologic State: Alert  Orientation Level: Oriented X4  Cognition: Appropriate for age attention/concentration, Appropriate safety awareness, Follows commands     Hearing: Auditory  Auditory Impairment: Hard of hearing, bilateral  Range Of Motion:  AROM: Generally decreased, functional  Strength:    Strength: Generally decreased, functional  Tone & Sensation:   Tone: Normal  Sensation: Intact  Coordination:  Coordination: Generally decreased, functional  Functional Mobility:  Bed Mobility:  Supine to Sit: Supervision  Scooting: Supervision  Transfers:  Sit to Stand: Minimum assistance  Stand to Sit: Minimum assistance  Stand Pivot Transfers: Moderate assistance     Balance:   Sitting: Intact  Standing: Impaired; Without support  Standing - Static: Fair  Standing - Dynamic : Poor  Functional Measure:  Tinetti test:    Sitting Balance: 1  Arises: 0  Attempts to Rise: 0  Immediate Standing Balance: 0  Standing Balance: 0  Nudged: 0  Eyes Closed: 0  Turn 360 Degrees - Continuous/Discontinuous: 0  Turn 360 Degrees - Steady/Unsteady: 0  Sitting Down: 0  Balance Score: 1 Balance total score  Indication of Gait: 0  R Step Length/Height: 0  L Step Length/Height: 0  R Foot Clearance: 0  L Foot Clearance: 0  Step Symmetry: 0  Step Continuity: 0  Path: 0  Trunk: 0  Walking Time: 0  Gait Score: 0 Gait total score  Total Score: 1/28 Overall total score         Tinetti Tool Score Risk of Falls  <19 = High Fall Risk  19-24 = Moderate Fall Risk  25-28 = Low Fall Risk  Tinetti ME. Performance-Oriented Assessment of Mobility Problems in Elderly Patients. Isabel 66; [id number]. (Scoring Description: PT Bulletin Feb. 10, 1993)    Older adults: Monae Oneil et al, 2009; n = 1000 [facility] elderly evaluated with ABC, MANDI, ADL, and IADL)  · Mean MANDI score for males aged 69-68 years = 26.21(3.40)  · Mean MANDI score for females age 69-68 years = 25.16(4.30)  · Mean MANDI score for males over 80 years = 23.29(6.02)  · Mean MANDI score for females over 80 years = 17.20(8.32)            Physical Therapy Evaluation Charge Determination   History Examination Presentation Decision-Making   HIGH Complexity :3+ comorbidities / personal factors will impact the outcome/ POC  HIGH Complexity : 4+ Standardized tests and measures addressing body structure, function, activity limitation and / or participation in recreation  LOW Complexity : Stable, uncomplicated  Other outcome measures Tinetti 1/28  HIGH       Based on the above components, the patient evaluation is determined to be of the following complexity level: LOW       Activity Tolerance:   SpO2 stable on RA, requires rest breaks and observed SOB with activity  Please refer to the flowsheet for vital signs taken during this treatment. After treatment patient left in no apparent distress:   Sitting in chair and Call bell within reach    COMMUNICATION/EDUCATION:   The patients plan of care was discussed with: Registered Nurse. Fall prevention education was provided and the patient/caregiver indicated understanding., Patient/family have participated as able in goal setting and plan of care. and Patient/family agree to work toward stated goals and plan of care.     Thank you for this referral.  Gwendolyn Valentin, PT, DPT   Time Calculation: 20 mins

## 2019-09-16 NOTE — CARDIO/PULMONARY
Cardiac Rehab: Visited to provide HF education to [facility] based on an echo EF of 26-30%. [facility] MATTIE is slow to arouse and falls back to sleep during discussion about her weak heart. Education deferred for now. I spoke with her primary [address] who acknowledged that she has periods of wake fullness/alertness but currently very drowsy. Will follow. Elsa Gotti RN

## 2019-09-16 NOTE — ROUTINE PROCESS
Bedside and Verbal shift change report given to HOMAR Poe (oncoming nurse) by Edil Mao RN (offgoing nurse). Report included the following information SBAR, Kardex, Intake/Output, MAR and Recent Results.

## 2019-09-16 NOTE — PROGRESS NOTES
NIRANJAN PLAN:    1. Patient will be discharged home with home care for PT/OT    2. Patient will continue with PD at home. 3. Family will provide transportation home upon discharge. CM spoke with patient's daughter in [facility] regarding discharge, she said her mother lives with her mother's niece and  Manny Liu and Veronica Iyer in their home. Daughter will fax patient's ACP to [phone]. Libby Boothe MSA, RN, CRM.

## 2019-09-17 ENCOUNTER — DOCUMENTATION ONLY (OUTPATIENT)
Dept: CASE MANAGEMENT | Age: 64
End: 2019-09-17

## 2019-09-17 PROCEDURE — 74011250637 HC RX REV CODE- 250/637: Performed by: HOSPITALIST

## 2019-09-17 PROCEDURE — A4722 DIALYS SOL FLD VOL > 1999CC: HCPCS | Performed by: INTERNAL MEDICINE

## 2019-09-17 PROCEDURE — 74011250636 HC RX REV CODE- 250/636: Performed by: INTERNAL MEDICINE

## 2019-09-17 PROCEDURE — 97116 GAIT TRAINING THERAPY: CPT

## 2019-09-17 PROCEDURE — 65270000029 HC RM PRIVATE

## 2019-09-17 PROCEDURE — 97530 THERAPEUTIC ACTIVITIES: CPT

## 2019-09-17 RX ADMIN — SODIUM CHLORIDE, SODIUM LACTATE, CALCIUM CHLORIDE, MAGNESIUM CHLORIDE AND DEXTROSE 2000 ML: 2.5; 538; 448; 18.3; 5.08 INJECTION, SOLUTION INTRAPERITONEAL at 14:41

## 2019-09-17 RX ADMIN — CARVEDILOL 12.5 MG: 12.5 TABLET, FILM COATED ORAL at 19:07

## 2019-09-17 RX ADMIN — BRIMONIDINE TARTRATE 1 DROP: 2 SOLUTION OPHTHALMIC at 08:39

## 2019-09-17 RX ADMIN — SEVELAMER CARBONATE 1600 MG: 800 TABLET, FILM COATED ORAL at 08:38

## 2019-09-17 RX ADMIN — CARVEDILOL 12.5 MG: 12.5 TABLET, FILM COATED ORAL at 08:37

## 2019-09-17 RX ADMIN — AMIODARONE HYDROCHLORIDE 100 MG: 200 TABLET ORAL at 08:37

## 2019-09-17 RX ADMIN — Medication 10 ML: at 22:50

## 2019-09-17 RX ADMIN — CLOPIDOGREL BISULFATE 75 MG: 75 TABLET ORAL at 08:38

## 2019-09-17 RX ADMIN — OXYCODONE HYDROCHLORIDE AND ACETAMINOPHEN 1 TABLET: 5; 325 TABLET ORAL at 16:33

## 2019-09-17 RX ADMIN — ASPIRIN 81 MG: 81 TABLET, COATED ORAL at 08:37

## 2019-09-17 RX ADMIN — BRIMONIDINE TARTRATE 1 DROP: 2 SOLUTION OPHTHALMIC at 19:15

## 2019-09-17 RX ADMIN — LEVETIRACETAM 500 MG: 500 TABLET, FILM COATED ORAL at 08:37

## 2019-09-17 RX ADMIN — LOSARTAN POTASSIUM 25 MG: 25 TABLET ORAL at 08:38

## 2019-09-17 RX ADMIN — SODIUM CHLORIDE, SODIUM LACTATE, CALCIUM CHLORIDE, MAGNESIUM CHLORIDE AND DEXTROSE 2000 ML: 2.5; 538; 448; 18.3; 5.08 INJECTION, SOLUTION INTRAPERITONEAL at 19:12

## 2019-09-17 RX ADMIN — ASCORBIC ACID, THIAMINE MONONITRATE,RIBOFLAVIN, NIACINAMIDE, PYRIDOXINE HYDROCHLORIDE, FOLIC ACID, CYANOCOBALAMIN, BIOTIN, CALCIUM PANTOTHENATE, 1 CAPSULE: 100; 1.5; 1.7; 20; 10; 1; 6000; 150000; 5 CAPSULE, LIQUID FILLED ORAL at 08:38

## 2019-09-17 RX ADMIN — Medication 5 ML: at 06:00

## 2019-09-17 RX ADMIN — LACOSAMIDE 150 MG: 50 TABLET, FILM COATED ORAL at 19:07

## 2019-09-17 RX ADMIN — SEVELAMER CARBONATE 1600 MG: 800 TABLET, FILM COATED ORAL at 12:17

## 2019-09-17 RX ADMIN — ISOSORBIDE MONONITRATE 60 MG: 60 TABLET, EXTENDED RELEASE ORAL at 08:01

## 2019-09-17 RX ADMIN — FUROSEMIDE 40 MG: 40 TABLET ORAL at 08:37

## 2019-09-17 RX ADMIN — SODIUM CHLORIDE, SODIUM LACTATE, CALCIUM CHLORIDE, MAGNESIUM CHLORIDE AND DEXTROSE 2000 ML: 2.5; 538; 448; 18.3; 5.08 INJECTION, SOLUTION INTRAPERITONEAL at 23:30

## 2019-09-17 RX ADMIN — PANTOPRAZOLE SODIUM 40 MG: 40 TABLET, DELAYED RELEASE ORAL at 08:01

## 2019-09-17 RX ADMIN — SEVELAMER CARBONATE 1600 MG: 800 TABLET, FILM COATED ORAL at 19:06

## 2019-09-17 RX ADMIN — SODIUM CHLORIDE, SODIUM LACTATE, CALCIUM CHLORIDE, MAGNESIUM CHLORIDE AND DEXTROSE 2000 ML: 2.5; 538; 448; 18.3; 5.08 INJECTION, SOLUTION INTRAPERITONEAL at 11:07

## 2019-09-17 RX ADMIN — LEVETIRACETAM 500 MG: 500 TABLET, FILM COATED ORAL at 19:07

## 2019-09-17 RX ADMIN — LACOSAMIDE 150 MG: 50 TABLET, FILM COATED ORAL at 08:38

## 2019-09-17 RX ADMIN — PHENYTOIN SODIUM 300 MG: 100 CAPSULE ORAL at 23:27

## 2019-09-17 RX ADMIN — POTASSIUM CHLORIDE 20 MEQ: 1.5 POWDER, FOR SOLUTION ORAL at 08:36

## 2019-09-17 RX ADMIN — ATORVASTATIN CALCIUM 40 MG: 40 TABLET, FILM COATED ORAL at 08:38

## 2019-09-17 NOTE — PROGRESS NOTES
This CM and Jack Cuevas, SILVIA, [facility] [place] [place] Team met with patient to discuss discharge planning. Patient was alert and oriented x 4. She said she lives with her friend Gracie Boxer ([phone]) and her Uil Gordy ([phone]) in their private residence. Sunny Jennings assists with patient's ADLs and her fiancee Tomeka Felt also assists with her IADLs. Patient said she has a cane, a walker and a wheelchair which she uses as needed in the home and for outdoor trips    Patient said her friend Sunny Jennings does her daily PD and has been compliant with the PD schedule. Patient denied any difficulties with getting her prescriptions filled or to her medical appointments as Jena Ashleyg takes to her appointments . Patient also said Sunny Jennings administers her medications    CM called Gracie Boxer to verify information provided by patient. She verified that she and Tomeka Felt have been friends of the family for so many years and seemed like family. She also said she is responsible for patient's peritoneal dialysis daily. She said the PD treatment is from 8:30 pm to 7:30 am (nocturnal) and was unaware of any issues as she did it every night as ordered. Sunny Jennings also said patient had been to a SNF in [facility] in the past but could not remember the name of the facility, however, she had not been to a SNF or had [address] in [place]. Sunny Jennings said patient \"does better at home\" and would prefer she returns home rather than going to a rehab post acute care discharge. CM will continue to monitor patient's progress to ensure appropriate disposition- SNF vs Home Care for PT/OT.   Carito Abernathy MSA, RN, CRM

## 2019-09-17 NOTE — PROGRESS NOTES
Name: Fabian Hickey MRN: [id number]   : 1955 Hospital[facility]   Date: 2019        IMPRESSION:   ESRD - on PD  Hypervolemia- resolved. Severe hypoalbuminemia. Anemia in CKD      PLAN:   Continue the present PD prescription. She is in negative balance. Continue protein supplements. Add Nepro with meals. Ms. Festus Liao indicated she liked this formulation. Continue EPO. Agree temp HD may be required (in light if the hypoalbuminemia). There has been some resistance to HD in the past. The rationale for such a move was discussed yesterday. Will hold off for now. Renal Panel + CBC in the am ()     Subjective/Interval History:       19    ---> Fair morning. Dyspepsia was reported. Ms. Festus Liao was less than impressed with the dietary prescription. 19   ---> Having therapy this morning. She was a bit \" winded \". Reported constipation. It was ultimately relieved. There were no reported problems with PD. The I/O report was noted. Objective:   Vital Signs:    Visit Vitals  /90 (BP 1 Location: Right arm, BP Patient Position: At rest)   Pulse 70   Temp 98.2 °F (36.8 °C)   Resp 18   Ht 5' 3\" (1.6 m)   Wt 91.8 kg (202 lb 6.1 oz)   SpO2 97%   Breastfeeding? No   BMI 35.85 kg/m²       O2 Device: Room air   O2 Flow Rate (L/min): 1 l/min   Temp (24hrs), Av °F (36.7 °C), Min:97.2 °F (36.2 °C), Max:98.3 °F (36.8 °C)       Intake/Output:   Last shift:       07 -  190  In: 480 [P.O.:480]  Out: -   Last 3 shifts: 09/15 1901 -  0700  In: 9320 [P.O.:720]  Out: 78460     Intake/Output Summary (Last 24 hours) at 2019 1002  Last data filed at 2019 0740  Gross per 24 hour   Intake 5260 ml   Output 7000 ml   Net -1740 ml        Physical Exam:    Seen in Room 445. Her Therapist was in attendance    General:    Awake, alert.        Head:   Normocephalic    Eyes: Anicteric  . Lungs:   Clear to auscultation ,  No Wheezing ,  Rhonchi or rales. Chest wall:  PD catheter exit site is just above the left breast.    Heart:   No S 3 gallop. Abdomen:   Soft, Non-tender. Extremities:  No leg edema    Psych:  Calm        DATA:  Labs:  No results for input(s): NA, K, CL, CO2, BUN, CREA, CA, ALB, PHOS, MG in the last 72 hours. No results for input(s): WBC, HGB, HCT, PLT, HGBEXT, HCTEXT, PLTEXT, HGBEXT, HCTEXT, PLTEXT in the last 72 hours. No results for input(s): CLARISA, KU, CLU, CREAU in the last 72 hours.     No lab exists for component: PROU    Total time spent with patient:         Care Plan discussed with:      Patient    Eddie Jean MD

## 2019-09-17 NOTE — PROGRESS NOTES
Patient reports feeling exhausted and extremely, uncomfortably full from what she believes to be an issue with the protein shake she has been drinking in hospital. Does not feel up to getting up at this time. Noted PT note and patient wishes for rehab versus care manager note where caregivers feel she will do best at home. Will follow up at a later time to evaluate needs and assist in discharge plan.

## 2019-09-17 NOTE — PROGRESS NOTES
Problem: Mobility Impaired (Adult and Pediatric)  Goal: *Acute Goals and Plan of Care (Insert Text)  Description  FUNCTIONAL STATUS PRIOR TO ADMISSION: Patient was modified independent using a Rolling walker and Wheelchair for functional mobility. Reported primarily using w/c but upon d/c from rehab ~2 months ago, she could walk the hallways with a walker. Could not determine the cause of her report of now using w/c primarily. States she performed stand pivot to wheelchair independently. Did not go into the bathroom and instead soiled her briefs but performed her own hygiene after this (?). Reported that she bathed via pan baths and did not shower. HOME SUPPORT PRIOR TO ADMISSION: The patient lived with her nephew and his wife. States her nephew assisted with PD. Physical Therapy Goals  Initiated 9/16/2019  1. Patient will move from supine to sit and sit to supine , scoot up and down and roll side to side in bed with modified independence within 7 day(s). 2.  Patient will transfer from bed to chair and chair to bed with modified independence using the least restrictive device within 7 day(s). 3.  Patient will perform sit to stand with modified independence within 7 day(s). 4.  Patient will ambulate with modified independence for 10 feet with the least restrictive device within 7 day(s). Outcome: Progressing Towards Goal   PHYSICAL THERAPY TREATMENT  Patient: Fabi Garrett (89 y.o. female)  Date: 9/17/2019  Diagnosis: Shortness of breath [R06.02]  Precautions: Fall, Seizure(periotoneal dialysis)  Chart, physical therapy assessment, plan of care and goals were reviewed. ASSESSMENT  Based on the objective data described below, patient presents with improvement towards goals but at times self limiting behavior. Patient overall supervision-CGA for mobility, and required encouragement to ambulate in room as well as participate in standing hygiene with brief change (did so with CGA).  Seems to request more assist than she needs and needs encouragement to be active in her own care. Reports she feels weaker than PTA, and would like to look into rehab. Current Level of Function Impacting Discharge (mobility/balance): supervision-CGA    Other factors to consider for discharge: questionable adherence to PD?, inconsistent reports of baseline level of assistance at home         PLAN :  Patient continues to benefit from skilled intervention to address the above impairments. Continue treatment per established plan of care. to address goals. Recommendation for discharge: (in order for the patient to meet his/her long term goals)  Therapy up to 5 days/week in SNF setting    This discharge recommendation:  Has not yet been discussed the attending provider and/or case management    Equipment recommendations for successful discharge (if) home: to be determined by rehab facility       SUBJECTIVE:   Patient stated I don't wipe myself, it's a long way back there.  states her \"girlfriend\" wipes her at home and that she poops in her \"pampers\" and does not use the toilet    OBJECTIVE DATA SUMMARY:   Critical Behavior:  Neurologic State: Alert  Orientation Level: Oriented X4  Cognition: Appropriate decision making, Follows commands     Functional Mobility Training:  Bed Mobility:  Supine to Sit: Supervision(HOB elevated)  Scooting: Supervision  Transfers:  Sit to Stand: Contact guard assistance  Stand to Sit: Contact guard assistance  Balance:  Sitting: Intact  Standing: Intact; With support(RW)  Ambulation/Gait Training:  Distance (ft): 20 Feet (ft)  Assistive Device: Gait belt;Walker, rolling  Ambulation - Level of Assistance: Contact guard assistance  Gait Abnormalities: Shuffling gait  Base of Support: Widened  Speed/Dalia: Slow;Shuffled  Step Length: Right shortened;Left shortened    Activity Tolerance:   Fair, SpO2 stable on RA, requires rest breaks and observed SOB with activity  Please refer to the flowsheet for vital signs taken during this treatment.     After treatment patient left in no apparent distress:   Call bell within reach and Side rails x 3    COMMUNICATION/COLLABORATION:   The patients plan of care was discussed with: Registered Nurse    Carlotta Huff PT, DPT   Time Calculation: 28 mins

## 2019-09-17 NOTE — PROGRESS NOTES
Hospitalist Progress Note  Pastor Elan MD  Answering service: [phone] from in house phone        Date of Service:  2019  NAME:  Racheal Blackmon  :  1955  MRN:  [id number]  PCP: Unknown, Provider    Chief Complaint:   Chief Complaint   Patient presents with    Shortness of Breath         Admission Summary:     The patient is a 29-year-old female who has previous history significant for systolic heart failure, end-stage renal disease on peritoneal dialysis, history of coronary artery disease status post angioplasty and stent placement approximately 1-1/2 years ago at [facility], history of hypertension, previous history of stroke, hyperlipidemia, seizure disorder, comes to the emergency room due to worsening shortness of breath. Interval history / Subjective:     Seen and  Examined    Patient denied any chest pain,SOB. We discussed about discharge planning - she said she lives with her friend Parveen who assists her in ADLs. Per , patient thinking about rehab. On my discussion today,patient said she would prefer to go home . Assessment & Plan:    Acute on Chronic Systolic and diastolic heart failure POA   Echo with 26-30% EF and Grade III diastolic dysfunction. On coreg,losartan,lasix. On PD, transition to oral lasix  Strict in and o  Stress test is inconclusive,no further cardiac work up      Altered mental status 9/10-Resolved. -MRI brain ,no acute process except the chronic changes. EEg normal.Continue AEds. Neurology evaluated.      ESRD on peritoneal dialysis      Paroxysmal Afib  -on BB and amiodarone   -Avoid anticoagulation, H/O SDH and required  shunt     SDH in past    Right frontal ventriculoperitoneal shunt      History of seizures   See above ,on AED      Trop of .06   Cards on board   No evidence of ischemia on stress test  Coreg , imdur and asa and plavix      CAD s/p PCI   [facility] () - mid LAD 70-80%, distal LAD 50%, diagonal high-grade disease, OMB high-grade disease, RCA total occlusion - medically managed  2. [facility] 8/2018 - heavily calcified vessels with severe early mid LAD stenosis, occluded RCA with faint L-R collateral filling and occluded 1st marginal - felt to be high risk for CABG / patient refused  3. [facility] 9/4/18 - S/p successful PCI of the prox LAD using 3.0 x 24 Synergy ANDER      COPD   Stable      STROKE X3 , left hemiparesisis     LEGALLY BLIND      HTN: c/w Home regimen, use labetalol prn. Right hip pain: xray,negative. pain control.  -Pain resolved.           Social : lives with nephew in [facility]  Has daughter in [place] and in [place]  Was in [place] rehab 2 months prior to it        Code status: full   DVT prophylaxis: [address] discussed with: Patient/Family  Disposition: Home w/Family tomorrow    Code status: Full Code      Hospital Problems  Date Reviewed: 7/18/2019          Codes Class Noted POA    * (Principal) Altered mental state ICD-10-CM: R41.82  ICD-9-CM: 780.97  9/12/2019 Yes        Shortness of breath ICD-10-CM: R06.02  ICD-9-CM: 786.05  9/9/2019 Unknown                Review of Systems:   A comprehensive review of systems was negative except for that written in the HPI. Physical Examination:     Visit Vitals  /81 (BP 1 Location: Left arm, BP Patient Position: At rest)   Pulse 72   Temp 98.4 °F (36.9 °C)   Resp 18   Ht 5' 3\" (1.6 m)   Wt 91.8 kg (202 lb 6.1 oz)   SpO2 95%   Breastfeeding? No   BMI 35.85 kg/m²     General:  Alert, cooperative, no distress, appears stated age. Back:   Symmetric, no curvature. ROM normal. No CVA tenderness. Lungs:   Clear to auscultation bilaterally. Chest wall:  No tenderness or deformity. Heart:  Regular rate and rhythm, S1, S2 normal, no murmur, click, rub or gallop. Abdomen:   Soft, non-tender. Bowel sounds normal. No masses,  No organomegaly. Extremities: Pain on ROM right hip   Pulses: 2+ and symmetric all extremities.    Neurologic: Alert and awake. Legally blind. Moves extremities. Vital Signs:    Last 24hrs VS reviewed since prior progress note. Most recent are:    Visit Vitals  /81 (BP 1 Location: Left arm, BP Patient Position: At rest)   Pulse 72   Temp 98.4 °F (36.9 °C)   Resp 18   Ht 5' 3\" (1.6 m)   Wt 91.8 kg (202 lb 6.1 oz)   SpO2 95%   Breastfeeding? No   BMI 35.85 kg/m²         Intake/Output Summary (Last 24 hours) at 2019 1850  Last data filed at 2019 1525  Gross per 24 hour   Intake 7980 ml   Output 9750 ml   Net -1770 ml        Tmax:  Temp (24hrs), Av.2 °F (36.8 °C), Min:98 °F (36.7 °C), Max:98.4 °F (36.9 °C)      Data Review:   Data reviewed by myself:  Mri Brain Wo Cont    Result Date: 2019  PRELIMINARY REPORT: Chronic appearing right greater than left subdural collection. Hemosiderin staining and encephalomalacia in the right frontal lobe adjacent to the right frontal shunt. Ventricular size not significant changed. No acute infarct. Encephalomalacia in the left frontal lobe with mild high signal within the periventricular white matter Preliminary report was provided by Dr. Navdeep Mtz, the on-call radiologist, at 6503 Final report to follow. FINAL REPORT: EXAM: MRI BRAIN WO CONT TECHNIQUE: Sagittal and axial T1-weighted images axial FLAIR, T2-weighted, diffusion weighted, gradient echo,  coronal T2 IV CONTRAST:  None INDICATION:  Evaluate for hydrocephalus, prior stroke, epileptogenic focus COMPARISON:  CT head of 9/10/2019, brain MRI of 2016 FINDINGS: BRAIN PARENCHYMA:  No acute infarct. Moderate predominantly confluent FLAIR/T2 hyperintensity in the deep cerebral white matter, slightly increased since 2016, likely due to intracranial small vessel disease. Chronic left basal ganglia, right corona radiata, and right cerebellar lacunar infarcts. Unchanged right superior frontal lobe encephalomalacia INTRACRANIAL HEMORRHAGE: No acute hemorrhage.  Small bilateral chronic extra-axial collections demonstrating mild T1 hypointensity and overall T2 hyperintensity. Unchanged on the right and smaller on the left when compared to 2016. Chronic hemorrhage in the region of the right frontal encephalomalacia. CSF SPACES:  Right frontal ventriculostomy catheter with the tip in the region of the left frontal horn. Normal and unchanged ventricular size. BASAL CISTERNS:  Patent. MIDLINE SHIFT: None. VASCULAR SYSTEM:  Normal flow voids. PARANASAL SINUSES AND MASTOID AIR CELLS:  No significant opacification. VISUALIZED ORBITS:  No significant abnormalities. VISUALIZED UPPER CERVICAL SPINE:  No significant abnormalities. SELLA:  No enlargement or  focal abnormality. SKULL BASE:  No significant abnormalities. Cerebellar tonsils in normal position. CALVARIUM:  Intact. IMPRESSION:  1. No acute findings. 2. Moderate cerebral white matter signal abnormality and chronic lacunar infarcts, consistent with chronic small vessel ischemic change, with progression since 2019. 3. Unchanged ventricular size. Small chronic extra-axial collections, present since at least 2016, unchanged on the right and smaller on the left since that time. Ct Head Wo Cont    Result Date: 9/10/2019  EXAM: CT HEAD WO CONT INDICATION: Confusion/delirium, altered LOC, unexplained COMPARISON: 2015. CONTRAST: None. TECHNIQUE: Unenhanced CT of the head was performed using 5 mm images. Brain and bone windows were generated. CT dose reduction was achieved through use of a standardized protocol tailored for this examination and automatic exposure control for dose modulation. FINDINGS: The ventricles are enlarged compared to prior examination of 2015. There is a ventriculoperitoneal shunt in place with the tip in the region of the right lateral ventricle. There is periventricular hypoattenuation compatible with chronic small vessel ischemic changes. There is encephalomalacia in the left frontal lobe, likely in an area of prior ventriculostomy tube.  There is a small low density collection in the right subdural space which appears chronic. There is no midline shift. There is encephalomalacia in the right frontal lobe. The basilar cisterns are open. No acute infarct is identified. The bone windows demonstrate there is evidence of left parietal bone surgery. There is left maxillary sinus disease. There is calcification in the left extra-axial space. IMPRESSION: Interval enlargement of ventricular size since prior study with ventriculostomy tube in place. Xr Chest Port    Result Date: 9/9/2019  EXAM: XR CHEST PORT INDICATION: CHF COMPARISON: 7/19/2019 FINDINGS: A portable AP radiograph of the chest was obtained at 1411 hours. The patient is on a cardiac monitor. The lungs are clear. The cardiac and mediastinal contours and pulmonary vascularity are remarkable for a central prominence of the right hilum. There is a linear band of subsegmental atelectasis along the left major fissure. The bones and soft tissues are grossly within normal limits. IMPRESSION: No acute process identified    No results found for: SDES  Lab Results   Component Value Date/Time    Culture result: NO GROWTH 4 DAYS 09/11/2019 03:32 PM    Culture result: Culture performed on Fluid swab specimen 07/17/2019 11:09 AM    Culture result: NO GROWTH 4 DAYS 07/17/2019 11:09 AM     All Micro Results     Procedure Component Value Units Date/Time    CULTURE, BODY FLUID Ricka Gaster STAIN [[id number]] Collected:  09/11/19 1532    Order Status:  Completed Specimen:  Dialysate Updated:  09/15/19 0928     Special Requests: NO SPECIAL REQUESTS        GRAM STAIN NO WBC'S SEEN         NO ORGANISMS SEEN        Culture result: NO GROWTH 4 DAYS             Labs: reviewed by myself. No results for input(s): WBC, HGB, HCT, PLT, HGBEXT, HCTEXT, PLTEXT, HGBEXT, HCTEXT, PLTEXT in the last 72 hours. No results for input(s): NA, K, CL, CO2, BUN, CREA, GLU, CA, MG, PHOS, URICA in the last 72 hours.   No results for input(s): SGOT, GPT, ALT, AP, TBIL, TBILI, TP, ALB, GLOB, GGT, AML, LPSE in the last 72 hours. No lab exists for component: AMYP, HLPSE  No results for input(s): INR, PTP, APTT in the last 72 hours. No lab exists for component: INREXT, INREXT   No results for input(s): FE, TIBC, PSAT, FERR in the last 72 hours. No results found for: FOL, RBCF   No results for input(s): PH, PCO2, PO2 in the last 72 hours. No results for input(s): CPK, CKNDX, TROIQ in the last 72 hours.     No lab exists for component: CPKMB  No results found for: CHOL, CHOLX, CHLST, CHOLV, HDL, HDLP, LDL, LDLC, DLDLP, TGLX, TRIGL, TRIGP, CHHD, CHHDX  Lab Results   Component Value Date/Time    Glucose (POC) 87 07/22/2019 11:16 AM    Glucose (POC) 162 (H) 07/17/2019 03:21 AM     Lab Results   Component Value Date/Time    Color YELLOW/STRAW 04/03/2015 08:31 PM    Appearance CLOUDY (A) 04/03/2015 08:31 PM    Specific gravity 1.013 04/03/2015 08:31 PM    pH (UA) 7.5 04/03/2015 08:31 PM    Protein 300 (A) 04/03/2015 08:31 PM    Glucose NEGATIVE  04/03/2015 08:31 PM    Ketone NEGATIVE  04/03/2015 08:31 PM    Bilirubin NEGATIVE  04/03/2015 08:31 PM    Urobilinogen 0.2 04/03/2015 08:31 PM    Nitrites NEGATIVE  04/03/2015 08:31 PM    Leukocyte Esterase NEGATIVE  04/03/2015 08:31 PM    Epithelial cells MANY (A) 04/03/2015 08:31 PM    Bacteria 1+ (A) 04/03/2015 08:31 PM    WBC 0-4 04/03/2015 08:31 PM    RBC 0-5 04/03/2015 08:31 PM         Medications Reviewed:     Current Facility-Administered Medications   Medication Dose Route Frequency    epoetin tawny-epbx (RETACRIT) injection 4,000 Units  4,000 Units SubCUTAneous Q MON, WED & FRI    furosemide (LASIX) tablet 40 mg  40 mg Oral DAILY    ondansetron (ZOFRAN) injection 4 mg  4 mg IntraVENous Q4H PRN    oxyCODONE-acetaminophen (PERCOCET) 5-325 mg per tablet 1 Tab  1 Tab Oral Q4H PRN    peritoneal dialysis DEXTROSE 2.5% (2.5 mEq/L low calcium) solution 2,000 mL  2,000 mL IntraPERitoneal QID    acetaminophen (TYLENOL) tablet 650 mg  650 mg Oral Q4H PRN    LORazepam (ATIVAN) injection 1 mg  1 mg IntraVENous Q4H PRN    bisacodyl (DULCOLAX) suppository 10 mg  10 mg Rectal DAILY PRN    sodium chloride (NS) flush 5-40 mL  5-40 mL IntraVENous Q8H    sodium chloride (NS) flush 5-40 mL  5-40 mL IntraVENous PRN    aspirin delayed-release tablet 81 mg  81 mg Oral DAILY    atorvastatin (LIPITOR) tablet 40 mg  40 mg Oral DAILY    brimonidine (ALPHAGAN) 0.2 % ophthalmic solution 1 Drop  1 Drop Both Eyes BID    calcitRIOL (ROCALTROL) capsule 0.25 mcg  0.25 mcg Oral Once per day on Thu Sat    carvedilol (COREG) tablet 12.5 mg  12.5 mg Oral BID WITH MEALS    clopidogrel (PLAVIX) tablet 75 mg  75 mg Oral DAILY    isosorbide mononitrate ER (IMDUR) tablet 60 mg  60 mg Oral 7am    lactulose (CHRONULAC) 10 gram/15 mL solution 30 mL  20 g Oral BID PRN    levETIRAcetam (KEPPRA) tablet 500 mg  500 mg Oral BID    pantoprazole (PROTONIX) tablet 40 mg  40 mg Oral ACB    sevelamer carbonate (RENVELA) tab 1,600 mg  1,600 mg Oral TID WITH MEALS    docusate sodium (COLACE) capsule 100 mg  100 mg Oral BID PRN    B complex-vitaminC-folic acid (NEPHROCAP) cap  1 Cap Oral DAILY    lacosamide (VIMPAT) tablet 150 mg  150 mg Oral BID    losartan (COZAAR) tablet 25 mg  25 mg Oral DAILY    phenytoin ER (DILANTIN ER) ER capsule 300 mg  300 mg Oral QHS    potassium chloride (KLOR-CON) packet for solution 20 mEq  20 mEq Oral DAILY    albuterol-ipratropium (DUO-NEB) 2.5 MG-0.5 MG/3 ML  3 mL Nebulization Q6H PRN    amiodarone (CORDARONE) tablet 100 mg  100 mg Oral DAILY     ______________________________________________________________________  EXPECTED LENGTH OF STAY: 4d 2h  ACTUAL LENGTH OF STAY:          8                 Perry Siddiqui MD     Patient's emergency contacts:  Extended Emergency Contact Information  Primary Emergency Contact: [address] [facility] Phone: [phone]  Relation: Sister  Secondary Emergency Contact: [address] Phone: [phone]  Relation: Child

## 2019-09-18 ENCOUNTER — DOCUMENTATION ONLY (OUTPATIENT)
Dept: CASE MANAGEMENT | Age: 64
End: 2019-09-18

## 2019-09-18 ENCOUNTER — APPOINTMENT (OUTPATIENT)
Dept: GENERAL RADIOLOGY | Age: 64
DRG: 291 | End: 2019-09-18
Attending: HOSPITALIST
Payer: MEDICARE

## 2019-09-18 PROCEDURE — 74011250636 HC RX REV CODE- 250/636: Performed by: INTERNAL MEDICINE

## 2019-09-18 PROCEDURE — A4722 DIALYS SOL FLD VOL > 1999CC: HCPCS | Performed by: INTERNAL MEDICINE

## 2019-09-18 PROCEDURE — 97165 OT EVAL LOW COMPLEX 30 MIN: CPT | Performed by: OCCUPATIONAL THERAPIST

## 2019-09-18 PROCEDURE — 74011250637 HC RX REV CODE- 250/637: Performed by: HOSPITALIST

## 2019-09-18 PROCEDURE — 74018 RADEX ABDOMEN 1 VIEW: CPT

## 2019-09-18 PROCEDURE — 97530 THERAPEUTIC ACTIVITIES: CPT | Performed by: OCCUPATIONAL THERAPIST

## 2019-09-18 PROCEDURE — 97535 SELF CARE MNGMENT TRAINING: CPT | Performed by: OCCUPATIONAL THERAPIST

## 2019-09-18 PROCEDURE — 65660000000 HC RM CCU STEPDOWN

## 2019-09-18 RX ORDER — POLYETHYLENE GLYCOL 3350 17 G/17G
17 POWDER, FOR SOLUTION ORAL DAILY
Status: DISCONTINUED | OUTPATIENT
Start: 2019-09-19 | End: 2019-09-19 | Stop reason: HOSPADM

## 2019-09-18 RX ORDER — LANOLIN ALCOHOL/MO/W.PET/CERES
1.5 CREAM (GRAM) TOPICAL
Status: DISCONTINUED | OUTPATIENT
Start: 2019-09-18 | End: 2019-09-19 | Stop reason: HOSPADM

## 2019-09-18 RX ADMIN — ISOSORBIDE MONONITRATE 60 MG: 60 TABLET, EXTENDED RELEASE ORAL at 06:06

## 2019-09-18 RX ADMIN — OXYCODONE HYDROCHLORIDE AND ACETAMINOPHEN 1 TABLET: 5; 325 TABLET ORAL at 06:32

## 2019-09-18 RX ADMIN — PHENYTOIN SODIUM 300 MG: 100 CAPSULE ORAL at 21:45

## 2019-09-18 RX ADMIN — POTASSIUM CHLORIDE 20 MEQ: 1.5 POWDER, FOR SOLUTION ORAL at 09:12

## 2019-09-18 RX ADMIN — EPOETIN ALFA-EPBX 4000 UNITS: 4000 INJECTION, SOLUTION INTRAVENOUS; SUBCUTANEOUS at 21:50

## 2019-09-18 RX ADMIN — SODIUM CHLORIDE, SODIUM LACTATE, CALCIUM CHLORIDE, MAGNESIUM CHLORIDE AND DEXTROSE 2000 ML: 2.5; 538; 448; 18.3; 5.08 INJECTION, SOLUTION INTRAPERITONEAL at 21:45

## 2019-09-18 RX ADMIN — SODIUM CHLORIDE, SODIUM LACTATE, CALCIUM CHLORIDE, MAGNESIUM CHLORIDE AND DEXTROSE 2000 ML: 2.5; 538; 448; 18.3; 5.08 INJECTION, SOLUTION INTRAPERITONEAL at 12:56

## 2019-09-18 RX ADMIN — SODIUM CHLORIDE, SODIUM LACTATE, CALCIUM CHLORIDE, MAGNESIUM CHLORIDE AND DEXTROSE 2000 ML: 2.5; 538; 448; 18.3; 5.08 INJECTION, SOLUTION INTRAPERITONEAL at 17:33

## 2019-09-18 RX ADMIN — CARVEDILOL 12.5 MG: 12.5 TABLET, FILM COATED ORAL at 17:33

## 2019-09-18 RX ADMIN — LACOSAMIDE 150 MG: 50 TABLET, FILM COATED ORAL at 17:33

## 2019-09-18 RX ADMIN — BRIMONIDINE TARTRATE 1 DROP: 2 SOLUTION OPHTHALMIC at 17:33

## 2019-09-18 RX ADMIN — Medication 10 ML: at 21:46

## 2019-09-18 RX ADMIN — PANTOPRAZOLE SODIUM 40 MG: 40 TABLET, DELAYED RELEASE ORAL at 06:05

## 2019-09-18 RX ADMIN — SEVELAMER CARBONATE 1600 MG: 800 TABLET, FILM COATED ORAL at 17:33

## 2019-09-18 RX ADMIN — OXYCODONE HYDROCHLORIDE AND ACETAMINOPHEN 1 TABLET: 5; 325 TABLET ORAL at 12:31

## 2019-09-18 RX ADMIN — LEVETIRACETAM 500 MG: 500 TABLET, FILM COATED ORAL at 17:33

## 2019-09-18 NOTE — PROGRESS NOTES
Physical Therapy Note    Patient is on the phone and having her hair done by family. Will re-attempt as able. 3502 Patient is off the floor for testing.     Thank you,  Pretty NAMT

## 2019-09-18 NOTE — PROGRESS NOTES
Problem: Falls - Risk of  Goal: *Absence of Falls  Description  Document [place] Fall Risk and appropriate interventions in the flowsheet.   Outcome: Progressing Towards Goal  Note:   Fall Risk Interventions:  Mobility Interventions: Communicate number of staff needed for ambulation/transfer, OT consult for ADLs, Patient to call before getting OOB, PT Consult for mobility concerns    Mentation Interventions: Adequate sleep, hydration, pain control    Medication Interventions: Evaluate medications/consider consulting pharmacy, Patient to call before getting OOB    Elimination Interventions: Call light in reach, Toileting schedule/hourly rounds

## 2019-09-18 NOTE — PROGRESS NOTES
Patient transferred to [address] [place] [address]. RN from 4W sent patient's uncollected lab bag, stated patient refused. Patient states she did not refuse, RN never attempted, and that RN did not complete peritoneal dialysis. Called IV team to assist with labs, no answer. Patient has had art stick for previous labs. Calling MD to discuss. 2nd page to MD.     1430- Orders placed for art stick    [phone]-  RRT unable to perform at this time, patient is off the floor. RN to repage when patient is returned to bedside.      2712- Patient refusing labs, says will be agreeable to AM labs

## 2019-09-18 NOTE — PROGRESS NOTES
Transitional Care Team: Follow-Up YARELIS Note      Called Ms. Hassan's daughter, Luis Woodard, who lives in [facility] at this time. Introduced myself and my role in her mother's care. After the [facility] NP asked about her AMD--see ACP note 9/18/19--Aliya told me she has been calling each day and speaking to the nurse caring for her mother, but has been unable to reach her physician for an update. I advised I can help facilitate this, and texted the hospitalist on her behalf, requesting a call and giving the daughter's phone #. The hospitalist agreed to call her. Aliya also asked about discharge planning, and whether her mother was going to a facility after hospital discharge. I mentioned that referrals have been sent to several local SNFs for rehab, in case this will be the recommendation. I advised that the CM note indicates [facility] will accept a patient on PD, and the other referrals are pending. She isn't sure she would want her mother to go to this facility. I encouraged her to discuss with CM and advised I can let the CMs on her mother's unit know she is interested in the recommendations and options for her mother post-hospital discharge. She is agreeable to this plan. I contacted one of the 2 CMs on [facility] and shared this information. Ms. Jesika Wagner has just transferred to their unit today and will be seen by the CM tomorrow at the earliest.  The CM has made note of this request.  I will F/U with the CM tomorrow if at all possible, to see if there are any questions for me.

## 2019-09-18 NOTE — PROGRESS NOTES
0845 Pt will not cooperate with assessment. Will not answer questions and will not follow commands. Pt states we are on her time and not our time. Pt is hostile to nurse and is name calling. 0930 Pt refused all AM meds as well as 0900 PD.   9040 Report called [facility] [address]. Nurse is aware of pt refusing medications as well as dialysis. Pt is awaiting transport. 1000 Dr. Marc Vu made aware of refusal of dialysis and am assessment and medication. Continue to await for transport.

## 2019-09-18 NOTE — ACP (ADVANCE CARE PLANNING)
Advance Care Planning (ACP) Provider Conversation Snapshot    Date of ACP Conversation: 09/18/19  Persons included in Corpus eren:  patient; and later by phone, her daughter Ina Urrutia" Derrick Owen of ACP Conversation in minutes:  20 minutes    In to see patient today around 12:15P to ask if she has an AMD at home. We have none on file and I wanted to offer the opportunity to complete while she is here if there is capacity and interest.  She is very alert and interactive today. Her sister Dalene Halsted is visiting and preparing to do her hair once the staff have assisted her to a chair. She told me her daughter, Lexi Campos, has it in her possession. She gave permission for me to call her. I spoke to Love and she informed me that they were not given a copy of the AMD completed at another hospital--? [facility]. Returned to see Ms. Jennifer Steinberg after this phone conversation to inform of this information and to see if I can help her complete one today. She is back in bed and ready to rest.  Asked me to return tomorrow, which I will try my best to do.   She did state that she has two health care agents in mind, so we will try to at least obtain that much of the AMD.

## 2019-09-18 NOTE — PROGRESS NOTES
Name: Roby Espinoza MRN: [id number]   : 1955 Hospital[facility]   Date: 2019        IMPRESSION:   ESRD - on PD  Hypervolemia- resolved. Severe hypoalbuminemia. Anemia in CKD  Constipation  Insomnia      PLAN:   Continue the present PD prescription. Continue protein supplements. Continue Nepro with meals. Ms. Karen Tinoco indicated she liked this formulation. Continue EPO. Agree temp HD may be required (in light if the hypoalbuminemia). There has been some resistance to HD in the past. The rationale for such a move was discussed yesterday. Will hold off for now. Melatonin prn for insomnia     Subjective/Interval History:       19    ---> Fair morning. Dyspepsia was reported. Ms. Karen Tinoco was less than impressed with the dietary prescription. 19   ---> Having therapy this morning. She was a bit \" winded \". Reported constipation. It was ultimately relieved. There were no reported problems with PD. The I/O report was noted. 19   --> F/U ESRD                         \" Did not sleep till 7 am \"                        Constipation was an issue. This has been addressed. Objective:   Vital Signs:    Visit Vitals  /77 (BP 1 Location: Left arm, BP Patient Position: At rest)   Pulse 68   Temp 98.2 °F (36.8 °C)   Resp 18   Ht 5' 3\" (1.6 m)   Wt 93.8 kg (206 lb 12.7 oz)   SpO2 98%   Breastfeeding? No   BMI 36.63 kg/m²       O2 Device: Room air   O2 Flow Rate (L/min): 1 l/min   Temp (24hrs), Av.2 °F (36.8 °C), Min:97.7 °F (36.5 °C), Max:98.4 °F (36.9 °C)       Intake/Output:   Last shift:      No intake/output data recorded.   Last 3 shifts:  1901 -  0700  In: 87977 [P.O.:1080]  Out: 27160     Intake/Output Summary (Last 24 hours) at 2019 1035  Last data filed at 2019 0030  Gross per 24 hour   Intake 9980 ml   Output 9850 ml   Net 130 ml Physical Exam:    Seen in Room 445. Her Therapist was in attendance    General:         Comfortable    Head:   Normocephalic    Eyes:   Anicteric  . Lungs:   Clear to auscultation ,  No Wheezing ,  Rhonchi or rales. Chest wall:  PD catheter --> exit site is just above the left breast.    Heart:   No S 3 gallop. Abdomen:   No distension. Extremities:  No leg edema    Psych:  Calm        DATA:  Labs:  No results for input(s): NA, K, CL, CO2, BUN, CREA, CA, ALB, PHOS, MG in the last 72 hours. No results for input(s): WBC, HGB, HCT, PLT, HGBEXT, HCTEXT, PLTEXT, HGBEXT, HCTEXT, PLTEXT in the last 72 hours. No results for input(s): CLARISA, KU, CLU, CREAU in the last 72 hours.     No lab exists for component: PROU    Total time spent with patient:         Care Plan discussed with:      Patient    Seng Hampton MD

## 2019-09-18 NOTE — PROGRESS NOTES
Bedside shift change report given to Allison Carmona 86 (oncoming nurse) by Cesar Moser RN (offgoing nurse). Report included the following information SBAR, Kardex, Intake/Output and MAR.

## 2019-09-18 NOTE — PROGRESS NOTES
NIRANJAN PLAN:    1. Patient to continue with Peritoneal Dialysis. 2. Disposition plan is SNF vs Home with PT.    3. Patient's sister may transport home but may need transportation arranged. 4. Patient to follow with PCP/Specialist.        CM noted PT recommendation for SNF placement. CM called Opal Josselyn [phone] patient's friend and caregiver as daughters live in [facility] to discuss discharge planning and to offer a choice. Caregiver said patient is at baseline and did not think patient will do well at a SNF. She prefers home PT to rehab at a SNF. Naveen Tatum said patient has 3 other people in the house to assist with her care. CM discussed patient with OT who said if patient has caregivers in the home, she will evaluate for home OT. Per Naveen Tatum, they will like to use [facility] if discharged home with home PT. Josefa Guardado MSA, RN, CRM. 11:45 am. CM spoke with patient's sister Dilan Negro over the phone regarding disposition as patient has asked for rehab prior to returning home. Patient's sister is in agreement with SNF rehab if able to place for a short time. , she said patient was at a SNF in [facility] because they were unable to find a SNF that would accept a PD patient in [place] at the time. She also said patient is receiving good care with her current caregivers Naveen Tatum and Rejionel Ignacio. Therefore, if unable to find a SNF placement, she is in agreement with [facility] for PT. Consent received over the phone and Freedom of Choice form placed in patient's chart    CM sent referrals to [facility], [address] [facility] and [facility]. Diogenes Rowell informed this CM that [facility] accepts PD patients   Y.  Ashleigh Vazquez, [address], RN, CRM

## 2019-09-18 NOTE — PROGRESS NOTES
OCCUPATIONAL THERAPY EVALUATION/DISCHARGE  Patient: Racheal Blackmon (58 y.o. female)  Date: 9/18/2019  Primary Diagnosis: Shortness of breath [R06.02]        Precautions:   Fall, Seizure(periotoneal dialysis)    ASSESSMENT  Based on the objective data described below, the patient presents with slow processing and reporting she did not go to sleep until 0700 this morning, however per RN she had pain med and slept all night. At this time feel she is at her baseline and caregiver reporting she is at baseline. Has assist at home and caregivers want to take her home. No further OT needs. Current Level of Function (ADLs/self-care): baseline, assist with LE ADLs, toileting, reports goes in brief and assist to change versus going to toilet,  stand pivot transfer with supervision to SBA    Functional Outcome Measure: The patient scored 20/100 on the Barthel Index outcome measure   Other factors to consider for discharge: patient reporting desire to go to rehab, perhaps due to desire for different home set up per MD, however not a permanent fix and caregivers want her to go home.  addressing     PLAN :  Recommend with staff: Recommend with nursing patient to complete as able in order to maintain strength, endurance and independence: ADLs with supervision/setup, OOB to chair 3x/day and mobilizing to the bathroom for toileting with SBA. Thank you for your assistance. Recommendation for discharge: (in order for the patient to meet his/her long term goals)  No skilled occupational therapy/ follow up rehabilitation needs identified at this time. This discharge recommendation:  Has been made in collaboration with the attending provider and/or case management    Equipment recommendations for successful discharge: none       SUBJECTIVE:   Patient stated I didn't sleep, they won't give me Melatonin.     OBJECTIVE DATA SUMMARY:   HISTORY:   Past Medical History:   Diagnosis Date    Acute on chronic respiratory failure ([facility] 7/18/2019    Blood clot in vein     left arm several years ago    CAD (coronary artery disease)     Chronic kidney disease     Chronic systolic heart failure ([facility] 7/18/2019    Congestive heart failure (HCC)     Hypercholesterolemia     Hypertension     Legally blind     PAF (paroxysmal atrial fibrillation) ([facility] 7/18/2019    Seizures ([facility]     Stroke [facility])      Past Surgical History:   Procedure Laterality Date    CARDIAC SURG PROCEDURE UNLIST      heart attack 12/15    HX ORTHOPAEDIC      right middle finger    HX OTHER SURGICAL      shunt placed in brain       Prior Level of Function/Environment/Context: lives with her nephew and his wife, had assist with dressing, bathing, toileting, they manage PD, walker for SPT however feel she can walk further  Expanded or extensive additional review of patient history:   Home Situation  Home Environment: Private residence  # Steps to Enter: 0  Wheelchair Ramp: Yes  One/Two Story Residence: One story  Living Alone: No  Support Systems: Family member(s)  Patient Expects to be Discharged to[de-identified] Private residence  Current DME Used/Available at Home: Cane, straight, Walker, rolling, Wheelchair(adjustable bed)  Tub or Shower Type: (performs pan baths)    Hand dominance: Right    EXAMINATION OF PERFORMANCE DEFICITS:  Cognitive/Behavioral Status:      Slow processing/command following, decreased memory                Skin: intact    Edema: none noted    Hearing: Auditory  Auditory Impairment: Hard of hearing, bilateral    Vision/Perceptual:            Legally blind     Range of Motion:  WFL bilateral UE        Strength:  WFL bilateral UE           Tone & Sensation:  Tone: normal           Balance:   Static standing good with support    Functional Mobility and Transfers for ADLs:  Bed Mobility:  Rolling: Supervision; Additional time  Supine to Sit: Minimum assistance; Additional time; Other (comment)(head of bed flat)    Transfers:  Sit to Stand: Contact guard assistance;Assist x1  Stand to Sit: Contact guard assistance;Assist x1  Bed to Chair: Stand-by assistance; Adaptive equipment; Additional time;Assist x1  Toilet Transfer : Stand-by assistance;Contact guard assistance; Adaptive equipment;Assist x1    ADL Assessment:  Feeding: Setup;Supervision    Oral Facial Hygiene/Grooming: Other (comment)(reports someone coming to do her hair)    Bathing: Maximum assistance    Upper Body Dressing: Minimum assistance    Lower Body Dressing: Maximum assistance    Toileting: Total assistance;Maximum assistance(incontinent)                ADL Intervention and task modifications:     Educated on role of OT, encouraged out of bed and agreeable    Patient instructed and indicated understanding the benefits of maintaining activity tolerance, functional mobility, and independence with self care tasks during acute stay  to ensure safe return home and to baseline. Encouraged patient to increase frequency and duration OOB, be out of bed for all meals, perform daily ADLs (as approved by RN/MD regarding bathing etc), and performing functional mobility to/from bedside commode or toilet        Functional Measure:  Barthel Index:    Bathin  Bladder: 0  Bowels: 0  Groomin  Dressin  Feedin  Mobility: 0  Stairs: 0  Toilet Use: 5  Transfer (Bed to Chair and Back): 10  Total: 20/100        The Barthel ADL Index: Guidelines  1. The index should be used as a record of what a patient does, not as a record of what a patient could do. 2. The main aim is to establish degree of independence from any help, physical or verbal, however minor and for whatever reason. 3. The need for supervision renders the patient not independent. 4. A patient's performance should be established using the best available evidence. Asking the patient, friends/relatives and nurses are the usual sources, but direct observation and common sense are also important. However direct testing is not needed.   5. Usually the patient's performance over the preceding 24-48 hours is important, but occasionally longer periods will be relevant. 6. Middle categories imply that the patient supplies over 50 per cent of the effort. 7. Use of aids to be independent is allowed. Denny Hall., Barthel, D.W. (1950). Functional evaluation: the Barthel Index. [facility] (14)2. Karena Barthel zeeshan RONDA Ramirez, Nilesh Wise.Magaly., [place], [address] (1999). Measuring the change indisability after inpatient rehabilitation; comparison of the responsiveness of the Barthel Index and Functional [place] Measure. Journal of Neurology, Neurosurgery, and Psychiatry, 66(4), 246-731. Mayra Castro, [place].JACKIE, BRITTANY Lott, & Ursula Baca M.A. (2004.) Assessment of post-stroke quality of life in cost-effectiveness studies: The usefulness of the Barthel Index and the EuroQoL-5D. Quality of Life Research, 15, 776-68         Occupational Therapy Evaluation Charge Determination   History Examination Decision-Making   LOW Complexity : Brief history review  MEDIUM Complexity : 3-5 performance deficits relating to physical, cognitive , or psychosocial skils that result in activity limitations and / or participation restrictions MEDIUM Complexity : Patient may present with comorbidities that affect occupational performnce. Miniml to moderate modification of tasks or assistance (eg, physical or verbal ) with assesment(s) is necessary to enable patient to complete evaluation       Based on the above components, the patient evaluation is determined to be of the following complexity level: LOW   Pain Rating:  Not rated right side with scooting edge of bed    Activity Tolerance:   Good  Please refer to the flowsheet for vital signs taken during this treatment.     After treatment patient left in no apparent distress:    Sitting in chair and Call bell within reach    COMMUNICATION/EDUCATION:   The patients plan of care was discussed with: Registered Nurse and .     Thank you for this referral.  Iraida Mccormick, OTR/L  Time Calculation: 21 mins

## 2019-09-19 ENCOUNTER — DOCUMENTATION ONLY (OUTPATIENT)
Dept: CASE MANAGEMENT | Age: 64
End: 2019-09-19

## 2019-09-19 ENCOUNTER — HOME HEALTH ADMISSION (OUTPATIENT)
Dept: HOME HEALTH SERVICES | Facility: HOME HEALTH | Age: 64
End: 2019-09-19
Payer: MEDICARE

## 2019-09-19 VITALS
WEIGHT: 203.93 LBS | SYSTOLIC BLOOD PRESSURE: 103 MMHG | OXYGEN SATURATION: 97 % | RESPIRATION RATE: 17 BRPM | HEART RATE: 68 BPM | TEMPERATURE: 98.2 F | BODY MASS INDEX: 36.13 KG/M2 | DIASTOLIC BLOOD PRESSURE: 60 MMHG | HEIGHT: 63 IN

## 2019-09-19 LAB
ANION GAP SERPL CALC-SCNC: 11 MMOL/L (ref 5–15)
BASOPHILS # BLD: 0.1 K/UL (ref 0–0.1)
BASOPHILS NFR BLD: 1 % (ref 0–1)
BUN SERPL-MCNC: 70 MG/DL (ref 6–20)
BUN/CREAT SERPL: 6 (ref 12–20)
CALCIUM SERPL-MCNC: 9 MG/DL (ref 8.5–10.1)
CHLORIDE SERPL-SCNC: 93 MMOL/L (ref 97–108)
CO2 SERPL-SCNC: 27 MMOL/L (ref 21–32)
CREAT SERPL-MCNC: 11.6 MG/DL (ref 0.55–1.02)
DIFFERENTIAL METHOD BLD: ABNORMAL
EOSINOPHIL # BLD: 0.8 K/UL (ref 0–0.4)
EOSINOPHIL NFR BLD: 8 % (ref 0–7)
ERYTHROCYTE [DISTWIDTH] IN BLOOD BY AUTOMATED COUNT: 19.2 % (ref 11.5–14.5)
GLUCOSE SERPL-MCNC: 84 MG/DL (ref 65–100)
HCT VFR BLD AUTO: 32 % (ref 35–47)
HGB BLD-MCNC: 10 G/DL (ref 11.5–16)
IMM GRANULOCYTES # BLD AUTO: 0.1 K/UL (ref 0–0.04)
IMM GRANULOCYTES NFR BLD AUTO: 1 % (ref 0–0.5)
LYMPHOCYTES # BLD: 1.9 K/UL (ref 0.8–3.5)
LYMPHOCYTES NFR BLD: 20 % (ref 12–49)
MCH RBC QN AUTO: 27.5 PG (ref 26–34)
MCHC RBC AUTO-ENTMCNC: 31.3 G/DL (ref 30–36.5)
MCV RBC AUTO: 88.2 FL (ref 80–99)
MONOCYTES # BLD: 0.9 K/UL (ref 0–1)
MONOCYTES NFR BLD: 9 % (ref 5–13)
NEUTS SEG # BLD: 5.8 K/UL (ref 1.8–8)
NEUTS SEG NFR BLD: 61 % (ref 32–75)
NRBC # BLD: 0 K/UL (ref 0–0.01)
NRBC BLD-RTO: 0 PER 100 WBC
PLATELET # BLD AUTO: 372 K/UL (ref 150–400)
PMV BLD AUTO: 11.7 FL (ref 8.9–12.9)
POTASSIUM SERPL-SCNC: 4.3 MMOL/L (ref 3.5–5.1)
RBC # BLD AUTO: 3.63 M/UL (ref 3.8–5.2)
SODIUM SERPL-SCNC: 131 MMOL/L (ref 136–145)
WBC # BLD AUTO: 9.5 K/UL (ref 3.6–11)

## 2019-09-19 PROCEDURE — A4722 DIALYS SOL FLD VOL > 1999CC: HCPCS | Performed by: INTERNAL MEDICINE

## 2019-09-19 PROCEDURE — 74011250637 HC RX REV CODE- 250/637: Performed by: HOSPITALIST

## 2019-09-19 PROCEDURE — 36415 COLL VENOUS BLD VENIPUNCTURE: CPT

## 2019-09-19 PROCEDURE — 85025 COMPLETE CBC W/AUTO DIFF WBC: CPT

## 2019-09-19 PROCEDURE — 80048 BASIC METABOLIC PNL TOTAL CA: CPT

## 2019-09-19 PROCEDURE — 74011250636 HC RX REV CODE- 250/636: Performed by: INTERNAL MEDICINE

## 2019-09-19 RX ORDER — POLYETHYLENE GLYCOL 3350 17 G/17G
17 POWDER, FOR SOLUTION ORAL
Qty: 10 EACH | Refills: 0 | Status: SHIPPED | OUTPATIENT
Start: 2019-09-19 | End: 2019-12-01

## 2019-09-19 RX ORDER — CALCITRIOL 0.25 UG/1
0.25 CAPSULE ORAL
Qty: 10 CAP | Refills: 0 | Status: SHIPPED | OUTPATIENT
Start: 2019-09-19 | End: 2019-12-01

## 2019-09-19 RX ORDER — FUROSEMIDE 40 MG/1
40 TABLET ORAL DAILY
Qty: 15 TAB | Refills: 0 | Status: SHIPPED | OUTPATIENT
Start: 2019-09-19 | End: 2019-12-01

## 2019-09-19 RX ORDER — CARVEDILOL 12.5 MG/1
12.5 TABLET ORAL 2 TIMES DAILY WITH MEALS
Qty: 30 TAB | Refills: 0 | Status: SHIPPED | OUTPATIENT
Start: 2019-09-19 | End: 2019-12-01 | Stop reason: DRUGHIGH

## 2019-09-19 RX ADMIN — SEVELAMER CARBONATE 1600 MG: 800 TABLET, FILM COATED ORAL at 18:27

## 2019-09-19 RX ADMIN — BRIMONIDINE TARTRATE 1 DROP: 2 SOLUTION OPHTHALMIC at 09:24

## 2019-09-19 RX ADMIN — Medication 10 ML: at 14:00

## 2019-09-19 RX ADMIN — ASCORBIC ACID, THIAMINE MONONITRATE,RIBOFLAVIN, NIACINAMIDE, PYRIDOXINE HYDROCHLORIDE, FOLIC ACID, CYANOCOBALAMIN, BIOTIN, CALCIUM PANTOTHENATE, 1 CAPSULE: 100; 1.5; 1.7; 20; 10; 1; 6000; 150000; 5 CAPSULE, LIQUID FILLED ORAL at 09:24

## 2019-09-19 RX ADMIN — Medication 10 ML: at 07:12

## 2019-09-19 RX ADMIN — POTASSIUM CHLORIDE 20 MEQ: 1.5 POWDER, FOR SOLUTION ORAL at 09:25

## 2019-09-19 RX ADMIN — LACOSAMIDE 150 MG: 50 TABLET, FILM COATED ORAL at 09:23

## 2019-09-19 RX ADMIN — ASPIRIN 81 MG: 81 TABLET, COATED ORAL at 09:24

## 2019-09-19 RX ADMIN — LEVETIRACETAM 500 MG: 500 TABLET, FILM COATED ORAL at 18:27

## 2019-09-19 RX ADMIN — BRIMONIDINE TARTRATE 1 DROP: 2 SOLUTION OPHTHALMIC at 18:27

## 2019-09-19 RX ADMIN — AMIODARONE HYDROCHLORIDE 100 MG: 200 TABLET ORAL at 09:23

## 2019-09-19 RX ADMIN — ISOSORBIDE MONONITRATE 60 MG: 60 TABLET, EXTENDED RELEASE ORAL at 07:12

## 2019-09-19 RX ADMIN — ATORVASTATIN CALCIUM 40 MG: 40 TABLET, FILM COATED ORAL at 09:23

## 2019-09-19 RX ADMIN — CALCITRIOL CAPSULES 0.25 MCG 0.25 MCG: 0.25 CAPSULE ORAL at 18:26

## 2019-09-19 RX ADMIN — SEVELAMER CARBONATE 1600 MG: 800 TABLET, FILM COATED ORAL at 12:13

## 2019-09-19 RX ADMIN — SODIUM CHLORIDE, SODIUM LACTATE, CALCIUM CHLORIDE, MAGNESIUM CHLORIDE AND DEXTROSE 2000 ML: 2.5; 538; 448; 18.3; 5.08 INJECTION, SOLUTION INTRAPERITONEAL at 13:45

## 2019-09-19 RX ADMIN — LEVETIRACETAM 500 MG: 500 TABLET, FILM COATED ORAL at 09:24

## 2019-09-19 RX ADMIN — FUROSEMIDE 40 MG: 40 TABLET ORAL at 09:24

## 2019-09-19 RX ADMIN — POLYETHYLENE GLYCOL 3350 17 G: 17 POWDER, FOR SOLUTION ORAL at 09:00

## 2019-09-19 RX ADMIN — CLOPIDOGREL BISULFATE 75 MG: 75 TABLET ORAL at 09:23

## 2019-09-19 RX ADMIN — PANTOPRAZOLE SODIUM 40 MG: 40 TABLET, DELAYED RELEASE ORAL at 07:12

## 2019-09-19 RX ADMIN — LOSARTAN POTASSIUM 25 MG: 25 TABLET ORAL at 09:23

## 2019-09-19 RX ADMIN — SEVELAMER CARBONATE 1600 MG: 800 TABLET, FILM COATED ORAL at 07:12

## 2019-09-19 RX ADMIN — SODIUM CHLORIDE, SODIUM LACTATE, CALCIUM CHLORIDE, MAGNESIUM CHLORIDE AND DEXTROSE 2000 ML: 2.5; 538; 448; 18.3; 5.08 INJECTION, SOLUTION INTRAPERITONEAL at 09:26

## 2019-09-19 RX ADMIN — CARVEDILOL 12.5 MG: 12.5 TABLET, FILM COATED ORAL at 07:12

## 2019-09-19 RX ADMIN — LACOSAMIDE 150 MG: 50 TABLET, FILM COATED ORAL at 18:00

## 2019-09-19 RX ADMIN — OXYCODONE HYDROCHLORIDE AND ACETAMINOPHEN 1 TABLET: 5; 325 TABLET ORAL at 04:11

## 2019-09-19 NOTE — PROGRESS NOTES
Pharmacist Discharge Medication Reconciliation    Discharging Provider: Johanna Doherty MD    Significant PMH:   Past Medical History:   Diagnosis Date    Acute on chronic respiratory failure ([facility] 7/18/2019    Blood clot in vein     left arm several years ago    CAD (coronary artery disease)     Chronic kidney disease     Chronic systolic heart failure ([facility] 7/18/2019    Congestive heart failure ([facility]     Hypercholesterolemia     Hypertension     Legally blind     [facility] 7/18/2019    Se[facility]ures [facility]     Stroke [facility])      Chief Complaint for this Admission:   Chief Complaint   Patient presents with    Shortness of Breath     Allergies: Gabapentin    Discharge Medications:   Current Discharge Medication List        START taking these medications    Details   polyethylene glycol (MIRALAX) 17 gram packet Take 1 Packet by mouth daily as needed (constipation). Qty: 10 Each, Refills: 0      furosemide (LASIX) 40 mg tablet Take 1 Tab by mouth daily. Qty: 15 Tab, Refills: 0           CONTINUE these medications which have CHANGED    Details   aspirin 81 mg tablet Take 1 Tab by mouth daily. Qty: 1 Tab, Refills: 0      calcitRIOL (ROCALTROL) 0.25 mcg capsule Take 1 Cap by mouth two (2) days a week. On Thursday and saturday  Qty: 10 Cap, Refills: 0      carvedilol (COREG) 12.5 mg tablet Take 1 Tab by mouth two (2) times daily (with meals). Qty: 30 Tab, Refills: 0           CONTINUE these medications which have NOT CHANGED    Details   phenytoin (DILANTIN ER) 300 mg ER capsule Take 300 mg by mouth nightly. lacosamide (VIMPAT) 150 mg tab tablet Take 150 mg by mouth two (2) times a day. losartan (COZAAR) 25 mg tablet Take 25 mg by mouth daily. docusate sodium (COLACE) 100 mg capsule Take 100 mg by mouth two (2) times daily as needed for Constipation. clopidogrel (PLAVIX) 75 mg tab Take 1 Tab by mouth daily.   Qty: 30 Tab, Refills: 1      isosorbide mononitrate ER (IMDUR) 60 mg CR tablet Take 1 Tab by mouth every morning. Qty: 30 Tab, Refills: 0    Comments: NEEDS OFFICE VISIT FOR MORE REFILLS      levETIRAcetam (KEPPRA) 500 mg tablet Take 500 mg by mouth two (2) times a day. amiodarone (CORDARONE) 200 mg tablet Take 100 mg by mouth daily. atorvastatin (LIPITOR) 40 mg tablet TAKE 1 TABLET BY MOUTH ONCE DAILY  Qty: 30 Tab, Refills: 0    Comments: NEEDS OFFICE VISIT FOR MORE REFILLS      sevelamer (RENAGEL) 800 mg tablet Take 1,600 mg by mouth three (3) times daily (with meals). omeprazole (PRILOSEC) 20 mg capsule Take 20 mg by mouth daily. Refills: 1      potassium chloride (K-DUR, KLOR-CON) 20 mEq tablet Take 20 mEq by mouth daily. brimonidine (ALPHAGAN) 0.2 % ophthalmic solution Administer 1 Drop to both eyes two (2) times a day. gentamicin (GARAMYCIN) 0.1 % topical cream Apply  to affected area daily. Apply to Cath wound      IRON FUM & PS CMP/VIT C & B (INTEGRA PO) Take 1 Tab by mouth daily.            STOP taking these medications       lactulose (CHRONULAC) 10 gram/15 mL solution Comments:   Reason for Stopping:               The patient's chart, MAR and AVS were reviewed by ARIELLA HinsonD.

## 2019-09-19 NOTE — PROGRESS NOTES
[facility] Transitional Care Team: Discharge [facility] Note    Date of Assessment: 09/19/19  Time of Assessment:  3:58 PM    Assessment & Plan   NIRANJAN Diagnoses:  Acute on chronic systolic and diastolic heart failure, POA   - Echo 9/59/27--BDPSWO systolic dysfunction. Estimated left ventricular ejection fraction is 26 - 30%. Left ventricular global hypokinesis. Per results: Moderate (grade 2) left ventricular diastolic dysfunction. Severe (grade 3) left ventricular diastolic dysfunction. Mild AR, mild MR.  - On coreg, losartan, lasix. - On PD  - Stress test was inconclusive, no evidence of ischemia  Results for Marlene Saucedo (MRN [id number]) as of 9/19/2019   Ref. Range 9/19/2019 07:00   Sodium Latest Ref Range: 136 - 145 mmol/L 131 (L)   Potassium Latest Ref Range: 3.5 - 5.1 mmol/L 4.3   Chloride Latest Ref Range: 97 - 108 mmol/L 93 (L)   CO2 Latest Ref Range: 21 - 32 mmol/L 27   Anion gap Latest Ref Range: 5 - 15 mmol/L 11   Glucose Latest Ref Range: 65 - 100 mg/dL 84   BUN Latest Ref Range: 6 - 20 MG/DL 70 (H)   Creatinine Latest Ref Range: 0.55 - 1.02 MG/DL 11.60 (H)   BUN/Creatinine ratio Latest Ref Range: 12 - 20   6 (L)   Calcium Latest Ref Range: 8.5 - 10.1 MG/DL 9.0   Will inform PCP and Dr. Kita Christiansen of mild hyponatremia early next week.     Altered mental status 9/10--Resolved. - MRI brain 9/11/19--No acute infarct. Encephalomalacia in the left frontal lobe with mild high signal within the periventricular white matter  - EEG 9/11/19 normal.  - Continue AEDs. Neurology evaluated. Phenytoin level 9.6 on 9/9/19--therapeutic range is 10-20; will ask Kennedi or Ms. Jazmin Mccoy if she sees neurologist in community    ESRD      Paroxysmal Afib  -on BB and amiodarone   - Avoid anticoagulation, H/O SDH and required  shunt     SDH in past   - Right frontal ventriculoperitoneal shunt      History of seizures   See above, on AED      Trop of .06   - No evidence of ischemia on stress test  Coreg, imdur, asa and plavix      CAD s/p PCI   As per hospitalist's note:  [facility] (2015) - mid LAD 70-80%, distal LAD 50%, diagonal high-grade disease, OMB high-grade disease, RCA total occlusion - medically managed  2. [facility] 8/2018 - heavily calcified vessels with severe early mid LAD stenosis, occluded RCA with faint L-R collateral filling and occluded 1st marginal - felt to be high risk for CABG / patient refused  3. [facility] 9/4/18 - S/p successful PCI of the prox LAD using 3.0 x 24 Synergy ANDER      COPD   - Stable      STROKE X 3   - left hemiparesisis     LEGALLY BLIND      HTN  - Home regimen     Right hip pain  - xray negative, pain control  - Pain resolved          Readmission Risks: high    1. Living with multiple advanced co-morbid conditions  2. Currently full code status  3. Five admissions so far this year        Nurse Navigator: SHANTEL, appears to have non-[facility] providers  NIRANJAN appointments: SHANTEL    Code status: Full  Recommended Disposition: TBD         Number of Admissions this year:  5, including current    Heart Failure Bundle:  yes        Advance Care Plan: not on file; offered to help her complete while here but was too sleepy at the time to do so. Medication reconciliation was performed at admission by registered pharmacist and at D/C with PharmD with [facility] team's thanks. Discharge Needs: home with her family friends with whom she has been living most recently. Spoke to Kennedi to introduce myself and the [facility] program and its goals. Shared upcoming appt with Dr. Koroma Part next Wednesday and to advise we are scheduling PCP appt, too, and I will F/U with them on Monday regarding these other appts, since I'm out of the office tomorrow. YARELIS NP left patient with [facility] calender and follow up appointments since she is to discharge today. [facility] information left at bedside to go with Ms. Myles Patterson at discharge this afternoon. Shared their services Milagro Hernandez.         Education provided to family friend Kennedi focused on readmission zones as described as: The Red Zone: High risk for readmission, days 1-21                          The Yellow Zone: Moderate risk for readmission, days 22-29                          The Green Zone: Lower risk for readmission, days 30 and after    Lorelei Rosado is a 62 y.o. female inpatient at [facility] admitted with progressive shortness of breath on  9/9/19. Chart reviewed by Rakesh Mack NP and [facility] Understanding of Goals) program introduced to patient/family friend Jac Lamb and daughter Love. The Transitional Care Team bridges the gaps in care and education surrounding discharge from the acute care facility. The objective is to empower the patient and family in taking a proactive role in the task of preventing readmission within the first thirty days after discharge from the acute care setting. The team is also involved in the efforts to reduce readmission to the acute care setting after stabilization and discharge from the acute care environment either to the skilled nursing facilities or community. The TC Team will follow patient from a distance while inpatient as well as be available for further transition disposition as needed. The NIRANJAN TEAM will continue to offer support during the 30- 90 day discharge from acute care setting. In case they are assigned, the  Ambulatory HF Nurse Navigators Justin Nuñez RN and Lorne Merritt RN, are being notified that she is discharging home today.     Past Medical History:   Diagnosis Date    Acute on chronic respiratory failure ([address] 7/18/2019    Blood clot in vein     left arm several years ago    CAD (coronary artery disease)     Chronic kidney disease     Chronic systolic heart failure ([address] 7/18/2019    Congestive heart failure ([address]     Hypercholesterolemia     Hypertension     Legally blind     PAF (paroxysmal atrial fibrillation) ([address] 7/18/2019    Seizures ([address]     Stroke [facility])        Advance Care Planning 9/12/2019   Patient's Healthcare Decision Maker is: Legal Next of Kin   Primary Decision Maker Name -   Primary Decision Maker Phone Number -   Secondary Decision Maker Name -   Secondary Decision [address] Phone Number -   Secondary Decision Maker Relationship to Patient -   Confirm Advance Directive None   Patient Would Like to Complete Advance Directive -

## 2019-09-19 NOTE — PROGRESS NOTES
Bedside and Verbal shift change report given to HOMAR Roldan (oncoming nurse) by Chuy Starr RN (offgoing nurse). Report included the following information SBAR, Kardex, ED Summary, Intake/Output, MAR and Recent Results.

## 2019-09-19 NOTE — PROGRESS NOTES
NIRANJAN plan:  -Patient transferred to this floor with referrals to the following SNFs: [facility], [facility], [facility].   -Cm contacted all of the above. [facility] can accept as long as a UAI is completed prior. [facility] and [facility] do not accept patients with PD.  -CM will discuss with patient if this is what she wants, as there seems to be discussion around her wants vs others. 11:00 am: KADE, MD and nephrology met to discuss patient and the best options for her, met with patient as well. Cm contacted her caregiver Rama Johnson. All are in agreement that patient will do best at home. Rama Kurk asked that I set up transport for 6 pm. Referral sent to [facility] for 6 pm request. [facility] [place] [facility] has been notified. 11:30am: Cm informed by [facility] that a referral had never been sent to them, referral now sent. Cm noted that no one had ever spoken with patient about a PCP. Cm called Rama Elizabeth to follow up on this as it is listed as 'unknown'. Rama Johnson stated she does have a PCP but Rama Johnson is not at home and cannot recall the name of the PCP. She will call me once she returns home and provide me with the name. 1:00 pm: Rama Elizabeth called back and informed me that patients' PCP is is Dr. Ramses Swan ([phone]). Cm will update the system to reflect this. 3:00 pm: Cm was asked by Elise Armas NP to also call patients' daughter Ras Blackwell" [facility] ([phone]). Cm attempted to reach her, no answer and the mailbox was full.      Tayo Rizzo BSW, ACM

## 2019-09-19 NOTE — PROGRESS NOTES
NUTRITION  Pt seen for:     [x] Supplements  [x] LOS        RECOMMENDATIONS:   1. Add additional bowel regimen as needed   - give warm prune juice PRN - drinks at home    2. Continue to encourage PO intake   - should be drinking Nepro if less than 75% meal consumed    Interventions:  - diet liberalized to 2gm Na  - Crystal lite and extra herb seasoning with meals  - continue Nepro  SUBJECTIVE/OBJECTIVE:   Information obtained from:   patient        Pt admitted with AMS. Past Medical History:   Diagnosis Date    Acute on chronic respiratory failure ([facility] 7/18/2019    Blood clot in vein     left arm several years ago    CAD (coronary artery disease)     Chronic kidney disease     Chronic systolic heart failure ([facility] 7/18/2019    Congestive heart failure ([facility]     Hypercholesterolemia     Hypertension     Legally blind     PAF (paroxysmal atrial fibrillation) ([facility] 7/18/2019    Seizures ([facility]     Stroke ([facility]    Pt with PD at home refusing some here. Plans for d/c to rehab when ready noted. Pt visited today. Reports fullness with meals with constipation noted by MD. Syed Weber ordered but pt reports has caused diarrhea in the past. Prune juice provided and will add to trays. Also discussed adequate fluid intake (within recommendations of nephrologist). She is tired of drinking water but agreeable to [employer] as alternative. Despite poor/fair intake with meals has been doing well with Nepro shakes. As ordered provides: 1275kcal, 57g protein. If consuming 100% meets >90% protein and >80% energy needs. Will continue to rescreen to monitor for BM and meal intake vs supplements.      Diet:  renal  Supplements: Nepro TID  Intake: [] Good     [x] Fair      [] Poor   Patient Vitals for the past 100 hrs:   % Diet Eaten   09/18/19 1800 75 %   09/17/19 1502 50 %   09/17/19 0740 75 %   09/16/19 1615 80 %   09/15/19 2200 70 %     Weight Changes:   [x] stable  Wt Readings from Last 4 Encounters:   09/19/19 92.5 kg (203 lb 14.8 oz)   07/22/19 93 kg (205 lb)   07/16/19 94 kg (207 lb 4.8 oz)   06/23/16 80.3 kg (177 lb)     Nutrition Problems:  []  None  [x]  Specified food preferences   []  Dislikes supplements              []  Difficulty chewing or swallowing   [x]  GI issues (N/V/D, constipation) - constipation    Nutrition Diagnosis:   1.  Altered GI function related to constipation as evidenced by pt complaints of fullness/earlier satiety; bowel regimen rx    Estimated Nutrition Needs:   Kcals/day: 1560 Kcals/day(1560-1820kcal)  Protein: 62 g(62-78g (1.2-1.5g/kg))  Fluid: 1200 ml(40oz FR per renal (as reported by pt))  Based On: Kcal/kg - specify (Comment)(30-35kcal/kg of IBW on PD)  Weight Used: IBW(52kg)    PLAN:   [x] Obtained or adjusted food preferences/tolerances  [] Adjust texture due to difficulty chewing   [x] Educated patient - bowel regimen/fluids  [] Added or adjusted supplements/snacks  [x] Continue current diet  [x] Continue to rescreen/follow per protocol    Rescreen:  []            At Nutrition Risk           [x]            Not at [address] Kesha Christian, Pager #[phone] or [phone]

## 2019-09-19 NOTE — PROGRESS NOTES
Hospitalist Progress Note  Love Mancilla MD  Answering service: [phone] from in house phone        Date of Service:  2019  NAME:  Braden Washington  :  1955  MRN:  [id number]  PCP: Unknown, Provider    Chief Complaint:   Chief Complaint   Patient presents with    Shortness of Breath         Admission Summary:     The patient is a 44-year-old female who has previous history significant for systolic heart failure, end-stage renal disease on peritoneal dialysis, history of coronary artery disease status post angioplasty and stent placement approximately 1-1/2 years ago at [facility], history of hypertension, previous history of stroke, hyperlipidemia, seizure disorder, comes to the emergency room due to worsening shortness of breath. Interval history / Subjective:     Seen and  Examined    Patient states \" everything hurts\" States that she cant explain specifics. Discussed about discharge planning again -she said she does not want to return home as she does not like that place and wants to go rehab. Refused labs today    Spoke to patient's daughter in the evening,she said patient does better at home than rehab and said she will talk to her mother about going home. I explained we can arrange home health with PT/OT ,nurse visits at home. Assessment & Plan:    Acute on Chronic Systolic and diastolic heart failure POA -stable  Echo with 26-30% EF and Grade III diastolic dysfunction. On coreg,losartan,lasix. On PD, transition to oral lasix  Strict in and o  Stress test is inconclusive,no further cardiac work up. Cardiology signed off     ESRD on peritoneal dialysis: nephrology following. Abdominal pain: KUB ordered- showed moderate amount of stool.  Ordered miralax      Paroxysmal Afib  -on BB and amiodarone   -Avoid anticoagulation, H/O SDH and required  shunt     SDH in past    Right frontal ventriculoperitoneal shunt      History of seizures   -on AED    Trop of .06   Cards on board   No evidence of ischemia on stress test  Coreg , imdur and asa and plavix      CAD s/p PCI   [facility] (2015) - mid LAD 70-80%, distal LAD 50%, diagonal high-grade disease, OMB high-grade disease, RCA total occlusion - medically managed  2. [facility] 8/2018 - heavily calcified vessels with severe early mid LAD stenosis, occluded RCA with faint L-R collateral filling and occluded 1st marginal - felt to be high risk for CABG / patient refused  3. [facility] 9/4/18 - S/p successful PCI of the prox LAD using 3.0 x 24 Synergy ANDER      COPD -Stable . Patient said she was using oxygen 1-2 lt oxygen in the past at home. Altered mental status 9/10-Resolved. -MRI brain ,no acute process except the chronic changes. EEg normal.Continue AEds. Neurology evaluated.      STROKE X3 , left hemiparesisis        HTN: c/w Home regimen, use labetalol prn. Right hip pain: xray,negative. pain control.  -Pain resolved.         Discussed with  for dispo planning again today. Rehab may be difficult as she is on peritoenal dialysis       Code status: full   DVT prophylaxis: heaprin     Care Plan discussed with: Patient/Family  Disposition: TBD    Code status: Full Code      Hospital Problems  Date Reviewed: 7/18/2019          Codes Class Noted POA    * (Principal) Altered mental state ICD-10-CM: R41.82  ICD-9-CM: 780.97  9/12/2019 Yes        Shortness of breath ICD-10-CM: R06.02  ICD-9-CM: 786.05  9/9/2019 Unknown                Review of Systems:   As per HPI        Physical Examination:     Visit Vitals  BP (!) 144/91   Pulse 62   Temp 98.2 °F (36.8 °C)   Resp 18   Ht 5' 3\" (1.6 m)   Wt 93.8 kg (206 lb 12.7 oz)   SpO2 100%   Breastfeeding? No   BMI 36.63 kg/m²     General:  No acute distess   Back:   Symmetric, no curvature. ROM normal. No CVA tenderness. Lungs:   Clear to auscultation bilaterally. Peritoneal cath near chest wall   Chest wall:  No tenderness or deformity.    Heart:  Regular rate and rhythm, S1, S2 normal   Abdomen:   Soft, non-tender. Bowel sounds normal. No masses   Extremities: Minimal LE edema       Neurologic: Alert and awake,oriented X 2-3. .Moves extremities. Vital Signs:    Last 24hrs VS reviewed since prior progress note. Most recent are:    Visit Vitals  BP (!) 144/91   Pulse 62   Temp 98.2 °F (36.8 °C)   Resp 18   Ht 5' 3\" (1.6 m)   Wt 93.8 kg (206 lb 12.7 oz)   SpO2 100%   Breastfeeding? No   BMI 36.63 kg/m²         Intake/Output Summary (Last 24 hours) at 2019 2347  Last data filed at 2019 2310  Gross per 24 hour   Intake 9500 ml   Output 9100 ml   Net 400 ml        Tmax:  Temp (24hrs), Av.3 °F (36.8 °C), Min:98 °F (36.7 °C), Max:98.5 °F (36.9 °C)      Data Review:   Data reviewed by myself:  Mri Brain Wo Cont    Result Date: 2019  PRELIMINARY REPORT: Chronic appearing right greater than left subdural collection. Hemosiderin staining and encephalomalacia in the right frontal lobe adjacent to the right frontal shunt. Ventricular size not significant changed. No acute infarct. Encephalomalacia in the left frontal lobe with mild high signal within the periventricular white matter Preliminary report was provided by Dr. Zafar De La Rosa, the on-call radiologist, at 6252 Final report to follow. FINAL REPORT: EXAM: MRI BRAIN WO CONT TECHNIQUE: Sagittal and axial T1-weighted images axial FLAIR, T2-weighted, diffusion weighted, gradient echo,  coronal T2 IV CONTRAST:  None INDICATION:  Evaluate for hydrocephalus, prior stroke, epileptogenic focus COMPARISON:  CT head of 9/10/2019, brain MRI of 2016 FINDINGS: BRAIN PARENCHYMA:  No acute infarct. Moderate predominantly confluent FLAIR/T2 hyperintensity in the deep cerebral white matter, slightly increased since 2016, likely due to intracranial small vessel disease. Chronic left basal ganglia, right corona radiata, and right cerebellar lacunar infarcts.  Unchanged right superior frontal lobe encephalomalacia INTRACRANIAL HEMORRHAGE: No acute hemorrhage. Small bilateral chronic extra-axial collections demonstrating mild T1 hypointensity and overall T2 hyperintensity. Unchanged on the right and smaller on the left when compared to 2016. Chronic hemorrhage in the region of the right frontal encephalomalacia. CSF SPACES:  Right frontal ventriculostomy catheter with the tip in the region of the left frontal horn. Normal and unchanged ventricular size. BASAL CISTERNS:  Patent. MIDLINE SHIFT: None. VASCULAR SYSTEM:  Normal flow voids. PARANASAL SINUSES AND MASTOID AIR CELLS:  No significant opacification. VISUALIZED ORBITS:  No significant abnormalities. VISUALIZED UPPER CERVICAL SPINE:  No significant abnormalities. SELLA:  No enlargement or  focal abnormality. SKULL BASE:  No significant abnormalities. Cerebellar tonsils in normal position. CALVARIUM:  Intact. IMPRESSION:  1. No acute findings. 2. Moderate cerebral white matter signal abnormality and chronic lacunar infarcts, consistent with chronic small vessel ischemic change, with progression since 2019. 3. Unchanged ventricular size. Small chronic extra-axial collections, present since at least 2016, unchanged on the right and smaller on the left since that time. Ct Head Wo Cont    Result Date: 9/10/2019  EXAM: CT HEAD WO CONT INDICATION: Confusion/delirium, altered LOC, unexplained COMPARISON: 2015. CONTRAST: None. TECHNIQUE: Unenhanced CT of the head was performed using 5 mm images. Brain and bone windows were generated. CT dose reduction was achieved through use of a standardized protocol tailored for this examination and automatic exposure control for dose modulation. FINDINGS: The ventricles are enlarged compared to prior examination of 2015. There is a ventriculoperitoneal shunt in place with the tip in the region of the right lateral ventricle. There is periventricular hypoattenuation compatible with chronic small vessel ischemic changes.  There is encephalomalacia in the left frontal lobe, likely in an area of prior ventriculostomy tube. There is a small low density collection in the right subdural space which appears chronic. There is no midline shift. There is encephalomalacia in the right frontal lobe. The basilar cisterns are open. No acute infarct is identified. The bone windows demonstrate there is evidence of left parietal bone surgery. There is left maxillary sinus disease. There is calcification in the left extra-axial space. IMPRESSION: Interval enlargement of ventricular size since prior study with ventriculostomy tube in place. Xr Chest Port    Result Date: 9/9/2019  EXAM: XR CHEST PORT INDICATION: CHF COMPARISON: 7/19/2019 FINDINGS: A portable AP radiograph of the chest was obtained at 1411 hours. The patient is on a cardiac monitor. The lungs are clear. The cardiac and mediastinal contours and pulmonary vascularity are remarkable for a central prominence of the right hilum. There is a linear band of subsegmental atelectasis along the left major fissure. The bones and soft tissues are grossly within normal limits. IMPRESSION: No acute process identified    No results found for: SDES  Lab Results   Component Value Date/Time    Culture result: NO GROWTH 4 DAYS 09/11/2019 03:32 PM    Culture result: Culture performed on Fluid swab specimen 07/17/2019 11:09 AM    Culture result: NO GROWTH 4 DAYS 07/17/2019 11:09 AM     All Micro Results     Procedure Component Value Units Date/Time    CULTURE, BODY FLUID Jerry Anthony STAIN [[id number]] Collected:  09/11/19 1532    Order Status:  Completed Specimen:  Dialysate Updated:  09/15/19 0928     Special Requests: NO SPECIAL REQUESTS        GRAM STAIN NO WBC'S SEEN         NO ORGANISMS SEEN        Culture result: NO GROWTH 4 DAYS             Labs: reviewed by myself. No results for input(s): WBC, HGB, HCT, PLT, HGBEXT, HCTEXT, PLTEXT, HGBEXT, HCTEXT, PLTEXT in the last 72 hours.   No results for input(s): NA, K, CL, CO2, BUN, CREA, GLU, CA, MG, PHOS, URICA in the last 72 hours. No results for input(s): SGOT, GPT, ALT, AP, TBIL, TBILI, TP, ALB, GLOB, GGT, AML, LPSE in the last 72 hours. No lab exists for component: AMYP, HLPSE  No results for input(s): INR, PTP, APTT in the last 72 hours. No lab exists for component: INREXT, INREXT   No results for input(s): FE, TIBC, PSAT, FERR in the last 72 hours. No results found for: FOL, RBCF   No results for input(s): PH, PCO2, PO2 in the last 72 hours. No results for input(s): CPK, CKNDX, TROIQ in the last 72 hours.     No lab exists for component: CPKMB  No results found for: CHOL, CHOLX, CHLST, CHOLV, HDL, HDLP, LDL, LDLC, DLDLP, TGLX, TRIGL, TRIGP, CHHD, CHHDX  Lab Results   Component Value Date/Time    Glucose (POC) 87 07/22/2019 11:16 AM    Glucose (POC) 162 (H) 07/17/2019 03:21 AM     Lab Results   Component Value Date/Time    Color YELLOW/STRAW 04/03/2015 08:31 PM    Appearance CLOUDY (A) 04/03/2015 08:31 PM    Specific gravity 1.013 04/03/2015 08:31 PM    pH (UA) 7.5 04/03/2015 08:31 PM    Protein 300 (A) 04/03/2015 08:31 PM    Glucose NEGATIVE  04/03/2015 08:31 PM    Ketone NEGATIVE  04/03/2015 08:31 PM    Bilirubin NEGATIVE  04/03/2015 08:31 PM    Urobilinogen 0.2 04/03/2015 08:31 PM    Nitrites NEGATIVE  04/03/2015 08:31 PM    Leukocyte Esterase NEGATIVE  04/03/2015 08:31 PM    Epithelial cells MANY (A) 04/03/2015 08:31 PM    Bacteria 1+ (A) 04/03/2015 08:31 PM    WBC 0-4 04/03/2015 08:31 PM    RBC 0-5 04/03/2015 08:31 PM         Medications Reviewed:     Current Facility-Administered Medications   Medication Dose Route Frequency    melatonin tablet 1.5 mg  1.5 mg Oral QHS PRN    epoetin tawny-epbx (RETACRIT) injection 4,000 Units  4,000 Units SubCUTAneous Q MON, WED & FRI    furosemide (LASIX) tablet 40 mg  40 mg Oral DAILY    ondansetron (ZOFRAN) injection 4 mg  4 mg IntraVENous Q4H PRN    oxyCODONE-acetaminophen (PERCOCET) 5-325 mg per tablet 1 Tab  1 Tab Oral Q4H PRN    peritoneal dialysis DEXTROSE 2.5% (2.5 mEq/L low calcium) solution 2,000 mL  2,000 mL IntraPERitoneal QID    acetaminophen (TYLENOL) tablet 650 mg  650 mg Oral Q4H PRN    LORazepam (ATIVAN) injection 1 mg  1 mg IntraVENous Q4H PRN    bisacodyl (DULCOLAX) suppository 10 mg  10 mg Rectal DAILY PRN    sodium chloride (NS) flush 5-40 mL  5-40 mL IntraVENous Q8H    sodium chloride (NS) flush 5-40 mL  5-40 mL IntraVENous PRN    aspirin delayed-release tablet 81 mg  81 mg Oral DAILY    atorvastatin (LIPITOR) tablet 40 mg  40 mg Oral DAILY    brimonidine (ALPHAGAN) 0.2 % ophthalmic solution 1 Drop  1 Drop Both Eyes BID    calcitRIOL (ROCALTROL) capsule 0.25 mcg  0.25 mcg Oral Once per day on Thu Sat    carvedilol (COREG) tablet 12.5 mg  12.5 mg Oral BID WITH MEALS    clopidogrel (PLAVIX) tablet 75 mg  75 mg Oral DAILY    isosorbide mononitrate ER (IMDUR) tablet 60 mg  60 mg Oral 7am    lactulose (CHRONULAC) 10 gram/15 mL solution 30 mL  20 g Oral BID PRN    levETIRAcetam (KEPPRA) tablet 500 mg  500 mg Oral BID    pantoprazole (PROTONIX) tablet 40 mg  40 mg Oral ACB    sevelamer carbonate (RENVELA) tab 1,600 mg  1,600 mg Oral TID WITH MEALS    docusate sodium (COLACE) capsule 100 mg  100 mg Oral BID PRN    B complex-vitaminC-folic acid (NEPHROCAP) cap  1 Cap Oral DAILY    lacosamide (VIMPAT) tablet 150 mg  150 mg Oral BID    losartan (COZAAR) tablet 25 mg  25 mg Oral DAILY    phenytoin ER (DILANTIN ER) ER capsule 300 mg  300 mg Oral QHS    potassium chloride (KLOR-CON) packet for solution 20 mEq  20 mEq Oral DAILY    albuterol-ipratropium (DUO-NEB) 2.5 MG-0.5 MG/3 ML  3 mL Nebulization Q6H PRN    amiodarone (CORDARONE) tablet 100 mg  100 mg Oral DAILY     ______________________________________________________________________  EXPECTED LENGTH OF STAY: 4d 2h  ACTUAL LENGTH OF STAY:          9                 Jeanette Maya MD     Patient's emergency contacts:  Extended Emergency Contact Information  Primary Emergency Contact: [address] [facility] Phone: [phone]  Relation: Sister  Secondary Emergency Contact: [address] Phone: [phone]  Relation: Child

## 2019-09-19 NOTE — PROGRESS NOTES
Name: Joanna Zhang MRN: [id number]   : 1955 [facility]   Date: 2019        IMPRESSION:   ESRD - on PD  Hypervolemia- resolved. Severe hypoalbuminemia. Anemia in CKD  Constipation  Insomnia      PLAN:   Continue the present PD prescription. Continue protein supplements. Continue Nepro with meals. Ms. Sharif Nichols indicated she liked this formulation. Continue EPO. Agree temp HD may be required (in light if the hypoalbuminemia). There has been some resistance to HD in the past. The rationale for such a move was discussed yesterday. Will hold off for now. Melatonin prn for insomnia     Subjective/Interval History:       19    ---> Fair morning. Dyspepsia was reported. Ms. Sharif Nichols was less than impressed with the dietary prescription. 19   ---> Having therapy this morning. She was a bit \" winded \". Reported constipation. It was ultimately relieved. There were no reported problems with PD. The I/O report was noted. 19   --> F/U ESRD                         \" Did not sleep till 7 am \"                        Constipation was an issue. This has been addressed. 19  --> F/U ESRD      [place] better today. There were no new complaints. Objective:   Vital Signs:    Visit Vitals  /87 (BP 1 Location: Left arm, BP Patient Position: At rest)   Pulse 69   Temp 98.8 °F (37.1 °C)   Resp 18   Ht 5' 3\" (1.6 m)   Wt 92.5 kg (203 lb 14.8 oz)   SpO2 98%   Breastfeeding?  No   BMI 36.12 kg/m²       O2 Device: Nasal cannula   O2 Flow Rate (L/min): 2 l/min   Temp (24hrs), Av.4 °F (36.9 °C), Min:98 °F (36.7 °C), Max:98.8 °F (37.1 °C)       Intake/Output:   Last shift:       07 - 1900  In: 240 [P.O.:240]  Out: -   Last 3 shifts: 1901 -  0700  In: 93380 [P.O.:600]  Out: 34682     Intake/Output Summary (Last 24 hours) at 2019 8 [facility] filed at 9/19/2019 0845  Gross per 24 hour   Intake 7340 ml   Output 6600 ml   Net 740 ml        Physical Exam:    Seen in Room 517. General:         Comfortable    Head:   Normocephalic    Eyes:   Anicteric  . Lungs:   Clear to auscultation ,  No Wheezing ,  Rhonchi or rales. Chest wall:  PD catheter --> exit site is just above the left breast.    Heart:   No S 3 gallop. Abdomen:   No distension. Extremities:  No leg edema    Psych:  Calm        DATA:  Labs:  Recent Labs     09/19/19  0700   *   K 4.3   CL 93*   CO2 27   BUN 70*   CREA 11.60*   CA 9.0     Recent Labs     09/19/19  0700   WBC 9.5   HGB 10.0*   HCT 32.0*        No results for input(s): CLARISA, KU, CLU, CREAU in the last 72 hours.     No lab exists for component: PROU    Total time spent with patient:         Care Plan discussed with:      Patient , Dr. Tiffanie Puentes MD

## 2019-09-19 NOTE — PROGRESS NOTES
Patient discharged home with home health. [facility] at bedside. Discharge instructions, prescriptions, and packet given to [facility] to travel with pt. Peripheral IV discontinued, tip intact, pt tolerated well. Pt off unit via stretcher. [facility] to transport pt home.

## 2019-09-19 NOTE — NURSE NAVIGATOR
ROMANA contacted [facility] to cancel followup appt with Dr. Martinez Getting for 9/19/19 @ 945. Will call back to reschedule when discharge noted. Ashly Wayne RN-LUKE/ROMANA  Call placed to [facility] for post discharge followup appt. Awaiting return call. Followup appt scheduled with Dr. Conner Benitez on  9/25/19 @ 12n. AVS updated.   Ashly Wayne RN-LUKE/ROMANA

## 2019-09-20 ENCOUNTER — TELEPHONE (OUTPATIENT)
Dept: CASE MANAGEMENT | Age: 64
End: 2019-09-20

## 2019-09-20 NOTE — DISCHARGE SUMMARY
Discharge Summary       PATIENT ID: Roby Espinoza  MRN: [id number]   YOB: 1955    DATE OF ADMISSION: 9/9/2019 11:13 AM    DATE OF DISCHARGE: 9/19/2019  PRIMARY CARE PROVIDER: Viraj Negrete MD     ATTENDING PHYSICIAN: Glen Cole MD    DISCHARGING PROVIDER: Jaime Low MD    To contact this individual call [phone] and ask the  to page. If unavailable ask to be transferred the Adult Hospitalist Department. CONSULTATIONS: IP CONSULT TO NEUROLOGY    PROCEDURES/SURGERIES: * No surgery found *    [address] COURSE:     DISCHARGE DIAGNOSES / PLAN:         The patient is a 30-year-old female who has previous history significant for systolic heart failure, end-stage renal disease on peritoneal dialysis, history of coronary artery disease status post angioplasty and stent placement approximately 1-1/2 years ago at [facility], history of hypertension, previous history of stroke, hyperlipidemia, seizure disorder, comes to the emergency room due to worsening shortness of breath. Acute on Chronic Systolic and diastolic heart failure POA -stable  Echo with 26-30% EF and Grade III diastolic dysfunction. On coreg,losartan,lasix. On PD, transition to oral lasix  Strict in and o  Stress test is inconclusive,no further cardiac work up. Cardiology signed off     ESRD on peritoneal dialysis: nephrology following.     Abdominal pain: KUB ordered- showed moderate amount of stool. Ordered miralax        Paroxysmal Afib  -on BB and amiodarone   -Avoid anticoagulation, H/O SDH and required  shunt     SDH in past    Right frontal ventriculoperitoneal shunt      History of seizures   -on AED      Trop of .06   Cards on board   No evidence of ischemia on stress test  Coreg , imdur and asa and plavix      CAD s/p PCI   [facility] (2015) - mid LAD 70-80%, distal LAD 50%, diagonal high-grade disease, OMB high-grade disease, RCA total occlusion - medically managed  2.  [facility] 8/2018 Tasneem Temple calcified vessels with severe early mid LAD stenosis, occluded RCA with faint L-R collateral filling and occluded 1st marginal - felt to be high risk for CABG / patient refused  3. [facility] 9/4/18 - S/p successful PCI of the prox LAD using 3.0 x 24 Synergy ANDER      COPD -Stable . Patient said she was using oxygen 1-2 lt oxygen in the past at home.     Altered mental status 9/10-Resolved. -MRI brain ,no acute process except the chronic changes. EEg normal.Continue AEds. Neurology evaluated.      STROKE X3 , left hemiparesisis        HTN: c/w Home regimen, use labetalol prn.     Right hip pain: xray,negative. pain control.  -Pain resolved.        Discussed with patient's daughter -patient has care givers at home and she said her mother will do better at home than a rehab -she has adequate support at home. Discussed with the aptient,she agreed to go home - Home health arranged. Patient was assymptomatic and hemodynamically satble at discharge,      Xr Hip Rt W Or Wo Pelv 2-3 Vws    Result Date: 9/14/2019  EXAM: XR HIP RT W OR WO PELV 2-3 VWS INDICATION: Right hip pain and limited rom. COMPARISON: None. FINDINGS: An AP view of the pelvis and a frogleg lateral view of the right hip demonstrate no fracture, dislocation or other acute abnormality. There is prior left hip fracture repair. Vascular calcifications are noted. IMPRESSION: No acute abnormality. Xr Abd (kub)    Result Date: 9/18/2019  EXAM:  XR ABD (KUB) INDICATION:   abdominal pain COMPARISON: Abdominal radiograph 7/22/2019. FINDINGS: Single of the abdomen was obtained.  shunt catheter tubing is noted terminating in the pelvis. No evidence of dilated small or large bowel loops, with a moderate amount of stool noted throughout the colon. There are vascular calcifications. There are coarse calcifications in the left pelvis, consistent with calcified fibroids. There are changes of the lower lumbar spine, SI joints, and hips. Osteopenia. Postsurgical changes of left proximal femur ORIF with simple medullary construct. IMPRESSION:  1. No evidence of bowel obstruction. Moderate amount of stool throughout the colon. Xr Abd (kub)    Result Date: 7/22/2019  ABDOMINAL RADIOGRAPHS. 7/22/2019 8:11 AM INDICATION: Peritoneal dialysis catheter without flow obstruction. Constipation. COMPARISON: Chest radiograph 7/19/2019; CT abdomen pelvis 6/19/2016. TECHNIQUE: AP supine  view/s of the abdomen. FINDINGS: A peritoneal dialysis catheter is coiled in the deep right hemipelvis, unchanged in position from 6/19/2016. Its visualized portions are intact, and it extends off the superior field of view (probably tunneled in the anterior abdominal wall). There is a calcified uterine fibroid in the left hemipelvis. The colonic gas pattern is normal. The paucity of small bowel gas is consistent with fluid-filled small bowel. Post left proximal femoral ORIF. IMPRESSION: Peritoneal dialysis catheter in the deep pelvis. Mri Brain Wo Cont    Result Date: 9/12/2019  PRELIMINARY REPORT: Chronic appearing right greater than left subdural collection. Hemosiderin staining and encephalomalacia in the right frontal lobe adjacent to the right frontal shunt. Ventricular size not significant changed. No acute infarct. Encephalomalacia in the left frontal lobe with mild high signal within the periventricular white matter Preliminary report was provided by Dr. Dalila Tavarez, the on-call radiologist, at 2570 Final report to follow. FINAL REPORT: EXAM: MRI BRAIN WO CONT TECHNIQUE: Sagittal and axial T1-weighted images axial FLAIR, T2-weighted, diffusion weighted, gradient echo,  coronal T2 IV CONTRAST:  None INDICATION:  Evaluate for hydrocephalus, prior stroke, epileptogenic focus COMPARISON:  CT head of 9/10/2019, brain MRI of 5/5/2016 FINDINGS: BRAIN PARENCHYMA:  No acute infarct.  Moderate predominantly confluent FLAIR/T2 hyperintensity in the deep cerebral white matter, slightly increased since 2016, likely due to intracranial small vessel disease. Chronic left basal ganglia, right corona radiata, and right cerebellar lacunar infarcts. Unchanged right superior frontal lobe encephalomalacia INTRACRANIAL HEMORRHAGE: No acute hemorrhage. Small bilateral chronic extra-axial collections demonstrating mild T1 hypointensity and overall T2 hyperintensity. Unchanged on the right and smaller on the left when compared to 2016. Chronic hemorrhage in the region of the right frontal encephalomalacia. CSF SPACES:  Right frontal ventriculostomy catheter with the tip in the region of the left frontal horn. Normal and unchanged ventricular size. BASAL CISTERNS:  Patent. MIDLINE SHIFT: None. VASCULAR SYSTEM:  Normal flow voids. PARANASAL SINUSES AND MASTOID AIR CELLS:  No significant opacification. VISUALIZED ORBITS:  No significant abnormalities. VISUALIZED UPPER CERVICAL SPINE:  No significant abnormalities. SELLA:  No enlargement or  focal abnormality. SKULL BASE:  No significant abnormalities. Cerebellar tonsils in normal position. CALVARIUM:  Intact. IMPRESSION:  1. No acute findings. 2. Moderate cerebral white matter signal abnormality and chronic lacunar infarcts, consistent with chronic small vessel ischemic change, with progression since 2019. 3. Unchanged ventricular size. Small chronic extra-axial collections, present since at least 2016, unchanged on the right and smaller on the left since that time. Ct Head Wo Cont    Result Date: 9/10/2019  EXAM: CT HEAD WO CONT INDICATION: Confusion/delirium, altered LOC, unexplained COMPARISON: 2015. CONTRAST: None. TECHNIQUE: Unenhanced CT of the head was performed using 5 mm images. Brain and bone windows were generated. CT dose reduction was achieved through use of a standardized protocol tailored for this examination and automatic exposure control for dose modulation. FINDINGS: The ventricles are enlarged compared to prior examination of 2015. There is a ventriculoperitoneal shunt in place with the tip in the region of the right lateral ventricle. There is periventricular hypoattenuation compatible with chronic small vessel ischemic changes. There is encephalomalacia in the left frontal lobe, likely in an area of prior ventriculostomy tube. There is a small low density collection in the right subdural space which appears chronic. There is no midline shift. There is encephalomalacia in the right frontal lobe. The basilar cisterns are open. No acute infarct is identified. The bone windows demonstrate there is evidence of left parietal bone surgery. There is left maxillary sinus disease. There is calcification in the left extra-axial space. IMPRESSION: Interval enlargement of ventricular size since prior study with ventriculostomy tube in place. Xr Chest Port    Result Date: 9/9/2019  EXAM: XR CHEST PORT INDICATION: CHF COMPARISON: 7/19/2019 FINDINGS: A portable AP radiograph of the chest was obtained at 1411 hours. The patient is on a cardiac monitor. The lungs are clear. The cardiac and mediastinal contours and pulmonary vascularity are remarkable for a central prominence of the right hilum. There is a linear band of subsegmental atelectasis along the left major fissure. The bones and soft tissues are grossly within normal limits. IMPRESSION: No acute process identified    Xr Chest Port    Result Date: 7/19/2019  EXAM:  XR CHEST PORT INDICATION: follow up COMPARISON: 7/17/2019. TECHNIQUE: Single frontal view of the chest. FINDINGS: Improving diffuse pulmonary opacities. A trace left pleural effusion is difficult to exclude. No visualized pneumothorax. Unchanged cardiomegaly. Atherosclerotic disease of the aorta. IMPRESSION: 1. Improving diffuse airspace disease. 2.  Unchanged cardiomegaly. Xr Chest Port    Result Date: 7/17/2019  EXAM: XR CHEST PORT INDICATION: CHF, shortness of breath. COMPARISON: Portable chest on 7/16/2019. TECHNIQUE: Upright portable chest AP view FINDINGS: Cardiac monitoring wires overlie the thorax. Cardiomegaly is unchanged. Pulmonary vascular prominence is increased. Heterogeneous bilateral pulmonary edema is increased. Round opacity at the right lung base is new. No pneumothorax. The visualized bones and upper abdomen are age-appropriate. IMPRESSION: Increased bilateral pulmonary edema. No pneumothorax. Xr Chest Port    Result Date: 7/16/2019  PORTABLE CHEST RADIOGRAPH/S: 7/16/2019 4:23 PM Clinical history: Chest pain INDICATION:   chest pain COMPARISON: 6/19/2016 FINDINGS: AP portable upright view of the chest demonstrates a enlarged cardiopericardial silhouette. The lungs are adequately expanded. Mild interstitial edema. No pneumothorax or focal consolidation. . The osseous structures are unremarkable. Patient is on a cardiac monitor. IMPRESSION: Cardiomegaly with interstitial pulmonary edema. Beatriz Cardenas          PENDING TEST RESULTS:   At the time of discharge the following test results are still pending: none      FOLLOW UP APPOINTMENTS:    Follow-up Information     Follow up With Specialties Details Why [address] [facility] [address] [place] [address]    Carolyne Mallory MD Cardiology On 9/25/2019 APPT time 12noon with Dr. Cecelia Lin [address] [facility]      Chad Schneider MD Internal Medicine On 10/2/2019 at 10:15 am for hospital follow up  [address] [place] [address]  [phone]             ADDITIONAL CARE RECOMMENDATIONS:   -follow up with PCP,nephrologist and cardiologist    DIET: Renal Diet      ACTIVITY: Activity as tolerated    WOUND CARE: na    EQUIPMENT needed: na    DISCHARGE MEDICATIONS:  Discharge Medication List as of 9/19/2019  6:32 PM      START taking these medications    Details   polyethylene glycol (MIRALAX) 17 gram packet Take 1 Packet by mouth daily as needed (constipation). , Print, Disp-10 Each, R-0      furosemide (LASIX) 40 mg tablet Take 1 Tab by mouth daily. , Print, Disp-15 Tab, R-0         CONTINUE these medications which have CHANGED    Details   aspirin 81 mg tablet Take 1 Tab by mouth daily. , No Print, Disp-1 Tab, R-0      calcitRIOL (ROCALTROL) 0.25 mcg capsule Take 1 Cap by mouth two (2) days a week. On Thursday and saturday, Print, Disp-10 Cap, R-0      carvedilol (COREG) 12.5 mg tablet Take 1 Tab by mouth two (2) times daily (with meals). , Print, Disp-30 Tab, R-0         CONTINUE these medications which have NOT CHANGED    Details   phenytoin (DILANTIN ER) 300 mg ER capsule Take 300 mg by mouth nightly., Historical Med      lacosamide (VIMPAT) 150 mg tab tablet Take 150 mg by mouth two (2) times a day., Historical Med      losartan (COZAAR) 25 mg tablet Take 25 mg by mouth daily. , Historical Med      docusate sodium (COLACE) 100 mg capsule Take 100 mg by mouth two (2) times daily as needed for Constipation. , Historical Med      clopidogrel (PLAVIX) 75 mg tab Take 1 Tab by mouth daily. , Print, Disp-30 Tab, R-1      isosorbide mononitrate ER (IMDUR) 60 mg CR tablet Take 1 Tab by mouth every morning., PrintNEEDS OFFICE VISIT FOR MORE REFILLSDisp-30 Tab, R-0      levETIRAcetam (KEPPRA) 500 mg tablet Take 500 mg by mouth two (2) times a day., Historical Med      amiodarone (CORDARONE) 200 mg tablet Take 100 mg by mouth daily. , Historical Med      atorvastatin (LIPITOR) 40 mg tablet TAKE 1 TABLET BY MOUTH ONCE DAILY, NormalNEEDS OFFICE VISIT FOR MORE REFILLSDisp-30 Tab, R-0      sevelamer (RENAGEL) 800 mg tablet Take 1,600 mg by mouth three (3) times daily (with meals). , Historical Med      omeprazole (PRILOSEC) 20 mg capsule Take 20 mg by mouth daily. , Historical Med, R-1      potassium chloride (K-DUR, KLOR-CON) 20 mEq tablet Take 20 mEq by mouth daily. , Historical Med      brimonidine (ALPHAGAN) 0.2 % ophthalmic solution Administer 1 Drop to both eyes two (2) times a day., Historical Med      gentamicin (GARAMYCIN) 0.1 % topical cream Apply  to affected area daily. Apply to Cath wound, Historical Med      IRON FUM & PS CMP/VIT C & B (INTEGRA PO) Take 1 Tab by mouth daily. , Historical Med         STOP taking these medications       lactulose (CHRONULAC) 10 gram/15 mL solution Comments:   Reason for Stopping:                 NOTIFY YOUR PHYSICIAN FOR ANY OF THE FOLLOWING:   Fever over 101 degrees for 24 hours. Chest pain, shortness of breath, fever, chills, nausea, vomiting, diarrhea, change in mentation, falling, weakness, bleeding. Severe pain or pain not relieved by medications. Or, any other signs or symptoms that you may have questions about. DISPOSITION:  X  Home With:   OT  PT  HH  RN       Long term SNF/Inpatient Rehab    Independent/assisted living    Hospice    Other:       PATIENT CONDITION AT DISCHARGE:     Functional status    Poor     Deconditioned    X Independent      Cognition     Lucid    X Forgetful     Dementia      Catheters/lines (plus indication)    Drake     PICC     PEG    X None      Code status   X  Full code     DNR      PHYSICAL EXAMINATION AT DISCHARGE:  General:  No acute distess   Back:   Symmetric, no curvature. ROM normal. No CVA tenderness. Lungs:   Clear to auscultation bilaterally. Peritoneal cath near chest wall   Chest wall:  No tenderness or deformity. Heart:  Regular rate and rhythm, S1, S2 normal   Abdomen:   Soft, non-tender. Bowel sounds normal. No masses   Extremities: Minimal LE edema         Neurologic: Alert and awake,oriented X 2-3. .Moves extremities.         CHRONIC MEDICAL DIAGNOSES:  Problem List as of 9/19/2019 Date Reviewed: 7/18/2019          Codes Class Noted - Resolved    * (Principal) Altered mental state ICD-10-CM: R41.82  ICD-9-CM: 780.97  9/12/2019 - Present        Shortness of breath ICD-10-CM: R06.02  ICD-9-CM: 786.05  9/9/2019 - Present        Chronic combined systolic and diastolic HF (heart failure), NYHA class 2 ([facility] ICD-10-CM: I50.42  ICD-9-CM: 428.42  7/18/2019 - Present        PAF (paroxysmal atrial fibrillation) ([facility]) ICD-10-CM: I48.0  ICD-9-CM: 427.31  7/18/2019 - Present        Acute on chronic respiratory failure ([facility] ICD-10-CM: J96.20  ICD-9-CM: 518.84  7/18/2019 - Present        S/P angioplasty with stent ICD-10-CM: Z95.820  ICD-9-CM: V45.89  7/16/2019 - Present        CHF (congestive heart failure) ([facility]) ICD-10-CM: I50.9  ICD-9-CM: 428.0  7/16/2019 - Present        SOB (shortness of breath) ICD-10-CM: R06.02  ICD-9-CM: 786.05  7/16/2019 - Present        Abdominal pain ICD-10-CM: R10.9  ICD-9-CM: 789.00  6/19/2016 - Present        Muscle cramps ICD-10-CM: R25.2  ICD-9-CM: 729.82  5/26/2016 - Present        Ventriculo-peritoneal shunt status ICD-10-CM: Z98.2  ICD-9-CM: V45.2  5/26/2016 - Present        Chronic nausea ICD-10-CM: R11.0  ICD-9-CM: 787.02  5/26/2016 - Present        Itchy skin ICD-10-CM: L29.9  ICD-9-CM: 698.9  3/31/2016 - Present        Hyperlipidemia ICD-10-CM: E78.5  ICD-9-CM: 272.4  3/17/2016 - Present        Peritoneal dialysis status ([facility] ICD-10-CM: Z99.2  ICD-9-CM: V45.11  3/10/2016 - Present        Memory loss ICD-10-CM: R41.3  ICD-9-CM: 780.93  3/10/2016 - Present        Coronary artery disease involving native coronary artery of native heart with unstable angina pectoris ([facility] ICD-10-CM: I25.110  ICD-9-CM: 414.01, 411.1  3/10/2016 - Present        CVA, old, cognitive deficits ICD-10-CM: I69.319  ICD-9-CM: 438.0  3/10/2016 - Present        Chronic abdominal pain ICD-10-CM: R10.9, G89.29  ICD-9-CM: 789.00, 338.29  3/10/2016 - Present        Seizure ([facility] ICD-10-CM: R56.9  ICD-9-CM: 780.39  10/12/2015 - Present        HTN (hypertension) ICD-10-CM: I10  ICD-9-CM: 401.9  10/12/2015 - Present        Hydrocephalus ICD-10-CM: G91.9  ICD-9-CM: 331.4  10/12/2015 - Present        End stage renal disease ([facility] ICD-10-CM: N18.6  ICD-9-CM: 585.6  10/12/2015 - Present 35 minutes were spent with the patient on counseling and coordination of care    Signed:   Jeanette Maya MD  9/19/2019  11:12 PM

## 2019-09-21 ENCOUNTER — HOME CARE VISIT (OUTPATIENT)
Dept: SCHEDULING | Facility: HOME HEALTH | Age: 64
End: 2019-09-21
Payer: MEDICARE

## 2019-09-21 VITALS
DIASTOLIC BLOOD PRESSURE: 69 MMHG | HEART RATE: 79 BPM | OXYGEN SATURATION: 98 % | RESPIRATION RATE: 16 BRPM | SYSTOLIC BLOOD PRESSURE: 112 MMHG | TEMPERATURE: 98.2 F

## 2019-09-21 PROCEDURE — G0151 HHCP-SERV OF PT,EA 15 MIN: HCPCS

## 2019-09-21 PROCEDURE — 3331090001 HH PPS REVENUE CREDIT

## 2019-09-21 PROCEDURE — 3331090002 HH PPS REVENUE DEBIT

## 2019-09-21 PROCEDURE — 400013 HH SOC

## 2019-09-22 PROCEDURE — 3331090002 HH PPS REVENUE DEBIT

## 2019-09-22 PROCEDURE — 3331090001 HH PPS REVENUE CREDIT

## 2019-09-22 NOTE — DISCHARGE INSTRUCTIONS
Discharge Instructions       PATIENT ID: Braden Washington  MRN: [id number]   YOB: 1955    DATE OF ADMISSION: 9/9/2019 11:13 AM    DATE OF DISCHARGE: 9/19/2019    PRIMARY CARE PROVIDER: Unknown, Provider     ATTENDING PHYSICIAN: Savannah Sue MD  DISCHARGING PROVIDER: Love Mancilla MD    To contact this individual call [phone] and ask the  to page. If unavailable ask to be transferred the Adult Hospitalist Department. DISCHARGE DIAGNOSES - Acute on chronic systolic and diastolic heart failure,encephalopathy    CONSULTATIONS: IP CONSULT TO NEPHROLOGY  IP CONSULT TO NEUROLOGY  IP CONSULT TO CARDIOLOGY    PROCEDURES/SURGERIES: * No surgery found *    PENDING TEST RESULTS:   At the time of discharge the following test results are still pending: none    FOLLOW UP APPOINTMENTS:   Follow-up Information     Follow up With Specialties Details Why Contact Info    [address]  [place] [address]    Unknown, Provider  In 1 week  Patient not available to ask      Nagi Jaime MD Cardiology In 2 weeks  [address]  [phone]             ADDITIONAL CARE RECOMMENDATIONS:   -follow up with PCP,nephrologist and cardiologist    DIET: Renal Diet      ACTIVITY: Activity as tolerated    WOUND CARE: na    EQUIPMENT needed: na      DISCHARGE MEDICATIONS:   See Medication Reconciliation Form    · It is important that you take the medication exactly as they are prescribed. · Keep your medication in the bottles provided by the pharmacist and keep a list of the medication names, dosages, and times to be taken in your wallet. · Do not take other medications without consulting your doctor. NOTIFY YOUR PHYSICIAN FOR ANY OF THE FOLLOWING:   Fever over 101 degrees for 24 hours.    Chest pain, shortness of breath, fever, chills, nausea, vomiting, diarrhea, change in mentation, falling, weakness, bleeding. Severe pain or pain not relieved by medications. Or, any other signs or symptoms that you may have questions about. DISPOSITION:   X Home With:   OT  PT  HH  RN       SNF/Inpatient Rehab/[facility]    Independent/assisted living    Hospice    Other:           Signed:   Vicente Jay MD  9/19/2019  11:38 AM    HEART FAILURE INSTRUCTIONS    Please weigh daily and report weight gain of 3# in one day or 5# in one week to Dr. Romana Penny at [phone]  Please continue on a low-salt (1500-2000mg/day) cardiac diet. Thank you!   Michael SANTOYO (Healthy Understanding of Goals) Transitional Care Team at [facility]  [phone]

## 2019-09-23 PROCEDURE — 3331090001 HH PPS REVENUE CREDIT

## 2019-09-23 PROCEDURE — 3331090002 HH PPS REVENUE DEBIT

## 2019-09-24 ENCOUNTER — HOME CARE VISIT (OUTPATIENT)
Dept: SCHEDULING | Facility: HOME HEALTH | Age: 64
End: 2019-09-24
Payer: MEDICARE

## 2019-09-24 ENCOUNTER — HOME CARE VISIT (OUTPATIENT)
Dept: HOME HEALTH SERVICES | Facility: HOME HEALTH | Age: 64
End: 2019-09-24
Payer: MEDICARE

## 2019-09-24 VITALS
HEART RATE: 76 BPM | RESPIRATION RATE: 16 BRPM | SYSTOLIC BLOOD PRESSURE: 123 MMHG | OXYGEN SATURATION: 98 % | TEMPERATURE: 96.8 F | DIASTOLIC BLOOD PRESSURE: 74 MMHG

## 2019-09-24 VITALS
TEMPERATURE: 96.8 F | OXYGEN SATURATION: 98 % | SYSTOLIC BLOOD PRESSURE: 123 MMHG | DIASTOLIC BLOOD PRESSURE: 74 MMHG | HEART RATE: 96 BPM

## 2019-09-24 PROCEDURE — 3331090002 HH PPS REVENUE DEBIT

## 2019-09-24 PROCEDURE — G0151 HHCP-SERV OF PT,EA 15 MIN: HCPCS

## 2019-09-24 PROCEDURE — 3331090001 HH PPS REVENUE CREDIT

## 2019-09-24 PROCEDURE — G0152 HHCP-SERV OF OT,EA 15 MIN: HCPCS

## 2019-09-25 PROCEDURE — 3331090001 HH PPS REVENUE CREDIT

## 2019-09-25 PROCEDURE — 3331090002 HH PPS REVENUE DEBIT

## 2019-09-26 ENCOUNTER — HOME CARE VISIT (OUTPATIENT)
Dept: SCHEDULING | Facility: HOME HEALTH | Age: 64
End: 2019-09-26
Payer: MEDICARE

## 2019-09-26 VITALS
RESPIRATION RATE: 16 BRPM | SYSTOLIC BLOOD PRESSURE: 119 MMHG | TEMPERATURE: 98.3 F | HEART RATE: 98 BPM | OXYGEN SATURATION: 96 % | DIASTOLIC BLOOD PRESSURE: 75 MMHG

## 2019-09-26 VITALS
TEMPERATURE: 96.9 F | OXYGEN SATURATION: 98 % | HEART RATE: 68 BPM | SYSTOLIC BLOOD PRESSURE: 126 MMHG | DIASTOLIC BLOOD PRESSURE: 90 MMHG

## 2019-09-26 PROCEDURE — G0151 HHCP-SERV OF PT,EA 15 MIN: HCPCS

## 2019-09-26 PROCEDURE — G0152 HHCP-SERV OF OT,EA 15 MIN: HCPCS

## 2019-09-26 PROCEDURE — 3331090001 HH PPS REVENUE CREDIT

## 2019-09-26 PROCEDURE — 3331090002 HH PPS REVENUE DEBIT

## 2019-09-27 ENCOUNTER — HOME CARE VISIT (OUTPATIENT)
Dept: SCHEDULING | Facility: HOME HEALTH | Age: 64
End: 2019-09-27
Payer: MEDICARE

## 2019-09-27 PROCEDURE — 3331090002 HH PPS REVENUE DEBIT

## 2019-09-27 PROCEDURE — 3331090001 HH PPS REVENUE CREDIT

## 2019-09-27 PROCEDURE — G0299 HHS/HOSPICE OF RN EA 15 MIN: HCPCS

## 2019-09-28 PROCEDURE — 3331090002 HH PPS REVENUE DEBIT

## 2019-09-28 PROCEDURE — 3331090001 HH PPS REVENUE CREDIT

## 2019-09-29 PROCEDURE — 3331090002 HH PPS REVENUE DEBIT

## 2019-09-29 PROCEDURE — 3331090001 HH PPS REVENUE CREDIT

## 2019-09-30 VITALS
HEIGHT: 63 IN | TEMPERATURE: 99.3 F | BODY MASS INDEX: 35.08 KG/M2 | RESPIRATION RATE: 20 BRPM | OXYGEN SATURATION: 97 % | WEIGHT: 198 LBS | SYSTOLIC BLOOD PRESSURE: 138 MMHG | DIASTOLIC BLOOD PRESSURE: 92 MMHG | HEART RATE: 64 BPM

## 2019-09-30 PROCEDURE — 3331090002 HH PPS REVENUE DEBIT

## 2019-09-30 PROCEDURE — 3331090001 HH PPS REVENUE CREDIT

## 2019-10-01 ENCOUNTER — HOME CARE VISIT (OUTPATIENT)
Dept: SCHEDULING | Facility: HOME HEALTH | Age: 64
End: 2019-10-01
Payer: MEDICARE

## 2019-10-01 VITALS
SYSTOLIC BLOOD PRESSURE: 136 MMHG | HEART RATE: 76 BPM | TEMPERATURE: 98.2 F | DIASTOLIC BLOOD PRESSURE: 85 MMHG | OXYGEN SATURATION: 97 % | RESPIRATION RATE: 16 BRPM

## 2019-10-01 PROCEDURE — 3331090002 HH PPS REVENUE DEBIT

## 2019-10-01 PROCEDURE — 3331090001 HH PPS REVENUE CREDIT

## 2019-10-01 PROCEDURE — G0151 HHCP-SERV OF PT,EA 15 MIN: HCPCS

## 2019-10-02 ENCOUNTER — HOME CARE VISIT (OUTPATIENT)
Dept: SCHEDULING | Facility: HOME HEALTH | Age: 64
End: 2019-10-02
Payer: MEDICARE

## 2019-10-02 PROCEDURE — 3331090002 HH PPS REVENUE DEBIT

## 2019-10-02 PROCEDURE — G0152 HHCP-SERV OF OT,EA 15 MIN: HCPCS

## 2019-10-02 PROCEDURE — 3331090001 HH PPS REVENUE CREDIT

## 2019-10-03 ENCOUNTER — HOME CARE VISIT (OUTPATIENT)
Dept: HOME HEALTH SERVICES | Facility: HOME HEALTH | Age: 64
End: 2019-10-03
Payer: MEDICARE

## 2019-10-03 VITALS — OXYGEN SATURATION: 98 % | HEART RATE: 72 BPM | DIASTOLIC BLOOD PRESSURE: 90 MMHG | SYSTOLIC BLOOD PRESSURE: 130 MMHG

## 2019-10-03 PROCEDURE — 3331090002 HH PPS REVENUE DEBIT

## 2019-10-03 PROCEDURE — 3331090001 HH PPS REVENUE CREDIT

## 2019-10-04 ENCOUNTER — HOME CARE VISIT (OUTPATIENT)
Dept: SCHEDULING | Facility: HOME HEALTH | Age: 64
End: 2019-10-04
Payer: MEDICARE

## 2019-10-04 PROCEDURE — 3331090001 HH PPS REVENUE CREDIT

## 2019-10-04 PROCEDURE — 3331090002 HH PPS REVENUE DEBIT

## 2019-10-05 PROCEDURE — 3331090001 HH PPS REVENUE CREDIT

## 2019-10-05 PROCEDURE — 3331090002 HH PPS REVENUE DEBIT

## 2019-10-06 PROCEDURE — 3331090001 HH PPS REVENUE CREDIT

## 2019-10-06 PROCEDURE — 3331090002 HH PPS REVENUE DEBIT

## 2019-10-07 PROCEDURE — 3331090002 HH PPS REVENUE DEBIT

## 2019-10-07 PROCEDURE — 3331090001 HH PPS REVENUE CREDIT

## 2019-10-08 PROCEDURE — 3331090001 HH PPS REVENUE CREDIT

## 2019-10-08 PROCEDURE — 3331090002 HH PPS REVENUE DEBIT

## 2019-10-09 ENCOUNTER — HOME CARE VISIT (OUTPATIENT)
Dept: SCHEDULING | Facility: HOME HEALTH | Age: 64
End: 2019-10-09
Payer: MEDICARE

## 2019-10-09 VITALS
OXYGEN SATURATION: 97 % | SYSTOLIC BLOOD PRESSURE: 150 MMHG | HEART RATE: 80 BPM | TEMPERATURE: 97 F | DIASTOLIC BLOOD PRESSURE: 100 MMHG

## 2019-10-09 PROCEDURE — 3331090001 HH PPS REVENUE CREDIT

## 2019-10-09 PROCEDURE — 3331090002 HH PPS REVENUE DEBIT

## 2019-10-09 PROCEDURE — G0152 HHCP-SERV OF OT,EA 15 MIN: HCPCS

## 2019-10-10 ENCOUNTER — HOME CARE VISIT (OUTPATIENT)
Dept: SCHEDULING | Facility: HOME HEALTH | Age: 64
End: 2019-10-10
Payer: MEDICARE

## 2019-10-10 VITALS
DIASTOLIC BLOOD PRESSURE: 93 MMHG | SYSTOLIC BLOOD PRESSURE: 146 MMHG | HEART RATE: 80 BPM | OXYGEN SATURATION: 97 % | RESPIRATION RATE: 16 BRPM | TEMPERATURE: 98.3 F

## 2019-10-10 PROCEDURE — 3331090002 HH PPS REVENUE DEBIT

## 2019-10-10 PROCEDURE — 3331090001 HH PPS REVENUE CREDIT

## 2019-10-10 PROCEDURE — G0151 HHCP-SERV OF PT,EA 15 MIN: HCPCS

## 2019-10-11 ENCOUNTER — HOME CARE VISIT (OUTPATIENT)
Dept: SCHEDULING | Facility: HOME HEALTH | Age: 64
End: 2019-10-11
Payer: MEDICARE

## 2019-10-11 PROCEDURE — 3331090002 HH PPS REVENUE DEBIT

## 2019-10-11 PROCEDURE — 3331090001 HH PPS REVENUE CREDIT

## 2019-10-12 PROCEDURE — 3331090001 HH PPS REVENUE CREDIT

## 2019-10-12 PROCEDURE — 3331090002 HH PPS REVENUE DEBIT

## 2019-10-13 PROCEDURE — 3331090002 HH PPS REVENUE DEBIT

## 2019-10-13 PROCEDURE — 3331090001 HH PPS REVENUE CREDIT

## 2019-10-14 PROCEDURE — 3331090001 HH PPS REVENUE CREDIT

## 2019-10-14 PROCEDURE — 3331090002 HH PPS REVENUE DEBIT

## 2019-10-15 ENCOUNTER — HOME CARE VISIT (OUTPATIENT)
Dept: HOME HEALTH SERVICES | Facility: HOME HEALTH | Age: 64
End: 2019-10-15
Payer: MEDICARE

## 2019-10-15 ENCOUNTER — HOME CARE VISIT (OUTPATIENT)
Dept: SCHEDULING | Facility: HOME HEALTH | Age: 64
End: 2019-10-15
Payer: MEDICARE

## 2019-10-15 PROCEDURE — 3331090002 HH PPS REVENUE DEBIT

## 2019-10-15 PROCEDURE — 3331090001 HH PPS REVENUE CREDIT

## 2019-10-16 ENCOUNTER — HOME CARE VISIT (OUTPATIENT)
Dept: SCHEDULING | Facility: HOME HEALTH | Age: 64
End: 2019-10-16
Payer: MEDICARE

## 2019-10-16 VITALS
OXYGEN SATURATION: 96 % | DIASTOLIC BLOOD PRESSURE: 90 MMHG | HEART RATE: 84 BPM | SYSTOLIC BLOOD PRESSURE: 124 MMHG | TEMPERATURE: 97.3 F

## 2019-10-16 PROCEDURE — G0152 HHCP-SERV OF OT,EA 15 MIN: HCPCS

## 2019-10-16 PROCEDURE — 3331090001 HH PPS REVENUE CREDIT

## 2019-10-16 PROCEDURE — 3331090002 HH PPS REVENUE DEBIT

## 2019-10-17 ENCOUNTER — HOME CARE VISIT (OUTPATIENT)
Dept: SCHEDULING | Facility: HOME HEALTH | Age: 64
End: 2019-10-17
Payer: MEDICARE

## 2019-10-17 VITALS
OXYGEN SATURATION: 98 % | RESPIRATION RATE: 16 BRPM | DIASTOLIC BLOOD PRESSURE: 93 MMHG | HEART RATE: 89 BPM | TEMPERATURE: 97.3 F | SYSTOLIC BLOOD PRESSURE: 148 MMHG

## 2019-10-17 PROCEDURE — G0151 HHCP-SERV OF PT,EA 15 MIN: HCPCS

## 2019-10-17 PROCEDURE — 3331090002 HH PPS REVENUE DEBIT

## 2019-10-17 PROCEDURE — 3331090001 HH PPS REVENUE CREDIT

## 2019-10-18 ENCOUNTER — HOME CARE VISIT (OUTPATIENT)
Dept: SCHEDULING | Facility: HOME HEALTH | Age: 64
End: 2019-10-18
Payer: MEDICARE

## 2019-10-18 PROCEDURE — G0299 HHS/HOSPICE OF RN EA 15 MIN: HCPCS

## 2019-10-18 PROCEDURE — G0152 HHCP-SERV OF OT,EA 15 MIN: HCPCS

## 2019-10-18 PROCEDURE — 3331090001 HH PPS REVENUE CREDIT

## 2019-10-18 PROCEDURE — 3331090002 HH PPS REVENUE DEBIT

## 2019-10-19 VITALS
SYSTOLIC BLOOD PRESSURE: 138 MMHG | HEART RATE: 86 BPM | OXYGEN SATURATION: 98 % | TEMPERATURE: 97 F | DIASTOLIC BLOOD PRESSURE: 90 MMHG

## 2019-10-19 PROCEDURE — 3331090002 HH PPS REVENUE DEBIT

## 2019-10-19 PROCEDURE — 3331090001 HH PPS REVENUE CREDIT

## 2019-10-20 VITALS
RESPIRATION RATE: 20 BRPM | DIASTOLIC BLOOD PRESSURE: 94 MMHG | OXYGEN SATURATION: 94 % | HEART RATE: 74 BPM | SYSTOLIC BLOOD PRESSURE: 140 MMHG | TEMPERATURE: 97.1 F

## 2019-10-20 PROCEDURE — 3331090002 HH PPS REVENUE DEBIT

## 2019-10-20 PROCEDURE — 3331090001 HH PPS REVENUE CREDIT

## 2019-10-21 PROCEDURE — 3331090002 HH PPS REVENUE DEBIT

## 2019-10-21 PROCEDURE — 3331090001 HH PPS REVENUE CREDIT

## 2019-10-22 ENCOUNTER — HOME CARE VISIT (OUTPATIENT)
Dept: SCHEDULING | Facility: HOME HEALTH | Age: 64
End: 2019-10-22
Payer: MEDICARE

## 2019-10-22 VITALS
RESPIRATION RATE: 16 BRPM | HEART RATE: 77 BPM | DIASTOLIC BLOOD PRESSURE: 93 MMHG | SYSTOLIC BLOOD PRESSURE: 143 MMHG | TEMPERATURE: 97.5 F | OXYGEN SATURATION: 96 %

## 2019-10-22 PROCEDURE — 3331090001 HH PPS REVENUE CREDIT

## 2019-10-22 PROCEDURE — 3331090002 HH PPS REVENUE DEBIT

## 2019-10-22 PROCEDURE — G0151 HHCP-SERV OF PT,EA 15 MIN: HCPCS

## 2019-10-23 PROCEDURE — 3331090002 HH PPS REVENUE DEBIT

## 2019-10-23 PROCEDURE — 3331090001 HH PPS REVENUE CREDIT

## 2019-10-24 ENCOUNTER — HOME CARE VISIT (OUTPATIENT)
Dept: SCHEDULING | Facility: HOME HEALTH | Age: 64
End: 2019-10-24
Payer: MEDICARE

## 2019-10-24 VITALS
TEMPERATURE: 97.7 F | DIASTOLIC BLOOD PRESSURE: 94 MMHG | HEART RATE: 83 BPM | RESPIRATION RATE: 16 BRPM | OXYGEN SATURATION: 96 % | SYSTOLIC BLOOD PRESSURE: 143 MMHG

## 2019-10-24 PROCEDURE — G0151 HHCP-SERV OF PT,EA 15 MIN: HCPCS

## 2019-10-24 PROCEDURE — 3331090001 HH PPS REVENUE CREDIT

## 2019-10-24 PROCEDURE — 3331090002 HH PPS REVENUE DEBIT

## 2019-10-25 ENCOUNTER — HOME CARE VISIT (OUTPATIENT)
Dept: SCHEDULING | Facility: HOME HEALTH | Age: 64
End: 2019-10-25
Payer: MEDICARE

## 2019-10-25 PROCEDURE — 3331090001 HH PPS REVENUE CREDIT

## 2019-10-25 PROCEDURE — 3331090002 HH PPS REVENUE DEBIT

## 2019-10-26 PROCEDURE — 3331090002 HH PPS REVENUE DEBIT

## 2019-10-26 PROCEDURE — 3331090001 HH PPS REVENUE CREDIT

## 2019-10-27 PROCEDURE — 3331090001 HH PPS REVENUE CREDIT

## 2019-10-27 PROCEDURE — 3331090002 HH PPS REVENUE DEBIT

## 2019-10-28 PROCEDURE — 3331090001 HH PPS REVENUE CREDIT

## 2019-10-28 PROCEDURE — 3331090002 HH PPS REVENUE DEBIT

## 2019-10-29 PROCEDURE — 3331090002 HH PPS REVENUE DEBIT

## 2019-10-29 PROCEDURE — 3331090001 HH PPS REVENUE CREDIT

## 2019-10-30 PROCEDURE — 3331090001 HH PPS REVENUE CREDIT

## 2019-10-30 PROCEDURE — 3331090002 HH PPS REVENUE DEBIT

## 2019-10-31 PROCEDURE — 3331090001 HH PPS REVENUE CREDIT

## 2019-10-31 PROCEDURE — 3331090002 HH PPS REVENUE DEBIT

## 2019-11-01 ENCOUNTER — HOME CARE VISIT (OUTPATIENT)
Dept: SCHEDULING | Facility: HOME HEALTH | Age: 64
End: 2019-11-01
Payer: MEDICARE

## 2019-11-01 PROCEDURE — G0299 HHS/HOSPICE OF RN EA 15 MIN: HCPCS

## 2019-11-01 PROCEDURE — 3331090002 HH PPS REVENUE DEBIT

## 2019-11-01 PROCEDURE — 3331090003 HH PPS REVENUE ADJ

## 2019-11-01 PROCEDURE — 3331090001 HH PPS REVENUE CREDIT

## 2019-11-02 PROCEDURE — 3331090001 HH PPS REVENUE CREDIT

## 2019-11-02 PROCEDURE — 3331090002 HH PPS REVENUE DEBIT

## 2019-11-03 PROCEDURE — 3331090001 HH PPS REVENUE CREDIT

## 2019-11-03 PROCEDURE — 3331090002 HH PPS REVENUE DEBIT

## 2019-11-04 VITALS
OXYGEN SATURATION: 98 % | SYSTOLIC BLOOD PRESSURE: 118 MMHG | TEMPERATURE: 98.6 F | DIASTOLIC BLOOD PRESSURE: 78 MMHG | RESPIRATION RATE: 18 BRPM | HEART RATE: 74 BPM

## 2019-11-24 ENCOUNTER — HOSPITAL ENCOUNTER (EMERGENCY)
Age: 64
Discharge: HOME | End: 2019-11-24
Attending: EMERGENCY MEDICINE | Admitting: EMERGENCY MEDICINE
Payer: MEDICARE

## 2019-11-24 ENCOUNTER — APPOINTMENT (OUTPATIENT)
Dept: GENERAL RADIOLOGY | Age: 64
End: 2019-11-24
Attending: EMERGENCY MEDICINE
Payer: MEDICARE

## 2019-11-24 VITALS
WEIGHT: 198 LBS | HEART RATE: 84 BPM | TEMPERATURE: 97.8 F | BODY MASS INDEX: 35.08 KG/M2 | SYSTOLIC BLOOD PRESSURE: 142 MMHG | HEIGHT: 63 IN | RESPIRATION RATE: 16 BRPM | OXYGEN SATURATION: 100 % | DIASTOLIC BLOOD PRESSURE: 97 MMHG

## 2019-11-24 DIAGNOSIS — M54.50 ACUTE BILATERAL LOW BACK PAIN WITHOUT SCIATICA: Primary | ICD-10-CM

## 2019-11-24 LAB
ALBUMIN SERPL-MCNC: 2 G/DL (ref 3.5–5)
ALBUMIN/GLOB SERPL: 0.4 {RATIO} (ref 1.1–2.2)
ALP SERPL-CCNC: 146 U/L (ref 45–117)
ALT SERPL-CCNC: 16 U/L (ref 12–78)
ANION GAP SERPL CALC-SCNC: 8 MMOL/L (ref 5–15)
AST SERPL-CCNC: 20 U/L (ref 15–37)
ATRIAL RATE: 80 BPM
BASOPHILS # BLD: 0.1 K/UL (ref 0–0.1)
BASOPHILS NFR BLD: 1 % (ref 0–1)
BILIRUB SERPL-MCNC: 0.3 MG/DL (ref 0.2–1)
BNP SERPL-MCNC: ABNORMAL PG/ML
BUN SERPL-MCNC: 60 MG/DL (ref 6–20)
BUN/CREAT SERPL: 6 (ref 12–20)
CALCIUM SERPL-MCNC: 8.4 MG/DL (ref 8.5–10.1)
CALCULATED P AXIS, ECG09: 24 DEGREES
CALCULATED R AXIS, ECG10: -45 DEGREES
CALCULATED T AXIS, ECG11: 104 DEGREES
CHLORIDE SERPL-SCNC: 100 MMOL/L (ref 97–108)
CO2 SERPL-SCNC: 29 MMOL/L (ref 21–32)
COMMENT, HOLDF: NORMAL
CREAT SERPL-MCNC: 10.2 MG/DL (ref 0.55–1.02)
DIAGNOSIS, 93000: NORMAL
DIFFERENTIAL METHOD BLD: ABNORMAL
EOSINOPHIL # BLD: 0.7 K/UL (ref 0–0.4)
EOSINOPHIL NFR BLD: 8 % (ref 0–7)
ERYTHROCYTE [DISTWIDTH] IN BLOOD BY AUTOMATED COUNT: 21.2 % (ref 11.5–14.5)
GLOBULIN SER CALC-MCNC: 4.7 G/DL (ref 2–4)
GLUCOSE SERPL-MCNC: 79 MG/DL (ref 65–100)
HCT VFR BLD AUTO: 30.8 % (ref 35–47)
HGB BLD-MCNC: 9.5 G/DL (ref 11.5–16)
IMM GRANULOCYTES # BLD AUTO: 0.1 K/UL (ref 0–0.04)
IMM GRANULOCYTES NFR BLD AUTO: 1 % (ref 0–0.5)
LIPASE SERPL-CCNC: 348 U/L (ref 73–393)
LYMPHOCYTES # BLD: 1.6 K/UL (ref 0.8–3.5)
LYMPHOCYTES NFR BLD: 17 % (ref 12–49)
MCH RBC QN AUTO: 28.4 PG (ref 26–34)
MCHC RBC AUTO-ENTMCNC: 30.8 G/DL (ref 30–36.5)
MCV RBC AUTO: 91.9 FL (ref 80–99)
MONOCYTES # BLD: 1 K/UL (ref 0–1)
MONOCYTES NFR BLD: 11 % (ref 5–13)
NEUTS SEG # BLD: 5.8 K/UL (ref 1.8–8)
NEUTS SEG NFR BLD: 62 % (ref 32–75)
NRBC # BLD: 0 K/UL (ref 0–0.01)
NRBC BLD-RTO: 0 PER 100 WBC
P-R INTERVAL, ECG05: 142 MS
PLATELET # BLD AUTO: 280 K/UL (ref 150–400)
PMV BLD AUTO: 11.8 FL (ref 8.9–12.9)
POTASSIUM SERPL-SCNC: 4.3 MMOL/L (ref 3.5–5.1)
PROT SERPL-MCNC: 6.7 G/DL (ref 6.4–8.2)
Q-T INTERVAL, ECG07: 468 MS
QRS DURATION, ECG06: 130 MS
QTC CALCULATION (BEZET), ECG08: 539 MS
RBC # BLD AUTO: 3.35 M/UL (ref 3.8–5.2)
RBC MORPH BLD: ABNORMAL
SAMPLES BEING HELD,HOLD: NORMAL
SODIUM SERPL-SCNC: 137 MMOL/L (ref 136–145)
TROPONIN I SERPL-MCNC: 0.06 NG/ML
VENTRICULAR RATE, ECG03: 80 BPM
WBC # BLD AUTO: 9.3 K/UL (ref 3.6–11)

## 2019-11-24 PROCEDURE — 83690 ASSAY OF LIPASE: CPT

## 2019-11-24 PROCEDURE — 84484 ASSAY OF TROPONIN QUANT: CPT

## 2019-11-24 PROCEDURE — 93005 ELECTROCARDIOGRAM TRACING: CPT

## 2019-11-24 PROCEDURE — 83880 ASSAY OF NATRIURETIC PEPTIDE: CPT

## 2019-11-24 PROCEDURE — 99284 EMERGENCY DEPT VISIT MOD MDM: CPT

## 2019-11-24 PROCEDURE — 80053 COMPREHEN METABOLIC PANEL: CPT

## 2019-11-24 PROCEDURE — 72100 X-RAY EXAM L-S SPINE 2/3 VWS: CPT

## 2019-11-24 PROCEDURE — 71046 X-RAY EXAM CHEST 2 VIEWS: CPT

## 2019-11-24 PROCEDURE — 36415 COLL VENOUS BLD VENIPUNCTURE: CPT

## 2019-11-24 PROCEDURE — 85025 COMPLETE CBC W/AUTO DIFF WBC: CPT

## 2019-11-24 RX ORDER — TRAMADOL HYDROCHLORIDE 50 MG/1
50 TABLET ORAL
Qty: 10 TAB | Refills: 0 | Status: SHIPPED | OUTPATIENT
Start: 2019-11-24 | End: 2019-11-27

## 2019-11-24 NOTE — ED PROVIDER NOTES
81-year-old -American female presents to the emergency department with low back pain. Patient reports that she is a peritoneal dialysis patient. She is been doing her peritoneal dialysis each day as normal.  She states that she was sitting in [employer] about an hour ago. She said she had pain come on in her left lower back. The pain was sharp in nature. Pain has now started to resolve. She reports continued mild pain in the left lower and middle back. Patient does not make urine at baseline. She reports no abdominal pain. No vomiting or diarrhea. No fevers. No weakness or numbness in arms or legs. The sister is with her. The sister has thought the patient has looked a bit short of breath over the past 3 or 4 days. The patient denies feeling short of breath. Patient denies any chest pains. The patient does not feel short of breath. The patient denies any recent falls. No bowel or bladder incontinence.              Past Medical History:   Diagnosis Date    Acute on chronic respiratory failure ([facility] [address] 7/18/2019    Blood clot in vein     left arm several years ago    CAD (coronary artery disease)     Chronic kidney disease     Chronic systolic heart failure (HCC) 7/18/2019    Congestive heart failure ([facility] [address]     Hypercholesterolemia     Hypertension     Legally blind     PAF (paroxysmal atrial fibrillation) ([facility] [address] 7/18/2019    Seizures ([facility] [address]     Stroke [facility])        Past Surgical History:   Procedure Laterality Date    CARDIAC SURG PROCEDURE UNLIST      heart attack 12/15    HX ORTHOPAEDIC      right middle finger    HX OTHER SURGICAL      shunt placed in brain         Family History:   Problem Relation Age of Onset    Diabetes Other     Hypertension Other     Cancer Other        Social History     Socioeconomic History    Marital status: SINGLE     Spouse name: Not on file    Number of children: Not on file    Years of education: Not on file    Highest education level: Not on file Occupational History    Not on file   Social Needs    Financial resource strain: Not on file    Food insecurity:     Worry: Not on file     Inability: Not on file    Transportation needs:     Medical: Not on file     Non-medical: Not on file   Tobacco Use    Smoking status: Never Smoker    Smokeless tobacco: Never Used   Substance and Sexual Activity    Alcohol use: No    Drug use: Yes     Types: Marijuana     Comment: last used 1 week ago    Sexual activity: Never   Lifestyle    Physical activity:     Days per week: Not on file     Minutes per session: Not on file    Stress: Not on file   Relationships    Social connections:     Talks on phone: Not on file     Gets together: Not on file     Attends [employer] service: Not on file     Active member of club or organization: Not on file     Attends meetings of clubs or organizations: Not on file     Relationship status: Not on file    Intimate partner violence:     Fear of current or ex partner: Not on file     Emotionally abused: Not on file     Physically abused: Not on file     Forced sexual activity: Not on file   Other Topics Concern    Not on file   Social History Narrative    Not on file         ALLERGIES: Gabapentin    Review of Systems   Constitutional: Negative for fever. HENT: Negative for congestion. Eyes: Negative for pain. Respiratory: Positive for shortness of breath. Cardiovascular: Negative for chest pain and leg swelling. Gastrointestinal: Negative for abdominal pain and vomiting. Endocrine: Negative for polyuria. Genitourinary: Negative for difficulty urinating and flank pain. Musculoskeletal: Positive for back pain. Negative for arthralgias. Skin: Negative for color change. Allergic/Immunologic: Negative for immunocompromised state. Neurological: Negative for dizziness and headaches. Hematological: Does not bruise/bleed easily. Psychiatric/Behavioral: Negative for confusion.    All other systems reviewed and are negative. Vitals:    11/24/19 1222   BP: (!) 150/99   Pulse: 78   Resp: 18   Temp: 98.3 °F (36.8 °C)   SpO2: 99%   Weight: 89.8 kg (198 lb)   Height: 5' 3\" (1.6 m)            Physical Exam  Vitals signs and nursing note reviewed. Constitutional:       Appearance: She is well-developed. HENT:      Head: Normocephalic and atraumatic. Right Ear: External ear normal.      Left Ear: External ear normal.      Nose: Nose normal.   Eyes:      General: No scleral icterus. Pupils: Pupils are equal, round, and reactive to light. Neck:      Musculoskeletal: Normal range of motion and neck supple. Thyroid: No thyromegaly. Vascular: No JVD. Trachea: No tracheal deviation. Cardiovascular:      Rate and Rhythm: Normal rate and regular rhythm. Heart sounds: Normal heart sounds. No murmur. No friction rub. Pulmonary:      Effort: Pulmonary effort is normal. No respiratory distress. Breath sounds: Normal breath sounds. No stridor. No wheezing or rales. Comments: O2 sats 100% on room air, lungs are clear  Chest:      Chest wall: No tenderness. Abdominal:      General: Bowel sounds are normal. There is no distension. Palpations: Abdomen is soft. Tenderness: There is no tenderness. There is no guarding or rebound. Comments: Abdomen is soft and nontender in all 4 quadrants   Musculoskeletal: Normal range of motion. General: No tenderness. Comments: Mild pain in the left lower lumbar region and in the middle of the lumbar region, no swelling or deformity, no CVA tenderness bilaterally   Lymphadenopathy:      Cervical: No cervical adenopathy. Skin:     General: Skin is warm and dry. Findings: No erythema or rash. Neurological:      Mental Status: She is alert and oriented to person, place, and time. Cranial Nerves: No cranial nerve deficit.       Coordination: Coordination normal.      Deep Tendon Reflexes: Reflexes are normal and symmetric. Psychiatric:         Behavior: Behavior normal.         Thought Content: Thought content normal.         Judgment: Judgment normal.          MDM  Number of Diagnoses or Management Options  Diagnosis management comments: Patient has mild low back pain. Will check x-rays. Patient does not make urine. Will check basic blood work. She says she is not short of breath but the sister said she looks short of breath. Will check basic blood work and chest x-ray and EKG. Will reassess shortly. Patient agrees. Amount and/or Complexity of Data Reviewed  Clinical lab tests: ordered and reviewed  Tests in the radiology section of CPT®: ordered and reviewed  Tests in the medicine section of CPT®: ordered and reviewed  Decide to obtain previous medical records or to obtain history from someone other than the patient: yes  Obtain history from someone other than the patient: yes  Review and summarize past medical records: yes  Independent visualization of images, tracings, or specimens: yes    Risk of Complications, Morbidity, and/or Mortality  Presenting problems: high  Diagnostic procedures: high  Management options: high           Procedures    EKG: NSR, HR 80, LAD, no ST elevations or depressions  Moose Cavazos MD    3 PM  Troponin is at baseline at 0.06  White blood cell count is normal at 9.3  No abdominal pain on my exam    X-ray show possible pneumoperitoneum which is normal and up to one third of peritoneal dialysis patients    Lumbar x-rays are negative patient is feeling better and has no shortness of breath currently. O2 sats are 100% on room air    Patient has no back pain currently. Will discharge with PCP close follow-up and good return precautions. Patient agrees. Pt now states that she thinks it was the hard wood chair at [employer] that caused the back pain and she had this last week after [employer] as well    Good return precautions given to patient. Close follow up with PCP recommended. Patient and/or family voices understanding of this plan. Discharge instructions were explained by me and all concerns were addressed.

## 2019-11-24 NOTE — DISCHARGE INSTRUCTIONS
Patient Education        Back Pain: Care Instructions  Your Care Instructions    Back pain has many possible causes. It is often related to problems with muscles and ligaments of the back. It may also be related to problems with the nerves, discs, or bones of the back. Moving, lifting, standing, sitting, or sleeping in an awkward way can strain the back. Sometimes you don't notice the injury until later. Arthritis is another common cause of back pain. Although it may hurt a lot, back pain usually improves on its own within several weeks. Most people recover in 12 weeks or less. Using good home treatment and being careful not to stress your back can help you feel better sooner. Follow-up care is a key part of your treatment and safety. Be sure to make and go to all appointments, and call your doctor if you are having problems. It's also a good idea to know your test results and keep a list of the medicines you take. How can you care for yourself at home? · Sit or lie in positions that are most comfortable and reduce your pain. Try one of these positions when you lie down:  ? Lie on your back with your knees bent and supported by large pillows. ? Lie on the floor with your legs on the seat of a sofa or chair. ? Lie on your side with your knees and hips bent and a pillow between your legs. ? Lie on your stomach if it does not make pain worse. · Do not sit up in bed, and avoid soft couches and twisted positions. Bed rest can help relieve pain at first, but it delays healing. Avoid bed rest after the first day of back pain. · Change positions every 30 minutes. If you must sit for long periods of time, take breaks from sitting. Get up and walk around, or lie in a comfortable position. · Try using a heating pad on a low or medium setting for 15 to 20 minutes every 2 or 3 hours. Try a warm shower in place of one session with the heating pad. · You can also try an ice pack for 10 to 15 minutes every 2 to 3 hours. Put a thin cloth between the ice pack and your skin. · Take pain medicines exactly as directed. ? If the doctor gave you a prescription medicine for pain, take it as prescribed. ? If you are not taking a prescription pain medicine, ask your doctor if you can take an over-the-counter medicine. · Take short walks several times a day. You can start with 5 to 10 minutes, 3 or 4 times a day, and work up to longer walks. Walk on level surfaces and avoid hills and stairs until your back is better. · Return to work and other activities as soon as you can. Continued rest without activity is usually not good for your back. · To prevent future back pain, do exercises to stretch and strengthen your back and stomach. Learn how to use good posture, safe lifting techniques, and proper body mechanics. When should you call for help? Call your doctor now or seek immediate medical care if:    · You have new or worsening numbness in your legs.     · You have new or worsening weakness in your legs. (This could make it hard to stand up.)     · You lose control of your bladder or bowels.    Watch closely for changes in your health, and be sure to contact your doctor if:    · You have a fever, lose weight, or don't feel well.     · You do not get better as expected. Where can you learn more? Go to http://kay-sylvain.info/. Enter B231 in the search box to learn more about \"Back Pain: Care Instructions. \"  Current as of: June 26, 2019  Content Version: 12.2  © 7872-9148 [employer]. Care instructions adapted under license by [employer] (which disclaims liability or warranty for this information). If you have questions about a medical condition or this instruction, always ask your healthcare professional. [place] [address] any warranty or liability for your use of this information.

## 2019-11-24 NOTE — ED TRIAGE NOTES
Patient was at [employer] when she suddenly had non radiating sharp lower back pain. + SOB on exertion for the past couple days. Patient is a peritoneal dialysis patient and does it every night.

## 2019-11-30 ENCOUNTER — APPOINTMENT (OUTPATIENT)
Dept: CT IMAGING | Age: 64
DRG: 919 | End: 2019-11-30
Attending: EMERGENCY MEDICINE
Payer: MEDICARE

## 2019-11-30 ENCOUNTER — HOSPITAL ENCOUNTER (INPATIENT)
Age: 64
LOS: 9 days | Discharge: NURSING, REHAB OR LONG-TERM CARE | DRG: 919 | End: 2019-12-10
Attending: EMERGENCY MEDICINE | Admitting: INTERNAL MEDICINE
Payer: MEDICARE

## 2019-11-30 DIAGNOSIS — K65.0 ACUTE PERITONITIS (HCC): Primary | ICD-10-CM

## 2019-11-30 PROCEDURE — 74011000250 HC RX REV CODE- 250: Performed by: EMERGENCY MEDICINE

## 2019-11-30 PROCEDURE — 80053 COMPREHEN METABOLIC PANEL: CPT

## 2019-11-30 PROCEDURE — 85027 COMPLETE CBC AUTOMATED: CPT

## 2019-11-30 PROCEDURE — 74011250636 HC RX REV CODE- 250/636: Performed by: EMERGENCY MEDICINE

## 2019-11-30 PROCEDURE — 96375 TX/PRO/DX INJ NEW DRUG ADDON: CPT

## 2019-11-30 PROCEDURE — 96374 THER/PROPH/DIAG INJ IV PUSH: CPT

## 2019-11-30 PROCEDURE — 94761 N-INVAS EAR/PLS OXIMETRY MLT: CPT

## 2019-11-30 PROCEDURE — 74177 CT ABD & PELVIS W/CONTRAST: CPT

## 2019-11-30 PROCEDURE — 86900 BLOOD TYPING SEROLOGIC ABO: CPT

## 2019-11-30 PROCEDURE — 99285 EMERGENCY DEPT VISIT HI MDM: CPT

## 2019-11-30 PROCEDURE — C1751 CATH, INF, PER/CENT/MIDLINE: HCPCS

## 2019-11-30 PROCEDURE — 36415 COLL VENOUS BLD VENIPUNCTURE: CPT

## 2019-11-30 PROCEDURE — 74011636320 HC RX REV CODE- 636/320: Performed by: EMERGENCY MEDICINE

## 2019-11-30 PROCEDURE — 99291 CRITICAL CARE FIRST HOUR: CPT

## 2019-11-30 RX ORDER — MORPHINE SULFATE 2 MG/ML
4 INJECTION, SOLUTION INTRAMUSCULAR; INTRAVENOUS ONCE
Status: COMPLETED | OUTPATIENT
Start: 2019-11-30 | End: 2019-11-30

## 2019-11-30 RX ORDER — LABETALOL HYDROCHLORIDE 5 MG/ML
40 INJECTION, SOLUTION INTRAVENOUS
Status: COMPLETED | OUTPATIENT
Start: 2019-11-30 | End: 2019-11-30

## 2019-11-30 RX ORDER — SODIUM CHLORIDE 0.9 % (FLUSH) 0.9 %
10 SYRINGE (ML) INJECTION
Status: COMPLETED | OUTPATIENT
Start: 2019-11-30 | End: 2019-12-01

## 2019-11-30 RX ADMIN — LABETALOL HYDROCHLORIDE 40 MG: 5 INJECTION INTRAVENOUS at 23:41

## 2019-11-30 RX ADMIN — MORPHINE SULFATE 4 MG: 2 INJECTION, SOLUTION INTRAMUSCULAR; INTRAVENOUS at 23:34

## 2019-11-30 RX ADMIN — IOHEXOL 50 ML: 240 INJECTION, SOLUTION INTRATHECAL; INTRAVASCULAR; INTRAVENOUS; ORAL at 23:32

## 2019-12-01 ENCOUNTER — APPOINTMENT (OUTPATIENT)
Dept: GENERAL RADIOLOGY | Age: 64
DRG: 919 | End: 2019-12-01
Attending: ANESTHESIOLOGY
Payer: MEDICARE

## 2019-12-01 PROBLEM — A41.9 SEPSIS (HCC): Status: ACTIVE | Noted: 2019-12-01

## 2019-12-01 LAB
ABO + RH BLD: NORMAL
ALBUMIN SERPL-MCNC: 2 G/DL (ref 3.5–5)
ALBUMIN SERPL-MCNC: 2.2 G/DL (ref 3.5–5)
ALBUMIN/GLOB SERPL: 0.4 {RATIO} (ref 1.1–2.2)
ALBUMIN/GLOB SERPL: 0.4 {RATIO} (ref 1.1–2.2)
ALP SERPL-CCNC: 122 U/L (ref 45–117)
ALP SERPL-CCNC: 155 U/L (ref 45–117)
ALT SERPL-CCNC: 17 U/L (ref 12–78)
ALT SERPL-CCNC: 18 U/L (ref 12–78)
ANION GAP SERPL CALC-SCNC: 13 MMOL/L (ref 5–15)
ANION GAP SERPL CALC-SCNC: 14 MMOL/L (ref 5–15)
APPEARANCE FLD: ABNORMAL
AST SERPL-CCNC: 26 U/L (ref 15–37)
AST SERPL-CCNC: 29 U/L (ref 15–37)
BASOPHILS # BLD: 0 K/UL (ref 0–0.1)
BASOPHILS NFR BLD: 0 % (ref 0–1)
BILIRUB SERPL-MCNC: 0.5 MG/DL (ref 0.2–1)
BILIRUB SERPL-MCNC: 0.7 MG/DL (ref 0.2–1)
BLOOD BANK CMNT PATIENT-IMP: NORMAL
BUN SERPL-MCNC: 42 MG/DL (ref 6–20)
BUN SERPL-MCNC: 46 MG/DL (ref 6–20)
BUN/CREAT SERPL: 4 (ref 12–20)
BUN/CREAT SERPL: 5 (ref 12–20)
CALCIUM SERPL-MCNC: 8.5 MG/DL (ref 8.5–10.1)
CALCIUM SERPL-MCNC: 8.7 MG/DL (ref 8.5–10.1)
CHLORIDE SERPL-SCNC: 100 MMOL/L (ref 97–108)
CHLORIDE SERPL-SCNC: 98 MMOL/L (ref 97–108)
CO2 SERPL-SCNC: 27 MMOL/L (ref 21–32)
CO2 SERPL-SCNC: 27 MMOL/L (ref 21–32)
COLOR FLD: YELLOW
CREAT SERPL-MCNC: 9.41 MG/DL (ref 0.55–1.02)
CREAT SERPL-MCNC: 9.85 MG/DL (ref 0.55–1.02)
DIFFERENTIAL METHOD BLD: ABNORMAL
EOSINOPHIL # BLD: 0 K/UL (ref 0–0.4)
EOSINOPHIL NFR BLD: 0 % (ref 0–7)
EOSINOPHIL NFR FLD MANUAL: 1 %
ERYTHROCYTE [DISTWIDTH] IN BLOOD BY AUTOMATED COUNT: 19.3 % (ref 11.5–14.5)
ERYTHROCYTE [DISTWIDTH] IN BLOOD BY AUTOMATED COUNT: 19.8 % (ref 11.5–14.5)
GLOBULIN SER CALC-MCNC: 4.9 G/DL (ref 2–4)
GLOBULIN SER CALC-MCNC: 5 G/DL (ref 2–4)
GLUCOSE BLD STRIP.AUTO-MCNC: 70 MG/DL (ref 65–100)
GLUCOSE BLD STRIP.AUTO-MCNC: 84 MG/DL (ref 65–100)
GLUCOSE BLD STRIP.AUTO-MCNC: 93 MG/DL (ref 65–100)
GLUCOSE SERPL-MCNC: 53 MG/DL (ref 65–100)
GLUCOSE SERPL-MCNC: 72 MG/DL (ref 65–100)
HCT VFR BLD AUTO: 32.9 % (ref 35–47)
HCT VFR BLD AUTO: 34.4 % (ref 35–47)
HGB BLD-MCNC: 10.3 G/DL (ref 11.5–16)
HGB BLD-MCNC: 10.8 G/DL (ref 11.5–16)
IMM GRANULOCYTES # BLD AUTO: 0.6 K/UL (ref 0–0.04)
IMM GRANULOCYTES NFR BLD AUTO: 2 % (ref 0–0.5)
LACTATE BLD-SCNC: 3.83 MMOL/L (ref 0.4–2)
LACTATE SERPL-SCNC: 1.9 MMOL/L (ref 0.4–2)
LACTATE SERPL-SCNC: 5.4 MMOL/L (ref 0.4–2)
LYMPHOCYTES # BLD: 0.9 K/UL (ref 0.8–3.5)
LYMPHOCYTES NFR BLD: 3 % (ref 12–49)
LYMPHOCYTES NFR FLD: 2 %
MCH RBC QN AUTO: 28.3 PG (ref 26–34)
MCH RBC QN AUTO: 28.6 PG (ref 26–34)
MCHC RBC AUTO-ENTMCNC: 31.3 G/DL (ref 30–36.5)
MCHC RBC AUTO-ENTMCNC: 31.4 G/DL (ref 30–36.5)
MCV RBC AUTO: 90.1 FL (ref 80–99)
MCV RBC AUTO: 91.4 FL (ref 80–99)
MONOCYTES # BLD: 1.5 K/UL (ref 0–1)
MONOCYTES NFR BLD: 5 % (ref 5–13)
MONOS+MACROS NFR FLD: 2 %
NEUTROPHILS NFR FLD: 95 %
NEUTS SEG # BLD: 26 K/UL (ref 1.8–8)
NEUTS SEG NFR BLD: 90 % (ref 32–75)
NRBC # BLD: 0 K/UL (ref 0–0.01)
NRBC # BLD: 0 K/UL (ref 0–0.01)
NRBC BLD-RTO: 0 PER 100 WBC
NRBC BLD-RTO: 0 PER 100 WBC
NUC CELL # FLD: 7810 /CU MM
PLATELET # BLD AUTO: 389 K/UL (ref 150–400)
PLATELET # BLD AUTO: 450 K/UL (ref 150–400)
PMV BLD AUTO: 12.1 FL (ref 8.9–12.9)
PMV BLD AUTO: 12.2 FL (ref 8.9–12.9)
POTASSIUM SERPL-SCNC: 3.8 MMOL/L (ref 3.5–5.1)
POTASSIUM SERPL-SCNC: 4.8 MMOL/L (ref 3.5–5.1)
PROT SERPL-MCNC: 6.9 G/DL (ref 6.4–8.2)
PROT SERPL-MCNC: 7.2 G/DL (ref 6.4–8.2)
RBC # BLD AUTO: 3.6 M/UL (ref 3.8–5.2)
RBC # BLD AUTO: 3.82 M/UL (ref 3.8–5.2)
RBC # FLD: 58 /CU MM
RBC MORPH BLD: ABNORMAL
SERVICE CMNT-IMP: NORMAL
SODIUM SERPL-SCNC: 138 MMOL/L (ref 136–145)
SODIUM SERPL-SCNC: 141 MMOL/L (ref 136–145)
SPECIMEN SOURCE FLD: ABNORMAL
WBC # BLD AUTO: 21.9 K/UL (ref 3.6–11)
WBC # BLD AUTO: 29 K/UL (ref 3.6–11)

## 2019-12-01 PROCEDURE — 74011250636 HC RX REV CODE- 250/636

## 2019-12-01 PROCEDURE — 89050 BODY FLUID CELL COUNT: CPT

## 2019-12-01 PROCEDURE — 87205 SMEAR GRAM STAIN: CPT

## 2019-12-01 PROCEDURE — 83605 ASSAY OF LACTIC ACID: CPT

## 2019-12-01 PROCEDURE — 82962 GLUCOSE BLOOD TEST: CPT

## 2019-12-01 PROCEDURE — 65660000000 HC RM CCU STEPDOWN

## 2019-12-01 PROCEDURE — 74011636320 HC RX REV CODE- 636/320: Performed by: EMERGENCY MEDICINE

## 2019-12-01 PROCEDURE — 74011250636 HC RX REV CODE- 250/636: Performed by: INTERNAL MEDICINE

## 2019-12-01 PROCEDURE — 96365 THER/PROPH/DIAG IV INF INIT: CPT

## 2019-12-01 PROCEDURE — 87040 BLOOD CULTURE FOR BACTERIA: CPT

## 2019-12-01 PROCEDURE — 96366 THER/PROPH/DIAG IV INF ADDON: CPT

## 2019-12-01 PROCEDURE — A4722 DIALYS SOL FLD VOL > 1999CC: HCPCS | Performed by: INTERNAL MEDICINE

## 2019-12-01 PROCEDURE — 71045 X-RAY EXAM CHEST 1 VIEW: CPT

## 2019-12-01 PROCEDURE — 02HV33Z INSERTION OF INFUSION DEVICE INTO SUPERIOR VENA CAVA, PERCUTANEOUS APPROACH: ICD-10-PCS | Performed by: ANESTHESIOLOGY

## 2019-12-01 PROCEDURE — 74011250637 HC RX REV CODE- 250/637: Performed by: INTERNAL MEDICINE

## 2019-12-01 PROCEDURE — 74011250636 HC RX REV CODE- 250/636: Performed by: EMERGENCY MEDICINE

## 2019-12-01 PROCEDURE — 80053 COMPREHEN METABOLIC PANEL: CPT

## 2019-12-01 PROCEDURE — 74011000258 HC RX REV CODE- 258: Performed by: EMERGENCY MEDICINE

## 2019-12-01 PROCEDURE — 99152 MOD SED SAME PHYS/QHP 5/>YRS: CPT

## 2019-12-01 PROCEDURE — 87077 CULTURE AEROBIC IDENTIFY: CPT

## 2019-12-01 PROCEDURE — 87015 SPECIMEN INFECT AGNT CONCNTJ: CPT

## 2019-12-01 PROCEDURE — 36415 COLL VENOUS BLD VENIPUNCTURE: CPT

## 2019-12-01 PROCEDURE — 36556 INSERT NON-TUNNEL CV CATH: CPT

## 2019-12-01 PROCEDURE — A4722 DIALYS SOL FLD VOL > 1999CC: HCPCS | Performed by: EMERGENCY MEDICINE

## 2019-12-01 PROCEDURE — 90945 DIALYSIS ONE EVALUATION: CPT

## 2019-12-01 PROCEDURE — 85025 COMPLETE CBC W/AUTO DIFF WBC: CPT

## 2019-12-01 PROCEDURE — 3E1M39Z IRRIGATION OF PERITONEAL CAVITY USING DIALYSATE, PERCUTANEOUS APPROACH: ICD-10-PCS | Performed by: EMERGENCY MEDICINE

## 2019-12-01 PROCEDURE — 74011000258 HC RX REV CODE- 258: Performed by: INTERNAL MEDICINE

## 2019-12-01 PROCEDURE — 87186 SC STD MICRODIL/AGAR DIL: CPT

## 2019-12-01 RX ORDER — PIPERACILLIN SODIUM, TAZOBACTAM SODIUM 3; .375 G/15ML; G/15ML
INJECTION, POWDER, LYOPHILIZED, FOR SOLUTION INTRAVENOUS
Status: DISPENSED
Start: 2019-12-01 | End: 2019-12-01

## 2019-12-01 RX ORDER — MORPHINE SULFATE 10 MG/ML
6 INJECTION, SOLUTION INTRAMUSCULAR; INTRAVENOUS ONCE
Status: COMPLETED | OUTPATIENT
Start: 2019-12-01 | End: 2019-12-01

## 2019-12-01 RX ORDER — CALCITRIOL 0.25 UG/1
0.25 CAPSULE ORAL
Status: DISCONTINUED | OUTPATIENT
Start: 2019-12-05 | End: 2019-12-10 | Stop reason: HOSPADM

## 2019-12-01 RX ORDER — ACETAMINOPHEN 325 MG/1
650 TABLET ORAL
Status: DISCONTINUED | OUTPATIENT
Start: 2019-12-01 | End: 2019-12-10 | Stop reason: HOSPADM

## 2019-12-01 RX ORDER — FUROSEMIDE 40 MG/1
40 TABLET ORAL DAILY
Status: DISCONTINUED | OUTPATIENT
Start: 2019-12-01 | End: 2019-12-10 | Stop reason: HOSPADM

## 2019-12-01 RX ORDER — LACTULOSE 10 G/15ML
30 SOLUTION ORAL; RECTAL
COMMUNITY

## 2019-12-01 RX ORDER — HEPARIN SODIUM 5000 [USP'U]/ML
5000 INJECTION, SOLUTION INTRAVENOUS; SUBCUTANEOUS EVERY 8 HOURS
Status: DISCONTINUED | OUTPATIENT
Start: 2019-12-01 | End: 2019-12-08

## 2019-12-01 RX ORDER — ASPIRIN 81 MG/1
81 TABLET ORAL DAILY
Status: DISCONTINUED | OUTPATIENT
Start: 2019-12-01 | End: 2019-12-10 | Stop reason: HOSPADM

## 2019-12-01 RX ORDER — LACOSAMIDE 50 MG/1
150 TABLET ORAL 2 TIMES DAILY
Status: DISCONTINUED | OUTPATIENT
Start: 2019-12-01 | End: 2019-12-10 | Stop reason: HOSPADM

## 2019-12-01 RX ORDER — MIDAZOLAM HYDROCHLORIDE 1 MG/ML
INJECTION, SOLUTION INTRAMUSCULAR; INTRAVENOUS
Status: COMPLETED
Start: 2019-12-01 | End: 2019-12-01

## 2019-12-01 RX ORDER — MIDAZOLAM HYDROCHLORIDE 1 MG/ML
INJECTION, SOLUTION INTRAMUSCULAR; INTRAVENOUS
Status: DISCONTINUED
Start: 2019-12-01 | End: 2019-12-01 | Stop reason: WASHOUT

## 2019-12-01 RX ORDER — CLOPIDOGREL BISULFATE 75 MG/1
75 TABLET ORAL DAILY
Status: DISCONTINUED | OUTPATIENT
Start: 2019-12-01 | End: 2019-12-10 | Stop reason: HOSPADM

## 2019-12-01 RX ORDER — SODIUM CHLORIDE 900 MG/100ML
INJECTION INTRAVENOUS
Status: DISPENSED
Start: 2019-12-01 | End: 2019-12-01

## 2019-12-01 RX ORDER — SODIUM CHLORIDE 0.9 % (FLUSH) 0.9 %
5-40 SYRINGE (ML) INJECTION EVERY 8 HOURS
Status: DISCONTINUED | OUTPATIENT
Start: 2019-12-01 | End: 2019-12-10 | Stop reason: HOSPADM

## 2019-12-01 RX ORDER — PANTOPRAZOLE SODIUM 40 MG/1
40 TABLET, DELAYED RELEASE ORAL DAILY
Status: DISCONTINUED | OUTPATIENT
Start: 2019-12-01 | End: 2019-12-10 | Stop reason: HOSPADM

## 2019-12-01 RX ORDER — MIDAZOLAM HYDROCHLORIDE 1 MG/ML
1 INJECTION, SOLUTION INTRAMUSCULAR; INTRAVENOUS ONCE
Status: COMPLETED | OUTPATIENT
Start: 2019-12-01 | End: 2019-12-01

## 2019-12-01 RX ORDER — ATORVASTATIN CALCIUM 40 MG/1
40 TABLET, FILM COATED ORAL
Status: DISCONTINUED | OUTPATIENT
Start: 2019-12-01 | End: 2019-12-10 | Stop reason: HOSPADM

## 2019-12-01 RX ORDER — ONDANSETRON 2 MG/ML
4 INJECTION INTRAMUSCULAR; INTRAVENOUS
Status: DISCONTINUED | OUTPATIENT
Start: 2019-12-01 | End: 2019-12-07

## 2019-12-01 RX ORDER — BISACODYL 5 MG
5 TABLET, DELAYED RELEASE (ENTERIC COATED) ORAL DAILY PRN
Status: DISCONTINUED | OUTPATIENT
Start: 2019-12-01 | End: 2019-12-10 | Stop reason: HOSPADM

## 2019-12-01 RX ORDER — MORPHINE SULFATE 4 MG/ML
INJECTION INTRAVENOUS
Status: COMPLETED
Start: 2019-12-01 | End: 2019-12-01

## 2019-12-01 RX ORDER — SODIUM CHLORIDE 0.9 % (FLUSH) 0.9 %
5-40 SYRINGE (ML) INJECTION AS NEEDED
Status: DISCONTINUED | OUTPATIENT
Start: 2019-12-01 | End: 2019-12-10 | Stop reason: HOSPADM

## 2019-12-01 RX ORDER — CARVEDILOL 6.25 MG/1
6.25 TABLET ORAL 2 TIMES DAILY WITH MEALS
COMMUNITY

## 2019-12-01 RX ORDER — LEVETIRACETAM 10 MG/ML
1000 INJECTION INTRAVASCULAR EVERY 12 HOURS
Status: DISCONTINUED | OUTPATIENT
Start: 2019-12-01 | End: 2019-12-05

## 2019-12-01 RX ORDER — AMIODARONE HYDROCHLORIDE 200 MG/1
100 TABLET ORAL DAILY
Status: DISCONTINUED | OUTPATIENT
Start: 2019-12-01 | End: 2019-12-10 | Stop reason: HOSPADM

## 2019-12-01 RX ORDER — SEVELAMER HYDROCHLORIDE 800 MG/1
800 TABLET, FILM COATED ORAL
COMMUNITY

## 2019-12-01 RX ORDER — GLYCERIN ADULT
1 SUPPOSITORY, RECTAL RECTAL
COMMUNITY

## 2019-12-01 RX ADMIN — VANCOMYCIN HYDROCHLORIDE 1750 MG: 10 INJECTION, POWDER, LYOPHILIZED, FOR SOLUTION INTRAVENOUS at 06:45

## 2019-12-01 RX ADMIN — AMIODARONE HYDROCHLORIDE 100 MG: 200 TABLET ORAL at 09:48

## 2019-12-01 RX ADMIN — LEVETIRACETAM 1000 MG: 10 INJECTION INTRAVENOUS at 15:09

## 2019-12-01 RX ADMIN — ONDANSETRON 4 MG: 2 INJECTION INTRAMUSCULAR; INTRAVENOUS at 20:36

## 2019-12-01 RX ADMIN — Medication 10 ML: at 00:42

## 2019-12-01 RX ADMIN — HEPARIN SODIUM 5000 UNITS: 5000 INJECTION INTRAVENOUS; SUBCUTANEOUS at 11:09

## 2019-12-01 RX ADMIN — LEVETIRACETAM 1000 MG: 10 INJECTION INTRAVENOUS at 03:50

## 2019-12-01 RX ADMIN — MIDAZOLAM 1 MG: 1 INJECTION INTRAMUSCULAR; INTRAVENOUS at 12:37

## 2019-12-01 RX ADMIN — BISACODYL 5 MG: 5 TABLET, COATED ORAL at 20:36

## 2019-12-01 RX ADMIN — FUROSEMIDE 40 MG: 40 TABLET ORAL at 09:48

## 2019-12-01 RX ADMIN — PIPERACILLIN AND TAZOBACTAM 3.38 G: 3; .375 INJECTION, POWDER, LYOPHILIZED, FOR SOLUTION INTRAVENOUS at 01:27

## 2019-12-01 RX ADMIN — SODIUM CHLORIDE, SODIUM LACTATE, CALCIUM CHLORIDE, MAGNESIUM CHLORIDE AND DEXTROSE 2000 ML: 2.5; 538; 448; 18.3; 5.08 INJECTION, SOLUTION INTRAPERITONEAL at 18:00

## 2019-12-01 RX ADMIN — PHENYTOIN SODIUM 1000 MG: 50 INJECTION INTRAMUSCULAR; INTRAVENOUS at 08:52

## 2019-12-01 RX ADMIN — PANTOPRAZOLE SODIUM 40 MG: 40 TABLET, DELAYED RELEASE ORAL at 09:47

## 2019-12-01 RX ADMIN — Medication 10 ML: at 20:38

## 2019-12-01 RX ADMIN — ASPIRIN 81 MG: 81 TABLET, COATED ORAL at 09:48

## 2019-12-01 RX ADMIN — HEPARIN SODIUM 5000 UNITS: 5000 INJECTION INTRAVENOUS; SUBCUTANEOUS at 20:36

## 2019-12-01 RX ADMIN — MORPHINE SULFATE 6 MG: 4 INJECTION, SOLUTION INTRAMUSCULAR; INTRAVENOUS at 06:42

## 2019-12-01 RX ADMIN — ATORVASTATIN CALCIUM 40 MG: 40 TABLET, FILM COATED ORAL at 20:37

## 2019-12-01 RX ADMIN — SODIUM CHLORIDE, SODIUM LACTATE, CALCIUM CHLORIDE, MAGNESIUM CHLORIDE AND DEXTROSE 2000 ML: 2.5; 538; 448; 18.3; 5.08 INJECTION, SOLUTION INTRAPERITONEAL at 11:19

## 2019-12-01 RX ADMIN — SODIUM CHLORIDE, SODIUM LACTATE, CALCIUM CHLORIDE, MAGNESIUM CHLORIDE AND DEXTROSE 2000 ML: 2.5; 538; 448; 18.3; 5.08 INJECTION, SOLUTION INTRAPERITONEAL at 21:30

## 2019-12-01 RX ADMIN — HEPARIN SODIUM 5000 UNITS: 5000 INJECTION INTRAVENOUS; SUBCUTANEOUS at 05:53

## 2019-12-01 RX ADMIN — MIDAZOLAM HYDROCHLORIDE 1 MG: 1 INJECTION, SOLUTION INTRAMUSCULAR; INTRAVENOUS at 12:37

## 2019-12-01 RX ADMIN — LACOSAMIDE 150 MG: 50 TABLET, FILM COATED ORAL at 20:36

## 2019-12-01 RX ADMIN — CLOPIDOGREL BISULFATE 75 MG: 75 TABLET ORAL at 09:48

## 2019-12-01 RX ADMIN — IOPAMIDOL 100 ML: 755 INJECTION, SOLUTION INTRAVENOUS at 00:42

## 2019-12-01 RX ADMIN — SODIUM CHLORIDE, SODIUM LACTATE, CALCIUM CHLORIDE, MAGNESIUM CHLORIDE AND DEXTROSE 1500 ML: 1.5; 538; 448; 18.3; 5.08 INJECTION, SOLUTION INTRAPERITONEAL at 07:27

## 2019-12-01 RX ADMIN — CEFEPIME HYDROCHLORIDE 1 G: 1 INJECTION, POWDER, FOR SOLUTION INTRAMUSCULAR; INTRAVENOUS at 05:50

## 2019-12-01 RX ADMIN — MORPHINE SULFATE 6 MG: 10 INJECTION INTRAVENOUS at 06:34

## 2019-12-01 RX ADMIN — Medication 10 ML: at 14:00

## 2019-12-01 RX ADMIN — LACOSAMIDE 150 MG: 100 TABLET, FILM COATED ORAL at 11:12

## 2019-12-01 NOTE — ED NOTES
Pt is now more alert; talkative; says I feel so much better; she had a completely oriented conversation with Dr. Francois Garcia; tech is getting second iv access to catch up meds ordered  Further attempts made for second iv by techs via ultrasound unsuccessful

## 2019-12-01 NOTE — CONSULTS
[facility]    Name:  Chetan Amin  MR#:  [id number]  :  1955  ACCOUNT #:  [de-identified]  DATE OF SERVICE:  2019      REASON FOR CONSULTATION:  Seen for end-stage renal disease/peritoneal dialysis/peritonitis. HISTORY OF PRESENT ILLNESS:  We had the pleasure of seeing the patient. She is a pleasant patient recently moved in town. She sees Dr. Marco Holliday from [facility]. She has been on peritoneal dialysis for 6 years, no peritonitis, doing quite well. She had a chronic history of seizure. Came here with abdominal pain for 3 days since  and then had diarrhea. Also no recent antibiotic intake. No recent procedure like colonoscopy or dental procedure. Found to have a leukocytosis and found to have cell count, WBC in the PD fluid 6845 with 91% of neutrophils. The patient started on vancomycin, cefepime, and she developed a seizure and had trouble, when I saw the patient, there were 2 L out. The patient is on CCPD with midday exchange and used high concentration sugar and she feels a little bit better when she was seen after they drained her belly fluid. PAST MEDICAL HISTORY:  Include,  1. End-stage renal disease, on peritoneal dialysis. 2.  Tenckhoff catheter, thoracic PD catheter inserted at  6-7 years ago. 3.  Hypertension. 4.  Dyslipidemia. 5.  No history of peritonitis. 6.  CHF. 7.  Seizure. 8.  CAD. 9.  Decreased vision. 10.  Questionable history of DVT in the past.    MEDICATIONS AS INPATIENT:  Include, vancomycin, cefepime, aspirin,  , calcitriol, Plavix, Lasix,   . Keppra, Protonix, Dilantin. ALLERGIES:  TO NEURONTIN. SOCIAL HISTORY:  Reviewed. FAMILY HISTORY:  Reviewed. PHYSICAL EXAMINATION:  VITAL SIGNS:  Blood pressure 134/70, heart rate is 104, saturating 96%. NECK:  JVD is negative. CHEST:  Exit site is in the chest for the PD catheter. ABDOMEN:  Minimally tender.   EXTREMITIES:  Trace edema. Fluid at bedside looking very cloudy. LABORATORY DATA:  As follows:  WBC is 29, hemoglobin 10.3, platelets 468. Sodium 138, potassium 4.8, BUN 46, creatinine 9.8. No phosphorus available. Albumin is 2.0. CT scan with IV contrast, some renal stones. IMPRESSION:  1. End-stage renal disease on continuous cyclic peritoneal dialysis for 6 years. 2.  Peritonitis with significant leukocytosis in the blood, also need to look for secondary peritonitis for the patient, but we will treat it as primary peritonitis and recheck culture and on dual antibiotic coverage. 3.  Seizure. 4.  Hypertension. 5.  Anemia. RECOMMENDATIONS:  As follows:  1. Send cell count again and send dialysate culture. 2.  Resume continuous ambulatory peritoneal dialysis as inpatient using 2.5%. 3.  Make sure there is no outflow failure now. 4.  Anti-seizure medicine. 5.  Follow up culture result.   6.  Discussed at length with ER, Rosie Carrasco MD      [id number]  D:  12/01/2019 9:48  T:  12/01/2019 15:38  JOB #:  [id number]  CC:  Marii Wilson MD

## 2019-12-01 NOTE — PROGRESS NOTES
Problem: Falls - Risk of  Goal: *Absence of Falls  Description  Document Devere Beatty Fall Risk and appropriate interventions in the flowsheet.   Outcome: Progressing Towards Goal  Note: Fall Risk Interventions:  Mobility Interventions: Patient to call before getting OOB, PT Consult for mobility concerns, PT Consult for assist device competence, OT consult for ADLs, Communicate number of staff needed for ambulation/transfer, Utilize walker, cane, or other assistive device, Strengthening exercises (ROM-active/passive)         Medication Interventions: Teach patient to arise slowly, Patient to call before getting OOB, Evaluate medications/consider consulting pharmacy                   Problem: Patient Education: Go to Patient Education Activity  Goal: Patient/Family Education  Outcome: Progressing Towards Goal

## 2019-12-01 NOTE — ED NOTES
Pt pulled out EJ. Pt states that she does not remember pulling out line. Tech attempting to get access.

## 2019-12-01 NOTE — ED NOTES
Peritoneal dialysis infusion begun(draining completed)  Change of shift bedside report; pt is critical; she hurts when she moves; a difficult stick; she recently received morphine for her pain.

## 2019-12-01 NOTE — ED NOTES
Bedside and Verbal shift change report given to Mandie Loaiza RN (oncoming nurse) by Sonali Cerda RN (offgoing nurse). Report included the following information SBAR, ED Summary, MAR and Recent Results.

## 2019-12-01 NOTE — ROUTINE PROCESS
TRANSFER - OUT REPORT: 
 
Verbal report given to Geneva(name) on Gomez Spain  being transferred to [facility](unit) for routine progression of care Report consisted of patients Situation, Background, Assessment and  
Recommendations(SBAR). Information from the following report(s) SBAR, Kardex, ED Summary, Procedure Summary, Intake/Output, MAR, Accordion, Recent Results, Med Rec Status and Cardiac Rhythm NSR was reviewed with the receiving nurse. Lines:  
Quad Lumen 12/01/19 Right (Active) Opportunity for questions and clarification was provided. Patient transported with: 
 Monitor Registered Nurse

## 2019-12-01 NOTE — ED PROVIDER NOTES
EMERGENCY DEPARTMENT HISTORY AND PHYSICAL EXAM      Date: 11/30/2019  Patient Name: Vinay Irene  Patient Age and Sex: 62 y.o. female    History of Presenting Illness     Chief Complaint   Patient presents with    Abdominal Pain    Constipation       History Provided By: Patient    Ability to gather history was limited by: none    HPI: Vinay Irene, 62 y.o. female with ESRD on peritoneal dialysis, CHF, seizures, c/o generalized abdominal pain and constipation since yesterday. Had small BM's the past few days, most recently yesterday. No N/V, no fevers. Pain in the abdomen is generalized, severe, cramping and tight in nature. No prior similar pain. Pt denies any other alleviating or exacerbating factors. There are no other complaints, changes or physical findings at this time.      Past Medical History:   Diagnosis Date    Acute on chronic respiratory failure ([address] 7/18/2019    Blood clot in vein     left arm several years ago    CAD (coronary artery disease)     Chronic kidney disease     Chronic systolic heart failure (HCC) 7/18/2019    Congestive heart failure ([address]     Hypercholesterolemia     Hypertension     Legally blind     PAF (paroxysmal atrial fibrillation) ([address] 7/18/2019    Seizures ([address]     Stroke [facility])      Past Surgical History:   Procedure Laterality Date    CARDIAC SURG PROCEDURE UNLIST      heart attack 12/15    HX ORTHOPAEDIC      right middle finger    HX OTHER SURGICAL      shunt placed in brain       PCP: Ron Diop MD    Past History     Past Medical History:  Past Medical History:   Diagnosis Date    Acute on chronic respiratory failure ([address] 7/18/2019    Blood clot in vein     left arm several years ago    CAD (coronary artery disease)     Chronic kidney disease     Chronic systolic heart failure ([address] 7/18/2019    Congestive heart failure ([address]     Hypercholesterolemia     Hypertension     Legally blind     PAF ([facility]oxysmal [facility] fibrillation) [facility] 7/18/2019    Seizures ([facility]     Stroke [facility])        Past Surgical History:  Past Surgical History:   Procedure Laterality Date    CARDIAC SURG PROCEDURE UNLIST      heart attack 12/15    HX ORTHOPAEDIC      right middle finger    HX OTHER SURGICAL      shunt placed in brain       Family History:  Family History   Problem Relation Age of Onset    Diabetes Other     Hypertension Other     Cancer Other        Social History:  Social History     Tobacco Use    Smoking status: Never Smoker    Smokeless tobacco: Never Used   Substance Use Topics    Alcohol use: No    Drug use: Yes     Types: Marijuana     Comment: last used 1 week ago       Allergies: Allergies   Allergen Reactions    Gabapentin Other (comments) and Seizures     Confusion, psychosis \"I was acting like a 4year old at a family event, drawing on walls and everything\"         Current Medications:  No current facility-administered medications on file prior to encounter. Current Outpatient Medications on File Prior to Encounter   Medication Sig Dispense Refill    cephALEXin (KEFLEX) 250 mg capsule Take 250 mg by mouth four (4) times daily.  trolamine salicylate-aloe vera 99% (ASPERCREME) topical cream Apply 10 Tubes to affected area as needed for Pain. apply as needed for pain on hands, feet lower back, shoulders      aspirin 81 mg tablet Take 1 Tab by mouth daily. 1 Tab 0    polyethylene glycol (MIRALAX) 17 gram packet Take 1 Packet by mouth daily as needed (constipation). (Patient not taking: Reported on 10/18/2019) 10 Each 0    furosemide (LASIX) 40 mg tablet Take 1 Tab by mouth daily. 15 Tab 0    calcitRIOL (ROCALTROL) 0.25 mcg capsule Take 1 Cap by mouth two (2) days a week. On Thursday and saturday 10 Cap 0    carvedilol (COREG) 12.5 mg tablet Take 1 Tab by mouth two (2) times daily (with meals). (Patient taking differently: Take 12.5 mg by mouth two (2) times daily (with meals).  take 2 tab to make 12.5 , patient currently has 6.25 mg) 30 Tab 0    phenytoin (DILANTIN ER) 300 mg ER capsule Take 300 mg by mouth nightly.  lacosamide (VIMPAT) 150 mg tab tablet Take 150 mg by mouth two (2) times a day.  losartan (COZAAR) 25 mg tablet Take 25 mg by mouth daily.  docusate sodium (COLACE) 100 mg capsule Take 100 mg by mouth two (2) times daily as needed for Constipation.  clopidogrel (PLAVIX) 75 mg tab Take 1 Tab by mouth daily. 30 Tab 1    isosorbide mononitrate ER (IMDUR) 60 mg CR tablet Take 1 Tab by mouth every morning. 30 Tab 0    levETIRAcetam (KEPPRA) 500 mg tablet Take 500 mg by mouth two (2) times a day.  amiodarone (CORDARONE) 200 mg tablet Take 100 mg by mouth daily.  brimonidine (ALPHAGAN) 0.2 % ophthalmic solution Administer 1 Drop to both eyes two (2) times a day.  gentamicin (GARAMYCIN) 0.1 % topical cream Apply  to affected area daily. Apply to Cath wound (dose= thin layer)      atorvastatin (LIPITOR) 40 mg tablet TAKE 1 TABLET BY MOUTH ONCE DAILY 30 Tab 0    sevelamer (RENAGEL) 800 mg tablet Take 1,600 mg by mouth three (3) times daily (with meals).  omeprazole (PRILOSEC) 20 mg capsule Take 20 mg by mouth daily. 1    IRON FUM & PS CMP/VIT C & B (INTEGRA PO) Take 1 Tab by mouth daily.  potassium chloride (K-DUR, KLOR-CON) 20 mEq tablet Take 20 mEq by mouth daily. Review of Systems   Review of Systems   Constitutional: Negative for fever. Gastrointestinal: Positive for abdominal pain and constipation. All other systems reviewed and are negative.       Physical Exam   Vital Signs  Patient Vitals for the past 24 hrs:   Temp Pulse Resp BP SpO2   12/01/19 0115 98 °F (36.7 °C) (!) 101 18 133/85 100 %   12/01/19 0102    133/70 100 %   12/01/19 0015    123/67 99 %   12/01/19 0001    112/67 98 %   11/30/19 2353    138/76 100 %   11/30/19 2341  (!) 125  (!) 164/107    11/30/19 2215  (!) 122  (!) 189/102 100 %   11/30/19 2141 97.8 °F (36.6 °C) (!) 118 20 (!) 191/115 99 %       Physical Exam  Vitals signs and nursing note reviewed. Constitutional:       General: She is in acute distress. Appearance: She is well-developed. She is ill-appearing. Comments: Ill appearing, in pain, in distress. Pale   HENT:      Head: Normocephalic and atraumatic. Mouth/Throat:      Mouth: Mucous membranes are moist.   Eyes:      General:         Right eye: No discharge. Left eye: No discharge. Conjunctiva/sclera: Conjunctivae normal.   Neck:      Musculoskeletal: Normal range of motion and neck supple. Cardiovascular:      Rate and Rhythm: Normal rate and regular rhythm. Heart sounds: Normal heart sounds. No murmur. Pulmonary:      Effort: Pulmonary effort is normal. No respiratory distress. Breath sounds: Normal breath sounds. No wheezing. Abdominal:      General: There is distension. Palpations: Abdomen is soft. Tenderness: There is generalized tenderness. There is guarding. Comments: PERITONEAL ABDOMEN, generalized severe tenderness and guarding   Genitourinary:     Rectum: Guaiac result negative. No mass or tenderness. Comments: Normal exam, slight light brown stool, no impaction  Musculoskeletal: Normal range of motion. General: No deformity. Skin:     General: Skin is warm and dry. Coloration: Skin is pale. Findings: No rash. Neurological:      Mental Status: She is alert and oriented to person, place, and time. Psychiatric:         Behavior: Behavior normal.         Thought Content:  Thought content normal.         Diagnostic Study Results   Labs  Recent Results (from the past 24 hour(s))   CBC W/O DIFF    Collection Time: 11/30/19 11:46 PM   Result Value Ref Range    WBC 21.9 (H) 3.6 - 11.0 K/uL    RBC 3.82 3.80 - 5.20 M/uL    HGB 10.8 (L) 11.5 - 16.0 g/dL    HCT 34.4 (L) 35.0 - 47.0 %    MCV 90.1 80.0 - 99.0 FL    MCH 28.3 26.0 - 34.0 PG    MCHC 31.4 30.0 - 36.5 g/dL RDW 19.3 (H) 11.5 - 14.5 %    PLATELET 299 (H) 081 - 400 K/uL    MPV 12.1 8.9 - 12.9 FL    NRBC 0.0 0  WBC    ABSOLUTE NRBC 0.00 0.00 - 4.52 K/uL   METABOLIC PANEL, COMPREHENSIVE    Collection Time: 11/30/19 11:46 PM   Result Value Ref Range    Sodium 141 136 - 145 mmol/L    Potassium 3.8 3.5 - 5.1 mmol/L    Chloride 100 97 - 108 mmol/L    CO2 27 21 - 32 mmol/L    Anion gap 14 5 - 15 mmol/L    Glucose 72 65 - 100 mg/dL    BUN 42 (H) 6 - 20 MG/DL    Creatinine 9.41 (H) 0.55 - 1.02 MG/DL    BUN/Creatinine ratio 4 (L) 12 - 20      GFR est AA 5 (L) >60 ml/min/1.73m2    GFR est non-AA 4 (L) >60 ml/min/1.73m2    Calcium 8.7 8.5 - 10.1 MG/DL    Bilirubin, total 0.5 0.2 - 1.0 MG/DL    ALT (SGPT) 18 12 - 78 U/L    AST (SGOT) 29 15 - 37 U/L    Alk. phosphatase 155 (H) 45 - 117 U/L    Protein, total 7.2 6.4 - 8.2 g/dL    Albumin 2.2 (L) 3.5 - 5.0 g/dL    Globulin 5.0 (H) 2.0 - 4.0 g/dL    A-G Ratio 0.4 (L) 1.1 - 2.2     BLOOD TYPE, (ABO+RH)    Collection Time: 11/30/19 11:46 PM   Result Value Ref Range    ABO/Rh(D) O POSITIVE     Comment SAMPLE NOT USABLE FOR CROSSMATCH    POC LACTIC ACID    Collection Time: 12/01/19 12:03 AM   Result Value Ref Range    Lactic Acid (POC) 3.83 (HH) 0.40 - 2.00 mmol/L   CELL COUNT, BODY FLUID    Collection Time: 12/01/19  2:43 AM   Result Value Ref Range    BODY FLUID TYPE PERITONEAL FLUID      FLUID COLOR STRAW      FLUID APPEARANCE HAZY      FLUID RBC CT. 14 (H) 0 /cu mm    FLUID NUCLEATED CELLS 6,845 /cu mm    FLD NEUTROPHILS 91 (A) NRRE %    FLD MONO/MACROPHAGES 9 (A) NRRE %   LACTIC ACID    Collection Time: 12/01/19  4:08 AM   Result Value Ref Range    Lactic acid 5.4 (HH) 0.4 - 2.0 MMOL/L       Radiologic Studies  CT ABD PELV W CONT   Final Result   IMPRESSION:   Large amount of ascites with peritoneal catheter in place.    No evidence for small bowel obstruction   Nonobstructing bilateral renal calculi        CT Results  (Last 48 hours)               12/01/19 0043  CT ABD PELV W CONT Final result    Impression:  IMPRESSION:   Large amount of ascites with peritoneal catheter in place. No evidence for small bowel obstruction   Nonobstructing bilateral renal calculi       Narrative:  EXAM: CT ABD PELV W CONT       INDICATION: Peritonitis; distended, very tender abdomen, ? SBO, ? perforation       COMPARISON: None        CONTRAST: 100 mL of Isovue-370. TECHNIQUE:    Following the uneventful intravenous administration of contrast, thin axial   images were obtained through the abdomen and pelvis. Coronal and sagittal   reconstructions were generated. Oral contrast was administered. CT dose   reduction was achieved through use of a standardized protocol tailored for this   examination and automatic exposure control for dose modulation. FINDINGS:    LUNG BASES: Clear. INCIDENTALLY IMAGED HEART AND MEDIASTINUM: Coronary artery calcification. LIVER: No biliary dilatation. 1.5 cm hypodensity with peripheral globular   enhancement (image 16). GALLBLADDER: Unremarkable. SPLEEN: No mass. PANCREAS: No mass or ductal dilatation. ADRENALS: Unremarkable. KIDNEYS: No mass, calculus, or hydronephrosis. Small kidneys with punctate   nonobstructing calcifications. STOMACH: Unremarkable. SMALL BOWEL: No dilatation or wall thickening. COLON: No dilatation or wall thickening. APPENDIX: Unremarkable. PERITONEUM: Positive ascites with density measurement of 12.5 HU. Peritoneal   catheter in place   RETROPERITONEUM: No lymphadenopathy or aortic aneurysm. REPRODUCTIVE ORGANS: Fibroids   URINARY BLADDER: No mass or calculus. BONES: No destructive bone lesion.  Left hip hardware   ADDITIONAL COMMENTS: N/A               CXR Results  (Last 48 hours)    None          Procedures   CRITICAL CARE (ASAP ONLY)  Performed by: Anna Steel MD  Authorized by: Anna Steel MD     Critical care provider statement:     Critical care time (minutes):  45    Critical care was necessary to treat or prevent imminent or life-threatening deterioration of the following conditions:  Sepsis    Critical care was time spent personally by me on the following activities:  Ordering and performing treatments and interventions, ordering and review of laboratory studies, ordering and review of radiographic studies, pulse oximetry, re-evaluation of patient's condition, review of old charts, evaluation of patient's response to treatment, examination of patient and development of treatment plan with patient or surrogate        Medical Decision Making     Provider Notes (Medical Decision Making):   63 yo F with ESRD on peritoneal dialysis, c/o abdominal pain and constipation since yesterday. On exam, very concerning abdominal exam, with distention and guarding. Peritoneal.  Normal rectal, no stool impaction. Pt very uncomfortable. Could represent high grade SBO, perforation, ischemic bowel, diverticulitis, or other severe intra-abdominal pathology. Doubt that this is simple constipation. IV access was a challenge even with U/S. I placed 18g EJ catheter. Morphine and Labetalol, and plan for CT imaging with IV and PO contrast.    Abel Guillen MD  11:37 PM    CT demonstrates large amount of ascites, no acute surgical process. This appears to be acute peritonitis secondary to infected peritoneal dialysis fluid. Peritoneal fluid sent for culture and cell count. Zosyn. Pt meets sepsis criteria. Due to her hypertension and renal failure, discussed sepsis fluid bolus with her and pt DECLINED sepsis fluid bolus. I performed sepsis re-evaluation at 2:15 AM.  Well appearing, alert, stable BP. Repeat lactate pending. Admit to medicine. Dotty Pacheco MD    Pt had 2 minute tonic clonic grand mal seizure. Started on Keppra and Dilantin loads (she takes both at baseline). Spoke with daughter and provided update. Dotty Pacheco MD        Consult required? no      Medications Administered During ED Course:  Medications   piperacillin-tazobactam (ZOSYN) 3.375 gram injection (has no administration in time range)   ADDaptor (has no administration in time range)   0.9% sodium chloride (MBP/ADV) infusion (has no administration in time range)   cefepime (MAXIPIME) 1 g in 0.9% sodium chloride (MBP/ADV) 50 mL (has no administration in time range)   sodium chloride (NS) flush 5-40 mL (has no administration in time range)   sodium chloride (NS) flush 5-40 mL (has no administration in time range)   acetaminophen (TYLENOL) tablet 650 mg (has no administration in time range)   ondansetron (ZOFRAN) injection 4 mg (has no administration in time range)   bisacodyl (DULCOLAX) tablet 5 mg (has no administration in time range)   heparin (porcine) injection 5,000 Units (has no administration in time range)   midazolam (VERSED) 1 mg/mL injection (has no administration in time range)   levETIRAcetam in saline (iso-os) (KEPPRA) infusion 1,000 mg (1,000 mg IntraVENous New Bag 12/1/19 0350)   phenytoin (DILANTIN) 1,000 mg in 0.9% sodium chloride 100 mL IVPB (has no administration in time range)   vancomycin (VANCOCIN) 1,750 mg in 0.9% sodium chloride 500 mL IVPB (has no administration in time range)   VANCOMYCIN INFORMATION NOTE (has no administration in time range)   morphine injection 4 mg (4 mg IntraVENous Given 11/30/19 2334)   labetalol (NORMODYNE;TRANDATE) injection 40 mg (40 mg IntraVENous Given 11/30/19 2341)   iohexol (OMNIPAQUE) ORAL mixture (50 mL Oral Given 11/30/19 2332)   sodium chloride (NS) flush 10 mL (10 mL IntraVENous Given 12/1/19 0042)   iopamidol (ISOVUE-370) 76 % injection 100 mL (100 mL IntraVENous Given 12/1/19 0042)   piperacillin-tazobactam (ZOSYN) 3.375 g in 0.9% sodium chloride (MBP/ADV) 100 mL (3.375 g IntraVENous New Bag 12/1/19 0127)          Diagnosis and Disposition     Disposition:      Clinical Impression:   1.  Acute peritonitis ([facility]        Attestation:  I personally performed the services described in this documentation on this date 11/30/2019 for patient Sarina Vigil. Alan Leahy MD        I was the first provider for this patient on this visit. To the best of my ability I reviewed relevant prior medical records, electrocardiograms, laboratories, and radiologic studies. The patient's presenting problems were discussed, and the patient was in agreement with the care plan formulated and outlined with them. Alan Leahy MD    Please note that this dictation was completed with Dragon voice recognition software. Quite often unanticipated grammatical, syntax, homophones, and other interpretive errors are inadvertently transcribed by the computer software. Please disregard these errors and excuse any errors that have escaped final proofreading.

## 2019-12-01 NOTE — PERIOP NOTES
TRANSFER - OUT REPORT:    Verbal report given to HOMAR Norman(name) on [address] by Celestino Hopper being transferred to ED(unit) for routine post - op       Report consisted of patients Situation, Background, Assessment and   Recommendations(SBAR). Information from the following report(s) SBAR, Kardex, OR Summary and MAR was reviewed with the receiving nurse. Lines:   Quad Lumen 12/01/19 Right (Active)        Opportunity for questions and clarification was provided.       Patient transported with:   Registered Nurse

## 2019-12-01 NOTE — PROCEDURES
Central Line Placement    Start time: 12/1/2019 12:16 PM  End time: 12/1/2019 12:35 PM  Performed by: Ariel Bergman MD  Authorized by: Ariel Bergman MD     Indications: need for vasopressors and vascular access  Preanesthetic Checklist: patient identified, risks and benefits discussed, anesthesia consent, site marked, patient being monitored and timeout performed      Pre-procedure: All elements of maximal sterile barrier technique followed? Yes    2% Chlorhexidine for cutaneous antisepsis and Hand hygiene performed prior to catheter insertion              Procedure:   Prep:  ChloraPrep    Orientation:  Right  Patient position:  Trendelenburg  Catheter type:  Quad lumen  Catheter size:  8.5 Fr  Catheter length:  16 cm  Number of attempts:  1  Successful placement: Yes      Assessment:   Post-procedure:  Catheter secured and sterile dressing applied  Assessment:  Blood return through all ports  Insertion:  Uncomplicated  Patient tolerance:  Patient tolerated the procedure well with no immediate complications  CXR pending.     Care turned over to covering Attending MD.

## 2019-12-01 NOTE — H&P
Hospitalist Admission Note    NAME: Lalo Martinez   :  1955   MRN:  [id number]     Date/Time:  2019 5:16 AM    Patient PCP: Narendra Oh MD  ______________________________________________________________________  Given the patient's current clinical presentation, I have a high level of concern for decompensation if discharged from the emergency department. Complex decision making was performed, which includes reviewing the patient's available past medical records, laboratory results, and x-ray films. My assessment of this patient's clinical condition and my plan of care is as follows. Assessment / Plan:    Sepsis most likely secondary to peritonitis in  peritoneal dialysis  Admit patient to stepdownstart patient on IV antibiotic vancomycin cefepime  Nephrology consultationfollow-up blood culturefollow-up peritoneal fluid analysis  Follow-up repeat lactic acid    ESRD  nephrology consultation    Seizure  Continue home medication Keppra    Hypertension  Hold antihypertensive medication    Chronic systolic CHF  Continue Lasix      Code Status: Full   Surrogate Decision Maker:Jaguar Hassan    DVT Prophylaxis: Heparin   GI Prophylaxis: not indicated          Subjective:   CHIEF COMPLAINT: abdomen pain     HISTORY OF PRESENT ILLNESS:     62years old female from home with past medical history significant for end-stage renal disease on peritoneal dialysis, CHF, seizure, coronary artery disease, history of stroke presented to the hospital for evaluation of generalized abdominal pain associated with distention and chills started since yesterday, patient denies any fever denies any nausea any vomiting, blood work in ED was significant for elevated  white blood cell count 21.9, CT abdomen was done and show large amount of ascites with peritoneal catheter in place. We were asked to admit for work up and evaluation of the above problems.      Past Medical History:   Diagnosis Date    Acute on chronic respiratory failure ([facility] [address] 7/18/2019    Blood clot in vein     left arm several years ago    CAD (coronary artery disease)     Chronic kidney disease     Chronic systolic heart failure (HCC) 7/18/2019    Congestive heart failure ([facility] [address]     Hypercholesterolemia     Hypertension     Legally blind     PAF (paroxysmal atrial fibrillation) ([facility] [address] 7/18/2019    Seizures ([facility] [address] [facility]     Stroke [facility])         Past Surgical History:   Procedure Laterality Date    CARDIAC SURG PROCEDURE UNLIST      heart attack 12/15    HX ORTHOPAEDIC      right middle finger    HX OTHER SURGICAL      shunt placed in brain       Social History     Tobacco Use    Smoking status: Never Smoker    Smokeless tobacco: Never Used   Substance Use Topics    Alcohol use: No        Family History   Problem Relation Age of Onset    Diabetes Other     Hypertension Other     Cancer Other      Allergies   Allergen Reactions    Gabapentin Other (comments) and Seizures     Confusion, psychosis \"I was acting like a 4year old at a family event, drawing on walls and everything\"          Prior to Admission medications    Medication Sig Start Date End Date Taking? Authorizing Provider   cephALEXin (KEFLEX) 250 mg capsule Take 250 mg by mouth four (4) times daily. 10/11/19   Provider, Historical   trolamine salicylate-aloe vera 58% (ASPERCREME) topical cream Apply 10 Tubes to affected area as needed for Pain. apply as needed for pain on hands, feet lower back, shoulders 9/21/19   Giana Maddox MD   aspirin 81 mg tablet Take 1 Tab by mouth daily. 9/19/19   Creig Barthel, MD   polyethylene glycol (MIRALAX) 17 gram packet Take 1 Packet by mouth daily as needed (constipation). Patient not taking: Reported on 10/18/2019 9/19/19   Creig Barthel, MD   furosemide (LASIX) 40 mg tablet Take 1 Tab by mouth daily.  9/19/19   Creig Barthel, MD   calcitRIOL (ROCALTROL) 0.25 mcg capsule Take 1 Cap by mouth two (2) days a week. On Thursday and saturday 9/19/19   Neftaly Barton MD   carvedilol (COREG) 12.5 mg tablet Take 1 Tab by mouth two (2) times daily (with meals). Patient taking differently: Take 12.5 mg by mouth two (2) times daily (with meals). take 2 tab to make 12.5 , patient currently has 6.25 mg 9/19/19   Neftaly Barton MD   phenytoin (DILANTIN ER) 300 mg ER capsule Take 300 mg by mouth nightly. Provider, Historical   lacosamide (VIMPAT) 150 mg tab tablet Take 150 mg by mouth two (2) times a day. Provider, Historical   losartan (COZAAR) 25 mg tablet Take 25 mg by mouth daily. Provider, Historical   docusate sodium (COLACE) 100 mg capsule Take 100 mg by mouth two (2) times daily as needed for Constipation. Provider, Historical   clopidogrel (PLAVIX) 75 mg tab Take 1 Tab by mouth daily. 7/23/19   Aristeo Haque MD   isosorbide mononitrate ER (IMDUR) 60 mg CR tablet Take 1 Tab by mouth every morning. 7/22/19   Aristeo Haque MD   levETIRAcetam (KEPPRA) 500 mg tablet Take 500 mg by mouth two (2) times a day. Provider, Historical   amiodarone (CORDARONE) 200 mg tablet Take 100 mg by mouth daily. Provider, Historical   brimonidine (ALPHAGAN) 0.2 % ophthalmic solution Administer 1 Drop to both eyes two (2) times a day. Provider, Historical   gentamicin (GARAMYCIN) 0.1 % topical cream Apply  to affected area daily. Apply to Cath wound (dose= thin layer)    Provider, Historical   atorvastatin (LIPITOR) 40 mg tablet TAKE 1 TABLET BY MOUTH ONCE DAILY 1/30/17   Stanley Gutiérrez DO   sevelamer (RENAGEL) 800 mg tablet Take 1,600 mg by mouth three (3) times daily (with meals). Provider, Historical   omeprazole (PRILOSEC) 20 mg capsule Take 20 mg by mouth daily. 5/10/16   Provider, Historical   IRON FUM & PS CMP/VIT C & B (INTEGRA PO) Take 1 Tab by mouth daily. Provider, Historical   potassium chloride (K-DUR, KLOR-CON) 20 mEq tablet Take 20 mEq by mouth daily.     Provider, Historical REVIEW OF SYSTEMS:     I am not able to complete the review of systems because: The patient is intubated and sedated    The patient has altered mental status due to his acute medical problems    The patient has baseline aphasia from prior stroke(s)    The patient has baseline dementia and is not reliable historian    The patient is in acute medical distress and unable to provide information           Total of 12 systems reviewed as follows:       POSITIVE= underlined text  Negative = text not underlined  General:  fever, chills, sweats, generalized weakness, weight loss/gain,      loss of appetite   Eyes:    blurred vision, eye pain, loss of vision, double vision  ENT:    rhinorrhea, pharyngitis   Respiratory:   cough, sputum production, SOB, MAYORGA, wheezing, pleuritic pain   Cardiology:   chest pain, palpitations, orthopnea, PND, edema, syncope   Gastrointestinal:  abdominal pain , N/V, diarrhea, dysphagia, constipation, bleeding   Genitourinary:  frequency, urgency, dysuria, hematuria, incontinence   Muskuloskeletal :  arthralgia, myalgia, back pain  Hematology:  easy bruising, nose or gum bleeding, lymphadenopathy   Dermatological: rash, ulceration, pruritis, color change / jaundice  Endocrine:   hot flashes or polydipsia   Neurological:  headache, dizziness, confusion, focal weakness, paresthesia,     Speech difficulties, memory loss, gait difficulty  Psychological: Feelings of anxiety, depression, agitation    Objective:   VITALS:    Visit Vitals  /85   Pulse (!) 101   Temp 98 °F (36.7 °C)   Resp 18   Ht 5' 3\" (1.6 m)   Wt 89.8 kg (197 lb 15.6 oz)   SpO2 100%   BMI 35.07 kg/m²       PHYSICAL EXAM:    General:    Alert, cooperative, no distress, appears stated age. HEENT: Atraumatic, anicteric sclerae, pink conjunctivae     No oral ulcers, mucosa moist, throat clear, dentition fair  Neck:  Supple, symmetrical,  thyroid: non tender  Lungs:   Clear to auscultation bilaterally.   No Wheezing or Rhonchi. No rales. Chest wall:  No tenderness  No Accessory muscle use. Heart:   Regular  rhythm,  No  murmur   No edema  Abdomen:   Soft, generalized  Tenderness . Not distended. Bowel sounds normal  Extremities: No cyanosis. No clubbing,      Skin turgor normal, Capillary refill normal, Radial dial pulse 2+  Skin:     Not pale. Not Jaundiced  No rashes   Psych:  Good insight. Not depressed. Not anxious or agitated. Neurologic: EOMs intact. No facial asymmetry. No aphasia or slurred speech. Symmetrical strength, Sensation grossly intact. Alert and oriented X 4.     _______________________________________________________________________  Care Plan discussed with:    Comments   Patient y    Family      RN y    Care Manager                    Consultant:      _______________________________________________________________________  Expected  Disposition:   Home with Family y   HH/PT/OT/RN    SNF/LTC    GLORIA    ________________________________________________________________________  TOTAL TIME:  61  Minutes    Critical Care Provided     Minutes non procedure based      Comments    y Reviewed previous records   >50% of visit spent in counseling and coordination of care y Discussion with patient and/or family and questions answered       ________________________________________________________________________  Signed: Landon Armenta MD    Procedures: see electronic medical records for all procedures/Xrays and details which were not copied into this note but were reviewed prior to creation of Plan.     LAB DATA REVIEWED:    Recent Results (from the past 24 hour(s))   CBC W/O DIFF    Collection Time: 11/30/19 11:46 PM   Result Value Ref Range    WBC 21.9 (H) 3.6 - 11.0 K/uL    RBC 3.82 3.80 - 5.20 M/uL    HGB 10.8 (L) 11.5 - 16.0 g/dL    HCT 34.4 (L) 35.0 - 47.0 %    MCV 90.1 80.0 - 99.0 FL    MCH 28.3 26.0 - 34.0 PG    MCHC 31.4 30.0 - 36.5 g/dL    RDW 19.3 (H) 11.5 - 14.5 %    PLATELET 169 (H) 239 - 400 K/uL MPV 12.1 8.9 - 12.9 FL    NRBC 0.0 0  WBC    ABSOLUTE NRBC 0.00 0.00 - 8.58 K/uL   METABOLIC PANEL, COMPREHENSIVE    Collection Time: 11/30/19 11:46 PM   Result Value Ref Range    Sodium 141 136 - 145 mmol/L    Potassium 3.8 3.5 - 5.1 mmol/L    Chloride 100 97 - 108 mmol/L    CO2 27 21 - 32 mmol/L    Anion gap 14 5 - 15 mmol/L    Glucose 72 65 - 100 mg/dL    BUN 42 (H) 6 - 20 MG/DL    Creatinine 9.41 (H) 0.55 - 1.02 MG/DL    BUN/Creatinine ratio 4 (L) 12 - 20      GFR est AA 5 (L) >60 ml/min/1.73m2    GFR est non-AA 4 (L) >60 ml/min/1.73m2    Calcium 8.7 8.5 - 10.1 MG/DL    Bilirubin, total 0.5 0.2 - 1.0 MG/DL    ALT (SGPT) 18 12 - 78 U/L    AST (SGOT) 29 15 - 37 U/L    Alk.  phosphatase 155 (H) 45 - 117 U/L    Protein, total 7.2 6.4 - 8.2 g/dL    Albumin 2.2 (L) 3.5 - 5.0 g/dL    Globulin 5.0 (H) 2.0 - 4.0 g/dL    A-G Ratio 0.4 (L) 1.1 - 2.2     BLOOD TYPE, (ABO+RH)    Collection Time: 11/30/19 11:46 PM   Result Value Ref Range    ABO/Rh(D) O POSITIVE     Comment SAMPLE NOT USABLE FOR CROSSMATCH    POC LACTIC ACID    Collection Time: 12/01/19 12:03 AM   Result Value Ref Range    Lactic Acid (POC) 3.83 (HH) 0.40 - 2.00 mmol/L   CELL COUNT, BODY FLUID    Collection Time: 12/01/19  2:43 AM   Result Value Ref Range    BODY FLUID TYPE PERITONEAL FLUID      FLUID COLOR STRAW      FLUID APPEARANCE HAZY      FLUID RBC CT. 14 (H) 0 /cu mm    FLUID NUCLEATED CELLS 6,845 /cu mm    FLD NEUTROPHILS 91 (A) NRRE %    FLD MONO/MACROPHAGES 9 (A) NRRE %   LACTIC ACID    Collection Time: 12/01/19  4:08 AM   Result Value Ref Range    Lactic acid 5.4 (HH) 0.4 - 2.0 MMOL/L

## 2019-12-01 NOTE — ED TRIAGE NOTES
Assumed care of pt from triage. Pt reporting abd pain and constipation starting 1AM today. Pt LBM was at 1AM today and was normal per pt. Pt reporting vomiting and nausea today. Pt has hx of HTN, CHF, and seizures. Pt unable to keep her meds down today due to vomiting. Pt gets peritoneal dialysis every night. Pt reports that she makes a very small amount of urine at baseline. Pt on monitor x2. Call bell in reach. Will continue to monitor.

## 2019-12-01 NOTE — PERIOP NOTES
Called charge nurse for RN number and attempted to call for report, no answer from number provided. Will try again shortly. 1015 TRANSFER - IN REPORT:    Verbal report received from Jay Hernández, [facility] (name) on Jack Bazan  being received from ED(unit) for ordered procedure      Report consisted of patients Situation, Background, Assessment and   Recommendations(SBAR). Information from the following report(s) SBAR, Kardex, ED Summary and Recent Results was reviewed with the receiving nurse. Opportunity for questions and clarification was provided. Requested repeat glucose check prior to transfer. Assessment completed upon patients arrival to unit and care assumed.

## 2019-12-01 NOTE — PROGRESS NOTES
Pharmacy Automatic Renal Dosing Protocol - Antimicrobials    Indication for Antimicrobials: peritonitis     Current Regimen of Each Antimicrobial:  Vancomycin 1750 mg X 1 dose; start , day 1  Cefepime 1g IV q12h; start , day 1    Previous Antimicrobial Therapy:  Zosyn x1 dose in ED     Goal Level: redose vancomycin when serum level <20    Date Dose & Interval Measured (mcg/mL) Extrapolated (mcg/mL)                       Date & time of next level:     Significant Cultures:    blood cx pending   peritoneal fluid cx: pending    Radiology / Imaging results: (X-ray, CT scan or MRI):   CT abd  Large amount of ascites with peritoneal catheter in place. No evidence for small bowel obstruction  Nonobstructing bilateral renal calculi    Paralysis, amputations, malnutrition: none noted    Labs:  Recent Labs     19  0536 19  2346   CREA 9.85* 9.41*   BUN 46* 42*   WBC 29.0* 21.9*     Temp (24hrs), Av.6 °F (37 °C), Min:97.8 °F (36.6 °C), Max:99.6 °F (37.6 °C)    Creatinine Clearance (mL/min) or Dialysis: ESRD on PD    Impression/Plan:   Vancomycin 1750 mg X 1 dose  Check vancomycin random level on  (72 hours post loading dose)  Decrease cefepime to 1g q48h  Antimicrobial stop date pending     Pharmacy will follow daily and adjust medications as appropriate for renal function and/or serum levels.     Thank you,  RAMU Nelson

## 2019-12-01 NOTE — PROGRESS NOTES
RN made me aware that IV access has been an issue , and IV access team could not do IV line with US,and no PICC line team today in the hospital, I said okay to have central line because of the severity of illness , sepsis, sever initial LA was 5.4 .

## 2019-12-01 NOTE — PROGRESS NOTES
TRANSFER - IN REPORT:    Verbal report received from Rochelle Cardenas RN(name) on Armin Curtis  being received from ED(unit) for routine progression of care      Report consisted of patients Situation, Background, Assessment and   Recommendations(SBAR). Information from the following report(s) SBAR, Kardex, ED Summary, Intake/Output, MAR, Recent Results, Cardiac Rhythm NSR and Quality Measures was reviewed with the receiving nurse. Opportunity for questions and clarification was provided. Assessment completed upon patients arrival to unit and care assumed. 1400: Pt arrived to PCU from ED. Pt alert and talking in the bed. VSS. MEWS Score 1. No complaints of pain. Pt sister at bedside. Dual skin assessment performed with Shashank Shelby RN. No impairments noted. Heels elevated on pillow. Pt set up to eat lunch. 1530: Warming up PD fluids. 1700: Family at bedside. 1800: PD started. 1900: PD complete, yellow, hazy fluid. Bedside and Verbal shift change report given to [facility] (oncoming nurse) by HOMAR Bean (offgoing nurse). Report included the following information SBAR, Kardex, ED Summary, Intake/Output, MAR, Recent Results, Cardiac Rhythm NSr and Quality Measures.

## 2019-12-01 NOTE — ED NOTES
Report to the PACU for the pt to get a central line insertion done  Pt aware she will go to the OR for a central line placement.

## 2019-12-01 NOTE — PROGRESS NOTES
Pharmacy Automatic Renal Dosing Protocol - Antimicrobials    Indication for Antimicrobials: IAI     Current Regimen of Each Antimicrobial:  Zosyn 3.375 gm IV every 8 hours (Start Date ; Day # 1)    Previous Antimicrobial Therapy:    Significant Cultures:       Radiology / Imaging results: (X-ray, CT scan or MRI):     Labs:  Recent Labs     19  2346   CREA 9.41*   BUN 42*   WBC 21.9*     Temp (24hrs), Av.8 °F (36.6 °C), Min:97.8 °F (36.6 °C), Max:97.8 °F (36.6 °C)    Creatinine Clearance (mL/min) or Dialysis: ESRD on PD    Impression/Plan:   Change Zosyn dose to 3.375 gm IV every 12 hours   Antimicrobial stop date TBD     Pharmacy will follow daily and adjust medications as appropriate for renal function and/or serum levels.     Thank you,  Patricia Crespo, PHARMD

## 2019-12-01 NOTE — ED NOTES
Pt continues sleepy but able to carry on conversation about what plan is what is going on  Pt going to OR now for the central line placement  The PD fluid that drained is clearer yellow than the first exchange at 7am

## 2019-12-01 NOTE — PROGRESS NOTES
Pharmacy Automatic Renal Dosing Protocol - Antimicrobials    Indication for Antimicrobials: Sepsis    Current Regimen of Each Antimicrobial:  Vancomycin 1750 mg X 1 dose, Vancomycin prn dialysis (Start Date 19; Day # 1)    Previous Antimicrobial Therapy:    Goal Level: VANCOMYCIN TROUGH GOAL RANGE    Vancomycin Trough: 15 - 20 mcg/mL  (AUC: 400 - 600 mg/hr/Liter/day)     Date Dose & Interval Measured (mcg/mL) Extrapolated (mcg/mL)                       Date & time of next level:     Significant Cultures:       Radiology / Imaging results: (X-ray, CT scan or MRI):    Paralysis, amputations, malnutrition:     Labs:  Recent Labs     19  0536 19  2346   CREA 9.85* 9.41*   BUN 46* 42*   WBC 29.0* 21.9*     Temp (24hrs), Av.7 °F (37.1 °C), Min:97.8 °F (36.6 °C), Max:99.6 °F (37.6 °C)    Creatinine Clearance (mL/min) or Dialysis: 6.2    Impression/Plan:   Sepsis / Vancomycin 1750 mg X 1 dose, Vancomycin prn dialysis   Antimicrobial stop date      Pharmacy will follow daily and adjust medications as appropriate for renal function and/or serum levels. Thank you,  Fabian Jason PHARMD    Recommended duration of therapy  [email or web address]/Clinical%20Companion/[email or web address]%[email or web address]. docx    Renal Dosing  [email or web address]/Pharmacy/Clinical%20Companion/Renal%[id number]. pdf

## 2019-12-01 NOTE — ED NOTES
The central line consent form is signed via pt sister telephone consent with Ambar Childers. rn witness as pt is sleepy post the phenytoin infusion

## 2019-12-01 NOTE — ED NOTES
Pt started seizing while RN was in room. Pt has hx of seizures. Pt came out of seizure on her own after about 90 seconds.

## 2019-12-01 NOTE — PROGRESS NOTES
Pharmacy Clarification of the Prior to Admission Medication Regimen Retrospective to the Admission Medication Reconciliation    The patient was not interviewed regarding clarification of the prior to admission medication regimen. Per patient's ED RN, patient's sister manages medications for the patient. MHT called the patient's sister, Jeannine Wang [phone], who was able to verify PTA medications history. Information Obtained From: Patient's sister    Recommendations/Findings: The following amendments were made to the patient's active medication list on file at [facility]:     1) Additions:   bismuth subsalicylate (PEPTO-BISMOL MAXIMUM STRENGTH) 525 mg/15 mL susp  glycerin, adult, (FLEET GLYCERIN, ADULT,) suppository  lactulose (CHRONULAC) 10 gram/15 mL solution    2) Removals:   ASA  brimonidine  calcitriol  cephalexin  furosemide  integra  miralax    3) Changes:  carvedilol (COREG) (Old regimen: (strength 12.5 mg) 12.5 mg BID /New regimen: (strength 6.25) 6.25 mg BID)  potassium chloride (K-DUR, KLOR-CON) 20 mEq tablet (Old regimen: 20 mEq daily /New regimen: 10 mEq QHS)  sevelamer (RENAGEL) 800 mg tablet (Old regimen: 1600 mg [facility] /New regimen: 1600 mg [facility] (breakfast and dinner) and 800 mg with lunch)    4) Pertinent Pharmacy Findings:  Patient's sister, Kianna Torres, manages the patient's medications  Patient's sister stated the patient is prescribed ergocalciferol 45225 units, that she found while reviewing medications on 12/1/19 with MHT. Patient's sister stated she would resume this agent post discharge for the patient. PTA medication list was corrected to the following:     Prior to Admission Medications   Prescriptions Last Dose Informant Taking?   amiodarone (CORDARONE) 200 mg tablet 11/29/2019 at Unknown time Family Member Yes   Sig: Take 100 mg by mouth nightly.    atorvastatin (LIPITOR) 40 mg tablet 11/30/2019 at Unknown time Family Member Yes   Sig: TAKE 1 TABLET BY MOUTH ONCE DAILY   bismuth subsalicylate (PEPTO-BISMOL MAXIMUM STRENGTH) 525 mg/15 mL susp 11/30/2019 at Unknown time Family Member Yes   Sig: Take 30 mL by mouth every six (6) hours as needed (upset stomach). carvedilol (COREG) 6.25 mg tablet 11/30/2019 at Unknown time Family Member Yes   Sig: Take 6.25 mg by mouth two (2) times daily (with meals). clopidogrel (PLAVIX) 75 mg tab 11/30/2019 at Unknown time Family Member Yes   Sig: Take 1 Tab by mouth daily. docusate sodium (COLACE) 100 mg capsule 11/30/2019 at Unknown time Family Member Yes   Sig: Take 100 mg by mouth two (2) times daily as needed for Constipation. gentamicin (GARAMYCIN) 0.1 % topical cream 11/30/2019 at Unknown time Family Member Yes   Sig: Apply  to affected area daily. Apply to Cath wound   glycerin, adult, (FLEET GLYCERIN, ADULT,) suppository 11/30/2019 at Unknown time Family Member Yes   Sig: Insert 1 Suppository into rectum daily as needed (constipation). isosorbide mononitrate ER (IMDUR) 60 mg CR tablet 11/30/2019 at Unknown time Family Member Yes   Sig: Take 1 Tab by mouth every morning. lacosamide (VIMPAT) 150 mg tab tablet 11/30/2019 at Unknown time Family Member Yes   Sig: Take 150 mg by mouth two (2) times a day. lactulose (CHRONULAC) 10 gram/15 mL solution 11/30/2019 at Unknown time Family Member Yes   Sig: Take 30 g by mouth three (3) times daily as needed (constipation). levETIRAcetam (KEPPRA) 500 mg tablet 11/30/2019 at Unknown time Family Member Yes   Sig: Take 500 mg by mouth two (2) times a day. losartan (COZAAR) 25 mg tablet 11/29/2019 at Unknown time Family Member Yes   Sig: Take 25 mg by mouth nightly. omeprazole (PRILOSEC) 20 mg capsule 11/29/2019 at Unknown time Family Member Yes   Sig: Take 20 mg by mouth nightly. phenytoin (DILANTIN ER) 300 mg ER capsule 11/29/2019 at Unknown time Family Member Yes   Sig: Take 300 mg by mouth nightly.    potassium chloride (K-DUR, KLOR-CON) 20 mEq tablet 11/29/2019 at Unknown time Family Member Yes   Sig: Take 10 mEq by mouth nightly. sevelamer (RENAGEL) 800 mg tablet 11/30/2019 at Unknown time Family Member Yes   Sig: Take 1,600 mg by mouth two (2) times daily (with meals). Patient takes 1600 mg [facility] (breakfast and dinner) and 800 mg with lunch   sevelamer (RENAGEL) 800 mg tablet 11/29/2019 at Unknown time Family Member Yes   Sig: Take 800 mg by mouth daily (with lunch). Patient takes 1600 mg [facility] (breakfast and dinner) and 800 mg with lunch   trolamine salicylate-aloe vera 79% (ASPERCREME) topical cream 11/24/2019 at Unknown time Family Member Yes   Sig: Apply  to affected area as needed for Pain.  apply as needed for pain on hands, feet lower back, shoulders      Facility-Administered Medications: None          Thank you,  Beatriz Lee [facility]  Medication History Pharmacy Technician

## 2019-12-01 NOTE — CONSULTS
Seen and examined  Thanks for the consult  A/P:  ESRD on [facility] with dr Lester Fang for 6 years  Access:thoracic tenckoff  Peritonitis ,neg CTscan,diarrhea and leukocytosis  Seizure     S.p draining PD fluid  Dual AB  Daily WBC in PD fluid  CAPD  Seizure meds  Discussed with her and RN

## 2019-12-01 NOTE — PROGRESS NOTES
Hospitalist Progress Note    NAME: Deloris Lopez   :  1955   MRN:  [id number]       Assessment / Plan:  Sepsis most likely secondary to peritonitis in peritoneal dialysis patient   Continue on on IV antibiotic vancomycin cefepime  Pending Nephrology consultation  follow-up blood culturefollow-up peritoneal fluid analysis  Repeat lactic acid is 1.9  , 5.4 at 4 am .  Low grade fever, leukocytosis  And tachycardia still on going, will closely watch. CT abdomen/pelvis: Large amount of ascites with peritoneal catheter in place. No evidence for small bowel obstruction  RN made me aware that IV access has been an issue , and IV access team could not do IV line with US,and no PICC line team today in the hospital, I said okay to have central line because of the severity of illness , sepsis, sever initial LA was 5.4 . ESRD  nephrology consultation pending  Hx of Seizure  Continue home medication Keppra  Hypertension  Will resume once sepsis resolved   Chronic systolic CHF, stable now   Continue Lasix  Code Status: Full   Surrogate Decision Maker:Jaguar Hassan  DVT Prophylaxis: Heparin   GI Prophylaxis: not indicated  Body mass index is 35.07 kg/m².: 30.0 - 39.9 Obese     Subjective:     Chief Complaint / Reason for Physician Visit  \"looks acutely sick , she said no CP, no other complaints \". Discussed with RN events overnight. Review of Systems:  Symptom Y/N Comments  Symptom Y/N Comments   Fever/Chills n   Chest Pain n    Poor Appetite    Edema     Cough y   Abdominal Pain y    Sputum    Joint Pain     SOB/MAYORGA y Mild   Pruritis/Rash     Nausea/vomit    Tolerating PT/OT     Diarrhea    Tolerating Diet     Constipation    Other       Could NOT obtain due to:      Objective:     VITALS:   Last 24hrs VS reviewed since prior progress note.  Most recent are:  Patient Vitals for the past 24 hrs:   Temp Pulse Resp BP SpO2   19 0730  (!) 104 (!) 37 134/70 96 %   19 0716 99.6 °F (37.6 °C) (!) 104 (!) 45 140/82 96 %   12/01/19 0715  (!) 104 (!) 46 140/82 96 %   12/01/19 0700  (!) 102 (!) 31 143/88 97 %   12/01/19 0600 99.4 °F (37.4 °C) 99 22 143/88 96 %   12/01/19 0400    132/74 100 %   12/01/19 0300    108/49 100 %   12/01/19 0115 98 °F (36.7 °C) (!) 101 18 133/85 100 %   12/01/19 0102    133/70 100 %   12/01/19 0015    123/67 99 %   12/01/19 0001    112/67 98 %   11/30/19 2353    138/76 100 %   11/30/19 2341  (!) 125  (!) 164/107    11/30/19 2215  (!) 122  (!) 189/102 100 %   11/30/19 2200    (!) 188/116 100 %   11/30/19 2141 97.8 °F (36.6 °C) (!) 118 20 (!) 191/115 99 %       Intake/Output Summary (Last 24 hours) at 12/1/2019 0902  Last data filed at 12/1/2019 0845  Gross per 24 hour   Intake 750 ml   Output    Net 750 ml        PHYSICAL EXAM:  General: WD, WN. Alert, cooperative, looks acutely ill   EENT:  EOMI. Anicteric sclerae. MMM  Resp:  CTA bilaterally, no wheezing or rales. No accessory muscle use  CV:  Regular  rhythm,  No edema  GI:  Mildly tender , mildly distended abdomen .  +Bowel sounds                      NO guarding or rebound tenderness  Neurologic:  Alert and oriented X 3, normal speech,   No LE swelling     Reviewed most current lab test results and cultures  YES  Reviewed most current radiology test results   YES  Review and summation of old records today    NO  Reviewed patient's current orders and MAR    YES  PMH/SH reviewed - no change compared to H&P  ________________________________________________________________________  Care Plan discussed with:    Comments   Patient y    Family      RN y    Care Manager     Consultant                        [facility] held today with , nursing, pharmacist and clinical coordinator. Patient's plan of care was discussed; medications were reviewed and discharge planning was addressed.      ________________________________________________________________________  Total NON critical care TIME: 35   Minutes    Total CRITICAL CARE TIME Spent:   Minutes non procedure based      Comments   >50% of visit spent in counseling and coordination of care     ________________________________________________________________________  Rafi Montero MD     Procedures: see electronic medical records for all procedures/Xrays and details which were not copied into this note but were reviewed prior to creation of Plan. LABS:  I reviewed today's most current labs and imaging studies. Pertinent labs include:  Recent Labs     12/01/19  0536 11/30/19  2346   WBC 29.0* 21.9*   HGB 10.3* 10.8*   HCT 32.9* 34.4*    450*     Recent Labs     12/01/19  0536 11/30/19  2346    141   K 4.8 3.8   CL 98 100   CO2 27 27   GLU 53* 72   BUN 46* 42*   CREA 9.85* 9.41*   CA 8.5 8.7   ALB 2.0* 2.2*   TBILI 0.7 0.5   SGOT 26 29   ALT 17 18       Signed:  Rafi Montero MD

## 2019-12-02 LAB
ALBUMIN SERPL-MCNC: 1.6 G/DL (ref 3.5–5)
ALBUMIN/GLOB SERPL: 0.4 {RATIO} (ref 1.1–2.2)
ALP SERPL-CCNC: 92 U/L (ref 45–117)
ALT SERPL-CCNC: 14 U/L (ref 12–78)
ANION GAP SERPL CALC-SCNC: 11 MMOL/L (ref 5–15)
APPEARANCE FLD: ABNORMAL
AST SERPL-CCNC: 22 U/L (ref 15–37)
BASOPHILS # BLD: 0 K/UL (ref 0–0.1)
BASOPHILS NFR BLD: 0 % (ref 0–1)
BILIRUB SERPL-MCNC: 0.7 MG/DL (ref 0.2–1)
BUN SERPL-MCNC: 51 MG/DL (ref 6–20)
BUN/CREAT SERPL: 6 (ref 12–20)
CALCIUM SERPL-MCNC: 7.9 MG/DL (ref 8.5–10.1)
CHLORIDE SERPL-SCNC: 96 MMOL/L (ref 97–108)
CO2 SERPL-SCNC: 28 MMOL/L (ref 21–32)
COLOR FLD: ABNORMAL
CREAT SERPL-MCNC: 9.13 MG/DL (ref 0.55–1.02)
DIFFERENTIAL METHOD BLD: ABNORMAL
EOSINOPHIL # BLD: 0.2 K/UL (ref 0–0.4)
EOSINOPHIL NFR BLD: 1 % (ref 0–7)
ERYTHROCYTE [DISTWIDTH] IN BLOOD BY AUTOMATED COUNT: 19.9 % (ref 11.5–14.5)
GLOBULIN SER CALC-MCNC: 4.5 G/DL (ref 2–4)
GLUCOSE SERPL-MCNC: 101 MG/DL (ref 65–100)
HCT VFR BLD AUTO: 28.2 % (ref 35–47)
HGB BLD-MCNC: 8.7 G/DL (ref 11.5–16)
IMM GRANULOCYTES # BLD AUTO: 0.2 K/UL (ref 0–0.04)
IMM GRANULOCYTES NFR BLD AUTO: 1 % (ref 0–0.5)
LYMPHOCYTES # BLD: 1 K/UL (ref 0.8–3.5)
LYMPHOCYTES NFR BLD: 4 % (ref 12–49)
MCH RBC QN AUTO: 28.8 PG (ref 26–34)
MCHC RBC AUTO-ENTMCNC: 30.9 G/DL (ref 30–36.5)
MCV RBC AUTO: 93.4 FL (ref 80–99)
MONOCYTES # BLD: 1.2 K/UL (ref 0–1)
MONOCYTES NFR BLD: 5 % (ref 5–13)
MONOS+MACROS NFR FLD: 9 %
NEUTROPHILS NFR FLD: 91 %
NEUTS SEG # BLD: 21.3 K/UL (ref 1.8–8)
NEUTS SEG NFR BLD: 89 % (ref 32–75)
NRBC # BLD: 0 K/UL (ref 0–0.01)
NRBC BLD-RTO: 0 PER 100 WBC
NUC CELL # FLD: 6845 /CU MM
PATH REV BLD -IMP: ABNORMAL
PHENYTOIN SERPL-MCNC: 7.1 UG/ML (ref 10–20)
PLATELET # BLD AUTO: 303 K/UL (ref 150–400)
PMV BLD AUTO: 12.4 FL (ref 8.9–12.9)
POTASSIUM SERPL-SCNC: 3.9 MMOL/L (ref 3.5–5.1)
PROT SERPL-MCNC: 6.1 G/DL (ref 6.4–8.2)
RBC # BLD AUTO: 3.02 M/UL (ref 3.8–5.2)
RBC # FLD: 14 /CU MM
RBC MORPH BLD: ABNORMAL
SODIUM SERPL-SCNC: 135 MMOL/L (ref 136–145)
SPECIMEN SOURCE FLD: ABNORMAL
WBC # BLD AUTO: 23.9 K/UL (ref 3.6–11)

## 2019-12-02 PROCEDURE — 80053 COMPREHEN METABOLIC PANEL: CPT

## 2019-12-02 PROCEDURE — 74011250637 HC RX REV CODE- 250/637: Performed by: INTERNAL MEDICINE

## 2019-12-02 PROCEDURE — 80186 ASSAY OF PHENYTOIN FREE: CPT

## 2019-12-02 PROCEDURE — 85025 COMPLETE CBC W/AUTO DIFF WBC: CPT

## 2019-12-02 PROCEDURE — 74011000258 HC RX REV CODE- 258: Performed by: INTERNAL MEDICINE

## 2019-12-02 PROCEDURE — A4722 DIALYS SOL FLD VOL > 1999CC: HCPCS | Performed by: INTERNAL MEDICINE

## 2019-12-02 PROCEDURE — 74011250636 HC RX REV CODE- 250/636: Performed by: EMERGENCY MEDICINE

## 2019-12-02 PROCEDURE — 80185 ASSAY OF PHENYTOIN TOTAL: CPT

## 2019-12-02 PROCEDURE — 36415 COLL VENOUS BLD VENIPUNCTURE: CPT

## 2019-12-02 PROCEDURE — 90945 DIALYSIS ONE EVALUATION: CPT

## 2019-12-02 PROCEDURE — 65270000029 HC RM PRIVATE

## 2019-12-02 PROCEDURE — 74011250636 HC RX REV CODE- 250/636: Performed by: INTERNAL MEDICINE

## 2019-12-02 PROCEDURE — 80177 DRUG SCRN QUAN LEVETIRACETAM: CPT

## 2019-12-02 RX ORDER — PHENYTOIN SODIUM 100 MG/1
300 CAPSULE, EXTENDED RELEASE ORAL
Status: DISCONTINUED | OUTPATIENT
Start: 2019-12-02 | End: 2019-12-10 | Stop reason: HOSPADM

## 2019-12-02 RX ADMIN — PHENYTOIN SODIUM 300 MG: 100 CAPSULE ORAL at 21:45

## 2019-12-02 RX ADMIN — Medication 10 ML: at 21:46

## 2019-12-02 RX ADMIN — Medication 10 ML: at 14:10

## 2019-12-02 RX ADMIN — Medication 10 ML: at 04:30

## 2019-12-02 RX ADMIN — LEVETIRACETAM 1000 MG: 10 INJECTION INTRAVENOUS at 16:16

## 2019-12-02 RX ADMIN — SODIUM CHLORIDE, SODIUM LACTATE, CALCIUM CHLORIDE, MAGNESIUM CHLORIDE AND DEXTROSE 2000 ML: 2.5; 538; 448; 18.3; 5.08 INJECTION, SOLUTION INTRAPERITONEAL at 02:17

## 2019-12-02 RX ADMIN — FUROSEMIDE 40 MG: 40 TABLET ORAL at 09:00

## 2019-12-02 RX ADMIN — LEVETIRACETAM 1000 MG: 10 INJECTION INTRAVENOUS at 04:29

## 2019-12-02 RX ADMIN — AMIODARONE HYDROCHLORIDE 100 MG: 200 TABLET ORAL at 09:00

## 2019-12-02 RX ADMIN — ASPIRIN 81 MG: 81 TABLET, COATED ORAL at 09:00

## 2019-12-02 RX ADMIN — CLOPIDOGREL BISULFATE 75 MG: 75 TABLET ORAL at 08:59

## 2019-12-02 RX ADMIN — HEPARIN SODIUM 5000 UNITS: 5000 INJECTION INTRAVENOUS; SUBCUTANEOUS at 04:29

## 2019-12-02 RX ADMIN — HEPARIN SODIUM 5000 UNITS: 5000 INJECTION INTRAVENOUS; SUBCUTANEOUS at 21:46

## 2019-12-02 RX ADMIN — ACETAMINOPHEN 650 MG: 325 TABLET ORAL at 21:45

## 2019-12-02 RX ADMIN — PANTOPRAZOLE SODIUM 40 MG: 40 TABLET, DELAYED RELEASE ORAL at 08:59

## 2019-12-02 RX ADMIN — LACTULOSE 45 ML: 20 SOLUTION ORAL at 16:46

## 2019-12-02 RX ADMIN — SODIUM CHLORIDE, SODIUM LACTATE, CALCIUM CHLORIDE, MAGNESIUM CHLORIDE AND DEXTROSE 2000 ML: 2.5; 538; 448; 18.3; 5.08 INJECTION, SOLUTION INTRAPERITONEAL at 16:19

## 2019-12-02 RX ADMIN — SODIUM CHLORIDE, SODIUM LACTATE, CALCIUM CHLORIDE, MAGNESIUM CHLORIDE AND DEXTROSE 2000 ML: 2.5; 538; 448; 18.3; 5.08 INJECTION, SOLUTION INTRAPERITONEAL at 21:49

## 2019-12-02 RX ADMIN — PIPERACILLIN AND TAZOBACTAM 3.38 G: 3; .375 INJECTION, POWDER, LYOPHILIZED, FOR SOLUTION INTRAVENOUS at 17:24

## 2019-12-02 RX ADMIN — SODIUM CHLORIDE, SODIUM LACTATE, CALCIUM CHLORIDE, MAGNESIUM CHLORIDE AND DEXTROSE 2000 ML: 2.5; 538; 448; 18.3; 5.08 INJECTION, SOLUTION INTRAPERITONEAL at 06:55

## 2019-12-02 RX ADMIN — LACOSAMIDE 150 MG: 50 TABLET, FILM COATED ORAL at 09:00

## 2019-12-02 RX ADMIN — HEPARIN SODIUM 5000 UNITS: 5000 INJECTION INTRAVENOUS; SUBCUTANEOUS at 11:38

## 2019-12-02 RX ADMIN — LACOSAMIDE 150 MG: 50 TABLET, FILM COATED ORAL at 17:20

## 2019-12-02 RX ADMIN — SODIUM CHLORIDE, SODIUM LACTATE, CALCIUM CHLORIDE, MAGNESIUM CHLORIDE AND DEXTROSE 2000 ML: 2.5; 538; 448; 18.3; 5.08 INJECTION, SOLUTION INTRAPERITONEAL at 11:37

## 2019-12-02 RX ADMIN — ATORVASTATIN CALCIUM 40 MG: 40 TABLET, FILM COATED ORAL at 21:45

## 2019-12-02 NOTE — PROGRESS NOTES
Bedside and Verbal shift change report given to Ranjeet Greene RN (oncoming nurse) by Lamont Carballo (offgoing nurse). Report included the following information SBAR, Kardex, ED Summary, Intake/Output, MAR, Recent Results, Cardiac Rhythm NSr and Quality Measures. 0710: Pt resting quietly in bed with no complaints of pain. VSS. MEWS Score 1. PD being done. 0800: Dr. Viktor Cox at bedside evaluating pt. Pt able to transfer. 1000: TRANSFER - OUT REPORT:    Verbal report given to Caleb Cosby on Cleveland Mandi  being transferred to Medical Telemetry(unit) for routine progression of care       Report consisted of patients Situation, Background, Assessment and   Recommendations(SBAR). Information from the following report(s) SBAR, Kardex, ED Summary, Intake/Output, MAR, Recent Results, Cardiac Rhythm Nsr and Quality Measures was reviewed with the receiving nurse. Lines:   Quad Lumen 12/01/19 Right (Active)   Central Line Being Utilized Yes 12/2/2019  8:02 AM   Criteria for Appropriate Use Limited/no vessel suitable for conventional peripheral access 12/2/2019  8:02 AM   Site Assessment Clean, dry, & intact 12/2/2019  8:02 AM   Infiltration Assessment 0 12/2/2019  8:02 AM   Affected Extremity/Extremities Color distal to insertion site pink (or appropriate for race); Pulses palpable;Range of motion performed 12/2/2019  8:02 AM   Date of Last Dressing Change 12/01/19 12/2/2019  8:02 AM   Dressing Status Clean, dry, & intact 12/2/2019  8:02 AM   Dressing Type Transparent;Disk with Chlorhexadine gluconate (CHG) 12/2/2019  8:02 AM   Action Taken Open ports on tubing capped 12/2/2019  8:02 AM   Proximal Hub Color/Line Status Blue;Capped 12/2/2019  8:02 AM   Positive Blood Return (Medial Site) Yes 12/2/2019  8:02 AM   Medial 1 Hub Color/Line Status Brown;Clotted 12/2/2019  8:02 AM   Positive Blood Return (Lateral Site) Yes 12/2/2019  8:02 AM   Medial 2 Hub Color/Line Status Gray;Capped 12/2/2019  8:02 AM Positive Blood Return (Site #3) Yes 12/2/2019  8:02 AM   Distal Hub Color/Line Status White;Capped 12/2/2019  8:02 AM   Positive Blood Return (Site #4) Yes 12/2/2019  8:02 AM   Alcohol Cap Used Yes 12/2/2019  8:02 AM        Opportunity for questions and clarification was provided. Patient transported with:   Monitor  Registered Nurse    1020: Mel Rodriguez, pt's sister, aware of pt's new room [phone].

## 2019-12-02 NOTE — PROGRESS NOTES
Pharmacy Automatic Renal Dosing Protocol - Antimicrobials    Indication for Antimicrobials: peritonitis     Current Regimen of Each Antimicrobial:  Vancomycin 1750 mg X 1 dose; start , day 2  Cefepime 1g IV q12h; start , day 2    Previous Antimicrobial Therapy:  Zosyn x1 dose in ED     Goal Level: redose vancomycin when serum level <20    Date Dose & Interval Measured (mcg/mL) Extrapolated (mcg/mL)                       Date & time of next level:     Significant Cultures:    blood cx pending   peritoneal fluid cx: Enterococcus, alpha strep    Radiology / Imaging results: (X-ray, CT scan or MRI):   CT abd  Large amount of ascites with peritoneal catheter in place. No evidence for small bowel obstruction  Nonobstructing bilateral renal calculi    Paralysis, amputations, malnutrition: none noted    Labs:  Recent Labs     19  0435 19  0536 19  2346   CREA 9.13* 9.85* 9.41*   BUN 51* 46* 42*   WBC 23.9* 29.0* 21.9*     Temp (24hrs), Av.2 °F (36.8 °C), Min:98.1 °F (36.7 °C), Max:98.5 °F (36.9 °C)    Creatinine Clearance (mL/min) or Dialysis: ESRD on PD    Impression/Plan:   Vancomycin 1750 mg X 1 dose  Check vancomycin random level on  (72 hours post loading dose)  Increase cefepime to 2 g q48h  Antimicrobial stop date pending     Pharmacy will follow daily and adjust medications as appropriate for renal function and/or serum levels.     Thank you,  Lane President, PHARMD

## 2019-12-02 NOTE — PROGRESS NOTES
TRANSFER - IN REPORT:    Verbal report received from [facility] on Amye Pean  being received from PCU for routine progression of care      Report consisted of patients Situation, Background, Assessment and   Recommendations(SBAR). Information from the following report(s) SBAR, ED Summary, Intake/Output, MAR, Recent Results and Cardiac Rhythm NSR was reviewed with the receiving nurse. Opportunity for questions and clarification was provided. Assessment completed upon patients arrival to unit and care assumed.

## 2019-12-02 NOTE — PROGRESS NOTES
1045 Received patient via bed from PCU with 2 RNs. Patient transferred to new bed without difficulty. VSS. Patient offers no complaints at this time. Call bell within reach. Family was notified of transfer from [facility]. Skin intact, scar noted to right shin.

## 2019-12-02 NOTE — PROGRESS NOTES
Hospitalist Progress Note    NAME: Rochelle Hilton   :  1955   MRN:  [id number]       Assessment / Plan:  Sepsis most likely secondary to peritonitis in peritoneal dialysis patient   Continue on on IV antibiotic vancomycin  And cefepime  Nephrology help appreciated. Patient is already started on peritoneal dialysis. follow-up blood culture and peritoneal fluid analysis  Repeat lactic acid is 1.9  , 5.4 at 4 am .  Low grade fever, leukocytosis  And tachycardia still on going, will closely watch. CT abdomen/pelvis: Large amount of ascites with peritoneal catheter in place. No evidence for small bowel obstruction  RN made me aware that IV access has been an issue , and IV access team could not do IV line with US,and there was no PICC line team  in the afternoon. Central line placed for this reason. ESRD  Nephrology on board. On peritoneal dialysis  Hx of Seizure  Continue home medication Keppra  Hypertension  Will resume once sepsis resolved   Chronic systolic CHF, stable now   Continue Lasix  Code Status: Full   Surrogate Decision Maker:Jaguar Hassan  DVT Prophylaxis: Heparin   GI Prophylaxis: not indicated  Body mass index is 35.19 kg/m².: 30.0 - 39.9 Obese     Subjective:     Chief Complaint / Reason for Physician Visit  \"looks acutely sick , patient states she is still having abdominal pain. Tender to touch. She added every time she has peritoneal dialysis she has a very severe pain. She said no CP\". Discussed with RN events overnight. Review of Systems:  Symptom Y/N Comments  Symptom Y/N Comments   Fever/Chills n   Chest Pain n    Poor Appetite    Edema     Cough y   Abdominal Pain y    Sputum    Joint Pain     SOB/MAYORGA y Mild   Pruritis/Rash     Nausea/vomit    Tolerating PT/OT     Diarrhea    Tolerating Diet     Constipation    Other       Could NOT obtain due to:      Objective:     VITALS:   Last 24hrs VS reviewed since prior progress note.  Most recent are:  Patient Vitals for the past 24 hrs:   Temp Pulse Resp BP SpO2   12/02/19 0712 98.1 °F (36.7 °C) 92 18 126/90 100 %   12/02/19 0320 98.3 °F (36.8 °C) 96 18 (!) 131/95 96 %   12/01/19 2320 98.5 °F (36.9 °C) 98 16 122/83 97 %   12/01/19 1936 98.2 °F (36.8 °C) 88 19 123/81 98 %   12/01/19 1412 98.4 °F (36.9 °C) 84 20 124/78 97 %   12/01/19 1400    109/74    12/01/19 1259 98.8 °F (37.1 °C) 85 27 112/71 97 %   12/01/19 1245  85 27 110/72 99 %   12/01/19 1230  86 28 108/74 98 %   12/01/19 1215  87 22 108/69 97 %   12/01/19 1210  86 21 122/83 100 %   12/01/19 1205  95 21 114/68 100 %   12/01/19 1200  85 24 120/80 99 %   12/01/19 1155 98.2 °F (36.8 °C) 86 24 109/72 97 %   12/01/19 1108 98.1 °F (36.7 °C) 91 (!) 31 112/76 96 %   12/01/19 1000  90 26 107/75 95 %   12/01/19 0947  89  108/74 96 %   12/01/19 0930  94 27 116/80 97 %   12/01/19 0915  95 (!) 35 125/82 98 %   12/01/19 0909  (!) 102 23 (!) 127/94 98 %   12/01/19 0730  (!) 104 (!) 37 134/70 96 %       Intake/Output Summary (Last 24 hours) at 12/2/2019 0725  Last data filed at 12/2/2019 0321  Gross per 24 hour   Intake 9100 ml   Output 6500 ml   Net 2600 ml        PHYSICAL EXAM:  General: WD, WN. Alert, cooperative, looks acutely ill   EENT:  EOMI. Anicteric sclerae. MMM  Resp:  CTA bilaterally, no wheezing or rales.   No accessory muscle use  CV:  Regular  rhythm,  No edema  GI:  Mildly tender , mildly distended abdomen .  +Bowel sounds                      NO guarding or rebound tenderness  Neurologic:  Alert and oriented X 3, normal speech,   No LE swelling     Reviewed most current lab test results and cultures  YES  Reviewed most current radiology test results   YES  Review and summation of old records today    NO  Reviewed patient's current orders and MAR    YES  PMH/ reviewed - no change compared to H&P  ________________________________________________________________________  Care Plan discussed with:    Comments   Patient y    Family      RN y    Care Manager Consultant                        Multidiciplinary team rounds were held today with , nursing, pharmacist and clinical coordinator. Patient's plan of care was discussed; medications were reviewed and discharge planning was addressed. ________________________________________________________________________  Total NON critical care TIME: 35   Minutes    Total CRITICAL CARE TIME Spent:   Minutes non procedure based      Comments   >50% of visit spent in counseling and coordination of care     ________________________________________________________________________  Andrew Temple MD     Procedures: see electronic medical records for all procedures/Xrays and details which were not copied into this note but were reviewed prior to creation of Plan. LABS:  I reviewed today's most current labs and imaging studies. Pertinent labs include:  Recent Labs     12/02/19 0435 12/01/19  0536 11/30/19  2346   WBC 23.9* 29.0* 21.9*   HGB 8.7* 10.3* 10.8*   HCT 28.2* 32.9* 34.4*    389 450*     Recent Labs     12/02/19  0435 12/01/19  0536 11/30/19  2346   * 138 141   K 3.9 4.8 3.8   CL 96* 98 100   CO2 28 27 27   * 53* 72   BUN 51* 46* 42*   CREA 9.13* 9.85* 9.41*   CA 7.9* 8.5 8.7   ALB 1.6* 2.0* 2.2*   TBILI 0.7 0.7 0.5   SGOT 22 26 29   ALT 14 17 18       Signed:  Andrew Temple MD

## 2019-12-02 NOTE — PROGRESS NOTES
Problem: Diabetes Comorbidity  Goal: Blood Glucose Level Within Desired Range  Outcome: Ongoing, Progressing  Intervention: Maintain Glycemic Control  Flowsheets (Taken 10/13/2019 8930)  Glycemic Management: blood glucose monitoring; oral hydration promoted; supplemental insulin given      Problem: Falls - Risk of  Goal: *Absence of Falls  Description  Document Tilbrendan Pickjudy Fall Risk and appropriate interventions in the flowsheet. Outcome: Progressing Towards Goal  Note: Fall Risk Interventions:  Mobility Interventions: Patient to call before getting OOB, Bed/chair exit alarm, Utilize walker, cane, or other assistive device, Strengthening exercises (ROM-active/passive), PT Consult for assist device competence, PT Consult for mobility concerns, OT consult for ADLs         Medication Interventions: Patient to call before getting OOB, Teach patient to arise slowly, Bed/chair exit alarm    Elimination Interventions: Toileting schedule/hourly rounds, Patient to call for help with toileting needs, Call light in reach    History of Falls Interventions: Room close to nurse's station, Bed/chair exit alarm, Door open when patient unattended         Problem: Patient Education: Go to Patient Education Activity  Goal: Patient/Family Education  Outcome: Progressing Towards Goal     Problem: Pressure Injury - Risk of  Goal: *Prevention of pressure injury  Description  Document Luis Scale and appropriate interventions in the flowsheet. Outcome: Progressing Towards Goal  Note: Pressure Injury Interventions:  Sensory Interventions: Monitor skin under medical devices, Minimize linen layers, Assess changes in LOC, Turn and reposition approx.  every two hours (pillows and wedges if needed), Pressure redistribution bed/mattress (bed type)         Activity Interventions: PT/OT evaluation, Pressure redistribution bed/mattress(bed type)    Mobility Interventions: Pressure redistribution bed/mattress (bed type), HOB 30 degrees or less    Nutrition Interventions: Offer support with meals,snacks and hydration, Document food/fluid/supplement intake                     Problem: Patient Education: Go to Patient Education Activity  Goal: Patient/Family Education  Outcome: Progressing Towards Goal

## 2019-12-02 NOTE — PROGRESS NOTES
NAME: Jacques Pena        :  1955        MRN:  [id number]        Assessment :    Plan:  --ESKD  Peritonitis  Tenckhoff catheter, thoracic PD catheter     Seizure d/o  Anemia of ESKD  HTN  SHPT On CAPD while hospitalized (2.5 % dextrose, 2 liter dwell, 5 exchanges)    Cell count on PD fluid - 7800    Hgb < 9; will give retacrit 10 k sc biw    On Calcitriol    On Maxipime, Vanco    Still with abdominal pain. Constipated. Will try lactulose and see if pain is better with a BM. Check cell count again in the morning. NPO tonight in case the thoracic PD cath needs to come out. Subjective:     Chief Complaint:  Still with abdominal pain. Constipated. No BM in a few days (normally every day). Lactulose has worked well for constipation in the past. We discussed the above. Review of Systems:    Symptom Y/N Comments  Symptom Y/N Comments   Fever/Chills    Chest Pain     Poor Appetite    Edema     Cough    Abdominal Pain y    Sputum    Joint Pain     SOB/MAYORGA    Pruritis/Rash     Nausea/vomit    Tolerating PT/OT     Diarrhea    Tolerating Diet     Constipation y   Other       Could not obtain due to:      Objective:     VITALS:   Last 24hrs VS reviewed since prior progress note.  Most recent are:  Visit Vitals  BP (!) 131/95   Pulse 96   Temp 98.3 °F (36.8 °C)   Resp 18   Ht 5' 3\" (1.6 m)   Wt 89.8 kg (197 lb 15.6 oz)   SpO2 96%   BMI 35.07 kg/m²       Intake/Output Summary (Last 24 hours) at 2019 0543  Last data filed at 2019 0321  Gross per 24 hour   Intake 9350 ml   Output 6500 ml   Net 2850 ml      Telemetry Reviewed:     PHYSICAL EXAM:  General: NAD  Abdomen distended, tender  Left chest PD cath exit site with no drainage/erythema    Lab Data Reviewed: (see below)    Medications Reviewed: (see below)    PMH/SH reviewed - no change compared to H&P  ________________________________________________________________________  Care Plan discussed with:  Patient     Family      RN     Care Manager                    Consultant:          Comments   >50% of visit spent in counseling and coordination of care       ________________________________________________________________________  Thania Gonsales MD     Procedures: see electronic medical records for all procedures/Xrays and details which  were not copied into this note but were reviewed prior to creation of Plan. LABS:  Recent Labs     12/02/19 0435 12/01/19 0536   WBC 23.9* 29.0*   HGB 8.7* 10.3*   HCT 28.2* 32.9*    389     Recent Labs     12/02/19 0435 12/01/19 0536 11/30/19  2346   * 138 141   K 3.9 4.8 3.8   CL 96* 98 100   CO2 28 27 27   BUN 51* 46* 42*   CREA 9.13* 9.85* 9.41*   * 53* 72   CA 7.9* 8.5 8.7     Recent Labs     12/02/19 0435 12/01/19 0536 11/30/19  2346   SGOT 22 26 29   AP 92 122* 155*   TP 6.1* 6.9 7.2   ALB 1.6* 2.0* 2.2*   GLOB 4.5* 4.9* 5.0*     No results for input(s): INR, PTP, APTT, INREXT in the last 72 hours. No results for input(s): FE, TIBC, PSAT, FERR in the last 72 hours. No results found for: FOL, RBCF   No results for input(s): PH, PCO2, PO2 in the last 72 hours. No results for input(s): CPK, CKMB in the last 72 hours.     No lab exists for component: TROPONINI  No components found for: [place]  Lab Results   Component Value Date/Time    Color YELLOW/STRAW 04/03/2015 08:31 PM    Appearance CLOUDY (A) 04/03/2015 08:31 PM    Specific gravity 1.013 04/03/2015 08:31 PM    pH (UA) 7.5 04/03/2015 08:31 PM    Protein 300 (A) 04/03/2015 08:31 PM    Glucose NEGATIVE  04/03/2015 08:31 PM    Ketone NEGATIVE  04/03/2015 08:31 PM    Bilirubin NEGATIVE  04/03/2015 08:31 PM    Urobilinogen 0.2 04/03/2015 08:31 PM    Nitrites NEGATIVE  04/03/2015 08:31 PM    Leukocyte Esterase NEGATIVE  04/03/2015 08:31 PM    Epithelial cells MANY (A) 04/03/2015 08:31 PM Bacteria 1+ (A) 04/03/2015 08:31 PM    WBC 0-4 04/03/2015 08:31 PM    RBC 0-5 04/03/2015 08:31 PM       MEDICATIONS:  Current Facility-Administered Medications   Medication Dose Route Frequency    sodium chloride (NS) flush 5-40 mL  5-40 mL IntraVENous Q8H    sodium chloride (NS) flush 5-40 mL  5-40 mL IntraVENous PRN    acetaminophen (TYLENOL) tablet 650 mg  650 mg Oral Q6H PRN    ondansetron (ZOFRAN) injection 4 mg  4 mg IntraVENous Q6H PRN    bisacodyl (DULCOLAX) tablet 5 mg  5 mg Oral DAILY PRN    heparin (porcine) injection 5,000 Units  5,000 Units SubCUTAneous Q8H    levETIRAcetam in saline (iso-os) (KEPPRA) infusion 1,000 mg  1,000 mg IntraVENous Q12H    amiodarone (CORDARONE) tablet 100 mg  100 mg Oral DAILY    atorvastatin (LIPITOR) tablet 40 mg  40 mg Oral QHS    aspirin delayed-release tablet 81 mg  81 mg Oral DAILY    clopidogrel (PLAVIX) tablet 75 mg  75 mg Oral DAILY    [START ON 12/5/2019] calcitRIOL (ROCALTROL) capsule 0.25 mcg  0.25 mcg Oral Once per day on Thu Sat    pantoprazole (PROTONIX) tablet 40 mg  40 mg Oral DAILY    furosemide (LASIX) tablet 40 mg  40 mg Oral DAILY    peritoneal dialysis DEXTROSE 2.5% (2.5 mEq/L low calcium) solution 2,000 mL  2,000 mL IntraPERitoneal 5XD    VANCOMYCIN INFORMATION NOTE   Other PRN    [START ON 12/3/2019] cefepime (MAXIPIME) 1 g in 0.9% sodium chloride (MBP/ADV) 50 mL  1 g IntraVENous Q48H    lacosamide (VIMPAT) tablet 150 mg  150 mg Oral BID

## 2019-12-02 NOTE — PROGRESS NOTES
Problem: Falls - Risk of  Goal: *Absence of Falls  Description  Document Elizabeth Seo Fall Risk and appropriate interventions in the flowsheet. Outcome: Progressing Towards Goal  Note: Fall Risk Interventions:  Mobility Interventions: Bed/chair exit alarm, Patient to call before getting OOB, PT Consult for assist device competence, PT Consult for mobility concerns, Utilize walker, cane, or other assistive device         Medication Interventions: Bed/chair exit alarm, Patient to call before getting OOB, Teach patient to arise slowly    Elimination Interventions: Bed/chair exit alarm, Patient to call for help with toileting needs, Toileting schedule/hourly rounds    History of Falls Interventions: Bed/chair exit alarm         Problem: Pressure Injury - Risk of  Goal: *Prevention of pressure injury  Description  Document Luis Scale and appropriate interventions in the flowsheet. Outcome: Progressing Towards Goal  Note: Pressure Injury Interventions:  Sensory Interventions: Assess changes in LOC, Assess need for specialty bed, Check visual cues for pain, Keep linens dry and wrinkle-free, Minimize linen layers, Monitor skin under medical devices, Pad between skin to skin         Activity Interventions: PT/OT evaluation, Pressure redistribution bed/mattress(bed type)    Mobility Interventions: PT/OT evaluation, Pressure redistribution bed/mattress (bed type), HOB 30 degrees or less, Turn and reposition approx.  every two hours(pillow and wedges)    Nutrition Interventions: Document food/fluid/supplement intake, Offer support with meals,snacks and hydration                     Problem: Sepsis: Day 2  Goal: *Hemodynamically stable  Outcome: Progressing Towards Goal     Problem: Sepsis: Day 2  Goal: Activity/Safety  Outcome: Progressing Towards Goal     Problem: Sepsis: Day 2  Goal: *Tolerating diet  Outcome: Progressing Towards Goal     Problem: Sepsis: Day 2  Goal: Medications  Outcome: Progressing Towards Goal     Problem: Sepsis: Day 2  Goal: Respiratory  Outcome: Progressing Towards Goal     Problem: Sepsis: Day 2  Goal: Psychosocial  Outcome: Progressing Towards Goal

## 2019-12-03 LAB
ALBUMIN SERPL-MCNC: 1.6 G/DL (ref 3.5–5)
ALBUMIN/GLOB SERPL: 0.3 {RATIO} (ref 1.1–2.2)
ALP SERPL-CCNC: 100 U/L (ref 45–117)
ALT SERPL-CCNC: 14 U/L (ref 12–78)
ANION GAP SERPL CALC-SCNC: 10 MMOL/L (ref 5–15)
APPEARANCE FLD: CLEAR
AST SERPL-CCNC: 18 U/L (ref 15–37)
BASOPHILS # BLD: 0.1 K/UL (ref 0–0.1)
BASOPHILS NFR BLD: 1 % (ref 0–1)
BILIRUB SERPL-MCNC: 0.3 MG/DL (ref 0.2–1)
BUN SERPL-MCNC: 47 MG/DL (ref 6–20)
BUN/CREAT SERPL: 5 (ref 12–20)
CALCIUM SERPL-MCNC: 7.8 MG/DL (ref 8.5–10.1)
CHLORIDE SERPL-SCNC: 95 MMOL/L (ref 97–108)
CO2 SERPL-SCNC: 30 MMOL/L (ref 21–32)
COLOR FLD: COLORLESS
CREAT SERPL-MCNC: 8.69 MG/DL (ref 0.55–1.02)
DIFFERENTIAL METHOD BLD: ABNORMAL
EOSINOPHIL # BLD: 0.4 K/UL (ref 0–0.4)
EOSINOPHIL NFR BLD: 2 % (ref 0–7)
ERYTHROCYTE [DISTWIDTH] IN BLOOD BY AUTOMATED COUNT: 19 % (ref 11.5–14.5)
GLOBULIN SER CALC-MCNC: 5 G/DL (ref 2–4)
GLUCOSE SERPL-MCNC: 84 MG/DL (ref 65–100)
HCT VFR BLD AUTO: 28 % (ref 35–47)
HGB BLD-MCNC: 8.8 G/DL (ref 11.5–16)
IMM GRANULOCYTES # BLD AUTO: 0.1 K/UL (ref 0–0.04)
IMM GRANULOCYTES NFR BLD AUTO: 1 % (ref 0–0.5)
LEVETIRACETAM SERPL-MCNC: 23.8 UG/ML (ref 10–40)
LYMPHOCYTES # BLD: 1.7 K/UL (ref 0.8–3.5)
LYMPHOCYTES NFR BLD: 10 % (ref 12–49)
LYMPHOCYTES NFR FLD: 2 %
MCH RBC QN AUTO: 28.2 PG (ref 26–34)
MCHC RBC AUTO-ENTMCNC: 31.4 G/DL (ref 30–36.5)
MCV RBC AUTO: 89.7 FL (ref 80–99)
MONOCYTES # BLD: 1 K/UL (ref 0–1)
MONOCYTES NFR BLD: 6 % (ref 5–13)
MONOS+MACROS NFR FLD: 9 %
NEUTROPHILS NFR FLD: 89 %
NEUTS SEG # BLD: 13.6 K/UL (ref 1.8–8)
NEUTS SEG NFR BLD: 80 % (ref 32–75)
NRBC # BLD: 0 K/UL (ref 0–0.01)
NRBC BLD-RTO: 0 PER 100 WBC
NUC CELL # FLD: 207 /CU MM
PHENYTOIN FREE SERPL-MCNC: 1.9 UG/ML (ref 1–2)
PLATELET # BLD AUTO: 337 K/UL (ref 150–400)
PMV BLD AUTO: 12.6 FL (ref 8.9–12.9)
POTASSIUM SERPL-SCNC: 3.7 MMOL/L (ref 3.5–5.1)
PROT SERPL-MCNC: 6.6 G/DL (ref 6.4–8.2)
RBC # BLD AUTO: 3.12 M/UL (ref 3.8–5.2)
RBC # FLD: 2 /CU MM
SODIUM SERPL-SCNC: 135 MMOL/L (ref 136–145)
SPECIMEN SOURCE FLD: ABNORMAL
WBC # BLD AUTO: 16.9 K/UL (ref 3.6–11)

## 2019-12-03 PROCEDURE — 80186 ASSAY OF PHENYTOIN FREE: CPT

## 2019-12-03 PROCEDURE — 87341 HEP B SURFACE AG NEUTRLZJ IA: CPT

## 2019-12-03 PROCEDURE — 89050 BODY FLUID CELL COUNT: CPT

## 2019-12-03 PROCEDURE — 65270000029 HC RM PRIVATE

## 2019-12-03 PROCEDURE — 90945 DIALYSIS ONE EVALUATION: CPT

## 2019-12-03 PROCEDURE — 77030018846 HC SOL IRR STRL H20 ICUM -A

## 2019-12-03 PROCEDURE — 74011250636 HC RX REV CODE- 250/636: Performed by: INTERNAL MEDICINE

## 2019-12-03 PROCEDURE — 74011250637 HC RX REV CODE- 250/637: Performed by: INTERNAL MEDICINE

## 2019-12-03 PROCEDURE — 36415 COLL VENOUS BLD VENIPUNCTURE: CPT

## 2019-12-03 PROCEDURE — 80053 COMPREHEN METABOLIC PANEL: CPT

## 2019-12-03 PROCEDURE — 85025 COMPLETE CBC W/AUTO DIFF WBC: CPT

## 2019-12-03 PROCEDURE — 94760 N-INVAS EAR/PLS OXIMETRY 1: CPT

## 2019-12-03 PROCEDURE — 74011250636 HC RX REV CODE- 250/636: Performed by: EMERGENCY MEDICINE

## 2019-12-03 PROCEDURE — A4722 DIALYS SOL FLD VOL > 1999CC: HCPCS | Performed by: INTERNAL MEDICINE

## 2019-12-03 PROCEDURE — 74011000258 HC RX REV CODE- 258: Performed by: INTERNAL MEDICINE

## 2019-12-03 RX ORDER — POLYETHYLENE GLYCOL 3350 17 G/17G
17 POWDER, FOR SOLUTION ORAL DAILY
Status: DISCONTINUED | OUTPATIENT
Start: 2019-12-03 | End: 2019-12-10 | Stop reason: HOSPADM

## 2019-12-03 RX ORDER — SORBITOL SOLUTION 70 %
30 SOLUTION, ORAL MISCELLANEOUS DAILY PRN
Status: DISCONTINUED | OUTPATIENT
Start: 2019-12-03 | End: 2019-12-10 | Stop reason: HOSPADM

## 2019-12-03 RX ORDER — CARVEDILOL 6.25 MG/1
6.25 TABLET ORAL 2 TIMES DAILY WITH MEALS
Status: DISCONTINUED | OUTPATIENT
Start: 2019-12-03 | End: 2019-12-09

## 2019-12-03 RX ORDER — AMOXICILLIN 250 MG
2 CAPSULE ORAL DAILY
Status: DISCONTINUED | OUTPATIENT
Start: 2019-12-03 | End: 2019-12-10 | Stop reason: HOSPADM

## 2019-12-03 RX ADMIN — SODIUM CHLORIDE, SODIUM LACTATE, CALCIUM CHLORIDE, MAGNESIUM CHLORIDE AND DEXTROSE 2000 ML: 2.5; 538; 448; 18.3; 5.08 INJECTION, SOLUTION INTRAPERITONEAL at 08:40

## 2019-12-03 RX ADMIN — SODIUM CHLORIDE, SODIUM LACTATE, CALCIUM CHLORIDE, MAGNESIUM CHLORIDE AND DEXTROSE 2000 ML: 2.5; 538; 448; 18.3; 5.08 INJECTION, SOLUTION INTRAPERITONEAL at 02:44

## 2019-12-03 RX ADMIN — HEPARIN SODIUM 5000 UNITS: 5000 INJECTION INTRAVENOUS; SUBCUTANEOUS at 05:53

## 2019-12-03 RX ADMIN — ACETAMINOPHEN 650 MG: 325 TABLET ORAL at 13:47

## 2019-12-03 RX ADMIN — CARVEDILOL 6.25 MG: 6.25 TABLET, FILM COATED ORAL at 18:56

## 2019-12-03 RX ADMIN — Medication 10 ML: at 22:24

## 2019-12-03 RX ADMIN — Medication 10 ML: at 15:05

## 2019-12-03 RX ADMIN — LACOSAMIDE 150 MG: 50 TABLET, FILM COATED ORAL at 08:38

## 2019-12-03 RX ADMIN — HEPARIN SODIUM 5000 UNITS: 5000 INJECTION INTRAVENOUS; SUBCUTANEOUS at 22:00

## 2019-12-03 RX ADMIN — PIPERACILLIN AND TAZOBACTAM 3.38 G: 3; .375 INJECTION, POWDER, LYOPHILIZED, FOR SOLUTION INTRAVENOUS at 17:08

## 2019-12-03 RX ADMIN — PANTOPRAZOLE SODIUM 40 MG: 40 TABLET, DELAYED RELEASE ORAL at 08:38

## 2019-12-03 RX ADMIN — FUROSEMIDE 40 MG: 40 TABLET ORAL at 08:38

## 2019-12-03 RX ADMIN — CLOPIDOGREL BISULFATE 75 MG: 75 TABLET ORAL at 08:38

## 2019-12-03 RX ADMIN — BISACODYL 5 MG: 5 TABLET, COATED ORAL at 08:38

## 2019-12-03 RX ADMIN — LACOSAMIDE 150 MG: 50 TABLET, FILM COATED ORAL at 17:09

## 2019-12-03 RX ADMIN — ACETAMINOPHEN 650 MG: 325 TABLET ORAL at 21:30

## 2019-12-03 RX ADMIN — LEVETIRACETAM 1000 MG: 10 INJECTION INTRAVENOUS at 15:06

## 2019-12-03 RX ADMIN — SODIUM CHLORIDE, SODIUM LACTATE, CALCIUM CHLORIDE, MAGNESIUM CHLORIDE AND DEXTROSE 2000 ML: 2.5; 538; 448; 18.3; 5.08 INJECTION, SOLUTION INTRAPERITONEAL at 17:49

## 2019-12-03 RX ADMIN — PHENYTOIN SODIUM 300 MG: 100 CAPSULE ORAL at 21:30

## 2019-12-03 RX ADMIN — ACETAMINOPHEN 650 MG: 325 TABLET ORAL at 03:45

## 2019-12-03 RX ADMIN — AMIODARONE HYDROCHLORIDE 100 MG: 200 TABLET ORAL at 08:38

## 2019-12-03 RX ADMIN — PIPERACILLIN AND TAZOBACTAM 3.38 G: 3; .375 INJECTION, POWDER, LYOPHILIZED, FOR SOLUTION INTRAVENOUS at 05:53

## 2019-12-03 RX ADMIN — HEPARIN SODIUM 5000 UNITS: 5000 INJECTION INTRAVENOUS; SUBCUTANEOUS at 13:47

## 2019-12-03 RX ADMIN — SODIUM CHLORIDE, SODIUM LACTATE, CALCIUM CHLORIDE, MAGNESIUM CHLORIDE AND DEXTROSE 2000 ML: 2.5; 538; 448; 18.3; 5.08 INJECTION, SOLUTION INTRAPERITONEAL at 13:39

## 2019-12-03 RX ADMIN — ASPIRIN 81 MG: 81 TABLET, COATED ORAL at 08:38

## 2019-12-03 RX ADMIN — SODIUM CHLORIDE, SODIUM LACTATE, CALCIUM CHLORIDE, MAGNESIUM CHLORIDE AND DEXTROSE 2000 ML: 2.5; 538; 448; 18.3; 5.08 INJECTION, SOLUTION INTRAPERITONEAL at 22:23

## 2019-12-03 RX ADMIN — LEVETIRACETAM 1000 MG: 10 INJECTION INTRAVENOUS at 03:45

## 2019-12-03 RX ADMIN — Medication 10 ML: at 05:53

## 2019-12-03 RX ADMIN — ATORVASTATIN CALCIUM 40 MG: 40 TABLET, FILM COATED ORAL at 21:30

## 2019-12-03 NOTE — PROGRESS NOTES
Problem: Falls - Risk of  Goal: *Absence of Falls  Description  Document Kendrick Middleton Fall Risk and appropriate interventions in the flowsheet.   Outcome: Progressing Towards Goal  Note: Fall Risk Interventions:  Mobility Interventions: Utilize walker, cane, or other assistive device         Medication Interventions: Patient to call before getting OOB    Elimination Interventions: Call light in reach    History of Falls Interventions: Bed/chair exit alarm

## 2019-12-03 NOTE — PROGRESS NOTES
Bedside and Verbal shift change report given to Viridiana RN (oncoming nurse) by Jarvis Quiroga RN (offgoing nurse). Report included the following information SBAR, Kardex, Intake/Output, MAR and Quality Measures.

## 2019-12-03 NOTE — PROGRESS NOTES
Bedside and Verbal shift change report given to Fabi Reyes (oncoming nurse) by Ana Betancourt (offgoing nurse). Report included the following information SBAR, Kardex, Intake/Output, MAR and Recent Results.

## 2019-12-03 NOTE — PROGRESS NOTES
Hospitalist Progress Note    NAME: Jacques ePna   :  1955   MRN:  [id number]       Assessment / Plan:  Sepsis POA  Due to Acute bacterial peritonitis in peritoneal dialysis patient   Continue IV Zosyn  Appreciate ID assistance  Nephrology doesn't think PD catheter needed to be removed  F/u PD and blood cultures  CT abdomen/pelvis with Large amount of ascites with peritoneal catheter in place. No evidence for small bowel obstruction    Constipation  ? Abdominal discomfort related to constipation vs peritonitis  Lactulose didn't work yesterday  Fleet Enema today  Start Pericolace and miralax  PRN sorbitol    ESRD  Nephrology followup appreciate  On peritoneal dialysis    Hx of Seizure  Continue home medication Keppra/Phenytoin    Hypertension  Resume coreg  Holding ARBs    Chronic systolic CHF with EF 31-60% on recent echo  Fluid management as per nephrology    Code Status: Full   Surrogate Decision Maker: Jaguar Hassan  DVT Prophylaxis: Heparin   GI Prophylaxis: not indicated  Body mass index is 35.85 kg/m².: 30.0 - 39.9 Obese     Subjective: Pt seen and examined at bedside. NAD. Complaining of constipation. Overnight events d/w RN     Chief Complaint / Reason for Physician Visit: f/u \"sepsis, bacterial peritonitis\"    Review of Systems:  Symptom Y/N Comments  Symptom Y/N Comments   Fever/Chills n   Chest Pain n    Poor Appetite    Edema     Cough y   Abdominal Pain y    Sputum    Joint Pain     SOB/MAYORGA n   Pruritis/Rash     Nausea/vomit    Tolerating PT/OT     Diarrhea    Tolerating Diet y    Constipation    Other       Could NOT obtain due to:      Objective:     VITALS:   Last 24hrs VS reviewed since prior progress note.  Most recent are:  Patient Vitals for the past 24 hrs:   Temp Pulse Resp BP SpO2   19 1537 98.1 °F (36.7 °C) (!) 103 18 152/87 95 %   19 1113 97.3 °F (36.3 °C) 87 18 151/89 96 %   19 0741 98.3 °F (36.8 °C) 93 18 148/86 96 %   19 0347 98.3 °F (36.8 °C) 86 18 134/82 97 %   12/02/19 2024 98.1 °F (36.7 °C) 89 18 134/76 95 %       Intake/Output Summary (Last 24 hours) at 12/3/2019 1811  Last data filed at 12/3/2019 1430  Gross per 24 hour   Intake 59307 ml   Output 8500 ml   Net 2520 ml        PHYSICAL EXAM:  General: WD, WN. Alert, cooperative, looks acutely ill   EENT:  EOMI. Anicteric sclerae. MMM  Resp:  CTA bilaterally, no wheezing or rales. No accessory muscle use  CV:  Regular  rhythm,  No edema  GI:  Mildly tender , mildly distended abdomen .  +Bowel sounds                      NO guarding or rebound tenderness  Neurologic:  Alert and oriented X 3, normal speech,   No LE swelling     Reviewed most current lab test results and cultures  YES  Reviewed most current radiology test results   YES  Review and summation of old records today    NO  Reviewed patient's current orders and MAR    YES  PMH/SH reviewed - no change compared to H&P  ________________________________________________________________________  Care Plan discussed with:    Comments   Patient y    Family      RN y    Care Manager     Consultant                        Multidiciplinary team rounds were held today with , nursing, pharmacist and clinical coordinator. Patient's plan of care was discussed; medications were reviewed and discharge planning was addressed. ________________________________________________________________________  Total NON critical care TIME: 30 Minutes    Total CRITICAL CARE TIME Spent:   Minutes non procedure based      Comments   >50% of visit spent in counseling and coordination of care x Chart review   ________________________________________________________________________  Viviane Duncans, MD     Procedures: see electronic medical records for all procedures/Xrays and details which were not copied into this note but were reviewed prior to creation of Plan. LABS:  I reviewed today's most current labs and imaging studies.   Pertinent labs include:  Recent Labs 12/03/19 0216 12/02/19  0435 12/01/19  0536   WBC 16.9* 23.9* 29.0*   HGB 8.8* 8.7* 10.3*   HCT 28.0* 28.2* 32.9*    303 389     Recent Labs     12/03/19 0216 12/02/19 0435 12/01/19  0536   * 135* 138   K 3.7 3.9 4.8   CL 95* 96* 98   CO2 30 28 27   GLU 84 101* 53*   BUN 47* 51* 46*   CREA 8.69* 9.13* 9.85*   CA 7.8* 7.9* 8.5   ALB 1.6* 1.6* 2.0*   TBILI 0.3 0.7 0.7   SGOT 18 22 26   ALT 14 14 17       Signed: Hunter Carpenter MD

## 2019-12-03 NOTE — PROGRESS NOTES
NAME: Jack Bazan        :  1955        MRN:  [id number]        Assessment :    Plan:  --ESKD  Peritonitis  Tenckhoff catheter, thoracic PD catheter     Seizure d/o  Anemia of ESKD  HTN  SHPT    Constipation On CAPD while hospitalized (2.5 % dextrose, 2 liter dwell, 5 exchanges)    Peripheral leukocytosis improving; Cell count on PD fluid improving - 7800 to 207; No need for PD cath removal from my standpoint; abdominal pain would seem to be from constipation? Few Enterococcus species on culture    Hgb < 9; retacrit 10 k sc biw    On Calcitriol    On Maxipime, Vanco       Subjective:     Chief Complaint:  Still with abdominal pain. Constipated. No BM in a few days (normally every day). Review of Systems:    Symptom Y/N Comments  Symptom Y/N Comments   Fever/Chills    Chest Pain     Poor Appetite    Edema     Cough    Abdominal Pain y    Sputum    Joint Pain     SOB/MAYORGA    Pruritis/Rash     Nausea/vomit    Tolerating PT/OT     Diarrhea    Tolerating Diet     Constipation y   Other       Could not obtain due to:      Objective:     VITALS:   Last 24hrs VS reviewed since prior progress note.  Most recent are:  Visit Vitals  /82 (BP 1 Location: Right arm, BP Patient Position: At rest)   Pulse 86   Temp 98.3 °F (36.8 °C)   Resp 18   Ht 5' 3\" (1.6 m)   Wt 90.1 kg (198 lb 10.2 oz)   SpO2 97%   BMI 35.19 kg/m²       Intake/Output Summary (Last 24 hours) at 12/3/2019 0709  Last data filed at 12/3/2019 4756  Gross per 24 hour   Intake 56055 ml   Output 21430 ml   Net 1720 ml      Telemetry Reviewed:     PHYSICAL EXAM:  General: NAD  Abdomen distended, tender  Left chest PD cath exit site with no drainage/erythema    Lab Data Reviewed: (see below)    Medications Reviewed: (see below)    PMH/SH reviewed - no change compared to H&P  ________________________________________________________________________  Care Plan discussed with:  Patient     Family      RN     Care Manager                    Consultant:          Comments   >50% of visit spent in counseling and coordination of care       ________________________________________________________________________  Jackie Bryant MD     Procedures: see electronic medical records for all procedures/Xrays and details which  were not copied into this note but were reviewed prior to creation of Plan. LABS:  Recent Labs     12/03/19 0216 12/02/19  0435   WBC 16.9* 23.9*   HGB 8.8* 8.7*   HCT 28.0* 28.2*    303     Recent Labs     12/03/19 0216 12/02/19 0435 12/01/19  0536   * 135* 138   K 3.7 3.9 4.8   CL 95* 96* 98   CO2 30 28 27   BUN 47* 51* 46*   CREA 8.69* 9.13* 9.85*   GLU 84 101* 53*   CA 7.8* 7.9* 8.5     Recent Labs     12/03/19 0216 12/02/19  0435 12/01/19  0536   SGOT 18 22 26    92 122*   TP 6.6 6.1* 6.9   ALB 1.6* 1.6* 2.0*   GLOB 5.0* 4.5* 4.9*     No results for input(s): INR, PTP, APTT, INREXT, INREXT in the last 72 hours. No results for input(s): FE, TIBC, PSAT, FERR in the last 72 hours. No results found for: FOL, RBCF   No results for input(s): PH, PCO2, PO2 in the last 72 hours. No results for input(s): CPK, CKMB in the last 72 hours.     No lab exists for component: TROPONINI  No components found for: [place]  Lab Results   Component Value Date/Time    Color YELLOW/STRAW 04/03/2015 08:31 PM    Appearance CLOUDY (A) 04/03/2015 08:31 PM    Specific gravity 1.013 04/03/2015 08:31 PM    pH (UA) 7.5 04/03/2015 08:31 PM    Protein 300 (A) 04/03/2015 08:31 PM    Glucose NEGATIVE  04/03/2015 08:31 PM    Ketone NEGATIVE  04/03/2015 08:31 PM    Bilirubin NEGATIVE  04/03/2015 08:31 PM    Urobilinogen 0.2 04/03/2015 08:31 PM    Nitrites NEGATIVE  04/03/2015 08:31 PM    Leukocyte Esterase NEGATIVE  04/03/2015 08:31 PM    Epithelial cells MANY (A) 04/03/2015 08:31 PM    Bacteria 1+ (A) 04/03/2015 08:31 PM    WBC 0-4 04/03/2015 08:31 PM    RBC 0-5 04/03/2015 08:31 PM       MEDICATIONS:  Current Facility-Administered Medications   Medication Dose Route Frequency    phenytoin ER (DILANTIN ER) ER capsule 300 mg  300 mg Oral QHS    piperacillin-tazobactam (ZOSYN) 3.375 g in 0.9% sodium chloride (MBP/ADV) 100 mL  3.375 g IntraVENous Q12H    sodium chloride (NS) flush 5-40 mL  5-40 mL IntraVENous Q8H    sodium chloride (NS) flush 5-40 mL  5-40 mL IntraVENous PRN    acetaminophen (TYLENOL) tablet 650 mg  650 mg Oral Q6H PRN    ondansetron (ZOFRAN) injection 4 mg  4 mg IntraVENous Q6H PRN    bisacodyl (DULCOLAX) tablet 5 mg  5 mg Oral DAILY PRN    heparin (porcine) injection 5,000 Units  5,000 Units SubCUTAneous Q8H    levETIRAcetam in saline (iso-os) (KEPPRA) infusion 1,000 mg  1,000 mg IntraVENous Q12H    amiodarone (CORDARONE) tablet 100 mg  100 mg Oral DAILY    atorvastatin (LIPITOR) tablet 40 mg  40 mg Oral QHS    aspirin delayed-release tablet 81 mg  81 mg Oral DAILY    clopidogrel (PLAVIX) tablet 75 mg  75 mg Oral DAILY    [START ON 12/5/2019] calcitRIOL (ROCALTROL) capsule 0.25 mcg  0.25 mcg Oral Once per day on Thu Sat    pantoprazole (PROTONIX) tablet 40 mg  40 mg Oral DAILY    furosemide (LASIX) tablet 40 mg  40 mg Oral DAILY    peritoneal dialysis DEXTROSE 2.5% (2.5 mEq/L low calcium) solution 2,000 mL  2,000 mL IntraPERitoneal 5XD    VANCOMYCIN INFORMATION NOTE   Other PRN    lacosamide (VIMPAT) tablet 150 mg  150 mg Oral BID

## 2019-12-04 LAB
ALBUMIN SERPL-MCNC: 1.6 G/DL (ref 3.5–5)
ALBUMIN/GLOB SERPL: 0.4 {RATIO} (ref 1.1–2.2)
ALP SERPL-CCNC: 118 U/L (ref 45–117)
ALT SERPL-CCNC: 12 U/L (ref 12–78)
AMMONIA PLAS-SCNC: <10 UMOL/L
ANION GAP SERPL CALC-SCNC: 7 MMOL/L (ref 5–15)
AST SERPL-CCNC: 20 U/L (ref 15–37)
BASOPHILS # BLD: 0.2 K/UL (ref 0–0.1)
BASOPHILS NFR BLD: 1 % (ref 0–1)
BILIRUB SERPL-MCNC: 0.3 MG/DL (ref 0.2–1)
BUN SERPL-MCNC: 45 MG/DL (ref 6–20)
BUN/CREAT SERPL: 5 (ref 12–20)
CALCIUM SERPL-MCNC: 8.1 MG/DL (ref 8.5–10.1)
CHLORIDE SERPL-SCNC: 96 MMOL/L (ref 97–108)
CO2 SERPL-SCNC: 31 MMOL/L (ref 21–32)
CREAT SERPL-MCNC: 8.3 MG/DL (ref 0.55–1.02)
DIFFERENTIAL METHOD BLD: ABNORMAL
EOSINOPHIL # BLD: 0.5 K/UL (ref 0–0.4)
EOSINOPHIL NFR BLD: 4 % (ref 0–7)
ERYTHROCYTE [DISTWIDTH] IN BLOOD BY AUTOMATED COUNT: 18.6 % (ref 11.5–14.5)
GLOBULIN SER CALC-MCNC: 4.5 G/DL (ref 2–4)
GLUCOSE SERPL-MCNC: 80 MG/DL (ref 65–100)
HBV SURFACE AG SER QL: 0.28 INDEX
HBV SURFACE AG SER QL: NEGATIVE
HBV SURFACE AG SERPL QL CFM: NORMAL
HCT VFR BLD AUTO: 26.4 % (ref 35–47)
HCV AB SER IA-ACNC: 5.58 INDEX
HCV AB SERPL QL IA: REACTIVE
HCV COMMENT,HCGAC: ABNORMAL
HGB BLD-MCNC: 8.3 G/DL (ref 11.5–16)
HIV1 P24 AG SERPL QL IA: NONREACTIVE
HIV1+2 AB SERPL QL IA: NONREACTIVE
IMM GRANULOCYTES # BLD AUTO: 0.2 K/UL (ref 0–0.04)
IMM GRANULOCYTES NFR BLD AUTO: 1 % (ref 0–0.5)
LYMPHOCYTES # BLD: 1.7 K/UL (ref 0.8–3.5)
LYMPHOCYTES NFR BLD: 13 % (ref 12–49)
MCH RBC QN AUTO: 27.9 PG (ref 26–34)
MCHC RBC AUTO-ENTMCNC: 31.4 G/DL (ref 30–36.5)
MCV RBC AUTO: 88.6 FL (ref 80–99)
MONOCYTES # BLD: 0.8 K/UL (ref 0–1)
MONOCYTES NFR BLD: 6 % (ref 5–13)
NEUTS SEG # BLD: 9.6 K/UL (ref 1.8–8)
NEUTS SEG NFR BLD: 75 % (ref 32–75)
NRBC # BLD: 0 K/UL (ref 0–0.01)
NRBC BLD-RTO: 0 PER 100 WBC
PLATELET # BLD AUTO: 319 K/UL (ref 150–400)
POTASSIUM SERPL-SCNC: 3.5 MMOL/L (ref 3.5–5.1)
PROT SERPL-MCNC: 6.1 G/DL (ref 6.4–8.2)
RBC # BLD AUTO: 2.98 M/UL (ref 3.8–5.2)
SODIUM SERPL-SCNC: 134 MMOL/L (ref 136–145)
VANCOMYCIN SERPL-MCNC: 14.2 UG/ML
WBC # BLD AUTO: 12.8 K/UL (ref 3.6–11)

## 2019-12-04 PROCEDURE — 94760 N-INVAS EAR/PLS OXIMETRY 1: CPT

## 2019-12-04 PROCEDURE — 74011250637 HC RX REV CODE- 250/637: Performed by: INTERNAL MEDICINE

## 2019-12-04 PROCEDURE — 74011250636 HC RX REV CODE- 250/636: Performed by: INTERNAL MEDICINE

## 2019-12-04 PROCEDURE — 82140 ASSAY OF AMMONIA: CPT

## 2019-12-04 PROCEDURE — 65270000029 HC RM PRIVATE

## 2019-12-04 PROCEDURE — A4722 DIALYS SOL FLD VOL > 1999CC: HCPCS | Performed by: INTERNAL MEDICINE

## 2019-12-04 PROCEDURE — 90945 DIALYSIS ONE EVALUATION: CPT

## 2019-12-04 PROCEDURE — 97116 GAIT TRAINING THERAPY: CPT | Performed by: PHYSICAL THERAPIST

## 2019-12-04 PROCEDURE — 97161 PT EVAL LOW COMPLEX 20 MIN: CPT | Performed by: PHYSICAL THERAPIST

## 2019-12-04 PROCEDURE — 87205 SMEAR GRAM STAIN: CPT

## 2019-12-04 PROCEDURE — 80202 ASSAY OF VANCOMYCIN: CPT

## 2019-12-04 PROCEDURE — 80053 COMPREHEN METABOLIC PANEL: CPT

## 2019-12-04 PROCEDURE — 74011250636 HC RX REV CODE- 250/636: Performed by: EMERGENCY MEDICINE

## 2019-12-04 PROCEDURE — 85025 COMPLETE CBC W/AUTO DIFF WBC: CPT

## 2019-12-04 PROCEDURE — 36415 COLL VENOUS BLD VENIPUNCTURE: CPT

## 2019-12-04 PROCEDURE — 74011000258 HC RX REV CODE- 258: Performed by: INTERNAL MEDICINE

## 2019-12-04 RX ADMIN — FUROSEMIDE 40 MG: 40 TABLET ORAL at 08:52

## 2019-12-04 RX ADMIN — PIPERACILLIN AND TAZOBACTAM 3.38 G: 3; .375 INJECTION, POWDER, LYOPHILIZED, FOR SOLUTION INTRAVENOUS at 06:24

## 2019-12-04 RX ADMIN — SODIUM CHLORIDE, SODIUM LACTATE, CALCIUM CHLORIDE, MAGNESIUM CHLORIDE AND DEXTROSE 2000 ML: 2.5; 538; 448; 18.3; 5.08 INJECTION, SOLUTION INTRAPERITONEAL at 07:00

## 2019-12-04 RX ADMIN — Medication 10 ML: at 13:48

## 2019-12-04 RX ADMIN — PIPERACILLIN AND TAZOBACTAM 3.38 G: 3; .375 INJECTION, POWDER, LYOPHILIZED, FOR SOLUTION INTRAVENOUS at 17:41

## 2019-12-04 RX ADMIN — ASPIRIN 81 MG: 81 TABLET, COATED ORAL at 08:49

## 2019-12-04 RX ADMIN — SODIUM CHLORIDE, SODIUM LACTATE, CALCIUM CHLORIDE, MAGNESIUM CHLORIDE AND DEXTROSE 2000 ML: 2.5; 538; 448; 18.3; 5.08 INJECTION, SOLUTION INTRAPERITONEAL at 15:52

## 2019-12-04 RX ADMIN — SODIUM CHLORIDE, SODIUM LACTATE, CALCIUM CHLORIDE, MAGNESIUM CHLORIDE AND DEXTROSE 2000 ML: 2.5; 538; 448; 18.3; 5.08 INJECTION, SOLUTION INTRAPERITONEAL at 11:10

## 2019-12-04 RX ADMIN — CLOPIDOGREL BISULFATE 75 MG: 75 TABLET ORAL at 08:52

## 2019-12-04 RX ADMIN — PANTOPRAZOLE SODIUM 40 MG: 40 TABLET, DELAYED RELEASE ORAL at 08:52

## 2019-12-04 RX ADMIN — SODIUM CHLORIDE, SODIUM LACTATE, CALCIUM CHLORIDE, MAGNESIUM CHLORIDE AND DEXTROSE 2000 ML: 2.5; 538; 448; 18.3; 5.08 INJECTION, SOLUTION INTRAPERITONEAL at 21:36

## 2019-12-04 RX ADMIN — HEPARIN SODIUM 5000 UNITS: 5000 INJECTION INTRAVENOUS; SUBCUTANEOUS at 21:25

## 2019-12-04 RX ADMIN — CARVEDILOL 6.25 MG: 6.25 TABLET, FILM COATED ORAL at 08:49

## 2019-12-04 RX ADMIN — PHENYTOIN SODIUM 300 MG: 100 CAPSULE ORAL at 21:24

## 2019-12-04 RX ADMIN — LACOSAMIDE 150 MG: 50 TABLET, FILM COATED ORAL at 08:52

## 2019-12-04 RX ADMIN — LACOSAMIDE 150 MG: 50 TABLET, FILM COATED ORAL at 17:41

## 2019-12-04 RX ADMIN — LEVETIRACETAM 1000 MG: 10 INJECTION INTRAVENOUS at 15:47

## 2019-12-04 RX ADMIN — CARVEDILOL 6.25 MG: 6.25 TABLET, FILM COATED ORAL at 17:41

## 2019-12-04 RX ADMIN — Medication 10 ML: at 06:24

## 2019-12-04 RX ADMIN — Medication 10 ML: at 21:29

## 2019-12-04 RX ADMIN — SODIUM CHLORIDE, SODIUM LACTATE, CALCIUM CHLORIDE, MAGNESIUM CHLORIDE AND DEXTROSE 2000 ML: 2.5; 538; 448; 18.3; 5.08 INJECTION, SOLUTION INTRAPERITONEAL at 03:03

## 2019-12-04 RX ADMIN — HEPARIN SODIUM 5000 UNITS: 5000 INJECTION INTRAVENOUS; SUBCUTANEOUS at 06:24

## 2019-12-04 RX ADMIN — ATORVASTATIN CALCIUM 40 MG: 40 TABLET, FILM COATED ORAL at 21:24

## 2019-12-04 RX ADMIN — LEVETIRACETAM 1000 MG: 10 INJECTION INTRAVENOUS at 03:03

## 2019-12-04 RX ADMIN — ONDANSETRON 4 MG: 2 INJECTION INTRAMUSCULAR; INTRAVENOUS at 18:05

## 2019-12-04 RX ADMIN — ACETAMINOPHEN 650 MG: 325 TABLET ORAL at 14:34

## 2019-12-04 RX ADMIN — SENNOSIDES AND DOCUSATE SODIUM 2 TABLET: 8.6; 5 TABLET ORAL at 08:52

## 2019-12-04 RX ADMIN — AMIODARONE HYDROCHLORIDE 100 MG: 200 TABLET ORAL at 08:49

## 2019-12-04 NOTE — PROGRESS NOTES
Reason for Admission:   Sepsis secondary to bacterial peritonitis                  RRAT Score:     22             Do you (patient/family) have any concerns for transition/discharge? no              Plan for utilizing home health:   ?open to [facility]    Current Advanced Directive/Advance Care Plan:  Not in epic            Transition of Care Plan:      Pt lives in [place], has a supportive sister//dtr, medicare//Medicaid, full code, PCP Monique Austin, obese, EF 26%, and is legally blind. Chart sent to [facility] to confirm they can follow. [facility] closed the referral on Nov 1st.  They will need new orders if appropriate. 1400  D/C plans discussed with pt who states she lives with her sister and sister's dtr. She states her sister works. She states sister stays up at night to help do her PD and pt wants a SNF//NHP at d/c. Will send out to a couple of SNFs to assess for admission. Will order PT//OT to assess for mobility//safety. 1430  I spoke with pt's sister Kale Rogers who states pt could no longer stay where she had been and moved in with her shortime but can not stay due to her working and that Kale Rogers has a special needs dtr. She says pt has a dtr, ? Jie Brown who she will update and have call me. She is aware the MD is working on d/c plans and the pt is nearing medical stability. [address] SNF may be able to accept her if she can provide her own PD equipment. Care Management Interventions  PCP Verified by CM:  Yes  Transition of Care Consult (CM Consult): Discharge Planning  MyChart Signup: No  Discharge Durable Medical Equipment: No  Physical Therapy Consult: No  Occupational Therapy Consult: No  Speech Therapy Consult: No  Current Support Network: Family Lives Nearby  Confirm Follow Up Transport: Family  Plan discussed with Pt/Family/Caregiver: Yes  Discharge Location  Discharge Placement: Home

## 2019-12-04 NOTE — PROGRESS NOTES
Problem: Mobility Impaired (Adult and Pediatric)  Goal: *Acute Goals and Plan of Care (Insert Text)  Description  FUNCTIONAL STATUS PRIOR TO ADMISSION: Patient was modified independent using a SB quad cane for functional mobility for household distances. HOME SUPPORT PRIOR TO ADMISSION: The patient lived with sister who provided assistance with peritoneal dialysis . Physical Therapy Goals  Initiated 12/4/2019  1. Patient will move from supine to sit and sit to supine , scoot up and down and roll side to side in bed with supervision/set-up within 7 day(s). 2.  Patient will transfer from bed to chair and chair to bed with supervision/set-up using the least restrictive device within 7 day(s). 3.  Patient will perform sit to stand with supervision/set-up within 7 day(s). 4.  Patient will ambulate with supervision/set-up for 100 feet with the least restrictive device within 7 day(s). Outcome: Not Met   PHYSICAL THERAPY EVALUATION  Patient: Vinay Irene (57 y.o. female)  Date: 12/4/2019  Primary Diagnosis: Sepsis ([facility] [A41.9]  Sepsis ([facility] [A41.9]  Procedure(s) (LRB):  PACU/RECOVERY (N/A) 3 Days Post-Op   Precautions: falls; per chart patient is legally blind         ASSESSMENT  Based on the objective data described below, the patient presents with generalized weakness and impaired functional mobility, balance and endurance. Patient requiring CGA for transfers and ambulation. She was able to ambulate 10 feet today using RW. Patient reports modified independence using quad cane at baseline for household distances. Patient has been living with sister but per chart, she is not able to return there following discharge. Recommend SNF following discharge to maximize functional mobility and endurance. Current Level of Function Impacting Discharge (mobility/balance): CGA    Functional Outcome Measure:   The patient scored 4/20 on the Elderly mobility scale outcome measure which is indicative of dependence for overall functional independence. Other factors to consider for discharge: has been living with sister but is not able to return to her home following discharge     Patient will benefit from skilled therapy intervention to address the above noted impairments. PLAN :  Recommendations and Planned Interventions: bed mobility training, transfer training, gait training, therapeutic exercises, patient and family training/education, and therapeutic activities      Frequency/Duration: Patient will be followed by physical therapy:  4 times a week to address goals. Recommendation for discharge: (in order for the patient to meet his/her long term goals)  Therapy up to 5 days/week in SNF setting    This discharge recommendation:  A follow-up discussion with the attending provider and/or case management is planned    IF patient discharges home will need the following DME: to be determined (TBD)         SUBJECTIVE:   Patient stated I know I don't move very well but if I close my eyes I feel like Wonder Woman.     OBJECTIVE DATA SUMMARY:   HISTORY:    Past Medical History:   Diagnosis Date    Acute on chronic respiratory failure ([facility] [address] 7/18/2019    Blood clot in vein     left arm several years ago    CAD (coronary artery disease)     Chronic kidney disease     Chronic systolic heart failure (HCC) 7/18/2019    Congestive heart failure (HCC)     Hypercholesterolemia     Hypertension     Legally blind     PAF (paroxysmal atrial fibrillation) ([facility] [address] 7/18/2019    Seizures ([facility] [address]     Stroke [facility])      Past Surgical History:   Procedure Laterality Date    CARDIAC SURG PROCEDURE UNLIST      heart attack 12/15    HX ORTHOPAEDIC      right middle finger    HX OTHER SURGICAL      shunt placed in brain       Home Situation  Home Environment: Private residence  One/Two Story Residence: One story  Living Alone: No  Support Systems: Family member(s), Friends \ neighbors  Patient Expects to be Discharged to[employer] residence  Current DME Used/Available at Home: None    EXAMINATION/PRESENTATION/DECISION MAKING:   Critical Behavior:  Neurologic State: Alert  Orientation Level: Oriented X4  Cognition: Follows commands     Hearing: Auditory  Auditory Impairment: None    Range Of Motion:  AROM: Generally decreased, functional                       Strength:    Strength: Generally decreased, functional                    Tone & Sensation:   Tone: Normal              Sensation: Impaired(pt report neuropathy in B feet)               Coordination:  Coordination: Within functional limits  Vision:    Per chart patient is legally blind  Functional Mobility:  Bed Mobility:   Deferred; patient sitting on BSC upon arrival           Transfers:  Sit to Stand: Contact guard assistance  Stand to Sit: Contact guard assistance                       Balance:   Sitting: Intact  Standing: Impaired  Standing - Static: Good;Constant support  Standing - Dynamic : Fair;Constant support(using RW for support)  Ambulation/Gait Training:  Distance (ft): 10 Feet (ft)  Assistive Device: Walker, rolling;Gait belt  Ambulation - Level of Assistance: Contact guard assistance        Gait Abnormalities: Decreased step clearance        Base of Support: Widened     Speed/Dalia: Pace decreased (<100 feet/min); Slow  Step Length: Left shortened;Right shortened      Gait is slow and mildly unsteady but no overt LOB noted         Functional Measure:    Elder Mobility Scale    4/20         Scores under 10 - generally these patients are dependent in mobility maneuvers; require help with  basic ADL, such as transfers, toileting and dressing. Scores between 10 - 13 - generally these patients are borderline in terms of safe mobility and  independence in ADL i.e. they require some help with some mobility maneuvers. Scores over 14 - Generally these patients are able to perform mobility maneuvers alone and safely  and are independent in basic ADL.           Activity Tolerance:   Fair  Please refer to the flowsheet for vital signs taken during this treatment. After treatment patient left in no apparent distress:   Supine in bed and Call bell within reach    COMMUNICATION/EDUCATION:   The patients plan of care was discussed with: Registered Nurse. Fall prevention education was provided and the patient/caregiver indicated understanding., Patient/family have participated as able in goal setting and plan of care. , and Patient/family agree to work toward stated goals and plan of care.     Thank you for this referral.  Ga Wagner, PT   Time Calculation: 22 mins

## 2019-12-04 NOTE — PROGRESS NOTES
Hospitalist Progress Note    NAME: Shana Smith   :  1955   MRN:  [id number]       Assessment / Plan:  Hep C (New Dx)  I have informed patient about new Dx  Nursing will inform nephrology as well as PD patient  I have discussed with her need to follow up with GI as OP for further treatment and plan  Considering altered mental status earlier today, check Ammonia   CT A/P with 1.5 cm hypodensity with peripheral globular enhancement    Sepsis POA  Due to Acute bacterial peritonitis in peritoneal dialysis patient   On IV Zosyn  Appreciate ID assistance  Nephrology doesn't think PD catheter needed to be removed. Pt continue to have abdomninel tenderness despite bowel movements. Catheter seems infected, cultures sent from around the PD catheter yesterday per ID recommendations  F/u PD and blood cultures and cultures from around the PD cath  Awaiting final recommendations of abx by nephrology/ID  CT abdomen/pelvis with Large amount of ascites with peritoneal catheter in place. No evidence for small bowel obstruction    Constipation - resolved  ? Abdominal discomfort related to constipation vs peritonitis  Lactulose didn't work yesterday  Fleet Enema today  Start Pericolace and miralax  PRN sorbitol    ESRD  Nephrology followup appreciate  On peritoneal dialysis    Hx of Seizure  Continue home medication Keppra/Phenytoin    Hypertension  Resume coreg  Holding ARBs    Chronic systolic CHF with EF 15-25% on recent echo  Fluid management as per nephrology    Code Status: Full   Surrogate Decision Maker: Jaguar Hassan  DVT Prophylaxis: Heparin   GI Prophylaxis: not indicated  Body mass index is 35.85 kg/m².: 30.0 - 39.9 Obese     Subjective: Pt seen and examined at bedside. NAD. hallucinations earlier today.  Now AAAx3 with good insight Overnight events d/w RN     Chief Complaint / Reason for Physician Visit: f/u \"sepsis, bacterial peritonitis\"    Review of Systems:  Symptom Y/N Comments  Symptom Y/N Comments Fever/Chills n   Chest Pain n    Poor Appetite    Edema     Cough n   Abdominal Pain y    Sputum    Joint Pain     SOB/MAYORGA n   Pruritis/Rash     Nausea/vomit    Tolerating PT/OT     Diarrhea    Tolerating Diet y    Constipation    Other       Could NOT obtain due to:      Objective:     VITALS:   Last 24hrs VS reviewed since prior progress note. Most recent are:  Patient Vitals for the past 24 hrs:   Temp Pulse Resp BP SpO2   12/04/19 0646 98.3 °F (36.8 °C) 92 18 144/82 96 %   12/04/19 0237 98.3 °F (36.8 °C) 91 18 144/80 96 %   12/03/19 2239 98.3 °F (36.8 °C) (!) 104 18 148/86 96 %   12/03/19 1936 98.2 °F (36.8 °C) 99 20 153/85 96 %   12/03/19 1537 98.1 °F (36.7 °C) (!) 103 18 152/87 95 %   12/03/19 1113 97.3 °F (36.3 °C) 87 18 151/89 96 %       Intake/Output Summary (Last 24 hours) at 12/4/2019 0945  Last data filed at 12/4/2019 0654  Gross per 24 hour   Intake 16301 ml   Output 30574 ml   Net 1540 ml        PHYSICAL EXAM:  General: WD, WN. Alert, cooperative, looks acutely ill   EENT:  EOMI. Anicteric sclerae. MMM  Resp:  CTA bilaterally, no wheezing or rales. No accessory muscle use  CV:  Regular  rhythm,  No edema  GI:  Mildly tender , mildly distended abdomen .  +Bowel sounds                      NO guarding or rebound tenderness  Neurologic:  Alert and oriented X 3, normal speech,   No LE swelling     Reviewed most current lab test results and cultures  YES  Reviewed most current radiology test results   YES  Review and summation of old records today    NO  Reviewed patient's current orders and MAR    YES  PMH/SH reviewed - no change compared to H&P  ________________________________________________________________________  Care Plan discussed with:    Comments   Patient y    Family      RN y    Care Manager     Consultant                        Multidiciplinary team rounds were held today with , nursing, pharmacist and clinical coordinator.   Patient's plan of care was discussed; medications were reviewed and discharge planning was addressed. ________________________________________________________________________  Total NON critical care TIME: 30 Minutes    Total CRITICAL CARE TIME Spent:   Minutes non procedure based      Comments   >50% of visit spent in counseling and coordination of care     ________________________________________________________________________  Brittani Gann MD     Procedures: see electronic medical records for all procedures/Xrays and details which were not copied into this note but were reviewed prior to creation of Plan. LABS:  I reviewed today's most current labs and imaging studies.   Pertinent labs include:  Recent Labs     12/04/19 0134 12/03/19  0216 12/02/19  0435   WBC 12.8* 16.9* 23.9*   HGB 8.3* 8.8* 8.7*   HCT 26.4* 28.0* 28.2*    337 303     Recent Labs     12/04/19  0134 12/03/19  0216 12/02/19  0435   * 135* 135*   K 3.5 3.7 3.9   CL 96* 95* 96*   CO2 31 30 28   GLU 80 84 101*   BUN 45* 47* 51*   CREA 8.30* 8.69* 9.13*   CA 8.1* 7.8* 7.9*   ALB 1.6* 1.6* 1.6*   TBILI 0.3 0.3 0.7   SGOT 20 18 22   ALT 12 14 14       Signed: Brittani Gann MD

## 2019-12-04 NOTE — CDMP QUERY
Pt admitted with Sepsis. Pt noted to have Peritonitis. If possible, please clarify in progress notes and d/c summary the relationship, if any, between peritonitis and peritoneal cathter. Are the conditions: 
? Peritonitis Due to or associated with peritoneal dialysis catheter ? Peritonitis Unrelated to peritoneal dialysis catheter 
? Other, please specify ? Unable to determine The medical record reflects the following: 
  Risk Factors: 64 BF w/hx: ESRD and peritoneal dialysis Clinical Indicators: Admitted with peritonitis, per notes \"Nephrology doesn't think PD catheter needed to be removed. Pt continue to have abdomninel tenderness despite bowel movements. Catheter seems infected, cultures sent from around the PD catheter yesterday per ID recommendations\" Treatment: Zosyn IV, ID consult, pending cultures Thank you, [email or web address] 
[phone]

## 2019-12-04 NOTE — CONSULTS
ID  Reason for Consult -Peritonitis in PD pt  Requesting Physician -Dr Matt Louise  -Sepsis with WBC 29, abdominal pain/ constipation , T max 99.6 on 12/1  -Peritoneal fld cloudy WBC 7810, N-95  -Fld culture- few Enterococcus spp, rare Lactobacillus spp  - ESRD on PD, thoracic Tenckhoff   Plan  - Continue Zosyn IV ( or Ampicillin ) x 2 weeks  - If exit site infection recommend PD catheter removal  - Nephrology on case, I defer to Dr Janet Turner for further recommendations  - D/w Dr Tonya Ruth seen at request of Dr Vivien Simmons for peritonitis     Yessica Stone is a  62 y.o. female with ESRD on peritoneal dialysis, CHF, seizures who presented to ED with  c/o generalized abdominal pain and constipation since day PTA. Had small BM's the past few days PTA , most recently day PTA . No N/V, no fevers. Pain in the abdomen was  generalized, severe, cramping and tight in nature. No prior similar pain.   WBC 29,Tmax 99.1 on admission , currently afebrile . WBC today 16.9  Peritoneal fld was cloudy WBC 7810, N-95  -Fld culture- few Enterococcus spp, rare Lactobacillus spp  Pt seen today. Says she has abdominal pain, less than before . Does not want to be switched to HD ,even if she has infection in peritoneal fluid.  Pt denies fevers    Past Medical History:   Diagnosis Date    Acute on chronic respiratory failure ([address] 7/18/2019    Blood clot in vein     left arm several years ago    CAD (coronary artery disease)     Chronic kidney disease     Chronic systolic heart failure ([address] 7/18/2019    Congestive heart failure ([address]     Hypercholesterolemia     Hypertension     Legally blind     PAF (paroxysmal atrial fibrillation) ([address] 7/18/2019    Seizures ([address]     Stroke [facility])      Past Surgical History:   Procedure Laterality Date    CARDIAC SURG PROCEDURE UNLIST      heart attack 12/15    HX ORTHOPAEDIC      right middle finger    HX OTHER SURGICAL      shunt placed in brain Allergies   Allergen Reactions    Gabapentin Other (comments) and Seizures     Confusion, psychosis \"I was acting like a 4year old at a family event, drawing on walls and everything\"       Social History     Socioeconomic History    Marital status: SINGLE     Spouse name: Not on file    Number of children: Not on file    Years of education: Not on file    Highest education level: Not on file   Occupational History    Not on file   Social Needs    Financial resource strain: Not on file    Food insecurity:     Worry: Not on file     Inability: Not on file    Transportation needs:     Medical: Not on file     Non-medical: Not on file   Tobacco Use    Smoking status: Never Smoker    Smokeless tobacco: Never Used   Substance and Sexual Activity    Alcohol use: No    Drug use: Yes     Types: Marijuana     Comment: last used 1 week ago    Sexual activity: Never   Lifestyle    Physical activity:     Days per week: Not on file     Minutes per session: Not on file    Stress: Not on file   Relationships    Social connections:     Talks on phone: Not on file     Gets together: Not on file     Attends Buddhist service: Not on file     Active member of club or organization: Not on file     Attends meetings of clubs or organizations: Not on file     Relationship status: Not on file    Intimate partner violence:     Fear of current or ex partner: Not on file     Emotionally abused: Not on file     Physically abused: Not on file     Forced sexual activity: Not on file   Other Topics Concern    Not on file   Social History Narrative    Not on file     Family Status   Relation Name Status    Mother  Other    Father  Other    Other  (Not Specified)    Other  (Not Specified)    Other  (Not Specified)     Medication Documentation Review Audit     Reviewed by Greta Morales CPHT (Technician) on 12/01/19 at 1031    Medication Sig Documenting Provider Last Dose Status Taking?   amiodarone (CORDARONE) 200 mg tablet Take 100 mg by mouth nightly. Provider, Historical 11/29/2019 Unknown time Active Yes   atorvastatin (LIPITOR) 40 mg tablet TAKE 1 TABLET BY MOUTH ONCE DAILY Shree Eduardo DO 11/30/2019 Unknown time Active Yes   bismuth subsalicylate (PEPTO-BISMOL MAXIMUM STRENGTH) 525 mg/15 mL susp Take 30 mL by mouth every six (6) hours as needed (upset stomach). Provider, Historical 11/30/2019 Unknown time Active Yes   carvedilol (COREG) 6.25 mg tablet Take 6.25 mg by mouth two (2) times daily (with meals). Provider, Historical 11/30/2019 Unknown time Active Yes   clopidogrel (PLAVIX) 75 mg tab Take 1 Tab by mouth daily. Ellie Bai MD 11/30/2019 Unknown time Active Yes   docusate sodium (COLACE) 100 mg capsule Take 100 mg by mouth two (2) times daily as needed for Constipation. Provider, Historical 11/30/2019 Unknown time Active Yes   gentamicin (GARAMYCIN) 0.1 % topical cream Apply  to affected area daily. Apply to Cath wound Provider, Historical 11/30/2019 Unknown time Active Yes   glycerin, adult, (FLEET GLYCERIN, ADULT,) suppository Insert 1 Suppository into rectum daily as needed (constipation). Provider, Historical 11/30/2019 Unknown time Active Yes   isosorbide mononitrate ER (IMDUR) 60 mg CR tablet Take 1 Tab by mouth every morning. Ellie Bai MD 11/30/2019 Unknown time Active Yes   lacosamide (VIMPAT) 150 mg tab tablet Take 150 mg by mouth two (2) times a day. Provider, Historical 11/30/2019 Unknown time Active Yes   lactulose (CHRONULAC) 10 gram/15 mL solution Take 30 g by mouth three (3) times daily as needed (constipation). Provider, Historical 11/30/2019 Unknown time Active Yes   levETIRAcetam (KEPPRA) 500 mg tablet Take 500 mg by mouth two (2) times a day. Provider, Historical 11/30/2019 Unknown time Active Yes   losartan (COZAAR) 25 mg tablet Take 25 mg by mouth nightly. Provider, Historical 11/29/2019 Unknown time Active Yes   omeprazole (PRILOSEC) 20 mg capsule Take 20 mg by mouth nightly. Provider, Historical 11/29/2019 Unknown time Active Yes           Med Note (Sebastian Pino   Wed Jul 17, 2019 12:41 PM)     phenytoin (DILANTIN ER) 300 mg ER capsule Take 300 mg by mouth nightly. Provider, Historical 11/29/2019 Unknown time Active Yes   potassium chloride (K-DUR, KLOR-CON) 20 mEq tablet Take 10 mEq by mouth nightly. Provider, Historical 11/29/2019 Unknown time Active Yes   sevelamer (RENAGEL) 800 mg tablet Take 1,600 mg by mouth two (2) times daily (with meals). Patient takes 1600 mg BIDCC (breakfast and dinner) and 800 mg with lunch Provider, Historical 11/30/2019 Unknown time Active Yes   sevelamer (RENAGEL) 800 mg tablet Take 800 mg by mouth daily (with lunch). Patient takes 1600 mg BIDCC (breakfast and dinner) and 800 mg with lunch Provider, Historical 11/29/2019 Unknown time Active Yes   trolamine salicylate-aloe vera 59% (ASPERCREME) topical cream Apply  to affected area as needed for Pain. apply as needed for pain on hands, feet lower back, shoulders Toro Benton MD 11/24/2019 Unknown time Active Yes              Review of Systems - Negative except those mentioned in HPI , 10 point ROS obtained    Physical Exam:   General:  Alert, cooperative, well developed, appears stated age   Eyes:  Sclera anicteric. Pupils equally round and reactive to light. Mouth/Throat: Mucous membranes normal, oral pharynx clear   Neck: Supple   Lungs:   Clear to auscultation bilaterally, good effort  Chest - catheter +   CV:  Regular rate and rhythm,no murmur, click, rub or gallop   Abdomen:   Soft, non-tender.  bowel sounds normal. non-distended   Extremities: No cyanosis or edema   Skin: Skin color, texture, turgor normal. no acute rash or lesions   Lymph nodes: Cervical and supraclavicular normal   Musculoskeletal: No swelling or deformity   Lines/Devices:  Intact, no erythema, drainage or tenderness   Psych: Alert and oriented, normal mood affect

## 2019-12-04 NOTE — PROGRESS NOTES
Bedside and Verbal shift change report given to Viridiana GRAF (oncoming nurse) by Jay Jay Weaver RN (offgoing nurse). Report included the following information SBAR, Kardex, Intake/Output, MAR and Quality Measures. Patient stating that staff didn't clean her up and \"left me sitting in my poop\" all day. Patient increasingly confused and saying she is \"the patient and should be listened to and treated better\". Patient increasingly particular about PD and states \"that's not how I do it at home, my PD nurse taught me something different and that I shouldn't change how I do my PD, Phi Likens been doing it for 6 years, you're doing it wrong\" . Patient also states  \"nobody wants me to get better, no doctor comes in to see me. \" Educated patient on orders and how PD is done while at the hospital may be a little bit different from home PD but is still the same.    Patient states she is having hallucinations and that she is \"at a furniture store and needs to get off the sofa\"

## 2019-12-04 NOTE — PROGRESS NOTES
NAME: Yessica Stone        :  1955        MRN:  [id number]        Assessment :    Plan:  --ESKD  Peritonitis  Tenckhoff catheter, thoracic PD catheter     Seizure d/o  Anemia of ESKD  HTN  SHPT    Constipation CCPD at home; On CAPD while hospitalized (2.5 % dextrose, 2 liter dwell, 5 exchanges)    Peripheral leukocytosis improving; afebrile; clear dialysate; Cell count on PD fluid markedly improved - 7800 to 207; No need for PD cath removal from my standpoint; odd for continued abdominal pain from peritonitis given improved WBC's    She tells me should would rather die than do HD; given this, I think we have to try to continue to try to treat though infection    Few Enterococcus species and rare lactobacillus on culture early ; culture done later on  is ngtd    Likely peritonitis from visceral (contamination via bacteria from bowel) or vaginal (rarely) source. Hgb < 9; retacrit 10 k sc biw    On Calcitriol    On Maxipime, Vanco       Subjective:     Chief Complaint:  Still with abdominal pain. States a good BM yesterday. States she always gets abdominal pain when she is in the hospital? States she doesn't follow a renal diet as an outpatient and is not eating here b/c of the renal diet. No danita. No sob. Review of Systems:    Symptom Y/N Comments  Symptom Y/N Comments   Fever/Chills    Chest Pain     Poor Appetite    Edema     Cough    Abdominal Pain y    Sputum    Joint Pain     SOB/MAYORGA    Pruritis/Rash     Nausea/vomit    Tolerating PT/OT     Diarrhea    Tolerating Diet     Constipation    Other       Could not obtain due to:      Objective:     VITALS:   Last 24hrs VS reviewed since prior progress note.  Most recent are:  Visit Vitals  /82 (BP 1 Location: Right arm, BP Patient Position: At rest)   Pulse 92   Temp 98.3 °F (36.8 °C)   Resp 18   Ht 5' 3\" (1.6 m)   Wt 91.8 kg (202 lb 6.1 oz)   SpO2 96%   BMI 35.85 kg/m²       Intake/Output Summary (Last 24 hours) at 12/4/2019 0740  Last data filed at 12/4/2019 0654  Gross per 24 hour   Intake 48497 ml   Output 13331 ml   Net 1740 ml      Telemetry Reviewed:     PHYSICAL EXAM:  General: NAD  Abdomen tender, diffuse  Left chest PD cath exit site with no drainage/erythema    Lab Data Reviewed: (see below)    Medications Reviewed: (see below)    PMH/SH reviewed - no change compared to H&P  ________________________________________________________________________  Care Plan discussed with:  Patient y    [facility]:          Comments   >50% of visit spent in counseling and coordination of care       ________________________________________________________________________  Federico Gonzales MD     Procedures: see electronic medical records for all procedures/Xrays and details which  were not copied into this note but were reviewed prior to creation of Plan. LABS:  Recent Labs     12/04/19 0134 12/03/19 0216   WBC 12.8* 16.9*   HGB 8.3* 8.8*   HCT 26.4* 28.0*    337     Recent Labs     12/04/19 0134 12/03/19 0216 12/02/19  0435   * 135* 135*   K 3.5 3.7 3.9   CL 96* 95* 96*   CO2 31 30 28   BUN 45* 47* 51*   CREA 8.30* 8.69* 9.13*   GLU 80 84 101*   CA 8.1* 7.8* 7.9*     Recent Labs     12/04/19 0134 12/03/19 0216 12/02/19  0435   SGOT 20 18 22   * 100 92   TP 6.1* 6.6 6.1*   ALB 1.6* 1.6* 1.6*   GLOB 4.5* 5.0* 4.5*     No results for input(s): INR, PTP, APTT, INREXT, INREXT in the last 72 hours. No results for input(s): FE, TIBC, PSAT, FERR in the last 72 hours. No results found for: FOL, RBCF   No results for input(s): PH, PCO2, PO2 in the last 72 hours. No results for input(s): CPK, CKMB in the last 72 hours.     No lab exists for component: TROPONINI  No components found for: [place]  Lab Results   Component Value Date/Time    Color YELLOW/STRAW 04/03/2015 08:31 PM    Appearance CLOUDY (A) 04/03/2015 08:31 PM    Specific gravity 1.013 04/03/2015 08:31 PM    pH (UA) 7.5 04/03/2015 08:31 PM    Protein 300 (A) 04/03/2015 08:31 PM    Glucose NEGATIVE  04/03/2015 08:31 PM    Ketone NEGATIVE  04/03/2015 08:31 PM    Bilirubin NEGATIVE  04/03/2015 08:31 PM    Urobilinogen 0.2 04/03/2015 08:31 PM    Nitrites NEGATIVE  04/03/2015 08:31 PM    Leukocyte Esterase NEGATIVE  04/03/2015 08:31 PM    Epithelial cells MANY (A) 04/03/2015 08:31 PM    Bacteria 1+ (A) 04/03/2015 08:31 PM    WBC 0-4 04/03/2015 08:31 PM    RBC 0-5 04/03/2015 08:31 PM       MEDICATIONS:  Current Facility-Administered Medications   Medication Dose Route Frequency    sorbitol 70 % solution 30 mL  30 mL Oral DAILY PRN    polyethylene glycol (MIRALAX) packet 17 g  17 g Oral DAILY    senna-docusate (PERICOLACE) 8.6-50 mg per tablet 2 Tab  2 Tab Oral DAILY    carvedilol (COREG) tablet 6.25 mg  6.25 mg Oral BID WITH MEALS    phenytoin ER (DILANTIN ER) ER capsule 300 mg  300 mg Oral QHS    piperacillin-tazobactam (ZOSYN) 3.375 g in 0.9% sodium chloride (MBP/ADV) 100 mL  3.375 g IntraVENous Q12H    sodium chloride (NS) flush 5-40 mL  5-40 mL IntraVENous Q8H    sodium chloride (NS) flush 5-40 mL  5-40 mL IntraVENous PRN    acetaminophen (TYLENOL) tablet 650 mg  650 mg Oral Q6H PRN    ondansetron (ZOFRAN) injection 4 mg  4 mg IntraVENous Q6H PRN    bisacodyl (DULCOLAX) tablet 5 mg  5 mg Oral DAILY PRN    heparin (porcine) injection 5,000 Units  5,000 Units SubCUTAneous Q8H    levETIRAcetam in saline (iso-os) (KEPPRA) infusion 1,000 mg  1,000 mg IntraVENous Q12H    amiodarone (CORDARONE) tablet 100 mg  100 mg Oral DAILY    atorvastatin (LIPITOR) tablet 40 mg  40 mg Oral QHS    aspirin delayed-release tablet 81 mg  81 mg Oral DAILY    clopidogrel (PLAVIX) tablet 75 mg  75 mg Oral DAILY    [START ON 12/5/2019] calcitRIOL (ROCALTROL) capsule 0.25 mcg  0.25 mcg Oral Once per day on Thu Sat    pantoprazole (PROTONIX) tablet 40 mg  40 mg Oral DAILY    furosemide (LASIX) tablet 40 mg  40 mg Oral DAILY    peritoneal dialysis DEXTROSE 2.5% (2.5 mEq/L low calcium) solution 2,000 mL  2,000 mL IntraPERitoneal 5XD    VANCOMYCIN INFORMATION NOTE   Other PRN    lacosamide (VIMPAT) tablet 150 mg  150 mg Oral BID

## 2019-12-04 NOTE — PROGRESS NOTES
Problem: Falls - Risk of  Goal: *Absence of Falls  Description  Document Ervin Hernández Fall Risk and appropriate interventions in the flowsheet.   Outcome: Progressing Towards Goal  Variance Patient Condition  Impact: Moderate  Note: Fall Risk Interventions:  Mobility Interventions: Bed/chair exit alarm, Patient to call before getting OOB, Utilize walker, cane, or other assistive device    Mentation Interventions: Adequate sleep, hydration, pain control, Bed/chair exit alarm, More frequent rounding    Medication Interventions: Bed/chair exit alarm, Patient to call before getting OOB, Teach patient to arise slowly    Elimination Interventions: Bed/chair exit alarm, Call light in reach, Toileting schedule/hourly rounds    History of Falls Interventions: Bed/chair exit alarm, Utilize gait belt for transfer/ambulation

## 2019-12-05 ENCOUNTER — APPOINTMENT (OUTPATIENT)
Dept: MRI IMAGING | Age: 64
DRG: 919 | End: 2019-12-05
Attending: PHYSICIAN ASSISTANT
Payer: MEDICARE

## 2019-12-05 PROBLEM — K76.9 LIVER LESION: Status: ACTIVE | Noted: 2019-12-05

## 2019-12-05 LAB
ALBUMIN SERPL-MCNC: 1.5 G/DL (ref 3.5–5)
ALBUMIN/GLOB SERPL: 0.3 {RATIO} (ref 1.1–2.2)
ALP SERPL-CCNC: 119 U/L (ref 45–117)
ALT SERPL-CCNC: 16 U/L (ref 12–78)
ANION GAP SERPL CALC-SCNC: 9 MMOL/L (ref 5–15)
AST SERPL-CCNC: 23 U/L (ref 15–37)
BACTERIA SPEC CULT: NORMAL
BASOPHILS # BLD: 0.1 K/UL (ref 0–0.1)
BASOPHILS NFR BLD: 1 % (ref 0–1)
BILIRUB SERPL-MCNC: 0.3 MG/DL (ref 0.2–1)
BUN SERPL-MCNC: 40 MG/DL (ref 6–20)
BUN/CREAT SERPL: 5 (ref 12–20)
CALCIUM SERPL-MCNC: 7.9 MG/DL (ref 8.5–10.1)
CHLORIDE SERPL-SCNC: 94 MMOL/L (ref 97–108)
CO2 SERPL-SCNC: 29 MMOL/L (ref 21–32)
CREAT SERPL-MCNC: 7.84 MG/DL (ref 0.55–1.02)
DIFFERENTIAL METHOD BLD: ABNORMAL
EOSINOPHIL # BLD: 0.5 K/UL (ref 0–0.4)
EOSINOPHIL NFR BLD: 5 % (ref 0–7)
ERYTHROCYTE [DISTWIDTH] IN BLOOD BY AUTOMATED COUNT: 18.6 % (ref 11.5–14.5)
GLOBULIN SER CALC-MCNC: 4.5 G/DL (ref 2–4)
GLUCOSE SERPL-MCNC: 84 MG/DL (ref 65–100)
GRAM STN SPEC: NORMAL
GRAM STN SPEC: NORMAL
HCT VFR BLD AUTO: 25.9 % (ref 35–47)
HGB BLD-MCNC: 8.1 G/DL (ref 11.5–16)
IMM GRANULOCYTES # BLD AUTO: 0.2 K/UL (ref 0–0.04)
IMM GRANULOCYTES NFR BLD AUTO: 2 % (ref 0–0.5)
LYMPHOCYTES # BLD: 1.8 K/UL (ref 0.8–3.5)
LYMPHOCYTES NFR BLD: 18 % (ref 12–49)
MCH RBC QN AUTO: 28 PG (ref 26–34)
MCHC RBC AUTO-ENTMCNC: 31.3 G/DL (ref 30–36.5)
MCV RBC AUTO: 89.6 FL (ref 80–99)
MONOCYTES # BLD: 0.8 K/UL (ref 0–1)
MONOCYTES NFR BLD: 9 % (ref 5–13)
NEUTS SEG # BLD: 6.2 K/UL (ref 1.8–8)
NEUTS SEG NFR BLD: 65 % (ref 32–75)
NRBC # BLD: 0 K/UL (ref 0–0.01)
NRBC BLD-RTO: 0 PER 100 WBC
PHENYTOIN FREE SERPL-MCNC: 1.7 UG/ML (ref 1–2)
PHOSPHATE SERPL-MCNC: 4.5 MG/DL (ref 2.6–4.7)
PLATELET # BLD AUTO: 371 K/UL (ref 150–400)
PMV BLD AUTO: 12.5 FL (ref 8.9–12.9)
POTASSIUM SERPL-SCNC: 3.4 MMOL/L (ref 3.5–5.1)
PROT SERPL-MCNC: 6 G/DL (ref 6.4–8.2)
RBC # BLD AUTO: 2.89 M/UL (ref 3.8–5.2)
SERVICE CMNT-IMP: NORMAL
SODIUM SERPL-SCNC: 132 MMOL/L (ref 136–145)
WBC # BLD AUTO: 9.6 K/UL (ref 3.6–11)

## 2019-12-05 PROCEDURE — 74181 MRI ABDOMEN W/O CONTRAST: CPT

## 2019-12-05 PROCEDURE — 97110 THERAPEUTIC EXERCISES: CPT | Performed by: OCCUPATIONAL THERAPIST

## 2019-12-05 PROCEDURE — 97530 THERAPEUTIC ACTIVITIES: CPT | Performed by: OCCUPATIONAL THERAPIST

## 2019-12-05 PROCEDURE — 74011250637 HC RX REV CODE- 250/637: Performed by: INTERNAL MEDICINE

## 2019-12-05 PROCEDURE — A4722 DIALYS SOL FLD VOL > 1999CC: HCPCS | Performed by: INTERNAL MEDICINE

## 2019-12-05 PROCEDURE — 97165 OT EVAL LOW COMPLEX 30 MIN: CPT | Performed by: OCCUPATIONAL THERAPIST

## 2019-12-05 PROCEDURE — 65270000029 HC RM PRIVATE

## 2019-12-05 PROCEDURE — 94760 N-INVAS EAR/PLS OXIMETRY 1: CPT

## 2019-12-05 PROCEDURE — 97535 SELF CARE MNGMENT TRAINING: CPT | Performed by: OCCUPATIONAL THERAPIST

## 2019-12-05 PROCEDURE — 74011000258 HC RX REV CODE- 258: Performed by: INTERNAL MEDICINE

## 2019-12-05 PROCEDURE — 85025 COMPLETE CBC W/AUTO DIFF WBC: CPT

## 2019-12-05 PROCEDURE — 74011250636 HC RX REV CODE- 250/636: Performed by: INTERNAL MEDICINE

## 2019-12-05 PROCEDURE — 36415 COLL VENOUS BLD VENIPUNCTURE: CPT

## 2019-12-05 PROCEDURE — 90945 DIALYSIS ONE EVALUATION: CPT

## 2019-12-05 PROCEDURE — 74011250636 HC RX REV CODE- 250/636: Performed by: EMERGENCY MEDICINE

## 2019-12-05 PROCEDURE — 84100 ASSAY OF PHOSPHORUS: CPT

## 2019-12-05 PROCEDURE — 80053 COMPREHEN METABOLIC PANEL: CPT

## 2019-12-05 RX ORDER — LEVETIRACETAM 500 MG/1
1000 TABLET ORAL 2 TIMES DAILY
Status: DISCONTINUED | OUTPATIENT
Start: 2019-12-06 | End: 2019-12-10 | Stop reason: HOSPADM

## 2019-12-05 RX ADMIN — SODIUM CHLORIDE, SODIUM LACTATE, CALCIUM CHLORIDE, MAGNESIUM CHLORIDE AND DEXTROSE 2000 ML: 2.5; 538; 448; 18.3; 5.08 INJECTION, SOLUTION INTRAPERITONEAL at 06:39

## 2019-12-05 RX ADMIN — ASPIRIN 81 MG: 81 TABLET, COATED ORAL at 09:27

## 2019-12-05 RX ADMIN — LEVETIRACETAM 1000 MG: 10 INJECTION INTRAVENOUS at 15:30

## 2019-12-05 RX ADMIN — PIPERACILLIN AND TAZOBACTAM: 3; .375 INJECTION, POWDER, LYOPHILIZED, FOR SOLUTION INTRAVENOUS at 22:13

## 2019-12-05 RX ADMIN — PIPERACILLIN AND TAZOBACTAM: 3; .375 INJECTION, POWDER, LYOPHILIZED, FOR SOLUTION INTRAVENOUS at 18:26

## 2019-12-05 RX ADMIN — HEPARIN SODIUM 5000 UNITS: 5000 INJECTION INTRAVENOUS; SUBCUTANEOUS at 14:20

## 2019-12-05 RX ADMIN — SENNOSIDES AND DOCUSATE SODIUM 2 TABLET: 8.6; 5 TABLET ORAL at 09:26

## 2019-12-05 RX ADMIN — FUROSEMIDE 40 MG: 40 TABLET ORAL at 09:28

## 2019-12-05 RX ADMIN — CARVEDILOL 6.25 MG: 6.25 TABLET, FILM COATED ORAL at 09:27

## 2019-12-05 RX ADMIN — SODIUM CHLORIDE, SODIUM LACTATE, CALCIUM CHLORIDE, MAGNESIUM CHLORIDE AND DEXTROSE 2000 ML: 2.5; 538; 448; 18.3; 5.08 INJECTION, SOLUTION INTRAPERITONEAL at 02:36

## 2019-12-05 RX ADMIN — HEPARIN SODIUM 5000 UNITS: 5000 INJECTION INTRAVENOUS; SUBCUTANEOUS at 22:08

## 2019-12-05 RX ADMIN — Medication 10 ML: at 05:24

## 2019-12-05 RX ADMIN — SODIUM CHLORIDE, SODIUM LACTATE, CALCIUM CHLORIDE, MAGNESIUM CHLORIDE AND DEXTROSE 2000 ML: 2.5; 538; 448; 18.3; 5.08 INJECTION, SOLUTION INTRAPERITONEAL at 10:38

## 2019-12-05 RX ADMIN — Medication 10 ML: at 14:19

## 2019-12-05 RX ADMIN — POLYETHYLENE GLYCOL (3350) 17 G: 17 POWDER, FOR SOLUTION ORAL at 09:29

## 2019-12-05 RX ADMIN — LEVETIRACETAM 1000 MG: 10 INJECTION INTRAVENOUS at 02:46

## 2019-12-05 RX ADMIN — PANTOPRAZOLE SODIUM 40 MG: 40 TABLET, DELAYED RELEASE ORAL at 09:29

## 2019-12-05 RX ADMIN — ACETAMINOPHEN 650 MG: 325 TABLET ORAL at 01:04

## 2019-12-05 RX ADMIN — CALCITRIOL CAPSULES 0.25 MCG 0.25 MCG: 0.25 CAPSULE ORAL at 16:50

## 2019-12-05 RX ADMIN — AMIODARONE HYDROCHLORIDE 100 MG: 200 TABLET ORAL at 09:28

## 2019-12-05 RX ADMIN — ATORVASTATIN CALCIUM 40 MG: 40 TABLET, FILM COATED ORAL at 22:06

## 2019-12-05 RX ADMIN — CLOPIDOGREL BISULFATE 75 MG: 75 TABLET ORAL at 09:26

## 2019-12-05 RX ADMIN — LACOSAMIDE 150 MG: 50 TABLET, FILM COATED ORAL at 17:58

## 2019-12-05 RX ADMIN — PHENYTOIN SODIUM 300 MG: 100 CAPSULE ORAL at 22:07

## 2019-12-05 RX ADMIN — SODIUM CHLORIDE, SODIUM LACTATE, CALCIUM CHLORIDE, MAGNESIUM CHLORIDE AND DEXTROSE 2000 ML: 2.5; 538; 448; 18.3; 5.08 INJECTION, SOLUTION INTRAPERITONEAL at 16:49

## 2019-12-05 RX ADMIN — PIPERACILLIN AND TAZOBACTAM 3.38 G: 3; .375 INJECTION, POWDER, LYOPHILIZED, FOR SOLUTION INTRAVENOUS at 04:07

## 2019-12-05 RX ADMIN — LACOSAMIDE 150 MG: 50 TABLET, FILM COATED ORAL at 09:29

## 2019-12-05 RX ADMIN — Medication 10 ML: at 16:34

## 2019-12-05 RX ADMIN — HEPARIN SODIUM 5000 UNITS: 5000 INJECTION INTRAVENOUS; SUBCUTANEOUS at 05:24

## 2019-12-05 RX ADMIN — Medication 10 ML: at 22:15

## 2019-12-05 RX ADMIN — CARVEDILOL 6.25 MG: 6.25 TABLET, FILM COATED ORAL at 16:50

## 2019-12-05 NOTE — PROGRESS NOTES
Problem: Falls - Risk of  Goal: *Absence of Falls  Description  Document Bin Briancatracho Fall Risk and appropriate interventions in the flowsheet.   Outcome: Progressing Towards Goal  Note: Fall Risk Interventions:  Mobility Interventions: Bed/chair exit alarm    Mentation Interventions: Adequate sleep, hydration, pain control    Medication Interventions: Bed/chair exit alarm    Elimination Interventions: Bed/chair exit alarm    History of Falls Interventions: Bed/chair exit alarm

## 2019-12-05 NOTE — PROGRESS NOTES
Physical Therapy  Attempting to see patient for PT this am. Patient drowsy and not able to maintain alert state. Will defer therapy at this time and follow back later.   Thank you,  Britni Bailey, PT

## 2019-12-05 NOTE — PROGRESS NOTES
Problem: Self Care Deficits Care Plan (Adult)  Goal: *Acute Goals and Plan of Care (Insert Text)  Description      FUNCTIONAL STATUS PRIOR TO ADMISSION: Patient reports being supervision/setup for ADLs and is SBA for functional mobility, ambulating with a Cane. HOME SUPPORT: The patient lived with sister who assists with her peritoneal dialysis and ADLs. Occupational Therapy Goals  Initiated 12/5/2019  1. Patient will perform grooming standing at sink with supervision/set-up within 7 day(s). 2.  Patient will perform upper body dressing with supervision/set-up within 7 day(s). 3.  Patient will perform lower body dressing with supervision/set-up within 7 day(s). 4.  Patient will perform toilet transfers with supervision/set-up within 7 day(s). 5.  Patient will perform all aspects of toileting with supervision/set-up within 7 day(s). Outcome: Progressing Towards Goal    OCCUPATIONAL THERAPY EVALUATION  Patient: Ángela Larsen (11 y.o. female)  Date: 12/5/2019  Primary Diagnosis: Sepsis ([facility] [A41.9]  Sepsis ([facility] [A41.9]  Procedure(s) (LRB):  PACU/RECOVERY (N/A) 4 Days Post-Op   Precautions: Low vision       ASSESSMENT  Based on the objective data described below, the patient presents with confusion, nonfunctional vision (legally blind), decreased attention, GW, decreased activity tolerance, decreased safety awareness and decreased balance which is impairing her functional independence. The patient is functioning below her supervision/setup to SBA baseline, now performing ADLs at a supervision/setup level and is CGA for functional mobility. The patient will benefit from acute OT intervention and will need SNF after discharge. Functional Outcome Measure: The patient scored 25/100 on the Barthel Index outcome measure which is indicative of a 75%% impairment in function.           PLAN :  Recommendations and Planned Interventions: self care training, functional mobility training, therapeutic exercise, balance training, therapeutic activities, endurance activities, patient education, home safety training, and family training/education    Frequency/Duration: Patient will be followed by occupational therapy 3 times a week to address goals. Recommendation for discharge: (in order for the patient to meet his/her long term goals)  Therapy up to 5 days/week in SNF setting         OBJECTIVE DATA SUMMARY:   HISTORY:   Past Medical History:   Diagnosis Date    Acute on chronic respiratory failure ([facility] 7/18/2019    Blood clot in vein     left arm several years ago    CAD (coronary artery disease)     Chronic kidney disease     Chronic systolic heart failure ([facility] 7/18/2019    Congestive heart failure (HCC)     Hypercholesterolemia     Hypertension     Legally blind     PAF (paroxysmal atrial fibrillation) ([facility] 7/18/2019    Seizures ([facility]     Stroke [facility])      Past Surgical History:   Procedure Laterality Date    CARDIAC SURG PROCEDURE UNLIST      heart attack 12/15    HX ORTHOPAEDIC      right middle finger    HX OTHER SURGICAL      shunt placed in brain       Expanded or extensive additional review of patient history:     Home Situation  Home Environment: Private residence  One/Two Story Residence: One story  Living Alone: No  Support Systems: Family member(s), Friends \ neighbors  Patient Expects to be Discharged to[de-identified] Private residence  Current DME Used/Available at Home: None    Hand dominance: Right    EXAMINATION OF PERFORMANCE DEFICITS:  Cognitive/Behavioral Status:  Neurologic State: Alert  Orientation Level: Oriented to person;Oriented to place;Oriented to situation  Cognition: Follows commands;Decreased attention/concentration        Safety/Judgement: Awareness of environment;Decreased awareness of need for safety      Hearing: Auditory  Auditory Impairment: None    Vision/Perceptual:    Acuity: Impaired near vision; Impaired far vision         Range of Motion:  AROM: Generally decreased, functional    Strength:  Strength: Grossly decreased, non-functional    Coordination:  Coordination: Generally decreased, functional            Tone & Sensation:  Tone: Normal  Sensation: Impaired(Neuropathy in hands and feet)       Balance:  Sitting: Intact  Standing: Impaired  Standing - Static: Good  Standing - Dynamic : Fair    Functional Mobility and Transfers for ADLs:  Bed Mobility:  Supine to Sit: Contact guard assistance  Scooting: Contact guard assistance    Transfers:  Sit to Stand: Contact guard assistance  Stand to Sit: Contact guard assistance; Additional time  Bed to Chair: Contact guard assistance; Additional time  Toilet Transfer : Contact guard assistance    ADL Assessment:  Feeding: Supervision;Setup    Oral Facial Hygiene/Grooming: Contact guard assistance    Bathing: Minimum assistance    Upper Body Dressing: Minimum assistance    Lower Body Dressing: Minimum assistance    Toileting: Moderate assistance                ADL Intervention and task modifications:  Initiated bed mobility, dressing ADL, transfer, toileting, standing grooming and safety training. Functional Measure:  Barthel Index:    Bathin  Bladder: 5  Bowels: 0  Groomin  Dressin  Feedin  Mobility: 0  Stairs: 0  Toilet Use: 0  Transfer (Bed to Chair and Back): 10  Total: 25/100        Percentage of impairment   0%   1-19%   20-39%   40-59%   60-79%   80-99%   100%   Barthel Score 0-100 100 99-80 79-60 59-40 20-39 1-19   0     The Barthel ADL Index: Guidelines  1. The index should be used as a record of what a patient does, not as a record of what a patient could do. 2. The main aim is to establish degree of independence from any help, physical or verbal, however minor and for whatever reason. 3. The need for supervision renders the patient not independent. 4. A patient's performance should be established using the best available evidence.  Asking the patient, friends/relatives and nurses are the usual sources, but direct observation and common sense are also important. However direct testing is not needed. 5. Usually the patient's performance over the preceding 24-48 hours is important, but occasionally longer periods will be relevant. 6. Middle categories imply that the patient supplies over 50 per cent of the effort. 7. Use of aids to be independent is allowed. Terese Monzon., Barthel, D.W. (0984). Functional evaluation: the Barthel Index. [address] (14)2. Jina Perez zeeshan RONDA Ramirez, Brandy Duncan., Iqra Prim., [place], [address] (1999). Measuring the change indisability after inpatient rehabilitation; comparison of the responsiveness of the Barthel Index and Functional [place] Measure. Journal of Neurology, Neurosurgery, and Psychiatry, 66(4), 915-349. TERESSA Desai, BRITTANY Lott, & Jc Gusman MNAOMI. (2004.) Assessment of post-stroke quality of life in cost-effectiveness studies: The usefulness of the Barthel Index and the EuroQoL-5D. Quality of Life Research, 13, 280-70        Activity Tolerance:   Fair  Please refer to the flowsheet for vital signs taken during this treatment. After treatment patient left in no apparent distress:    Sitting in chair and Call bell within reach    COMMUNICATION/EDUCATION:   The patients plan of care was discussed with: Registered Nurse. Patient/family agree to work toward stated goals and plan of care. This patients plan of care is appropriate for delegation to [facility].     Thank you for this referral.  Favio Andrade, OTR/L  Time Calculation: 35 mins

## 2019-12-05 NOTE — PROGRESS NOTES
NAME: Lalo Martinez        :  1955        MRN:  [id number]        Assessment :    Plan:  --ESKD  Peritonitis  Tenckhoff catheter, thoracic PD catheter     Seizure d/o  Anemia of ESKD  HTN  SHPT    HCV CCPD at home; On CAPD while hospitalized (2.5 % dextrose, 2 liter dwell, 5 exchanges)    Peripheral leukocytosis resolved; afebrile; clear dialysate; Cell count on PD fluid markedly improved - 7800 to 207; No need for PD cath removal from my standpoint; doubt abdominal pain is from peritonitis given improvement in all the above    She tells me should would rather die than do HD; given this, I think we have to try to continue to try to treat though infection    Few Enterococcus species and rare lactobacillus on culture early ; culture done later on  is ngtd; PD cath site with no bacteria/wbc's    Likely peritonitis from visceral (contamination via bacteria from bowel) or vaginal (rarely) source. Hgb < 9; retacrit 10 k sc biw    On Calcitriol    On Maxipime, Vanco       Subjective:     Chief Complaint:  Difficult historian today. Just kept staring at me and talking about how bad she has been treated. Not answering questions. I reviewed with the above with her. Review of Systems:    Symptom Y/N Comments  Symptom Y/N Comments   Fever/Chills    Chest Pain     Poor Appetite    Edema     Cough    Abdominal Pain     Sputum    Joint Pain     SOB/MAYORGA    Pruritis/Rash     Nausea/vomit    Tolerating PT/OT     Diarrhea    Tolerating Diet     Constipation    Other       Could not obtain due to: see above     Objective:     VITALS:   Last 24hrs VS reviewed since prior progress note.  Most recent are:  Visit Vitals  /72   Pulse 86   Temp 98.1 °F (36.7 °C)   Resp 18   Ht 5' 3\" (1.6 m)   Wt 94.1 kg (207 lb 6.4 oz)   SpO2 98%   BMI 36.74 kg/m²       Intake/Output Summary (Last 24 hours) at 2019 022  Last data filed at 12/5/2019 0311  Gross per 24 hour   Intake 7720 ml   Output 07981 ml   Net -2380 ml      Telemetry Reviewed:     PHYSICAL EXAM:  General: NAD  Abdomen tender, diffuse  Left chest PD cath exit site with no drainage/erythema    Lab Data Reviewed: (see below)    Medications Reviewed: (see below)    PMH/SH reviewed - no change compared to H&P  ________________________________________________________________________  Care Plan discussed with:  Patient y    Family      RN     Care Manager                    Consultant:          Comments   >50% of visit spent in counseling and coordination of care       ________________________________________________________________________  Analia Godfrey MD     Procedures: see electronic medical records for all procedures/Xrays and details which  were not copied into this note but were reviewed prior to creation of Plan. LABS:  Recent Labs     12/05/19 0244 12/04/19 0134   WBC 9.6 12.8*   HGB 8.1* 8.3*   HCT 25.9* 26.4*    319     Recent Labs     12/05/19 0244 12/04/19 0134 12/03/19 0216   * 134* 135*   K 3.4* 3.5 3.7   CL 94* 96* 95*   CO2 29 31 30   BUN 40* 45* 47*   CREA 7.84* 8.30* 8.69*   GLU 84 80 84   CA 7.9* 8.1* 7.8*   PHOS 4.5  --   --      Recent Labs     12/05/19 0244 12/04/19 0134 12/03/19 0216   SGOT 23 20 18   * 118* 100   TP 6.0* 6.1* 6.6   ALB 1.5* 1.6* 1.6*   GLOB 4.5* 4.5* 5.0*     No results for input(s): INR, PTP, APTT, INREXT, INREXT in the last 72 hours. No results for input(s): FE, TIBC, PSAT, FERR in the last 72 hours. No results found for: FOL, RBCF   No results for input(s): PH, PCO2, PO2 in the last 72 hours. No results for input(s): CPK, CKMB in the last 72 hours.     No lab exists for component: TROPONINI  No components found for: [place]  Lab Results   Component Value Date/Time    Color YELLOW/STRAW 04/03/2015 08:31 PM    Appearance CLOUDY (A) 04/03/2015 08:31 PM    Specific gravity 1.013 04/03/2015 08:31 PM    pH (UA) 7.5 04/03/2015 08:31 PM    Protein 300 (A) 04/03/2015 08:31 PM    Glucose NEGATIVE  04/03/2015 08:31 PM    Ketone NEGATIVE  04/03/2015 08:31 PM    Bilirubin NEGATIVE  04/03/2015 08:31 PM    Urobilinogen 0.2 04/03/2015 08:31 PM    Nitrites NEGATIVE  04/03/2015 08:31 PM    Leukocyte Esterase NEGATIVE  04/03/2015 08:31 PM    Epithelial cells MANY (A) 04/03/2015 08:31 PM    Bacteria 1+ (A) 04/03/2015 08:31 PM    WBC 0-4 04/03/2015 08:31 PM    RBC 0-5 04/03/2015 08:31 PM       MEDICATIONS:  Current Facility-Administered Medications   Medication Dose Route Frequency    sorbitol 70 % solution 30 mL  30 mL Oral DAILY PRN    polyethylene glycol (MIRALAX) packet 17 g  17 g Oral DAILY    senna-docusate (PERICOLACE) 8.6-50 mg per tablet 2 Tab  2 Tab Oral DAILY    carvedilol (COREG) tablet 6.25 mg  6.25 mg Oral BID WITH MEALS    phenytoin ER (DILANTIN ER) ER capsule 300 mg  300 mg Oral QHS    piperacillin-tazobactam (ZOSYN) 3.375 g in 0.9% sodium chloride (MBP/ADV) 100 mL  3.375 g IntraVENous Q12H    sodium chloride (NS) flush 5-40 mL  5-40 mL IntraVENous Q8H    sodium chloride (NS) flush 5-40 mL  5-40 mL IntraVENous PRN    acetaminophen (TYLENOL) tablet 650 mg  650 mg Oral Q6H PRN    ondansetron (ZOFRAN) injection 4 mg  4 mg IntraVENous Q6H PRN    bisacodyl (DULCOLAX) tablet 5 mg  5 mg Oral DAILY PRN    heparin (porcine) injection 5,000 Units  5,000 Units SubCUTAneous Q8H    levETIRAcetam in saline (iso-os) (KEPPRA) infusion 1,000 mg  1,000 mg IntraVENous Q12H    amiodarone (CORDARONE) tablet 100 mg  100 mg Oral DAILY    atorvastatin (LIPITOR) tablet 40 mg  40 mg Oral QHS    aspirin delayed-release tablet 81 mg  81 mg Oral DAILY    clopidogrel (PLAVIX) tablet 75 mg  75 mg Oral DAILY    calcitRIOL (ROCALTROL) capsule 0.25 mcg  0.25 mcg Oral Once per day on Thu Sat    pantoprazole (PROTONIX) tablet 40 mg  40 mg Oral DAILY    furosemide (LASIX) tablet 40 mg  40 mg Oral DAILY    peritoneal dialysis DEXTROSE 2.5% (2.5 mEq/L low calcium) solution 2,000 mL  2,000 mL IntraPERitoneal 5XD    lacosamide (VIMPAT) tablet 150 mg  150 mg Oral BID

## 2019-12-05 NOTE — PROGRESS NOTES
Hospitalist Progress Note    NAME: Jay Ferraro   :  1955   MRN:  [id number]       Assessment / Plan:  Hep C (New Dx)  CT A/P with 1.5 cm hypodensity with peripheral globular enhancement  I have informed patient about new Dx  I have discussed with her need to follow up with GI as OP for further treatment and plan  Ammonia  WNL  GI Consult    Sepsis POA  Due to Acute bacterial peritonitis in peritoneal dialysis patient   On IV Zosyn  Appreciate ID assistance  Pt continue to have abdominal tenderness despite bowel movements. Catheter seems infected, cultures sent from around the PD catheter yesterday per ID recommendations  F/u PD and blood cultures and cultures from around the PD cath  Pt decline HD, she claims she will die but not go one HD  Awaiting final recommendations of abx by nephrology/ID  CT abdomen/pelvis with Large amount of ascites with peritoneal catheter in place. No evidence for small bowel obstruction    Constipation - resolved  ? Abdominal discomfort related to constipation vs peritonitis  Lactulose didn't work yesterday  Fleet Enema today  Start Pericolace and miralax  PRN sorbitol    ESRD  Nephrology followup appreciate  On peritoneal dialysis    Hx of Seizure  Continue home medication Keppra/Phenytoin    Hypertension  Resume coreg  Holding ARBs    Chronic systolic CHF with EF 20-20% on recent echo  Fluid management as per nephrology    Code Status: Full   Surrogate Decision Maker: Jaguar Hassan  DVT Prophylaxis: Heparin   GI Prophylaxis: not indicated  Body mass index is 36.74 kg/m².: 30.0 - 39.9 Obese     Subjective: Pt seen and examined at bedside. NAD. No new complaints.  Now AAAx3 with good insight Overnight events d/w RN     Chief Complaint / Reason for Physician Visit: f/u \"sepsis, bacterial peritonitis\"    Review of Systems:  Symptom Y/N Comments  Symptom Y/N Comments   Fever/Chills n   Chest Pain n    Poor Appetite    Edema     Cough n   Abdominal Pain y    Sputum    Joint Pain     SOB/MAYORGA n   Pruritis/Rash     Nausea/vomit    Tolerating PT/OT     Diarrhea    Tolerating Diet y    Constipation    Other       Could NOT obtain due to:      Objective:     VITALS:   Last 24hrs VS reviewed since prior progress note. Most recent are:  Patient Vitals for the past 24 hrs:   Temp Pulse Resp BP SpO2   12/05/19 1558 98.1 °F (36.7 °C) 79 18 (!) 139/91 98 %   12/05/19 1130 97.4 °F (36.3 °C) 90 18 (!) 151/103 99 %   12/05/19 0751 98.1 °F (36.7 °C) 86 18 137/72 98 %   12/05/19 0311 97.8 °F (36.6 °C) 89 18 156/89 100 %   12/04/19 2248 98.3 °F (36.8 °C) 94 20 143/85 96 %   12/04/19 1930 98.2 °F (36.8 °C) 91 18 146/84 96 %       Intake/Output Summary (Last 24 hours) at 12/5/2019 1654  Last data filed at 12/5/2019 1359  Gross per 24 hour   Intake 9780 ml   Output 48242 ml   Net -470 ml        PHYSICAL EXAM:  General: WD, WN. Alert, cooperative, looks acutely ill   EENT:  EOMI. Anicteric sclerae. MMM  Resp:  CTA bilaterally, no wheezing or rales. No accessory muscle use  CV:  Regular  rhythm,  No edema  GI:  Mildly tender , mildly distended abdomen .  +Bowel sounds                      NO guarding or rebound tenderness  Neurologic:  Alert and oriented X 3, normal speech,   No LE swelling     Reviewed most current lab test results and cultures  YES  Reviewed most current radiology test results   YES  Review and summation of old records today    NO  Reviewed patient's current orders and MAR    YES  PMH/SH reviewed - no change compared to H&P  ________________________________________________________________________  Care Plan discussed with:    Comments   Patient y    Family      RN y    Care Manager     Consultant                        Multidiciplinary team rounds were held today with , nursing, pharmacist and clinical coordinator. Patient's plan of care was discussed; medications were reviewed and discharge planning was addressed. ________________________________________________________________________  Total NON critical care TIME: 30 Minutes    Total CRITICAL CARE TIME Spent:   Minutes non procedure based      Comments   >50% of visit spent in counseling and coordination of care     ________________________________________________________________________  Dian Mosley MD     Procedures: see electronic medical records for all procedures/Xrays and details which were not copied into this note but were reviewed prior to creation of Plan. LABS:  I reviewed today's most current labs and imaging studies.   Pertinent labs include:  Recent Labs     12/05/19 0244 12/04/19 0134 12/03/19 0216   WBC 9.6 12.8* 16.9*   HGB 8.1* 8.3* 8.8*   HCT 25.9* 26.4* 28.0*    319 337     Recent Labs     12/05/19 0244 12/04/19 0134 12/03/19 0216   * 134* 135*   K 3.4* 3.5 3.7   CL 94* 96* 95*   CO2 29 31 30   GLU 84 80 84   BUN 40* 45* 47*   CREA 7.84* 8.30* 8.69*   CA 7.9* 8.1* 7.8*   PHOS 4.5  --   --    ALB 1.5* 1.6* 1.6*   TBILI 0.3 0.3 0.3   SGOT 23 20 18   ALT 16 12 14       Signed: Dian Mosley MD

## 2019-12-05 NOTE — PROGRESS NOTES
Bedside and Verbal shift change report given to Viridiana (oncoming nurse) by Dennys Delacruz RN (offgoing nurse). Report included the following information Kardex, MAR and Recent Results.

## 2019-12-05 NOTE — CONSULTS
She         Gastroenterology Consultation Note  LORENZA Davis   for Dr. Luis [place]    NAME: Klaudia Berry : 1955 MRN: [id number]   ATTG: [unfilled] PCP: Emily Tidwell MD  Date/Time:  2019 5:00 PM  Subjective:   REASON FOR CONSULT:      Duc Worley is a 62 y.o.  female who I was asked to see for Hep C with liver mass. PMHx of ESRD on home PD that sister assists with, CHF and siezures. She reprots that she came because her \"kindeys were failing\". Then reports at [facility] she ate some food, didn't overeat. was having a lot of abdominal pain with gurggling and bubbliong and brought her to the ED. Had chills, no fevers. Has been constipated with no BM in 4 days. She is more constipated over the last year, stool is \"like a baseball\" and is hard. No hatochezia. It is dark at times but pt is legally blind, not on any iron supplementation. Pain was in her belly button. She admits N/V, no hematemesis. She is unaware of any hx of liver disease. Never saw a hepatologist.  No accidental needle sticks, no contacts with hepatitis, she has not been sexually active for 32 years, she has had 2-3 blood transfusions recently. Nothing in her earlier years. Lives with her sister, drank EtOH on weekend when she was in college, she used to smoke marijuana in her 43B, no other illict substance use. Was found to be septic on presentation and dx with peritonitis. ID also on the case. Labs done initally and WBC was elevated 21/9, now WNL, hgb initally 10.8, decreased to 8.1 today, plt stable, bili WNL, alk phos initially elevated at 155, was WNL and increased again today to 119, ammonia WNL. CT was done with contrast showing 1.5 cm hypodensity in the liver with peripheral globular enhancement, large volume ascites with PD cath in place. No family history of liver disease, no family hx of colon, stomach or esophageal cancer, no family hx of polyps. Past Medical History:   Diagnosis Date    Acute on chronic respiratory failure ([facility] 7/18/2019    Blood clot in vein     left arm several years ago    CAD (coronary artery disease)     Chronic kidney disease     Chronic systolic heart failure ([facility] 7/18/2019    [facility]     Hypercholesterolemia     Hypertension     Legally blind     PAF (paroxysmal atrial fibrillation) ([facility] 7/18/2019    Seizures ([facility]     Stroke [facility])       Past Surgical History:   Procedure Laterality Date    CARDIAC SURG PROCEDURE UNLIST      heart attack 12/15    HX ORTHOPAEDIC      right middle finger    HX OTHER SURGICAL      shunt placed in brain     Social History     Tobacco Use    Smoking status: Never Smoker    Smokeless tobacco: Never Used   Substance Use Topics    Alcohol use: No      Family History   Problem Relation Age of Onset    Diabetes Other     Hypertension Other     Cancer Other       Allergies   Allergen Reactions    Gabapentin Other (comments) and Seizures     Confusion, psychosis \"I was acting like a 4year old at a family event, drawing on walls and everything\"        Home Medications:  Prior to Admission Medications   Prescriptions Last Dose Informant Patient Reported? Taking?   amiodarone (CORDARONE) 200 mg tablet 11/29/2019 at Unknown time Family Member Yes Yes   Sig: Take 100 mg by mouth nightly. atorvastatin (LIPITOR) 40 mg tablet 11/30/2019 at Unknown time Family Member No Yes   Sig: TAKE 1 TABLET BY MOUTH ONCE DAILY   bismuth subsalicylate (PEPTO-BISMOL MAXIMUM STRENGTH) 525 mg/15 mL susp 11/30/2019 at Unknown time Family Member Yes Yes   Sig: Take 30 mL by mouth every six (6) hours as needed (upset stomach). carvedilol (COREG) 6.25 mg tablet 11/30/2019 at Unknown time Family Member Yes Yes   Sig: Take 6.25 mg by mouth two (2) times daily (with meals).    clopidogrel (PLAVIX) 75 mg tab 11/30/2019 at Unknown time Family Member No Yes   Sig: Take 1 Tab by mouth daily. docusate sodium (COLACE) 100 mg capsule 11/30/2019 at Unknown time Family Member Yes Yes   Sig: Take 100 mg by mouth two (2) times daily as needed for Constipation. gentamicin (GARAMYCIN) 0.1 % topical cream 11/30/2019 at Unknown time Family Member Yes Yes   Sig: Apply  to affected area daily. Apply to Cath wound   glycerin, adult, (FLEET GLYCERIN, ADULT,) suppository 11/30/2019 at Unknown time Family Member Yes Yes   Sig: Insert 1 Suppository into rectum daily as needed (constipation). isosorbide mononitrate ER (IMDUR) 60 mg CR tablet 11/30/2019 at Unknown time Family Member No Yes   Sig: Take 1 Tab by mouth every morning. lacosamide (VIMPAT) 150 mg tab tablet 11/30/2019 at Unknown time Family Member Yes Yes   Sig: Take 150 mg by mouth two (2) times a day. lactulose (CHRONULAC) 10 gram/15 mL solution 11/30/2019 at Unknown time Family Member Yes Yes   Sig: Take 30 g by mouth three (3) times daily as needed (constipation). levETIRAcetam (KEPPRA) 500 mg tablet 11/30/2019 at Unknown time Family Member Yes Yes   Sig: Take 500 mg by mouth two (2) times a day. losartan (COZAAR) 25 mg tablet 11/29/2019 at Unknown time Family Member Yes Yes   Sig: Take 25 mg by mouth nightly. omeprazole (PRILOSEC) 20 mg capsule 11/29/2019 at Unknown time Family Member Yes Yes   Sig: Take 20 mg by mouth nightly. phenytoin (DILANTIN ER) 300 mg ER capsule 11/29/2019 at Unknown time Family Member Yes Yes   Sig: Take 300 mg by mouth nightly. potassium chloride (K-DUR, KLOR-CON) 20 mEq tablet 11/29/2019 at Unknown time Family Member Yes Yes   Sig: Take 10 mEq by mouth nightly. sevelamer (RENAGEL) 800 mg tablet 11/30/2019 at Unknown time Family Member Yes Yes   Sig: Take 1,600 mg by mouth two (2) times daily (with meals).  Patient takes 1600 mg BIDCC (breakfast and dinner) and 800 mg with lunch   sevelamer (RENAGEL) 800 mg tablet 11/29/2019 at Unknown time Family Member Yes Yes   Sig: Take 800 mg by mouth daily (with lunch). Patient takes 1600 mg BIDCC (breakfast and dinner) and 800 mg with lunch   trolamine salicylate-aloe vera 05% (ASPERCREME) topical cream 11/24/2019 at Unknown time Family Member Yes Yes   Sig: Apply  to affected area as needed for Pain.  apply as needed for pain on hands, feet lower back, shoulders      Facility-Administered Medications: None     Hospital medications:  Current Facility-Administered Medications   Medication Dose Route Frequency    peritoneal dialysis with additives   IntraPERitoneal 5XD    [START ON 12/6/2019] levETIRAcetam (KEPPRA) tablet 1,000 mg  1,000 mg Oral BID    sorbitol 70 % solution 30 mL  30 mL Oral DAILY PRN    polyethylene glycol (MIRALAX) packet 17 g  17 g Oral DAILY    senna-docusate (PERICOLACE) 8.6-50 mg per tablet 2 Tab  2 Tab Oral DAILY    carvedilol (COREG) tablet 6.25 mg  6.25 mg Oral BID WITH MEALS    phenytoin ER (DILANTIN ER) ER capsule 300 mg  300 mg Oral QHS    sodium chloride (NS) flush 5-40 mL  5-40 mL IntraVENous Q8H    sodium chloride (NS) flush 5-40 mL  5-40 mL IntraVENous PRN    acetaminophen (TYLENOL) tablet 650 mg  650 mg Oral Q6H PRN    ondansetron (ZOFRAN) injection 4 mg  4 mg IntraVENous Q6H PRN    bisacodyl (DULCOLAX) tablet 5 mg  5 mg Oral DAILY PRN    heparin (porcine) injection 5,000 Units  5,000 Units SubCUTAneous Q8H    amiodarone (CORDARONE) tablet 100 mg  100 mg Oral DAILY    atorvastatin (LIPITOR) tablet 40 mg  40 mg Oral QHS    aspirin delayed-release tablet 81 mg  81 mg Oral DAILY    clopidogrel (PLAVIX) tablet 75 mg  75 mg Oral DAILY    calcitRIOL (ROCALTROL) capsule 0.25 mcg  0.25 mcg Oral Once per day on Thu Sat    pantoprazole (PROTONIX) tablet 40 mg  40 mg Oral DAILY    furosemide (LASIX) tablet 40 mg  40 mg Oral DAILY    lacosamide (VIMPAT) tablet 150 mg  150 mg Oral BID     REVIEW OF SYSTEMS:     []     Unable to obtain  ROS due to  []    mental status change  []    sedated   []    intubated  Review of Systems - History obtained from the patient  General ROS: positive for  - chills  Psychological ROS: negative for - anxiety or depression  Hematological and Lymphatic ROS: positive for - bleeding problems and blood clots  Respiratory ROS: positive for - cough, shortness of breath and wheezing  Cardiovascular ROS: no chest pain or dyspnea on exertion  Gastrointestinal ROS: See HPI  Genito-Urinary ROS: no dysuria, trouble voiding, or hematuria  Musculoskeletal ROS: negative  positive for - joint pain and joint swelling  Neurological ROS: positive for - hx of CVA, and seizures  Dermatological ROS: negative for - rash    Objective:   VITALS:    Visit Vitals  BP (!) 139/91 (BP 1 Location: Left arm, BP Patient Position: Sitting)   Pulse 79   Temp 98.1 °F (36.7 °C)   Resp 18   Ht 5' 3\" (1.6 m)   Wt 94.1 kg (207 lb 6.4 oz)   SpO2 98%   BMI 36.74 kg/m²     Temp (24hrs), Av °F (36.7 °C), Min:97.4 °F (36.3 °C), Max:98.3 °F (36.8 °C)    PHYSICAL EXAM:   General:    Alert, cooperative, no distress, appears older than stated age. Head:   Normocephalic, without obvious abnormality, atraumatic. Eyes:   Conjunctivae clear, anicteric sclerae. Pupils are equal  Nose:  Nares normal. No drainage Lungs:   CTA bilaterally. No wheezing/rhonchi/rales. PD cath on left side of chest  Heart:   Regular rate and rhythm,  no murmur, rub or gallop. Abdomen:   Soft, generalized abdominal tenderness, no guarding or rebound. Not distended. Bowel sounds normal. No masses. No                           hepatosplenomegaly. Rectal:  Deferred  Extremities: No cyanosis. No edema. No clubbing  Skin:     Texture, turgor normal. No rashes/lesions/jaundice, no spider angiomas or palmar erythemia  Lymph: Cervical, supraclavicular normal.  Psych:  Poor insight. Not depressed. Not anxious or agitated. Odd affect. Neurologic: EOMs intact. No facial asymmetry. No aphasia or slurred speech normal strength, A/O X 3.       LAB DATA REVIEWED:    Lab Results Component Value Date/Time    WBC 9.6 12/05/2019 02:44 AM    HGB 8.1 (L) 12/05/2019 02:44 AM    HCT 25.9 (L) 12/05/2019 02:44 AM    PLATELET 374 77/64/0741 02:44 AM    MCV 89.6 12/05/2019 02:44 AM     Lab Results   Component Value Date/Time    ALT (SGPT) 16 12/05/2019 02:44 AM    AST (SGOT) 23 12/05/2019 02:44 AM    Alk. phosphatase 119 (H) 12/05/2019 02:44 AM    Bilirubin, total 0.3 12/05/2019 02:44 AM     Lab Results   Component Value Date/Time    Sodium 132 (L) 12/05/2019 02:44 AM    Potassium 3.4 (L) 12/05/2019 02:44 AM    Chloride 94 (L) 12/05/2019 02:44 AM    CO2 29 12/05/2019 02:44 AM    Anion gap 9 12/05/2019 02:44 AM    Glucose 84 12/05/2019 02:44 AM    BUN 40 (H) 12/05/2019 02:44 AM    Creatinine 7.84 (H) 12/05/2019 02:44 AM    BUN/Creatinine ratio 5 (L) 12/05/2019 02:44 AM    GFR est AA 6 (L) 12/05/2019 02:44 AM    GFR est non-AA 5 (L) 12/05/2019 02:44 AM    Calcium 7.9 (L) 12/05/2019 02:44 AM     Lab Results   Component Value Date/Time    Lipase 348 11/24/2019 01:41 PM     Lab Results   Component Value Date/Time    INR <0.9 (L) 10/12/2015 05:20 PM    INR 1.1 04/03/2015 04:28 PM    Prothrombin time <9.0 (L) 10/12/2015 05:20 PM    Prothrombin time 10.6 04/03/2015 04:28 PM       CT Results (most recent):  Results from [place] encounter on 11/30/19   CT ABD PELV W CONT    Narrative EXAM: CT ABD PELV W CONT    INDICATION: Peritonitis; distended, very tender abdomen, ? SBO, ? perforation    COMPARISON: None     CONTRAST: 100 mL of Isovue-370. TECHNIQUE:   Following the uneventful intravenous administration of contrast, thin axial  images were obtained through the abdomen and pelvis. Coronal and sagittal  reconstructions were generated. Oral contrast was administered. CT dose  reduction was achieved through use of a standardized protocol tailored for this  examination and automatic exposure control for dose modulation. FINDINGS:   LUNG BASES: Clear.   INCIDENTALLY IMAGED HEART AND MEDIASTINUM: Coronary artery calcification. LIVER: No biliary dilatation. 1.5 cm hypodensity with peripheral globular  enhancement (image 16). GALLBLADDER: Unremarkable. SPLEEN: No mass. PANCREAS: No mass or ductal dilatation. ADRENALS: Unremarkable. KIDNEYS: No mass, calculus, or hydronephrosis. Small kidneys with punctate  nonobstructing calcifications. STOMACH: Unremarkable. SMALL BOWEL: No dilatation or wall thickening. COLON: No dilatation or wall thickening. APPENDIX: Unremarkable. PERITONEUM: Positive ascites with density measurement of 12.5 HU. Peritoneal  catheter in place  RETROPERITONEUM: No lymphadenopathy or aortic aneurysm. REPRODUCTIVE ORGANS: Fibroids  URINARY BLADDER: No mass or calculus. BONES: No destructive bone lesion. Left hip hardware  ADDITIONAL COMMENTS: N/A      Impression IMPRESSION:  Large amount of ascites with peritoneal catheter in place. No evidence for small bowel obstruction  Nonobstructing bilateral renal calculi         Impression:  Active Problems:    Sepsis ([address] (12/1/2019)      Liver lesion (12/5/2019)         Plan:  Hep C AB was reactive (appears she had a low positive back in 2015), Hep C viral load ordered. Will check INR and AFP. Proceed with MRI to further characterize lesion. If active viral loat will check geotype and would benefit from O/P treatment by Dr Jalen Mcguire or [facility]. She is in need of colon cancer screening.   Can discuss on follow up after d/c.       ___________________________________________________  Care Plan discussed with:    [x]    Patient   []    Family   []    Nursing   []    Attending  Total Time :  30  minutes   ___________________________________________________  GI: LORENZA Jorgensen

## 2019-12-05 NOTE — ROUTINE PROCESS
-Please complete MRI History and Safety Screening Form for this patient using [employer] only under Orders Requiring a Screening Form: 
 

## 2019-12-05 NOTE — PROGRESS NOTES
Infectious Disease Progress Note    IMPRESSION:      -Sepsis with WBC 29, abdominal pain/ constipation on presentation , T max 99.6 on   - Peritoneal fld cloudy WBC 7810, N-95  - Fld culture- few possible Streptococcus  spp, rare Lactobacillus spp  - ESRD on PD, thoracic Tenckhoff   - No evidence of exit site infection , WC- NG   - Hep C new diagnosis , on review of records Hep C ab + in 5/5/15 ( low positive )    -CT abd- 1.5 cm hypodensity with peripheral globular enhancement noted       PLAN:            Change to  Zosyn I/perotoneal , may DC on po amoxicillin /augmentin total  x 2 weeks, end date   - Hep C RNA & peg typing, out pt F/u with GI . Advised pt that Hep C can be cured   -Plan of care D/w pt          Subjective:     Pt seen . Denies complaints . No abdominal pain   She is concerned about new diagnosis of Hep C    Advised pt that it could be eradicated    Review of Systems:  A comprehensive review of systems was negative except for that written in the History of Present Illness. 10 point ROS obtained . All other systems negative . Objective:     Blood pressure (!) 139/91, pulse 79, temperature 98.1 °F (36.7 °C), resp. rate 18, height 5' 3\" (1.6 m), weight 207 lb 6.4 oz (94.1 kg), SpO2 98 %. Temp (24hrs), Av °F (36.7 °C), Min:97.4 °F (36.3 °C), Max:98.3 °F (36.8 °C)      Patient Vitals for the past 24 hrs:   Temp Pulse Resp BP SpO2   19 1558 98.1 °F (36.7 °C) 79 18 (!) 139/91 98 %   19 1130 97.4 °F (36.3 °C) 90 18 (!) 151/103 99 %   19 0751 98.1 °F (36.7 °C) 86 18 137/72 98 %   19 0311 97.8 °F (36.6 °C) 89 18 156/89 100 %   19 2248 98.3 °F (36.8 °C) 94 20 143/85 96 %   19 1930 98.2 °F (36.8 °C) 91 18 146/84 96 %         Lines:  Peripheral IV:       Physical Exam:   General:  Alert, cooperative, well developed, appears stated age   Eyes:  Sclera anicteric. Pupils equally round and reactive to light.    Mouth/Throat: Mucous membranes normal, oral pharynx clear   Neck: Supple   Lungs:   Clear to auscultation bilaterally, good effort   CV:  Regular rate and rhythm,no murmur, click, rub or gallop   Abdomen:   Soft, non-tender. bowel sounds normal. non-distended   Extremities: No cyanosis or edema   Skin: Skin color, texture, turgor normal. no acute rash or lesions   Lymph nodes: Cervical and supraclavicular normal   Musculoskeletal: No swelling or deformity   Lines/Devices:  Intact, no erythema, drainage or tenderness   Psych: Alert and oriented, normal mood affect       Data Review:   CBC:   Recent Labs     12/05/19 0244 12/04/19 0134 12/03/19 0216   WBC 9.6 12.8* 16.9*   RBC 2.89* 2.98* 3.12*   HGB 8.1* 8.3* 8.8*   HCT 25.9* 26.4* 28.0*    319 337   GRANS 65 75 80*   LYMPH 18 13 10*   EOS 5 4 2     CMP:   Recent Labs     12/05/19 0244 12/04/19 0134 12/03/19 0216   GLU 84 80 84   * 134* 135*   K 3.4* 3.5 3.7   CL 94* 96* 95*   CO2 29 31 30   BUN 40* 45* 47*   CREA 7.84* 8.30* 8.69*   CA 7.9* 8.1* 7.8*   AGAP 9 7 10   BUCR 5* 5* 5*   * 118* 100   TP 6.0* 6.1* 6.6   ALB 1.5* 1.6* 1.6*   GLOB 4.5* 4.5* 5.0*   AGRAT 0.3* 0.4* 0.3*       Studies:      Lab Results   Component Value Date/Time    Culture result: NO GROWTH 1 DAY 12/04/2019 01:34 AM    Culture result: NO GROWTH 4 DAYS 12/01/2019 08:04 AM    Culture result: FEW POSSIBLE STREPTOCOCCUS SPECIES (A) 12/01/2019 02:43 AM    Culture result: RARE LACTOBACILLUS SPECIES (A) 12/01/2019 02:43 AM    Culture result: Culture performed on Unspun Fluid 12/01/2019 02:43 AM        XR Results (most recent):  Results from Hospital Encounter encounter on 11/30/19   XR CHEST PORT    Narrative EXAM:  XR CHEST PORT    INDICATION:  central line placement    COMPARISON:  11/24/2019    FINDINGS: A portable AP radiograph of the chest was obtained at 1231 hours. Right central venous catheter tip overlies SVC right atrial junction. There is  no pneumothorax. Catheter overlying right neck is unchanged.   There is discoid  atelectasis left midlung. Moderately severe cardiomegaly is stable. .       Impression IMPRESSION: Right central venous catheter tip overlies SVC right atrial  junction. There is no pneumothorax. Patient Active Problem List   Diagnosis Code    Seizure ([facility] R56.9    HTN (hypertension) I10    Hydrocephalus ([facility]) G91.9    End stage renal disease ([facility] N18.6    Peritoneal dialysis status ([facility] Z99.2    Memory loss R41.3    Coronary artery disease involving native coronary artery of native heart with unstable angina pectoris ([facility]) I25.110    CVA, old, cognitive deficits I69.319    Chronic abdominal pain R10.9, G89.29    Hyperlipidemia E78.5    Itchy skin L29.9    Muscle cramps R25.2    Ventriculo-peritoneal shunt status Z98.2    Chronic nausea R11.0    Abdominal pain R10.9    S/P angioplasty with stent Z95.820    CHF (congestive heart failure) ([facility]) I50.9    SOB (shortness of breath) R06.02    Chronic combined systolic and diastolic HF (heart failure), NYHA class 2 ([facility]) I50.42    PAF (paroxysmal atrial fibrillation) ([facility]) I48.0    Acute on chronic respiratory failure ([facility]) J96.20    Shortness of breath R06.02    Altered mental state R41.82    Sepsis ([facility] A41.9         ICD-10-CM ICD-9-CM    1. Acute peritonitis ([facility] K65.0 567.9        I have discussed the diagnosis with the patient and the intended plan as seen in the above orders. I have discussed medication side effects and warnings with the patient as well.     Reviewed test results at length with patient    Anti-infectives:     Zosyn via PD     Jayesh Navarrete MD [address]

## 2019-12-05 NOTE — PROGRESS NOTES
Transition of Care Plan:      Pt lives in [place], has a supportive sister//dtr, medicare//Medicaid, full code, PCP Stefanie Becky, obese, EF 26%, and is legally blind. Chart sent to [facility] to confirm they can follow. [facility] closed the referral on Nov 1st.  They will need new orders if appropriate. Yesterday, D/C plans discussed with pt who states she lives with her sister and sister's dtr. She states her sister works. She states sister stays up at night to help do her PD and pt wants a SNF//NHP at d/c. Yesterday, I spoke with pt's sister Lilian Abdi who states pt could no longer stay where she had been and moved in with her shortime but can not stay due to her working and that Lilian Abdi has a special needs dtr.    -She says pt has a dtr, ? Wonda Gilford, [phone] who she will update and have call me. She is aware the MD is working on d/c plans and the pt is nearing medical stability. [address] [facility] and [facility] [place] [facility] may be able to accept her if she can provide her own PD equipment.

## 2019-12-05 NOTE — PROGRESS NOTES
Bedside shift change report given to Fely Napier (oncoming nurse) by Bryan Escalona (offgoing nurse). Report included the following information SBAR.

## 2019-12-05 NOTE — PROGRESS NOTES
Patient verbally inappropriate, insulted staff. Patient stated \"You telling a lie, if you knew how to do you job, you wouldn't get mad when I tell you what to do\". Pt removed clamp during fill process, of PD. Patient stated \"I'm sorry, I did take if off to get back at you\". Encouraged patient not to remove clamps, and call for assistance.

## 2019-12-06 LAB
APPEARANCE FLD: CLEAR
BACTERIA SPEC CULT: ABNORMAL
BACTERIA SPEC CULT: NORMAL
COLOR FLD: COLORLESS
EOSINOPHIL NFR FLD MANUAL: 2 %
GRAM STN SPEC: ABNORMAL
GRAM STN SPEC: ABNORMAL
LYMPHOCYTES NFR FLD: 27 %
MESOTHL CELL NFR FLD: 2 %
MONOS+MACROS NFR FLD: 28 %
NEUTROPHILS NFR FLD: 41 %
NUC CELL # FLD: 30 /CU MM
RBC # FLD: 5 /CU MM
SERVICE CMNT-IMP: ABNORMAL
SERVICE CMNT-IMP: NORMAL
SPECIMEN SOURCE FLD: ABNORMAL

## 2019-12-06 PROCEDURE — 74011250636 HC RX REV CODE- 250/636: Performed by: INTERNAL MEDICINE

## 2019-12-06 PROCEDURE — 90945 DIALYSIS ONE EVALUATION: CPT

## 2019-12-06 PROCEDURE — 65270000029 HC RM PRIVATE

## 2019-12-06 PROCEDURE — 36415 COLL VENOUS BLD VENIPUNCTURE: CPT

## 2019-12-06 PROCEDURE — 74011250637 HC RX REV CODE- 250/637: Performed by: INTERNAL MEDICINE

## 2019-12-06 PROCEDURE — A4722 DIALYS SOL FLD VOL > 1999CC: HCPCS | Performed by: INTERNAL MEDICINE

## 2019-12-06 PROCEDURE — 89050 BODY FLUID CELL COUNT: CPT

## 2019-12-06 PROCEDURE — 87522 HEPATITIS C REVRS TRNSCRPJ: CPT

## 2019-12-06 PROCEDURE — 94760 N-INVAS EAR/PLS OXIMETRY 1: CPT

## 2019-12-06 PROCEDURE — 82105 ALPHA-FETOPROTEIN SERUM: CPT

## 2019-12-06 RX ORDER — CLINDAMYCIN HYDROCHLORIDE 150 MG/1
300 CAPSULE ORAL EVERY 8 HOURS
Status: DISCONTINUED | OUTPATIENT
Start: 2019-12-06 | End: 2019-12-10 | Stop reason: HOSPADM

## 2019-12-06 RX ADMIN — PIPERACILLIN AND TAZOBACTAM: 3; .375 INJECTION, POWDER, LYOPHILIZED, FOR SOLUTION INTRAVENOUS at 10:48

## 2019-12-06 RX ADMIN — ASPIRIN 81 MG: 81 TABLET, COATED ORAL at 08:36

## 2019-12-06 RX ADMIN — HEPARIN SODIUM 5000 UNITS: 5000 INJECTION INTRAVENOUS; SUBCUTANEOUS at 21:21

## 2019-12-06 RX ADMIN — LACOSAMIDE 150 MG: 50 TABLET, FILM COATED ORAL at 08:36

## 2019-12-06 RX ADMIN — PHENYTOIN SODIUM 300 MG: 100 CAPSULE ORAL at 21:21

## 2019-12-06 RX ADMIN — FUROSEMIDE 40 MG: 40 TABLET ORAL at 08:36

## 2019-12-06 RX ADMIN — Medication 5 ML: at 06:31

## 2019-12-06 RX ADMIN — HEPARIN SODIUM 5000 UNITS: 5000 INJECTION INTRAVENOUS; SUBCUTANEOUS at 14:09

## 2019-12-06 RX ADMIN — HEPARIN SODIUM 5000 UNITS: 5000 INJECTION INTRAVENOUS; SUBCUTANEOUS at 06:35

## 2019-12-06 RX ADMIN — PANTOPRAZOLE SODIUM 40 MG: 40 TABLET, DELAYED RELEASE ORAL at 08:36

## 2019-12-06 RX ADMIN — ATORVASTATIN CALCIUM 40 MG: 40 TABLET, FILM COATED ORAL at 21:21

## 2019-12-06 RX ADMIN — CLINDAMYCIN HYDROCHLORIDE 300 MG: 150 CAPSULE ORAL at 21:21

## 2019-12-06 RX ADMIN — LEVETIRACETAM 1000 MG: 500 TABLET ORAL at 06:35

## 2019-12-06 RX ADMIN — CLOPIDOGREL BISULFATE 75 MG: 75 TABLET ORAL at 08:36

## 2019-12-06 RX ADMIN — SODIUM CHLORIDE, SODIUM LACTATE, CALCIUM CHLORIDE, MAGNESIUM CHLORIDE AND DEXTROSE 2000 ML: 4.25; 538; 448; 18.3; 5.08 INJECTION, SOLUTION INTRAPERITONEAL at 07:26

## 2019-12-06 RX ADMIN — AMIODARONE HYDROCHLORIDE 100 MG: 200 TABLET ORAL at 08:36

## 2019-12-06 RX ADMIN — CARVEDILOL 6.25 MG: 6.25 TABLET, FILM COATED ORAL at 08:36

## 2019-12-06 RX ADMIN — Medication 5 ML: at 21:21

## 2019-12-06 RX ADMIN — Medication 10 ML: at 14:00

## 2019-12-06 RX ADMIN — SENNOSIDES AND DOCUSATE SODIUM 2 TABLET: 8.6; 5 TABLET ORAL at 08:36

## 2019-12-06 RX ADMIN — SODIUM CHLORIDE, SODIUM LACTATE, CALCIUM CHLORIDE, MAGNESIUM CHLORIDE AND DEXTROSE 2000 ML: 2.5; 538; 448; 18.3; 5.08 INJECTION, SOLUTION INTRAPERITONEAL at 21:23

## 2019-12-06 RX ADMIN — PIPERACILLIN AND TAZOBACTAM: 3; .375 INJECTION, POWDER, LYOPHILIZED, FOR SOLUTION INTRAVENOUS at 16:02

## 2019-12-06 RX ADMIN — ACETAMINOPHEN 650 MG: 325 TABLET ORAL at 23:08

## 2019-12-06 RX ADMIN — CARVEDILOL 6.25 MG: 6.25 TABLET, FILM COATED ORAL at 17:43

## 2019-12-06 RX ADMIN — ONDANSETRON 4 MG: 2 INJECTION INTRAMUSCULAR; INTRAVENOUS at 08:35

## 2019-12-06 RX ADMIN — LEVETIRACETAM 1000 MG: 500 TABLET ORAL at 17:43

## 2019-12-06 RX ADMIN — LACOSAMIDE 150 MG: 50 TABLET, FILM COATED ORAL at 17:43

## 2019-12-06 RX ADMIN — CLINDAMYCIN HYDROCHLORIDE 300 MG: 150 CAPSULE ORAL at 17:43

## 2019-12-06 NOTE — PROGRESS NOTES
NAME: Shira Raymond        :  1955        MRN:  [id number]        Assessment :    Plan:  --ESKD (Dr. Darrel Grey)  Peritonitis  Tenckhoff catheter, thoracic PD catheter     Seizure d/o  Anemia of ESKD  HTN  SHPT    HCV CCPD at home; On CAPD while hospitalized (2.5 % dextrose, 2 liter dwell, 5 exchanges); weight is up and sodium down a tad - add a 4.24% dextrose exchange today    Peripheral leukocytosis resolved; afebrile; clear dialysate; Cell count on PD fluid markedly improved - 7800 to 207; No need for PD cath removal from my standpoint; ID recs noted   -  Zosyn perotoneal , may DC on po amoxicillin /augmentin total  x 2 weeks, end date ; b/c on ongoing abdominal pain will check one more cell count    Few Enterococcus species and rare lactobacillus on culture early ; culture done later on  is ngtd; PD cath site with ngtd    Likely peritonitis from visceral (contamination via bacteria from bowel) or vaginal (rarely) source. Hgb < 9; retacrit 10 k sc biw    On Calcitriol       Subjective:     Chief Complaint:  Difficult historian. Still states abdominal pain. Left side mostly. We discussed the above. I spoke with her nurse. Review of Systems:    Symptom Y/N Comments  Symptom Y/N Comments   Fever/Chills    Chest Pain     Poor Appetite    Edema     Cough    Abdominal Pain     Sputum    Joint Pain     SOB/MAYORGA    Pruritis/Rash     Nausea/vomit    Tolerating PT/OT     Diarrhea    Tolerating Diet     Constipation    Other       Could not obtain due to: see above     Objective:     VITALS:   Last 24hrs VS reviewed since prior progress note.  Most recent are:  Visit Vitals  /87   Pulse 85   Temp 98.3 °F (36.8 °C)   Resp 18   Ht 5' 3\" (1.6 m)   Wt 94.1 kg (207 lb 6.4 oz)   SpO2 98%   BMI 36.74 kg/m²       Intake/Output Summary (Last 24 hours) at 2019 8233  Last data filed at 2019 1359  Gross per 24 hour Intake 4180 ml   Output 2250 ml   Net 1930 ml      Telemetry Reviewed:     PHYSICAL EXAM:  General: NAD  Abdomen tender, diffuse  Left chest PD cath exit site with no drainage/erythema    Lab Data Reviewed: (see below)    Medications Reviewed: (see below)    PMH/SH reviewed - no change compared to H&P  ________________________________________________________________________  Care Plan discussed with:  Patient y    Family      RN     Care Manager                    Consultant:          Comments   >50% of visit spent in counseling and coordination of care       ________________________________________________________________________  Jimmy MD Elva     Procedures: see electronic medical records for all procedures/Xrays and details which  were not copied into this note but were reviewed prior to creation of Plan. LABS:  Recent Labs     12/05/19 0244 12/04/19 0134   WBC 9.6 12.8*   HGB 8.1* 8.3*   HCT 25.9* 26.4*    319     Recent Labs     12/05/19 0244 12/04/19 0134   * 134*   K 3.4* 3.5   CL 94* 96*   CO2 29 31   BUN 40* 45*   CREA 7.84* 8.30*   GLU 84 80   CA 7.9* 8.1*   PHOS 4.5  --      Recent Labs     12/05/19 0244 12/04/19 0134   SGOT 23 20   * 118*   TP 6.0* 6.1*   ALB 1.5* 1.6*   GLOB 4.5* 4.5*     No results for input(s): INR, PTP, APTT, INREXT, INREXT in the last 72 hours. No results for input(s): FE, TIBC, PSAT, FERR in the last 72 hours. No results found for: FOL, RBCF   No results for input(s): PH, PCO2, PO2 in the last 72 hours. No results for input(s): CPK, CKMB in the last 72 hours.     No lab exists for component: TROPONINI  No components found for: [place]  Lab Results   Component Value Date/Time    Color YELLOW/STRAW 04/03/2015 08:31 PM    Appearance CLOUDY (A) 04/03/2015 08:31 PM    Specific gravity 1.013 04/03/2015 08:31 PM    pH (UA) 7.5 04/03/2015 08:31 PM    Protein 300 (A) 04/03/2015 08:31 PM    Glucose NEGATIVE  04/03/2015 08:31 PM    Ketone NEGATIVE 04/03/2015 08:31 PM    Bilirubin NEGATIVE  04/03/2015 08:31 PM    Urobilinogen 0.2 04/03/2015 08:31 PM    Nitrites NEGATIVE  04/03/2015 08:31 PM    Leukocyte Esterase NEGATIVE  04/03/2015 08:31 PM    Epithelial cells MANY (A) 04/03/2015 08:31 PM    Bacteria 1+ (A) 04/03/2015 08:31 PM    WBC 0-4 04/03/2015 08:31 PM    RBC 0-5 04/03/2015 08:31 PM       MEDICATIONS:  Current Facility-Administered Medications   Medication Dose Route Frequency    peritoneal dialysis DEXTROSE 4.25% (2.5 mEq/L low calcium) solution 2,000 mL  2,000 mL IntraPERitoneal ONCE    peritoneal dialysis with additives   IntraPERitoneal 5XD    levETIRAcetam (KEPPRA) tablet 1,000 mg  1,000 mg Oral BID    sorbitol 70 % solution 30 mL  30 mL Oral DAILY PRN    polyethylene glycol (MIRALAX) packet 17 g  17 g Oral DAILY    senna-docusate (PERICOLACE) 8.6-50 mg per tablet 2 Tab  2 Tab Oral DAILY    carvedilol (COREG) tablet 6.25 mg  6.25 mg Oral BID WITH MEALS    phenytoin ER (DILANTIN ER) ER capsule 300 mg  300 mg Oral QHS    sodium chloride (NS) flush 5-40 mL  5-40 mL IntraVENous Q8H    sodium chloride (NS) flush 5-40 mL  5-40 mL IntraVENous PRN    acetaminophen (TYLENOL) tablet 650 mg  650 mg Oral Q6H PRN    ondansetron (ZOFRAN) injection 4 mg  4 mg IntraVENous Q6H PRN    bisacodyl (DULCOLAX) tablet 5 mg  5 mg Oral DAILY PRN    heparin (porcine) injection 5,000 Units  5,000 Units SubCUTAneous Q8H    amiodarone (CORDARONE) tablet 100 mg  100 mg Oral DAILY    atorvastatin (LIPITOR) tablet 40 mg  40 mg Oral QHS    aspirin delayed-release tablet 81 mg  81 mg Oral DAILY    clopidogrel (PLAVIX) tablet 75 mg  75 mg Oral DAILY    calcitRIOL (ROCALTROL) capsule 0.25 mcg  0.25 mcg Oral Once per day on Thu Sat    pantoprazole (PROTONIX) tablet 40 mg  40 mg Oral DAILY    furosemide (LASIX) tablet 40 mg  40 mg Oral DAILY    lacosamide (VIMPAT) tablet 150 mg  150 mg Oral BID

## 2019-12-06 NOTE — PROGRESS NOTES
Gastroenterology Progress Note    12/6/2019    Admit Date: 11/30/2019    Subjective: Follow up for:  1)  Liver mass    2) +HCV Ab    MRI 12/5/19: A homogenously T2 hyperintense, circumscribed, 19 x 22 mm hepatic mass at the  dome in segment 7 is unchanged in size from 2016, and showed a peripheral nodule  of enhancement on 12/1/2019. As such, it is consistent with a hemangioma. The  liver is not overtly cirrhotic. AFP and quantitative hep C pending. Patient is on Regular. Patient complains of peritoneal catheter being pulled during dialysis and causing her pain. No other c/o.       Current Facility-Administered Medications   Medication Dose Route Frequency    peritoneal dialysis with additives   IntraPERitoneal 5XD    levETIRAcetam (KEPPRA) tablet 1,000 mg  1,000 mg Oral BID    sorbitol 70 % solution 30 mL  30 mL Oral DAILY PRN    polyethylene glycol (MIRALAX) packet 17 g  17 g Oral DAILY    senna-docusate (PERICOLACE) 8.6-50 mg per tablet 2 Tab  2 Tab Oral DAILY    carvedilol (COREG) tablet 6.25 mg  6.25 mg Oral BID WITH MEALS    phenytoin ER (DILANTIN ER) ER capsule 300 mg  300 mg Oral QHS    sodium chloride (NS) flush 5-40 mL  5-40 mL IntraVENous Q8H    sodium chloride (NS) flush 5-40 mL  5-40 mL IntraVENous PRN    acetaminophen (TYLENOL) tablet 650 mg  650 mg Oral Q6H PRN    ondansetron (ZOFRAN) injection 4 mg  4 mg IntraVENous Q6H PRN    bisacodyl (DULCOLAX) tablet 5 mg  5 mg Oral DAILY PRN    heparin (porcine) injection 5,000 Units  5,000 Units SubCUTAneous Q8H    amiodarone (CORDARONE) tablet 100 mg  100 mg Oral DAILY    atorvastatin (LIPITOR) tablet 40 mg  40 mg Oral QHS    aspirin delayed-release tablet 81 mg  81 mg Oral DAILY    clopidogrel (PLAVIX) tablet 75 mg  75 mg Oral DAILY    calcitRIOL (ROCALTROL) capsule 0.25 mcg  0.25 mcg Oral Once per day on Thu Sat    pantoprazole (PROTONIX) tablet 40 mg  40 mg Oral DAILY    furosemide (LASIX) tablet 40 mg  40 mg Oral DAILY    lacosamide (VIMPAT) tablet 150 mg  150 mg Oral BID        Objective:     Blood pressure 173/89, pulse 82, temperature 98.2 °F (36.8 °C), resp. rate 18, height 5' 3\" (1.6 m), weight 94.1 kg (207 lb 6.4 oz), SpO2 98 %. 12/06 0701 - 12/06 1900  In: 2000   Out: 2000 12/04 1901 - 12/06 0700  In: 3866 [P.O.:180]  Out: 7450     EXAM:      Gen: sitting in a chair in NAD, blank stare  HEENT: sclera anicteric  Lungs: CTA  Heart: RRR  Chest: catheter in left chest  Abd: obese, +BS, ND, soft, mild RUQ tenderness  Ext: no edema  Neuro: oriented    Data Review  No results found for this or any previous visit (from the past 24 hour(s)). Recent Labs     12/05/19 0244 12/04/19 0134   WBC 9.6 12.8*   HGB 8.1* 8.3*   HCT 25.9* 26.4*    319     Recent Labs     12/05/19 0244 12/04/19 0134   * 134*   K 3.4* 3.5   CL 94* 96*   CO2 29 31   BUN 40* 45*   CREA 7.84* 8.30*   GLU 84 80   CA 7.9* 8.1*   PHOS 4.5  --      Recent Labs     12/05/19 0244 12/04/19 0134   SGOT 23 20   ALT 16 12   * 118*   TBILI 0.3 0.3   TP 6.0* 6.1*   ALB 1.5* 1.6*   GLOB 4.5* 4.5*     No results for input(s): INR, PTP, APTT, INREXT in the last 72 hours. No results for input(s): FE, TIBC, PSAT, FERR in the last 72 hours. No results found for: FOL, RBCF   No results for input(s): PH, PCO2, PO2 in the last 72 hours. No results for input(s): CPK, CKNDX, TROIQ in the last 72 hours.     No lab exists for component: CPKMB  No results found for: CHOL, CHOLX, CHLST, CHOLV, HDL, HDLP, LDL, LDLC, DLDLP, TGLX, TRIGL, TRIGP, CHHD, CHHDX  Lab Results   Component Value Date/Time    Glucose (POC) 93 12/01/2019 12:01 PM    Glucose (POC) 84 12/01/2019 10:29 AM    Glucose (POC) 70 12/01/2019 07:45 AM    Glucose (POC) 87 07/22/2019 11:16 AM    Glucose (POC) 162 (H) 07/17/2019 03:21 AM     Lab Results   Component Value Date/Time    Color YELLOW/STRAW 04/03/2015 08:31 PM    Appearance CLOUDY (A) 04/03/2015 08:31 PM    Specific gravity 1.013 04/03/2015 08:31 PM    pH (UA) 7.5 04/03/2015 08:31 PM    Protein 300 (A) 04/03/2015 08:31 PM    Glucose NEGATIVE  04/03/2015 08:31 PM    Ketone NEGATIVE  04/03/2015 08:31 PM    Bilirubin NEGATIVE  04/03/2015 08:31 PM    Urobilinogen 0.2 04/03/2015 08:31 PM    Nitrites NEGATIVE  04/03/2015 08:31 PM    Leukocyte Esterase NEGATIVE  04/03/2015 08:31 PM    Epithelial cells MANY (A) 04/03/2015 08:31 PM    Bacteria 1+ (A) 04/03/2015 08:31 PM    WBC 0-4 04/03/2015 08:31 PM    RBC 0-5 04/03/2015 08:31 PM           Assessment:     Active Problems:    Sepsis ([address] (12/1/2019)      Liver lesion (12/5/2019)        Plan:     Liver lesion is a benign hemangioma. No further evaluation of it is necessary. No sign of cirrhosis either. Awaiting quantitative hep C to determine whether outpatient treatment of hep C is necessary. If +, would refer to Dr. Jalen Mcguire. Patient does not ever want a colonoscopy. She prefers to have a cologuard.     LORENZA Grant  12/06/19  9:23 AM

## 2019-12-06 NOTE — PROGRESS NOTES
CM verified patient has a qualifying hospital stay for [facility]. Initial inpatient order submitted 12/1/2019. Liam Finn, [address] - [address]  [employer] ACP Facilitator  Zone Phone: [phone]      Mobile: [phone] e

## 2019-12-06 NOTE — PROGRESS NOTES
NIRANJAN:  -will discuss d/c plan with dtr this am  -[facility]. The former can administer the PD// the latter I'm waiting to confirm  -she would need [facility] transport  -will discuss [address] with dtr  -needs 2nd IM letter.    -The Plan for Transition of Care is related to the following treatment goals: SNF  The Patient and/or patient representative sister was provided with a choice of provider and agrees   with the discharge plan. [x] Yes [] No  FOC on chart  Freedom of choice list was provided with basic dialogue that supports the patient's individualized plan of care/goals, treatment preferences and shares the quality data associated with the providers. [x] Yes [] No  -needs 3 night stay note    3:30  I called [phone] at [facility]) and no answer; I called Jabier Chinchilla at [phone] who connected me to a person that was supposed to help but never answered. I called Donnie Carrera PD nurse at [phone] #[phone] and left a VM. I'm hoping to obtain a name//phone number of a Damion Lopez [facility] PD nurse that knows this pt as far as equipment//PD runs so they can talk with the PD nurse at the nursing home she will go to. Will follow on Monday. Dtr has not returned my call. I called the other dtr, Jayden Moreno at [phone], who is in [place], states Love is in [facility] and there is a time difference, and she really doesn't have anything to do with making decisions about the pt.      4:05  I spoke with Dolly Garcia at [facility](cell [phone]) which is the facility that has accepted the patient. He will order the PD equipment and have it in place for a Monday afternoon admission.

## 2019-12-06 NOTE — PROGRESS NOTES
Bedside shift change report given to [address] [place] [address] (oncoming nurse) by Fernanda Escamilla RN (offgoing nurse). Report included the following information SBAR, Kardex, Procedure Summary, Intake/Output and Recent Results.

## 2019-12-06 NOTE — PROGRESS NOTES
Hospitalist Progress Note    NAME: Angelo Delgado   :  1955   MRN:  [id number]       Assessment / Plan:  Hep C (New Dx)  CT A/P with 1.5 cm hypodensity with peripheral globular enhancement  I have informed patient about new Dx  I have discussed with her need to follow up with GI as OP for further treatment and plan  Ammonia  WNL  GI Consult    Sepsis POA  Due to Acute bacterial peritonitis in peritoneal dialysis patient   On IV Zosyn  Appreciate ID assistance  Pt continue to have abdominal tenderness despite bowel movements. Catheter seems infected, cultures sent from around the PD catheter yesterday per ID recommendations  F/u PD and blood cultures and cultures from around the PD cath  Pt decline HD, she claims she will die but not go one HD  Appreciate, ID/Nephrology input, transition to PO Augmentin upon discharge to complete 14 days coarse of abx  CT abdomen/pelvis with Large amount of ascites with peritoneal catheter in place. No evidence for small bowel obstruction    Constipation - resolved  ? Abdominal discomfort related to constipation vs peritonitis  Lactulose didn't work yesterday  Fleet Enema today  Start Pericolace and miralax  PRN sorbitol    ESRD  Nephrology followup appreciate  On peritoneal dialysis    Hx of Seizure  Continue home medication Keppra/Phenytoin    Hypertension  Resume coreg  Holding ARBs    Chronic systolic CHF with EF 44-39% on recent echo  Fluid management as per nephrology    Code Status: Full   Surrogate Decision Maker: Jaguar Hassan  DVT Prophylaxis: Heparin   GI Prophylaxis: not indicated  Body mass index is 36.08 kg/m².: 30.0 - 39.9 Obese     Subjective: Pt seen and examined at bedside. NAD. No new complaints.  Now AAAx3 with good insight Overnight events d/w RN     Chief Complaint / Reason for Physician Visit: f/u \"sepsis, bacterial peritonitis\"    Review of Systems:  Symptom Y/N Comments  Symptom Y/N Comments   Fever/Chills n   Chest Pain n    Poor Appetite Edema     Cough n   Abdominal Pain y    Sputum    Joint Pain     SOB/MAYORGA n   Pruritis/Rash     Nausea/vomit    Tolerating PT/OT     Diarrhea    Tolerating Diet y    Constipation    Other       Could NOT obtain due to:      Objective:     VITALS:   Last 24hrs VS reviewed since prior progress note. Most recent are:  Patient Vitals for the past 24 hrs:   Temp Pulse Resp BP SpO2   12/06/19 1524 97.2 °F (36.2 °C) 77 18 142/82 100 %   12/06/19 0800 98.2 °F (36.8 °C) 82 18 173/89 98 %   12/05/19 2331 98.3 °F (36.8 °C) 85 18 145/87 98 %   12/05/19 1817 98.2 °F (36.8 °C) 84 18 140/80 96 %       Intake/Output Summary (Last 24 hours) at 12/6/2019 1619  Last data filed at 12/6/2019 1153  Gross per 24 hour   Intake 4000 ml   Output 4000 ml   Net 0 ml        PHYSICAL EXAM:  General: WD, WN. Alert, cooperative, looks acutely ill   EENT:  EOMI. Anicteric sclerae. MMM  Resp:  CTA bilaterally, no wheezing or rales. No accessory muscle use  CV:  Regular  rhythm,  No edema  GI:  Mildly tender , mildly distended abdomen .  +Bowel sounds                      NO guarding or rebound tenderness  Neurologic:  Alert and oriented X 3, normal speech,   No LE swelling     Reviewed most current lab test results and cultures  YES  Reviewed most current radiology test results   YES  Review and summation of old records today    NO  Reviewed patient's current orders and MAR    YES  PMH/ reviewed - no change compared to H&P  ________________________________________________________________________  Care Plan discussed with:    Comments   Patient y    Family      RN y    Care Manager     Consultant                        Multidiciplinary team rounds were held today with , nursing, pharmacist and clinical coordinator. Patient's plan of care was discussed; medications were reviewed and discharge planning was addressed.      ________________________________________________________________________  Total NON critical care TIME: 20 Minutes    Total CRITICAL CARE TIME Spent:   Minutes non procedure based      Comments   >50% of visit spent in counseling and coordination of care     ________________________________________________________________________  Zafar Woods MD     Procedures: see electronic medical records for all procedures/Xrays and details which were not copied into this note but were reviewed prior to creation of Plan. LABS:  I reviewed today's most current labs and imaging studies.   Pertinent labs include:  Recent Labs     12/05/19 0244 12/04/19 0134   WBC 9.6 12.8*   HGB 8.1* 8.3*   HCT 25.9* 26.4*    319     Recent Labs     12/05/19 0244 12/04/19 0134   * 134*   K 3.4* 3.5   CL 94* 96*   CO2 29 31   GLU 84 80   BUN 40* 45*   CREA 7.84* 8.30*   CA 7.9* 8.1*   PHOS 4.5  --    ALB 1.5* 1.6*   TBILI 0.3 0.3   SGOT 23 20   ALT 16 12       Signed: Zafar Woods MD

## 2019-12-06 NOTE — PROGRESS NOTES
Infectious Disease Progress Note    IMPRESSION:      -Sepsis with WBC 29, abdominal pain/ constipation on presentation , T max 99.6 on   - Peritoneal fld cloudy WBC 7810, N-95  - Fld culture- few Streptococcus salivarius ( PCN I ntermediate sensitivity) , rare Lactobacillus spp  - ESRD on PD, thoracic Tenckhoff   - No evidence of exit site infection , WC- NG   - Hep C new diagnosis , on review of records Hep C ab + in 5/5/15 ( low positive )    -CT abd- 1.5 cm hypodensity with peripheral globular enhancement noted       PLAN:            Change to Clindamycin po / DC on po Clindamycin 300 mg TID   x 2 weeks, end date   - Hep C RNA & peg typing, out pt F/u with GI . Advised pt that Hep C can be cured   -Plan of care D/w pt          Subjective:     Pt seen . C/o abdominal pain , vomiting , diarrhea  D/w RN - one episode of vomiting , no diarrhea , pt taking miralax for constipation  Review of Systems:  A comprehensive review of systems was negative except for that written in the History of Present Illness. 10 point ROS obtained . All other systems negative . Objective:     Blood pressure 142/82, pulse 77, temperature 97.2 °F (36.2 °C), resp. rate 18, height 5' 3\" (1.6 m), weight 203 lb 11.3 oz (92.4 kg), SpO2 100 %. Temp (24hrs), Av °F (36.7 °C), Min:97.2 °F (36.2 °C), Max:98.3 °F (36.8 °C)      Patient Vitals for the past 24 hrs:   Temp Pulse Resp BP SpO2   19 1524 97.2 °F (36.2 °C) 77 18 142/82 100 %   19 0800 98.2 °F (36.8 °C) 82 18 173/89 98 %   19 2331 98.3 °F (36.8 °C) 85 18 145/87 98 %   19 1817 98.2 °F (36.8 °C) 84 18 140/80 96 %         Lines:  Peripheral IV:       Physical Exam:   General:  Alert, cooperative, well developed, appears stated age   Eyes:  Sclera anicteric. Pupils equally round and reactive to light.    Mouth/Throat: Mucous membranes normal, oral pharynx clear   Neck: Supple   Lungs:   Clear to auscultation bilaterally, good effort   CV:  Regular rate and rhythm,no murmur, click, rub or gallop   Abdomen:   Soft, non-tender. bowel sounds normal. non-distended   Extremities: No cyanosis or edema   Skin: Skin color, texture, turgor normal. no acute rash or lesions   Lymph nodes: Cervical and supraclavicular normal   Musculoskeletal: No swelling or deformity   Lines/Devices:  Intact, no erythema, drainage or tenderness   Psych: Alert and oriented, normal mood affect       Data Review:   CBC:   Recent Labs     12/05/19 0244 12/04/19 0134   WBC 9.6 12.8*   RBC 2.89* 2.98*   HGB 8.1* 8.3*   HCT 25.9* 26.4*    319   GRANS 65 75   LYMPH 18 13   EOS 5 4     CMP:   Recent Labs     12/05/19 0244 12/04/19 0134   GLU 84 80   * 134*   K 3.4* 3.5   CL 94* 96*   CO2 29 31   BUN 40* 45*   CREA 7.84* 8.30*   CA 7.9* 8.1*   AGAP 9 7   BUCR 5* 5*   * 118*   TP 6.0* 6.1*   ALB 1.5* 1.6*   GLOB 4.5* 4.5*   AGRAT 0.3* 0.4*       Studies:      Lab Results   Component Value Date/Time    Culture result: NO GROWTH 2 DAYS 12/04/2019 01:34 AM    Culture result: NO GROWTH 4 DAYS 12/01/2019 08:04 AM    Culture result: (A) 12/01/2019 02:43 AM     FEW STREPTOCOCCUS SALIVARIUS (SENSITIVITIES PERFORMED BY E-TEST)    Culture result: RARE LACTOBACILLUS SPECIES (A) 12/01/2019 02:43 AM    Culture result: Culture performed on Unspun Fluid 12/01/2019 02:43 AM        XR Results (most recent):  Results from Hospital Encounter encounter on 11/30/19   XR CHEST PORT    Narrative EXAM:  XR CHEST PORT    INDICATION:  central line placement    COMPARISON:  11/24/2019    FINDINGS: A portable AP radiograph of the chest was obtained at 1231 hours. Right central venous catheter tip overlies SVC right atrial junction. There is  no pneumothorax. Catheter overlying right neck is unchanged. There is discoid  atelectasis left midlung. Moderately severe cardiomegaly is stable. .       Impression IMPRESSION: Right central venous catheter tip overlies SVC right atrial  junction.  There is no pneumothorax. Patient Active Problem List   Diagnosis Code    Seizure ([facility] R56.9    HTN (hypertension) I10    Hydrocephalus ([facility]) G91.9    End stage renal disease ([facility] N18.6    Peritoneal dialysis status ([facility] Z99.2    Memory loss R41.3    Coronary artery disease involving native coronary artery of native heart with unstable angina pectoris ([facility]) I25.110    CVA, old, cognitive deficits I69.319    Chronic abdominal pain R10.9, G89.29    Hyperlipidemia E78.5    Itchy skin L29.9    Muscle cramps R25.2    Ventriculo-peritoneal shunt status Z98.2    Chronic nausea R11.0    Abdominal pain R10.9    S/P angioplasty with stent Z95.820    CHF (congestive heart failure) ([facility]) I50.9    SOB (shortness of breath) R06.02    Chronic combined systolic and diastolic HF (heart failure), NYHA class 2 ([facility]) I50.42    PAF (paroxysmal atrial fibrillation) ([facility]) I48.0    Acute on chronic respiratory failure ([facility]) J96.20    Shortness of breath R06.02    Altered mental state R41.82    Sepsis ([facility] A41.9    Liver lesion K76.9         ICD-10-CM ICD-9-CM    1. Acute peritonitis ([facility] [address] K65.0 567.9        I have discussed the diagnosis with the patient and the intended plan as seen in the above orders. I have discussed medication side effects and warnings with the patient as well.     Reviewed test results at length with patient    Anti-infectives:     S/p Zosyn via PD     Jacqui Rodriguez MD [address]

## 2019-12-06 NOTE — PROGRESS NOTES
Initial Nutrition Assessment:    INTERVENTIONS/RECOMMENDATIONS:   · Meals/Snacks: General/healthful diet: Continue Regular diet  · Supplements: Commercial supplement: Add nepro daily    ASSESSMENT:   Patient medically noted for sepsis, peritonitis, liver lesion, and Hep C. PMH ESRD on PD, seizure, HTN, CVA, and CHF. Chart reviewed for length of stay. Attempted to visit patient x 2; patient stated she wanted to talk to RD but requested I come back later. When visiting this afternoon, patient was half asleep and declined visit again. PO mostly 10-50% of meals per flowsheets. K+ slightly low, last phos WNL. Will add nepro daily for now. Encourage intake of meals. Diet Order: Regular  % Eaten:    Patient Vitals for the past 72 hrs:   % Diet Eaten   12/05/19 1359 20 %   12/05/19 0955 10 %   12/04/19 1759 10 %   12/04/19 1211 50 %       Pertinent Medications: [x]Reviewed []Other: Atorvastatin, Coreg, Plavix, Lasix, Keppra, Protonix, Dilantin, Miralax, Pericolace   Pertinent Labs: [x]Reviewed []Other: K+ 3.4, Phos 4.5, BG 44-47-  Food Allergies: [x]None []Yes:    Last BM: 12/5  [x]Active     []Hyperactive  []Hypoactive       [] Absent BS  Skin:    [x] Intact   [] Incision  [] Breakdown: [] Edema []Other:    Anthropometrics:   Height: 5' 3\" (160 cm) Weight: 94.1 kg (207 lb 6.4 oz)   IBW (%IBW):   ( ) UBW (%UBW):   (  %)   Last Weight Metrics:  Weight Loss Metrics 12/5/2019 11/24/2019 9/27/2019 9/19/2019 7/22/2019 7/16/2019 7/16/2019   Today's Wt 207 lb 6.4 oz 198 lb 198 lb 203 lb 14.8 oz 205 lb - 207 lb 4.8 oz   BMI 36.74 kg/m2 35.07 kg/m2 35.07 kg/m2 36.12 kg/m2 - 36.31 kg/m2 -       BMI: Body mass index is 36.74 kg/m². This BMI is indicative of:   []Underweight    []Normal    []Overweight    [x] Obesity   [] Extreme Obesity (BMI>40)     Estimated Nutrition Needs (Based on):   6422 Kcals/day(BMR (1464) x 1. 3AF) , 94 g(-113g (1.0-1.2 g/kg bw)) Protein  Carbohydrate:  At Least 130 g/day  Fluids: 1650 mL/day (1ml/kcal)    Pt expected to meet estimated nutrient needs: [x]Yes []No    NUTRITION DIAGNOSES:   Problem:  Inadequate protein-energy intake      Etiology: related to poor appetite     Signs/Symptoms: as evidenced by documented PO 10-50% of meals      NUTRITION INTERVENTIONS:  Meals/Snacks: General/healthful diet   Supplements: Commercial supplement              GOAL:   PO intake >50% of meals next 1-3 days    LEARNING NEEDS (Diet, Food/Nutrient-Drug Interaction):    [x] None Identified   [] Identified and Education Provided/Documented   [] Identified and Pt declined/was not appropriate     Cultural, Latter-day, OR Ethnic Dietary Needs:    [x] None Identified   [] Identified and Addressed     [x] Interdisciplinary Care Plan Reviewed/Documented    [x] Discharge Planning: Regular diet       MONITORING /EVALUATION:   Food/Nutrient Intake Outcomes:  Total energy intake  Physical Signs/Symptoms Outcomes: Weight/weight change, Electrolyte and renal profile    NUTRITION RISK:    [x] Patient At Nutritional Risk              [] Patient Not at Nutritional Risk    PT SEEN FOR:    []  MD Consult: []Calorie Count      []Diabetic Diet Education        []Diet Education     []Electrolyte Management     []General Nutrition Management and Supplements     []Management of Tube Feeding     []TPN Recommendations    []  RN Referral:  []MST score >=2     []Enteral/Parenteral Nutrition PTA     []Pregnant: Gestational DM or Multigestation     []Pressure Ulcer/Wound Care needs        []  Low BMI  [x]  [facility] [id number]  Pager [phone]    Weekend Pager [phone]

## 2019-12-07 LAB — AFP-TM SERPL-MCNC: 1.2 NG/ML (ref 0–8.3)

## 2019-12-07 PROCEDURE — 94760 N-INVAS EAR/PLS OXIMETRY 1: CPT

## 2019-12-07 PROCEDURE — 74011250637 HC RX REV CODE- 250/637: Performed by: INTERNAL MEDICINE

## 2019-12-07 PROCEDURE — 74011250636 HC RX REV CODE- 250/636: Performed by: INTERNAL MEDICINE

## 2019-12-07 PROCEDURE — 65270000029 HC RM PRIVATE

## 2019-12-07 PROCEDURE — 90945 DIALYSIS ONE EVALUATION: CPT

## 2019-12-07 PROCEDURE — A4722 DIALYS SOL FLD VOL > 1999CC: HCPCS | Performed by: INTERNAL MEDICINE

## 2019-12-07 RX ORDER — ONDANSETRON 2 MG/ML
4 INJECTION INTRAMUSCULAR; INTRAVENOUS
Status: DISCONTINUED | OUTPATIENT
Start: 2019-12-07 | End: 2019-12-10 | Stop reason: HOSPADM

## 2019-12-07 RX ORDER — POTASSIUM CHLORIDE 750 MG/1
20 TABLET, FILM COATED, EXTENDED RELEASE ORAL DAILY
Status: DISCONTINUED | OUTPATIENT
Start: 2019-12-07 | End: 2019-12-10 | Stop reason: HOSPADM

## 2019-12-07 RX ADMIN — HEPARIN SODIUM 5000 UNITS: 5000 INJECTION INTRAVENOUS; SUBCUTANEOUS at 06:47

## 2019-12-07 RX ADMIN — SODIUM CHLORIDE, SODIUM LACTATE, CALCIUM CHLORIDE, MAGNESIUM CHLORIDE AND DEXTROSE 2000 ML: 2.5; 538; 448; 18.3; 5.08 INJECTION, SOLUTION INTRAPERITONEAL at 18:30

## 2019-12-07 RX ADMIN — SODIUM CHLORIDE, SODIUM LACTATE, CALCIUM CHLORIDE, MAGNESIUM CHLORIDE AND DEXTROSE 2000 ML: 2.5; 538; 448; 18.3; 5.08 INJECTION, SOLUTION INTRAPERITONEAL at 11:06

## 2019-12-07 RX ADMIN — ONDANSETRON 4 MG: 2 INJECTION INTRAMUSCULAR; INTRAVENOUS at 09:20

## 2019-12-07 RX ADMIN — CLINDAMYCIN HYDROCHLORIDE 300 MG: 150 CAPSULE ORAL at 06:46

## 2019-12-07 RX ADMIN — SODIUM CHLORIDE, SODIUM LACTATE, CALCIUM CHLORIDE, MAGNESIUM CHLORIDE AND DEXTROSE 2000 ML: 2.5; 538; 448; 18.3; 5.08 INJECTION, SOLUTION INTRAPERITONEAL at 02:14

## 2019-12-07 RX ADMIN — PHENYTOIN SODIUM 300 MG: 100 CAPSULE ORAL at 22:21

## 2019-12-07 RX ADMIN — CLOPIDOGREL BISULFATE 75 MG: 75 TABLET ORAL at 09:14

## 2019-12-07 RX ADMIN — CARVEDILOL 6.25 MG: 6.25 TABLET, FILM COATED ORAL at 09:14

## 2019-12-07 RX ADMIN — Medication 10 ML: at 23:09

## 2019-12-07 RX ADMIN — Medication 10 ML: at 15:40

## 2019-12-07 RX ADMIN — POTASSIUM CHLORIDE 20 MEQ: 750 TABLET, FILM COATED, EXTENDED RELEASE ORAL at 13:33

## 2019-12-07 RX ADMIN — ONDANSETRON 4 MG: 2 INJECTION INTRAMUSCULAR; INTRAVENOUS at 15:40

## 2019-12-07 RX ADMIN — SODIUM CHLORIDE, SODIUM LACTATE, CALCIUM CHLORIDE, MAGNESIUM CHLORIDE AND DEXTROSE 2000 ML: 2.5; 538; 448; 18.3; 5.08 INJECTION, SOLUTION INTRAPERITONEAL at 06:47

## 2019-12-07 RX ADMIN — LACOSAMIDE 150 MG: 50 TABLET, FILM COATED ORAL at 17:09

## 2019-12-07 RX ADMIN — FUROSEMIDE 40 MG: 40 TABLET ORAL at 09:14

## 2019-12-07 RX ADMIN — LACOSAMIDE 150 MG: 50 TABLET, FILM COATED ORAL at 09:15

## 2019-12-07 RX ADMIN — SODIUM CHLORIDE, SODIUM LACTATE, CALCIUM CHLORIDE, MAGNESIUM CHLORIDE AND DEXTROSE 2000 ML: 2.5; 538; 448; 18.3; 5.08 INJECTION, SOLUTION INTRAPERITONEAL at 22:00

## 2019-12-07 RX ADMIN — AMIODARONE HYDROCHLORIDE 100 MG: 200 TABLET ORAL at 09:15

## 2019-12-07 RX ADMIN — Medication 5 ML: at 06:47

## 2019-12-07 RX ADMIN — ASPIRIN 81 MG: 81 TABLET, COATED ORAL at 09:14

## 2019-12-07 RX ADMIN — CLINDAMYCIN HYDROCHLORIDE 300 MG: 150 CAPSULE ORAL at 13:33

## 2019-12-07 RX ADMIN — LEVETIRACETAM 1000 MG: 500 TABLET ORAL at 17:09

## 2019-12-07 RX ADMIN — CARVEDILOL 6.25 MG: 6.25 TABLET, FILM COATED ORAL at 17:09

## 2019-12-07 RX ADMIN — ATORVASTATIN CALCIUM 40 MG: 40 TABLET, FILM COATED ORAL at 22:21

## 2019-12-07 RX ADMIN — CLINDAMYCIN HYDROCHLORIDE 300 MG: 150 CAPSULE ORAL at 22:21

## 2019-12-07 RX ADMIN — HEPARIN SODIUM 5000 UNITS: 5000 INJECTION INTRAVENOUS; SUBCUTANEOUS at 23:09

## 2019-12-07 RX ADMIN — CALCITRIOL CAPSULES 0.25 MCG 0.25 MCG: 0.25 CAPSULE ORAL at 22:22

## 2019-12-07 RX ADMIN — LEVETIRACETAM 1000 MG: 500 TABLET ORAL at 06:46

## 2019-12-07 RX ADMIN — PANTOPRAZOLE SODIUM 40 MG: 40 TABLET, DELAYED RELEASE ORAL at 09:14

## 2019-12-07 RX ADMIN — SODIUM CHLORIDE, SODIUM LACTATE, CALCIUM CHLORIDE, MAGNESIUM CHLORIDE AND DEXTROSE 2000 ML: 2.5; 538; 448; 18.3; 5.08 INJECTION, SOLUTION INTRAPERITONEAL at 14:36

## 2019-12-07 NOTE — PROGRESS NOTES
NAME: Manny Thomas        :  1955        MRN:  [id number]        Assessment :    Plan:  --ESKD (Dr. Maryann Peterson)  Peritonitis  Tenckhoff catheter, thoracic PD catheter     Seizure d/o  Anemia of ESKD  HTN  SHPT    HCV CCPD at home; On CAPD while hospitalized (2.5 % dextrose, 2 liter dwell, 5 exchanges); weight is up and sodium down a tad - continue 4.25% dextrose exchange daily    Peripheral leukocytosis resolved; afebrile; clear dialysate; Cell count on PD fluid markedly improved - 7800 to 207; No need for PD cath removal from my standpoint; ID recs noted   -     Per id-home on clindamycin tid x 2 weeks-see her note    Few Enterococcus species and rare lactobacillus on culture early ; culture done later on  is ngtd; PD cath site with ngtd    Likely peritonitis from visceral (contamination via bacteria from bowel) or vaginal (rarely) source. Hgb < 9; retacrit 10 k sc biw    On Calcitriol       Subjective:     Chief Complaint:  Difficult historian. Review of Systems:    Symptom Y/N Comments  Symptom Y/N Comments   Fever/Chills    Chest Pain     Poor Appetite    Edema     Cough    Abdominal Pain     Sputum    Joint Pain     SOB/MAYORGA    Pruritis/Rash     Nausea/vomit    Tolerating PT/OT     Diarrhea    Tolerating Diet     Constipation    Other       Could not obtain due to: see above     Objective:     VITALS:   Last 24hrs VS reviewed since prior progress note.  Most recent are:  Visit Vitals  /86 (BP 1 Location: Right arm, BP Patient Position: At rest)   Pulse 78   Temp 98.6 °F (37 °C)   Resp 17   Ht 5' 3\" (1.6 m)   Wt 92.4 kg (203 lb 11.3 oz)   SpO2 100%   BMI 36.08 kg/m²       Intake/Output Summary (Last 24 hours) at 2019 1014  Last data filed at 2019 0915  Gross per 24 hour   Intake 9960 ml   Output 9900 ml   Net 60 ml      Telemetry Reviewed:     PHYSICAL EXAM:  General: NAD  Abdomen tender, diffuse  Left chest PD cath exit site with no drainage/erythema    Lab Data Reviewed: (see below)    Medications Reviewed: (see below)    PMH/SH reviewed - no change compared to H&P  ________________________________________________________________________  Care Plan discussed with:  Patient y    [facility]:          Comments   >50% of visit spent in counseling and coordination of care       ________________________________________________________________________  Nancy Napier MD     Procedures: see electronic medical records for all procedures/Xrays and details which  were not copied into this note but were reviewed prior to creation of Plan. LABS:  Recent Labs     12/05/19 0244   WBC 9.6   HGB 8.1*   HCT 25.9*        Recent Labs     12/05/19 0244   *   K 3.4*   CL 94*   CO2 29   BUN 40*   CREA 7.84*   GLU 84   CA 7.9*   PHOS 4.5     Recent Labs     12/05/19  0244   SGOT 23   *   TP 6.0*   ALB 1.5*   GLOB 4.5*     No results for input(s): INR, PTP, APTT, INREXT, INREXT in the last 72 hours. No results for input(s): FE, TIBC, PSAT, FERR in the last 72 hours. No results found for: FOL, RBCF   No results for input(s): PH, PCO2, PO2 in the last 72 hours. No results for input(s): CPK, CKMB in the last 72 hours.     No lab exists for component: TROPONINI  No components found for: [place]  Lab Results   Component Value Date/Time    Color YELLOW/STRAW 04/03/2015 08:31 PM    Appearance CLOUDY (A) 04/03/2015 08:31 PM    Specific gravity 1.013 04/03/2015 08:31 PM    pH (UA) 7.5 04/03/2015 08:31 PM    Protein 300 (A) 04/03/2015 08:31 PM    Glucose NEGATIVE  04/03/2015 08:31 PM    Ketone NEGATIVE  04/03/2015 08:31 PM    Bilirubin NEGATIVE  04/03/2015 08:31 PM    Urobilinogen 0.2 04/03/2015 08:31 PM    Nitrites NEGATIVE  04/03/2015 08:31 PM    Leukocyte Esterase NEGATIVE  04/03/2015 08:31 PM    Epithelial cells MANY (A) 04/03/2015 08:31 PM Bacteria 1+ (A) 04/03/2015 08:31 PM    WBC 0-4 04/03/2015 08:31 PM    RBC 0-5 04/03/2015 08:31 PM       MEDICATIONS:  Current Facility-Administered Medications   Medication Dose Route Frequency    clindamycin (CLEOCIN) capsule 300 mg  300 mg Oral Q8H    peritoneal dialysis DEXTROSE 2.5% (2.5 mEq/L low calcium) solution 2,000 mL  2,000 mL IntraPERitoneal 5XD    levETIRAcetam (KEPPRA) tablet 1,000 mg  1,000 mg Oral BID    sorbitol 70 % solution 30 mL  30 mL Oral DAILY PRN    polyethylene glycol (MIRALAX) packet 17 g  17 g Oral DAILY    senna-docusate (PERICOLACE) 8.6-50 mg per tablet 2 Tab  2 Tab Oral DAILY    carvedilol (COREG) tablet 6.25 mg  6.25 mg Oral BID WITH MEALS    phenytoin ER (DILANTIN ER) ER capsule 300 mg  300 mg Oral QHS    sodium chloride (NS) flush 5-40 mL  5-40 mL IntraVENous Q8H    sodium chloride (NS) flush 5-40 mL  5-40 mL IntraVENous PRN    acetaminophen (TYLENOL) tablet 650 mg  650 mg Oral Q6H PRN    ondansetron (ZOFRAN) injection 4 mg  4 mg IntraVENous Q6H PRN    bisacodyl (DULCOLAX) tablet 5 mg  5 mg Oral DAILY PRN    heparin (porcine) injection 5,000 Units  5,000 Units SubCUTAneous Q8H    amiodarone (CORDARONE) tablet 100 mg  100 mg Oral DAILY    atorvastatin (LIPITOR) tablet 40 mg  40 mg Oral QHS    aspirin delayed-release tablet 81 mg  81 mg Oral DAILY    clopidogrel (PLAVIX) tablet 75 mg  75 mg Oral DAILY    calcitRIOL (ROCALTROL) capsule 0.25 mcg  0.25 mcg Oral Once per day on Thu Sat    pantoprazole (PROTONIX) tablet 40 mg  40 mg Oral DAILY    furosemide (LASIX) tablet 40 mg  40 mg Oral DAILY    lacosamide (VIMPAT) tablet 150 mg  150 mg Oral BID

## 2019-12-07 NOTE — PROGRESS NOTES
Spiritual Care Assessment/Progress Note  [facility]      NAME: Tex Pimentel      MRN: [id number]  AGE: 62 y.o.  SEX: female  [employer]ist Affiliation: Non Evangelical   Language: English     12/7/2019     Total Time (in minutes): 80     Spiritual Assessment begun in [facility] 3 MED TELE through conversation with:         [x]Patient        [] Family    [] Friend(s)        Reason for Consult: Request by staff     Spiritual beliefs: (Please include comment if needed)     [x] Identifies with a linette tradition:         [] Supported by a linette community:            [] Claims no spiritual orientation:           [] Seeking spiritual identity:                [] Adheres to an individual form of spirituality:           [] Not able to assess:                           Identified resources for coping:      [x] Prayer                               [] Music                  [] Guided Imagery     [] Family/friends                 [] Pet visits     [] Devotional reading                         [] Unknown     [] Other:                                             Interventions offered during this visit: (See comments for more details)    Patient Interventions: Affirmation of linette, Affirmation of emotions/emotional suffering, Catharsis/review of pertinent events in supportive environment, Iconic (affirming the presence of God/Higher Power), Initial/Spiritual assessment, patient floor, Prayer (actual), Methodist beliefs/image of God discussed           Plan of Care:     [x] Support spiritual and/or cultural needs    [] Support AMD and/or advance care planning process      [] Support grieving process   [] Coordinate Rites and/or Rituals    [] Coordination with community clergy   [] No spiritual needs identified at this time   [] Detailed Plan of Care below (See Comments)  [] Make referral to Music Therapy  [] Make referral to Pet Therapy     [] Make referral to Addiction services  [] Make referral to [facility]  [] Make referral to Spiritual Care Partner  [] No future visits requested        [x] Follow up visits as needed     Comments:   Responded to staff request to offer support to patient on Med Tele. No family/friends present. Patient engaged in life review recalling sharing in the care of her 5 younger siblings when her parents . Patient has two grown daughters. According to patient, one daughter lives in [place] and one lives in [place]. 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 She does not belong to a [employer], but has followed [employer] for about 30 years. She voiced concern about her relationship with God while expressing a desire to feel closer to God. Explored her image of and beliefs about God as One who knows and loves her. Provided ministry of presence, empathic listening and shared prayer aloud. Patient appeared comforted by encounter. Advised her of  availability.      LAXMI Nolan, [facility], Staff [address]    [address] Paging Service  [phone]

## 2019-12-07 NOTE — PROGRESS NOTES
Hospitalist Progress Note    NAME: Rochelle Hilton   :  1955   MRN:  [id number]       Assessment / Plan:  Hep C (New Dx)  CT A/P with 1.5 cm hypodensity with peripheral globular enhancement  MRI showed hypodensity seems hemangioma  I have informed patient about new Dx  Appreciate GI Consult     Sepsis POA  Due to Acute bacterial peritonitis in peritoneal dialysis patient   On IV Zosyn  Appreciate ID assistance  Pt continue to have abdominal tenderness despite bowel movements. Catheter seems infected, cultures sent from around the PD catheter yesterday per ID recommendations  F/u PD and blood cultures and cultures from around the PD cath  Pt decline HD, she claims she will die but not go one HD  Appreciate, ID/Nephrology input, transition to PO clindamycin 14 days coarse of abx  CT abdomen/pelvis with Large amount of ascites with peritoneal catheter in place. No evidence for small bowel obstruction    Constipation - resolved  ? Abdominal discomfort related to constipation vs peritonitis  Lactulose didn't work yesterday  Fleet Enema today  Start Pericolace and miralax  PRN sorbitol    ESRD  Nephrology followup appreciate  On peritoneal dialysis    Hx of Seizure  Continue home medication Keppra/Phenytoin    Hypertension  Resume coreg  Holding ARBs    Chronic systolic CHF with EF 76-08% on recent echo  Fluid management as per nephrology    Code Status: Full   Surrogate Decision Maker: Jaguar Hassan  DVT Prophylaxis: Heparin   GI Prophylaxis: not indicated  Body mass index is 36.08 kg/m².: 30.0 - 39.9 Obese     Subjective: Pt seen and examined at bedside. NAD. Feels nauseous today.  Now AAAx3 with good insight Overnight events d/w RN     Chief Complaint / Reason for Physician Visit: f/u \"sepsis, bacterial peritonitis\"    Review of Systems:  Symptom Y/N Comments  Symptom Y/N Comments   Fever/Chills n   Chest Pain n    Poor Appetite    Edema     Cough n   Abdominal Pain y    Sputum    Joint Pain     SOB/MAYORGA n Pruritis/Rash     Nausea/vomit    Tolerating PT/OT     Diarrhea    Tolerating Diet y    Constipation    Other       Could NOT obtain due to:      Objective:     VITALS:   Last 24hrs VS reviewed since prior progress note. Most recent are:  Patient Vitals for the past 24 hrs:   Temp Pulse Resp BP SpO2   12/07/19 1106 98.1 °F (36.7 °C) 81 18 147/85 100 %   12/07/19 0817 98.6 °F (37 °C) 78 17 150/86 100 %   12/06/19 2326 98 °F (36.7 °C) 78 18 143/72 100 %   12/06/19 1524 97.2 °F (36.2 °C) 77 18 142/82 100 %       Intake/Output Summary (Last 24 hours) at 12/7/2019 1439  Last data filed at 12/7/2019 1129  Gross per 24 hour   Intake 9840 ml   Output 9800 ml   Net 40 ml        PHYSICAL EXAM:  General: WD, WN. Alert, cooperative, looks acutely ill   EENT:  EOMI. Anicteric sclerae. MMM  Resp:  CTA bilaterally, no wheezing or rales. No accessory muscle use  CV:  Regular  rhythm,  No edema  GI:  Mildly tender , mildly distended abdomen .  +Bowel sounds                      NO guarding or rebound tenderness  Neurologic:  Alert and oriented X 3, normal speech,   No LE swelling     Reviewed most current lab test results and cultures  YES  Reviewed most current radiology test results   YES  Review and summation of old records today    NO  Reviewed patient's current orders and MAR    YES  PMH/ reviewed - no change compared to H&P  ________________________________________________________________________  Care Plan discussed with:    Comments   Patient y    Family      RN y    Care Manager     Consultant                        Multidiciplinary team rounds were held today with , nursing, pharmacist and clinical coordinator. Patient's plan of care was discussed; medications were reviewed and discharge planning was addressed.      ________________________________________________________________________  Total NON critical care TIME: 20 Minutes    Total CRITICAL CARE TIME Spent:   Minutes non procedure based      Comments >50% of visit spent in counseling and coordination of care     ________________________________________________________________________  Hunter Carpenter MD     Procedures: see electronic medical records for all procedures/Xrays and details which were not copied into this note but were reviewed prior to creation of Plan. LABS:  I reviewed today's most current labs and imaging studies.   Pertinent labs include:  Recent Labs     12/05/19 0244   WBC 9.6   HGB 8.1*   HCT 25.9*        Recent Labs     12/05/19 0244   *   K 3.4*   CL 94*   CO2 29   GLU 84   BUN 40*   CREA 7.84*   CA 7.9*   PHOS 4.5   ALB 1.5*   TBILI 0.3   SGOT 23   ALT 16       Signed: Hunter Carpenter MD

## 2019-12-08 ENCOUNTER — APPOINTMENT (OUTPATIENT)
Dept: GENERAL RADIOLOGY | Age: 64
DRG: 919 | End: 2019-12-08
Attending: INTERNAL MEDICINE
Payer: MEDICARE

## 2019-12-08 LAB
ANION GAP SERPL CALC-SCNC: 13 MMOL/L (ref 5–15)
BACTERIA SPEC CULT: NORMAL
BUN SERPL-MCNC: 32 MG/DL (ref 6–20)
BUN/CREAT SERPL: 4 (ref 12–20)
CALCIUM SERPL-MCNC: 7.5 MG/DL (ref 8.5–10.1)
CHLORIDE SERPL-SCNC: 96 MMOL/L (ref 97–108)
CO2 SERPL-SCNC: 24 MMOL/L (ref 21–32)
CREAT SERPL-MCNC: 8.11 MG/DL (ref 0.55–1.02)
GLUCOSE SERPL-MCNC: 98 MG/DL (ref 65–100)
GRAM STN SPEC: NORMAL
GRAM STN SPEC: NORMAL
HCV GENOTYPE: NORMAL
HCV RNA SERPL NAA+PROBE-ACNC: NORMAL IU/ML
HCV RNA SERPL NAA+PROBE-LOG IU: NORMAL LOG10 IU/ML
POTASSIUM SERPL-SCNC: 2.8 MMOL/L (ref 3.5–5.1)
SERVICE CMNT-IMP: NORMAL
SODIUM SERPL-SCNC: 133 MMOL/L (ref 136–145)
TEST INFORMATION, 550045: NORMAL

## 2019-12-08 PROCEDURE — 65270000029 HC RM PRIVATE

## 2019-12-08 PROCEDURE — 94760 N-INVAS EAR/PLS OXIMETRY 1: CPT

## 2019-12-08 PROCEDURE — 74011250636 HC RX REV CODE- 250/636: Performed by: INTERNAL MEDICINE

## 2019-12-08 PROCEDURE — A4722 DIALYS SOL FLD VOL > 1999CC: HCPCS | Performed by: INTERNAL MEDICINE

## 2019-12-08 PROCEDURE — 74011250637 HC RX REV CODE- 250/637: Performed by: INTERNAL MEDICINE

## 2019-12-08 PROCEDURE — 90945 DIALYSIS ONE EVALUATION: CPT

## 2019-12-08 PROCEDURE — 71045 X-RAY EXAM CHEST 1 VIEW: CPT

## 2019-12-08 PROCEDURE — 36415 COLL VENOUS BLD VENIPUNCTURE: CPT

## 2019-12-08 PROCEDURE — 80048 BASIC METABOLIC PNL TOTAL CA: CPT

## 2019-12-08 RX ORDER — HEPARIN SODIUM 5000 [USP'U]/ML
5000 INJECTION, SOLUTION INTRAVENOUS; SUBCUTANEOUS EVERY 12 HOURS
Status: DISCONTINUED | OUTPATIENT
Start: 2019-12-08 | End: 2019-12-10 | Stop reason: HOSPADM

## 2019-12-08 RX ORDER — POTASSIUM CHLORIDE 750 MG/1
40 TABLET, FILM COATED, EXTENDED RELEASE ORAL
Status: COMPLETED | OUTPATIENT
Start: 2019-12-08 | End: 2019-12-08

## 2019-12-08 RX ORDER — POTASSIUM CHLORIDE 14.9 MG/ML
10 INJECTION INTRAVENOUS
Status: DISCONTINUED | OUTPATIENT
Start: 2019-12-08 | End: 2019-12-08

## 2019-12-08 RX ADMIN — PANTOPRAZOLE SODIUM 40 MG: 40 TABLET, DELAYED RELEASE ORAL at 08:42

## 2019-12-08 RX ADMIN — LEVETIRACETAM 1000 MG: 500 TABLET ORAL at 06:06

## 2019-12-08 RX ADMIN — ONDANSETRON 4 MG: 2 INJECTION INTRAMUSCULAR; INTRAVENOUS at 09:37

## 2019-12-08 RX ADMIN — CLINDAMYCIN HYDROCHLORIDE 300 MG: 150 CAPSULE ORAL at 22:22

## 2019-12-08 RX ADMIN — CARVEDILOL 6.25 MG: 6.25 TABLET, FILM COATED ORAL at 18:28

## 2019-12-08 RX ADMIN — LEVETIRACETAM 1000 MG: 500 TABLET ORAL at 18:28

## 2019-12-08 RX ADMIN — ATORVASTATIN CALCIUM 40 MG: 40 TABLET, FILM COATED ORAL at 22:23

## 2019-12-08 RX ADMIN — LACOSAMIDE 150 MG: 50 TABLET, FILM COATED ORAL at 08:41

## 2019-12-08 RX ADMIN — POTASSIUM CHLORIDE 40 MEQ: 750 TABLET, FILM COATED, EXTENDED RELEASE ORAL at 12:50

## 2019-12-08 RX ADMIN — SODIUM CHLORIDE, SODIUM LACTATE, CALCIUM CHLORIDE, MAGNESIUM CHLORIDE AND DEXTROSE 2000 ML: 2.5; 538; 448; 18.3; 5.08 INJECTION, SOLUTION INTRAPERITONEAL at 06:44

## 2019-12-08 RX ADMIN — SODIUM CHLORIDE, SODIUM LACTATE, CALCIUM CHLORIDE, MAGNESIUM CHLORIDE AND DEXTROSE 2000 ML: 2.5; 538; 448; 18.3; 5.08 INJECTION, SOLUTION INTRAPERITONEAL at 18:28

## 2019-12-08 RX ADMIN — HEPARIN SODIUM 5000 UNITS: 5000 INJECTION INTRAVENOUS; SUBCUTANEOUS at 06:44

## 2019-12-08 RX ADMIN — SODIUM CHLORIDE, SODIUM LACTATE, CALCIUM CHLORIDE, MAGNESIUM CHLORIDE AND DEXTROSE 2000 ML: 2.5; 538; 448; 18.3; 5.08 INJECTION, SOLUTION INTRAPERITONEAL at 15:25

## 2019-12-08 RX ADMIN — SODIUM CHLORIDE, SODIUM LACTATE, CALCIUM CHLORIDE, MAGNESIUM CHLORIDE AND DEXTROSE 2000 ML: 4.25; 538; 448; 18.3; 5.08 INJECTION, SOLUTION INTRAPERITONEAL at 22:46

## 2019-12-08 RX ADMIN — SODIUM CHLORIDE, SODIUM LACTATE, CALCIUM CHLORIDE, MAGNESIUM CHLORIDE AND DEXTROSE 2000 ML: 2.5; 538; 448; 18.3; 5.08 INJECTION, SOLUTION INTRAPERITONEAL at 10:58

## 2019-12-08 RX ADMIN — Medication 10 ML: at 22:24

## 2019-12-08 RX ADMIN — ONDANSETRON 4 MG: 2 INJECTION INTRAMUSCULAR; INTRAVENOUS at 15:24

## 2019-12-08 RX ADMIN — CARVEDILOL 6.25 MG: 6.25 TABLET, FILM COATED ORAL at 08:42

## 2019-12-08 RX ADMIN — POTASSIUM CHLORIDE 20 MEQ: 750 TABLET, FILM COATED, EXTENDED RELEASE ORAL at 08:42

## 2019-12-08 RX ADMIN — AMIODARONE HYDROCHLORIDE 100 MG: 200 TABLET ORAL at 08:42

## 2019-12-08 RX ADMIN — ACETAMINOPHEN 650 MG: 325 TABLET ORAL at 06:13

## 2019-12-08 RX ADMIN — CLINDAMYCIN HYDROCHLORIDE 300 MG: 150 CAPSULE ORAL at 15:23

## 2019-12-08 RX ADMIN — FUROSEMIDE 40 MG: 40 TABLET ORAL at 08:42

## 2019-12-08 RX ADMIN — CLOPIDOGREL BISULFATE 75 MG: 75 TABLET ORAL at 08:41

## 2019-12-08 RX ADMIN — Medication 10 ML: at 15:18

## 2019-12-08 RX ADMIN — ASPIRIN 81 MG: 81 TABLET, COATED ORAL at 08:41

## 2019-12-08 RX ADMIN — POTASSIUM CHLORIDE 40 MEQ: 750 TABLET, FILM COATED, EXTENDED RELEASE ORAL at 15:24

## 2019-12-08 RX ADMIN — PHENYTOIN SODIUM 300 MG: 100 CAPSULE ORAL at 22:22

## 2019-12-08 RX ADMIN — CLINDAMYCIN HYDROCHLORIDE 300 MG: 150 CAPSULE ORAL at 06:06

## 2019-12-08 RX ADMIN — Medication 10 ML: at 06:07

## 2019-12-08 RX ADMIN — LACOSAMIDE 150 MG: 50 TABLET, FILM COATED ORAL at 18:27

## 2019-12-08 NOTE — PROGRESS NOTES
Bedside shift change report given to [facility] (oncoming nurse) by Mary Alice Andino (offgoing nurse). Report included the following information SBAR, Kardex, Intake/Output, MAR and Recent Results. Pt allowed one attempt at labs. It was unsuccessful and she refused further attempts to obtain labs.

## 2019-12-08 NOTE — PROGRESS NOTES
Hospitalist Progress Note    NAME: Gomez Spain   :  1955   MRN:  [id number]       Assessment / Plan:  Sepsis POA  Due to Peritonitis Due to or associated with peritoneal dialysis catheter  On IV Zosyn  Appreciate ID assistance  Pt continue to have abdominal tenderness despite bowel movements. Catheter seems infected, cultures sent from around the PD catheter yesterday per ID recommendations  F/u PD and blood cultures and cultures from around the PD cath  Pt decline HD, she claims she will die but not go one HD  Appreciate, ID/Nephrology input, transition to PO clindamycin 14 days coarse of abx  CT abdomen/pelvis with Large amount of ascites with peritoneal catheter in place. No evidence for small bowel obstruction    Hep C (New Dx)  CT A/P with 1.5 cm hypodensity with peripheral globular enhancement  MRI showed hypodensity seems hemangioma  I have informed patient about new Dx  Appreciate GI Consult     Constipation - resolved  ? Abdominal discomfort related to constipation vs peritonitis  Lactulose didn't work yesterday  Fleet Enema today  Start Pericolace and miralax  PRN sorbitol    ESRD  Nephrology followup appreciate  On peritoneal dialysis    Hx of Seizure  Continue home medication Keppra/Phenytoin    Hypertension  Resume coreg  Holding ARBs    Chronic systolic CHF with EF 46-64% on recent echo  Fluid management as per nephrology    Code Status: Full   Surrogate Decision Maker: Jaguar Hassan  DVT Prophylaxis: Heparin   GI Prophylaxis: not indicated  Body mass index is 36.7 kg/m².: 30.0 - 39.9 Obese     Subjective: Pt seen and examined at bedside. NAD. Feels nauseous today.  Now AAAx3 with good insight Overnight events d/w RN     Chief Complaint / Reason for Physician Visit: f/u \"sepsis, bacterial peritonitis\"    Review of Systems:  Symptom Y/N Comments  Symptom Y/N Comments   Fever/Chills n   Chest Pain n    Poor Appetite    Edema     Cough n   Abdominal Pain y    Sputum    Joint Pain SOB/MAYORGA n   Pruritis/Rash     Nausea/vomit    Tolerating PT/OT     Diarrhea    Tolerating Diet y    Constipation    Other       Could NOT obtain due to:      Objective:     VITALS:   Last 24hrs VS reviewed since prior progress note. Most recent are:  Patient Vitals for the past 24 hrs:   Temp Pulse Resp BP SpO2   12/08/19 0639 98.7 °F (37.1 °C) 79 18 140/87 100 %   12/07/19 2246 98.4 °F (36.9 °C) 82 16 138/85 100 %   12/07/19 1444 98.4 °F (36.9 °C) 73 17 110/59 100 %       Intake/Output Summary (Last 24 hours) at 12/8/2019 1336  Last data filed at 12/8/2019 7012  Gross per 24 hour   Intake 7900 ml   Output 9350 ml   Net -1450 ml        PHYSICAL EXAM:  General: WD, WN. Alert, cooperative, looks acutely ill   EENT:  EOMI. Anicteric sclerae. MMM  Resp:  CTA bilaterally, no wheezing or rales. No accessory muscle use  CV:  Regular  rhythm,  No edema  GI:  Mildly tender , mildly distended abdomen .  +Bowel sounds                      NO guarding or rebound tenderness  Neurologic:  Alert and oriented X 3, normal speech,   No LE swelling     Reviewed most current lab test results and cultures  YES  Reviewed most current radiology test results   YES  Review and summation of old records today    NO  Reviewed patient's current orders and MAR    YES  PMH/ reviewed - no change compared to H&P  ________________________________________________________________________  Care Plan discussed with:    Comments   Patient y    Family      RN y    Care Manager     Consultant                        Multidiciplinary team rounds were held today with , nursing, pharmacist and clinical coordinator. Patient's plan of care was discussed; medications were reviewed and discharge planning was addressed.      ________________________________________________________________________  Total NON critical care TIME: 20 Minutes    Total CRITICAL CARE TIME Spent:   Minutes non procedure based      Comments   >50% of visit spent in counseling and coordination of care     ________________________________________________________________________  Hunter Carpenter MD     Procedures: see electronic medical records for all procedures/Xrays and details which were not copied into this note but were reviewed prior to creation of Plan. LABS:  I reviewed today's most current labs and imaging studies. Pertinent labs include:  No results for input(s): WBC, HGB, HCT, PLT, HGBEXT, HCTEXT, PLTEXT, HGBEXT, HCTEXT, PLTEXT in the last 72 hours.   Recent Labs     12/08/19  0825   *   K 2.8*   CL 96*   CO2 24   GLU 98   BUN 32*   CREA 8.11*   CA 7.5*       Signed: Hunter Carpenter MD

## 2019-12-08 NOTE — PROGRESS NOTES
NAME: Yessica Stone        :  1955        MRN:  [id number]        Assessment :    Plan:  --ESKD (Dr. Delroy Payne)  Peritonitis  Tenckhoff catheter, thoracic PD catheter     Seizure d/o  Anemia of ESKD  HTN  SHPT    HCV CCPD at home; On CAPD while hospitalized (2.5 % dextrose, 2 liter dwell, 5 exchanges); weight is up and sodium down a tad - continue 4.25% dextrose exchange daily    Peripheral leukocytosis resolved; afebrile; clear dialysate; Cell count on PD fluid markedly improved - 7800 to 207; No need for PD cath removal from my standpoint; ID recs noted   -     Per id-home on clindamycin tid x 2 weeks-see her note    Few Enterococcus species and rare lactobacillus on culture early ; culture done later on  is ngtd; PD cath site with ngtd      Hgb < 9; retacrit 10 k sc tiw    On Calcitriol    Replete K IV/PO       Subjective:     Chief Complaint:  Difficult historian. Review of Systems:    Symptom Y/N Comments  Symptom Y/N Comments   Fever/Chills    Chest Pain     Poor Appetite    Edema     Cough    Abdominal Pain     Sputum    Joint Pain     SOB/MAYORGA    Pruritis/Rash     Nausea/vomit    Tolerating PT/OT     Diarrhea    Tolerating Diet     Constipation    Other       Could not obtain due to: see above     Objective:     VITALS:   Last 24hrs VS reviewed since prior progress note.  Most recent are:  Visit Vitals  /87   Pulse 79   Temp 98.7 °F (37.1 °C)   Resp 18   Ht 5' 3\" (1.6 m)   Wt 94 kg (207 lb 3.2 oz)   SpO2 100%   BMI 36.70 kg/m²       Intake/Output Summary (Last 24 hours) at 2019 1103  Last data filed at 2019 8457  Gross per 24 hour   Intake 9900 ml   Output 66730 ml   Net -1350 ml      Telemetry Reviewed:     PHYSICAL EXAM:  General: NAD  Abdomen tender, diffuse  Left chest PD cath exit site with no drainage/erythema    Lab Data Reviewed: (see below)    Medications Reviewed: (see below)    PMH/SH reviewed - no change compared to H&P  ________________________________________________________________________  Care Plan discussed with:  Patient y    [facility]:          Comments   >50% of visit spent in counseling and coordination of care       ________________________________________________________________________  Darian Zamorano MD     Procedures: see electronic medical records for all procedures/Xrays and details which  were not copied into this note but were reviewed prior to creation of Plan. LABS:  No results for input(s): WBC, HGB, HCT, PLT, HGBEXT, HCTEXT, PLTEXT, HGBEXT, HCTEXT, PLTEXT in the last 72 hours. Recent Labs     12/08/19  0825   *   K 2.8*   CL 96*   CO2 24   BUN 32*   CREA 8.11*   GLU 98   CA 7.5*     No results for input(s): SGOT, GPT, AP, TBIL, TP, ALB, GLOB, GGT, AML, LPSE in the last 72 hours. No lab exists for component: AMYP, HLPSE  No results for input(s): INR, PTP, APTT, INREXT, INREXT in the last 72 hours. No results for input(s): FE, TIBC, PSAT, FERR in the last 72 hours. No results found for: FOL, RBCF   No results for input(s): PH, PCO2, PO2 in the last 72 hours. No results for input(s): CPK, CKMB in the last 72 hours.     No lab exists for component: TROPONINI  No components found for: [place]  Lab Results   Component Value Date/Time    Color YELLOW/STRAW 04/03/2015 08:31 PM    Appearance CLOUDY (A) 04/03/2015 08:31 PM    Specific gravity 1.013 04/03/2015 08:31 PM    pH (UA) 7.5 04/03/2015 08:31 PM    Protein 300 (A) 04/03/2015 08:31 PM    Glucose NEGATIVE  04/03/2015 08:31 PM    Ketone NEGATIVE  04/03/2015 08:31 PM    Bilirubin NEGATIVE  04/03/2015 08:31 PM    Urobilinogen 0.2 04/03/2015 08:31 PM    Nitrites NEGATIVE  04/03/2015 08:31 PM    Leukocyte Esterase NEGATIVE  04/03/2015 08:31 PM    Epithelial cells MANY (A) 04/03/2015 08:31 PM    Bacteria 1+ (A) 04/03/2015 08:31 PM    WBC 0-4 04/03/2015 08:31 PM RBC 0-5 04/03/2015 08:31 PM       MEDICATIONS:  Current Facility-Administered Medications   Medication Dose Route Frequency    potassium chloride 10 mEq in 50 ml IVPB  10 mEq IntraVENous Q1H    heparin (porcine) injection 5,000 Units  5,000 Units SubCUTAneous Q12H    [START ON 12/9/2019] epoetin tawny-epbx (RETACRIT) injection 10,000 Units  10,000 Units SubCUTAneous Q MON, WED & FRI    potassium chloride SR (KLOR-CON 10) tablet 20 mEq  20 mEq Oral DAILY    ondansetron (ZOFRAN) injection 4 mg  4 mg IntraVENous Q4H PRN    clindamycin (CLEOCIN) capsule 300 mg  300 mg Oral Q8H    peritoneal dialysis DEXTROSE 2.5% (2.5 mEq/L low calcium) solution 2,000 mL  2,000 mL IntraPERitoneal 5XD    levETIRAcetam (KEPPRA) tablet 1,000 mg  1,000 mg Oral BID    sorbitol 70 % solution 30 mL  30 mL Oral DAILY PRN    polyethylene glycol (MIRALAX) packet 17 g  17 g Oral DAILY    senna-docusate (PERICOLACE) 8.6-50 mg per tablet 2 Tab  2 Tab Oral DAILY    carvedilol (COREG) tablet 6.25 mg  6.25 mg Oral BID WITH MEALS    phenytoin ER (DILANTIN ER) ER capsule 300 mg  300 mg Oral QHS    sodium chloride (NS) flush 5-40 mL  5-40 mL IntraVENous Q8H    sodium chloride (NS) flush 5-40 mL  5-40 mL IntraVENous PRN    acetaminophen (TYLENOL) tablet 650 mg  650 mg Oral Q6H PRN    bisacodyl (DULCOLAX) tablet 5 mg  5 mg Oral DAILY PRN    amiodarone (CORDARONE) tablet 100 mg  100 mg Oral DAILY    atorvastatin (LIPITOR) tablet 40 mg  40 mg Oral QHS    aspirin delayed-release tablet 81 mg  81 mg Oral DAILY    clopidogrel (PLAVIX) tablet 75 mg  75 mg Oral DAILY    calcitRIOL (ROCALTROL) capsule 0.25 mcg  0.25 mcg Oral Once per day on Thu Sat    pantoprazole (PROTONIX) tablet 40 mg  40 mg Oral DAILY    furosemide (LASIX) tablet 40 mg  40 mg Oral DAILY    lacosamide (VIMPAT) tablet 150 mg  150 mg Oral BID

## 2019-12-09 LAB
ANION GAP SERPL CALC-SCNC: 10 MMOL/L (ref 5–15)
BUN SERPL-MCNC: 32 MG/DL (ref 6–20)
BUN/CREAT SERPL: 4 (ref 12–20)
CALCIUM SERPL-MCNC: 8 MG/DL (ref 8.5–10.1)
CHLORIDE SERPL-SCNC: 97 MMOL/L (ref 97–108)
CO2 SERPL-SCNC: 27 MMOL/L (ref 21–32)
CREAT SERPL-MCNC: 7.89 MG/DL (ref 0.55–1.02)
ERYTHROCYTE [DISTWIDTH] IN BLOOD BY AUTOMATED COUNT: 18.5 % (ref 11.5–14.5)
GLUCOSE SERPL-MCNC: 84 MG/DL (ref 65–100)
HCT VFR BLD AUTO: 26.2 % (ref 35–47)
HGB BLD-MCNC: 8.2 G/DL (ref 11.5–16)
MCH RBC QN AUTO: 27.6 PG (ref 26–34)
MCHC RBC AUTO-ENTMCNC: 31.3 G/DL (ref 30–36.5)
MCV RBC AUTO: 88.2 FL (ref 80–99)
NRBC # BLD: 0 K/UL (ref 0–0.01)
NRBC BLD-RTO: 0 PER 100 WBC
PLATELET # BLD AUTO: 388 K/UL (ref 150–400)
PMV BLD AUTO: 12.1 FL (ref 8.9–12.9)
POTASSIUM SERPL-SCNC: 3.5 MMOL/L (ref 3.5–5.1)
RBC # BLD AUTO: 2.97 M/UL (ref 3.8–5.2)
SODIUM SERPL-SCNC: 134 MMOL/L (ref 136–145)
WBC # BLD AUTO: 9.2 K/UL (ref 3.6–11)

## 2019-12-09 PROCEDURE — 80186 ASSAY OF PHENYTOIN FREE: CPT

## 2019-12-09 PROCEDURE — 80048 BASIC METABOLIC PNL TOTAL CA: CPT

## 2019-12-09 PROCEDURE — 74011250637 HC RX REV CODE- 250/637: Performed by: INTERNAL MEDICINE

## 2019-12-09 PROCEDURE — 36415 COLL VENOUS BLD VENIPUNCTURE: CPT

## 2019-12-09 PROCEDURE — 74011250636 HC RX REV CODE- 250/636: Performed by: INTERNAL MEDICINE

## 2019-12-09 PROCEDURE — A4722 DIALYS SOL FLD VOL > 1999CC: HCPCS | Performed by: INTERNAL MEDICINE

## 2019-12-09 PROCEDURE — 85027 COMPLETE CBC AUTOMATED: CPT

## 2019-12-09 PROCEDURE — 94760 N-INVAS EAR/PLS OXIMETRY 1: CPT

## 2019-12-09 PROCEDURE — 90945 DIALYSIS ONE EVALUATION: CPT

## 2019-12-09 PROCEDURE — 65270000029 HC RM PRIVATE

## 2019-12-09 RX ORDER — ISOSORBIDE MONONITRATE 30 MG/1
60 TABLET, EXTENDED RELEASE ORAL
Qty: 30 TAB | Refills: 0 | Status: SHIPPED
Start: 2019-12-09 | End: 2019-12-09

## 2019-12-09 RX ORDER — CLINDAMYCIN HYDROCHLORIDE 300 MG/1
300 CAPSULE ORAL EVERY 8 HOURS
Qty: 15 CAP | Refills: 0 | Status: SHIPPED
Start: 2019-12-09 | End: 2019-12-10

## 2019-12-09 RX ORDER — CARVEDILOL 3.12 MG/1
3.12 TABLET ORAL 2 TIMES DAILY WITH MEALS
Status: DISCONTINUED | OUTPATIENT
Start: 2019-12-09 | End: 2019-12-10 | Stop reason: HOSPADM

## 2019-12-09 RX ORDER — ONDANSETRON 4 MG/1
4 TABLET, FILM COATED ORAL
Qty: 30 TAB | Refills: 0 | Status: SHIPPED
Start: 2019-12-09

## 2019-12-09 RX ORDER — LOSARTAN POTASSIUM 25 MG/1
12.5 TABLET ORAL
Qty: 15 TAB | Refills: 0 | Status: SHIPPED
Start: 2019-12-09

## 2019-12-09 RX ADMIN — SODIUM CHLORIDE, SODIUM LACTATE, CALCIUM CHLORIDE, MAGNESIUM CHLORIDE AND DEXTROSE 2000 ML: 2.5; 538; 448; 18.3; 5.08 INJECTION, SOLUTION INTRAPERITONEAL at 19:53

## 2019-12-09 RX ADMIN — EPOETIN ALFA-EPBX 10000 UNITS: 10000 INJECTION, SOLUTION INTRAVENOUS; SUBCUTANEOUS at 23:55

## 2019-12-09 RX ADMIN — CLOPIDOGREL BISULFATE 75 MG: 75 TABLET ORAL at 11:59

## 2019-12-09 RX ADMIN — SODIUM CHLORIDE, SODIUM LACTATE, CALCIUM CHLORIDE, MAGNESIUM CHLORIDE AND DEXTROSE 2000 ML: 4.25; 538; 448; 18.3; 5.08 INJECTION, SOLUTION INTRAPERITONEAL at 22:59

## 2019-12-09 RX ADMIN — SODIUM CHLORIDE, SODIUM LACTATE, CALCIUM CHLORIDE, MAGNESIUM CHLORIDE AND DEXTROSE 2000 ML: 2.5; 538; 448; 18.3; 5.08 INJECTION, SOLUTION INTRAPERITONEAL at 06:37

## 2019-12-09 RX ADMIN — Medication 10 ML: at 14:06

## 2019-12-09 RX ADMIN — ASPIRIN 81 MG: 81 TABLET, COATED ORAL at 11:54

## 2019-12-09 RX ADMIN — FUROSEMIDE 40 MG: 40 TABLET ORAL at 11:58

## 2019-12-09 RX ADMIN — POTASSIUM CHLORIDE 20 MEQ: 750 TABLET, FILM COATED, EXTENDED RELEASE ORAL at 11:54

## 2019-12-09 RX ADMIN — AMIODARONE HYDROCHLORIDE 100 MG: 200 TABLET ORAL at 11:57

## 2019-12-09 RX ADMIN — LEVETIRACETAM 1000 MG: 500 TABLET ORAL at 17:54

## 2019-12-09 RX ADMIN — Medication 10 ML: at 23:58

## 2019-12-09 RX ADMIN — CLINDAMYCIN HYDROCHLORIDE 300 MG: 150 CAPSULE ORAL at 14:00

## 2019-12-09 RX ADMIN — ATORVASTATIN CALCIUM 40 MG: 40 TABLET, FILM COATED ORAL at 23:48

## 2019-12-09 RX ADMIN — CLINDAMYCIN HYDROCHLORIDE 300 MG: 150 CAPSULE ORAL at 23:48

## 2019-12-09 RX ADMIN — HEPARIN SODIUM 5000 UNITS: 5000 INJECTION INTRAVENOUS; SUBCUTANEOUS at 19:00

## 2019-12-09 RX ADMIN — LEVETIRACETAM 1000 MG: 500 TABLET ORAL at 06:35

## 2019-12-09 RX ADMIN — PHENYTOIN SODIUM 300 MG: 100 CAPSULE ORAL at 23:48

## 2019-12-09 RX ADMIN — PANTOPRAZOLE SODIUM 40 MG: 40 TABLET, DELAYED RELEASE ORAL at 11:55

## 2019-12-09 RX ADMIN — SODIUM CHLORIDE, SODIUM LACTATE, CALCIUM CHLORIDE, MAGNESIUM CHLORIDE AND DEXTROSE 2000 ML: 2.5; 538; 448; 18.3; 5.08 INJECTION, SOLUTION INTRAPERITONEAL at 12:02

## 2019-12-09 RX ADMIN — CLINDAMYCIN HYDROCHLORIDE 300 MG: 150 CAPSULE ORAL at 06:35

## 2019-12-09 RX ADMIN — HEPARIN SODIUM 5000 UNITS: 5000 INJECTION INTRAVENOUS; SUBCUTANEOUS at 06:35

## 2019-12-09 RX ADMIN — Medication 10 ML: at 06:35

## 2019-12-09 RX ADMIN — CARVEDILOL 3.12 MG: 3.12 TABLET, FILM COATED ORAL at 16:02

## 2019-12-09 RX ADMIN — LACOSAMIDE 150 MG: 50 TABLET, FILM COATED ORAL at 17:54

## 2019-12-09 RX ADMIN — LACOSAMIDE 150 MG: 50 TABLET, FILM COATED ORAL at 11:06

## 2019-12-09 RX ADMIN — ONDANSETRON 4 MG: 2 INJECTION INTRAMUSCULAR; INTRAVENOUS at 00:15

## 2019-12-09 NOTE — PROGRESS NOTES
Hospitalist Progress Note    NAME: Jack Bazan   :  1955   MRN:  [id number]       Assessment / Plan:  Sepsis POA  Due to Peritonitis Due to or associated with peritoneal dialysis catheter  On IV Zosyn  Appreciate ID assistance  Peritoneal cultures with Streptococcus Salvarius  Pt decline HD, she claims she will die but not go one HD  Appreciate, ID/Nephrology input, transition to PO clindamycin 14 days coarse of abx  CT abdomen/pelvis with Large amount of ascites with peritoneal catheter in place. No evidence for small bowel obstruction    Hep C (New Dx)  CT A/P with 1.5 cm hypodensity with peripheral globular enhancement  MRI showed hypodensity seems hemangioma  I have informed patient about new Dx  Appreciate GI Consult     Constipation - resolved  ? Abdominal discomfort related to constipation vs peritonitis  Lactulose didn't work yesterday  Fleet Enema today  Start Pericolace and miralax  PRN sorbitol    ESRD  Nephrology followup appreciate  On peritoneal dialysis    Hx of Seizure  Continue home medication Keppra/Phenytoin    Hypertension  Resume coreg  Holding ARBs    Chronic systolic CHF with EF 60-87% on recent echo  Fluid management as per nephrology    Code Status: Full   Surrogate Decision Maker: Jaguar Hassan  DVT Prophylaxis: Heparin   GI Prophylaxis: not indicated  Body mass index is 35.93 kg/m².: 30.0 - 39.9 Obese     Subjective: Pt seen and examined at bedside. NAD. Denies any new complaints.  Now AAAx3 with good insight Overnight events d/w RN     Chief Complaint / Reason for Physician Visit: f/u \"sepsis, bacterial peritonitis\"    Review of Systems:  Symptom Y/N Comments  Symptom Y/N Comments   Fever/Chills n   Chest Pain n    Poor Appetite    Edema     Cough n   Abdominal Pain y    Sputum    Joint Pain     SOB/MAYORGA n   Pruritis/Rash     Nausea/vomit    Tolerating PT/OT     Diarrhea    Tolerating Diet y    Constipation    Other       Could NOT obtain due to:      Objective:     VITALS: Last 24hrs VS reviewed since prior progress note. Most recent are:  Patient Vitals for the past 24 hrs:   Temp Pulse Resp BP SpO2   12/09/19 1542 98.2 °F (36.8 °C) 86 18 133/65 100 %   12/09/19 0726 98.2 °F (36.8 °C) 79 15 124/67 100 %   12/09/19 0352 97.9 °F (36.6 °C) 82 16 134/82 98 %   12/09/19 0026 98.3 °F (36.8 °C) 74 16 147/88 100 %   12/08/19 2000 98.7 °F (37.1 °C) 70 14 124/72 97 %   12/08/19 1650 98.4 °F (36.9 °C) 61 18 (!) 136/101 98 %       Intake/Output Summary (Last 24 hours) at 12/9/2019 1628  Last data filed at 12/9/2019 1220  Gross per 24 hour   Intake 8240 ml   Output 8900 ml   Net -660 ml        PHYSICAL EXAM:  General: WD, WN. Alert, cooperative, looks acutely ill   EENT:  EOMI. Anicteric sclerae. MMM  Resp:  CTA bilaterally, no wheezing or rales. No accessory muscle use  CV:  Regular  rhythm,  No edema  GI:  Mildly tender , mildly distended abdomen .  +Bowel sounds                      NO guarding or rebound tenderness  Neurologic:  Alert and oriented X 3, normal speech,   No LE swelling     Reviewed most current lab test results and cultures  YES  Reviewed most current radiology test results   YES  Review and summation of old records today    NO  Reviewed patient's current orders and MAR    YES  PMH/ reviewed - no change compared to H&P  ________________________________________________________________________  Care Plan discussed with:    Comments   Patient y    Family      RN y    Care Manager     Consultant                        Multidiciplinary team rounds were held today with , nursing, pharmacist and clinical coordinator. Patient's plan of care was discussed; medications were reviewed and discharge planning was addressed.      ________________________________________________________________________  Total NON critical care TIME: 20 Minutes    Total CRITICAL CARE TIME Spent:   Minutes non procedure based      Comments   >50% of visit spent in counseling and coordination of care     ________________________________________________________________________  Bishnu Weldon MD     Procedures: see electronic medical records for all procedures/Xrays and details which were not copied into this note but were reviewed prior to creation of Plan. LABS:  I reviewed today's most current labs and imaging studies.   Pertinent labs include:  Recent Labs     12/09/19  1110   WBC 9.2   HGB 8.2*   HCT 26.2*        Recent Labs     12/09/19  1110 12/08/19  0825   * 133*   K 3.5 2.8*   CL 97 96*   CO2 27 24   GLU 84 98   BUN 32* 32*   CREA 7.89* 8.11*   CA 8.0* 7.5*       Signed: Bishnu Weldon MD

## 2019-12-09 NOTE — PROGRESS NOTES
0025:Pt reporting pain in abdomen, stating she feels like she needs to have a BM, pt also reporting nausea, administered zofran and tylenol. 0100:Pt resting in bed, R:16. Bedside shift change report given to [facility] (oncoming nurse) by David Ching (offgoing nurse). Report included the following information SBAR, Kardex, Intake/Output, MAR and Recent Results. 26: PT PD still filling, pt closed the system, due to her stomach pain, informed pt to press the call bell the next time she experiences pain and to not close off the PD as she already has an infection.

## 2019-12-09 NOTE — PROGRESS NOTES
Problem: Falls - Risk of  Goal: *Absence of Falls  Description  Document Freddie Page Fall Risk and appropriate interventions in the flowsheet.   Outcome: Progressing Towards Goal  Note: Fall Risk Interventions:  Mobility Interventions: Communicate number of staff needed for ambulation/transfer, Patient to call before getting OOB, PT Consult for assist device competence, Utilize walker, cane, or other assistive device    Mentation Interventions: Adequate sleep, hydration, pain control, Door open when patient unattended, Reorient patient, More frequent rounding    Medication Interventions: Evaluate medications/consider consulting pharmacy, Patient to call before getting OOB, Teach patient to arise slowly    Elimination Interventions: Call light in reach, Patient to call for help with toileting needs, Toilet paper/wipes in reach    History of Falls Interventions: Bed/chair exit alarm, Door open when patient unattended, Investigate reason for fall, Utilize gait belt for transfer/ambulation

## 2019-12-09 NOTE — PROGRESS NOTES
NAME: Yessica Stone        :  1955        MRN:  [id number]        Assessment :    Plan:  --ESKD (Dr. Delroy Payne)  Peritonitis  Tenckhoff catheter, thoracic PD catheter     Seizure d/o  Anemia of ESKD  HTN  SHPT    HCV I spoke with Dr. Delroy Payne (primary nephrologist)-he will see her on THursday at [address]    Her outpatient PD regimen is as follows    2L 2.5% dianeal, low calcium (if available) at 0700, 1100, 1400, 1800  2L 4.25% dianeal, low calcium (if available) at 2200       She will complete a 2 week course of clindamycin. Send out on oral K replacement         Subjective:     Chief Complaint:  Difficult historian. Review of Systems:    Symptom Y/N Comments  Symptom Y/N Comments   Fever/Chills    Chest Pain     Poor Appetite    Edema     Cough    Abdominal Pain     Sputum    Joint Pain     SOB/MAYORGA    Pruritis/Rash     Nausea/vomit    Tolerating PT/OT     Diarrhea    Tolerating Diet     Constipation    Other       Could not obtain due to: see above     Objective:     VITALS:   Last 24hrs VS reviewed since prior progress note.  Most recent are:  Visit Vitals  /67 (BP 1 Location: Right arm, BP Patient Position: At rest)   Pulse 79   Temp 98.2 °F (36.8 °C)   Resp 15   Ht 5' 3\" (1.6 m)   Wt 92 kg (202 lb 13.2 oz)   SpO2 100%   BMI 35.93 kg/m²       Intake/Output Summary (Last 24 hours) at 2019 1001  Last data filed at 2019 3298  Gross per 24 hour   Intake 8360 ml   Output 9100 ml   Net -740 ml      Telemetry Reviewed:     PHYSICAL EXAM:  General: NAD  Abdomen tender, diffuse  Left chest PD cath exit site with no drainage/erythema    Lab Data Reviewed: (see below)    Medications Reviewed: (see below)    PMH/SH reviewed - no change compared to H&P  ________________________________________________________________________  Care Plan discussed with:  Patient y    [facility] y Consultant:  y        Comments   >50% of visit spent in counseling and coordination of care       ________________________________________________________________________  Aliya Reynolds MD     Procedures: see electronic medical records for all procedures/Xrays and details which  were not copied into this note but were reviewed prior to creation of Plan. LABS:  No results for input(s): WBC, HGB, HCT, PLT, HGBEXT, HCTEXT, PLTEXT, HGBEXT, HCTEXT, PLTEXT in the last 72 hours. Recent Labs     12/08/19  0825   *   K 2.8*   CL 96*   CO2 24   BUN 32*   CREA 8.11*   GLU 98   CA 7.5*     No results for input(s): SGOT, GPT, AP, TBIL, TP, ALB, GLOB, GGT, AML, LPSE in the last 72 hours. No lab exists for component: AMYP, HLPSE  No results for input(s): INR, PTP, APTT, INREXT, INREXT in the last 72 hours. No results for input(s): FE, TIBC, PSAT, FERR in the last 72 hours. No results found for: FOL, RBCF   No results for input(s): PH, PCO2, PO2 in the last 72 hours. No results for input(s): CPK, CKMB in the last 72 hours.     No lab exists for component: TROPONINI  No components found for: [place]  Lab Results   Component Value Date/Time    Color YELLOW/STRAW 04/03/2015 08:31 PM    Appearance CLOUDY (A) 04/03/2015 08:31 PM    Specific gravity 1.013 04/03/2015 08:31 PM    pH (UA) 7.5 04/03/2015 08:31 PM    Protein 300 (A) 04/03/2015 08:31 PM    Glucose NEGATIVE  04/03/2015 08:31 PM    Ketone NEGATIVE  04/03/2015 08:31 PM    Bilirubin NEGATIVE  04/03/2015 08:31 PM    Urobilinogen 0.2 04/03/2015 08:31 PM    Nitrites NEGATIVE  04/03/2015 08:31 PM    Leukocyte Esterase NEGATIVE  04/03/2015 08:31 PM    Epithelial cells MANY (A) 04/03/2015 08:31 PM    Bacteria 1+ (A) 04/03/2015 08:31 PM    WBC 0-4 04/03/2015 08:31 PM    RBC 0-5 04/03/2015 08:31 PM       MEDICATIONS:  Current Facility-Administered Medications   Medication Dose Route Frequency    heparin (porcine) injection 5,000 Units  5,000 Units SubCUTAneous Q12H    epoetin tawny-epbx (RETACRIT) injection 10,000 Units  10,000 Units SubCUTAneous Q MON, WED & FRI    peritoneal dialysis DEXTROSE 2.5% (2.5 mEq/L low calcium) solution 2,000 mL  2,000 mL IntraPERitoneal QID    peritoneal dialysis DEXTROSE 4.25% (2.5 mEq/L low calcium) solution 2,000 mL  2,000 mL IntraPERitoneal QHS    potassium chloride SR (KLOR-CON 10) tablet 20 mEq  20 mEq Oral DAILY    ondansetron (ZOFRAN) injection 4 mg  4 mg IntraVENous Q4H PRN    clindamycin (CLEOCIN) capsule 300 mg  300 mg Oral Q8H    levETIRAcetam (KEPPRA) tablet 1,000 mg  1,000 mg Oral BID    sorbitol 70 % solution 30 mL  30 mL Oral DAILY PRN    polyethylene glycol (MIRALAX) packet 17 g  17 g Oral DAILY    senna-docusate (PERICOLACE) 8.6-50 mg per tablet 2 Tab  2 Tab Oral DAILY    carvedilol (COREG) tablet 6.25 mg  6.25 mg Oral BID WITH MEALS    phenytoin ER (DILANTIN ER) ER capsule 300 mg  300 mg Oral QHS    sodium chloride (NS) flush 5-40 mL  5-40 mL IntraVENous Q8H    sodium chloride (NS) flush 5-40 mL  5-40 mL IntraVENous PRN    acetaminophen (TYLENOL) tablet 650 mg  650 mg Oral Q6H PRN    bisacodyl (DULCOLAX) tablet 5 mg  5 mg Oral DAILY PRN    amiodarone (CORDARONE) tablet 100 mg  100 mg Oral DAILY    atorvastatin (LIPITOR) tablet 40 mg  40 mg Oral QHS    aspirin delayed-release tablet 81 mg  81 mg Oral DAILY    clopidogrel (PLAVIX) tablet 75 mg  75 mg Oral DAILY    calcitRIOL (ROCALTROL) capsule 0.25 mcg  0.25 mcg Oral Once per day on Thu Sat    pantoprazole (PROTONIX) tablet 40 mg  40 mg Oral DAILY    furosemide (LASIX) tablet 40 mg  40 mg Oral DAILY    lacosamide (VIMPAT) tablet 150 mg  150 mg Oral BID

## 2019-12-09 NOTE — DISCHARGE SUMMARY
Hospitalist Discharge Summary     Patient ID:  Tex Pimentel  [id number]  77 y.o.  1955 11/30/2019    PCP on record: Eugenie Staley MD    Admit date: 11/30/2019  Discharge date and time: 12/9/2019    DISCHARGE DIAGNOSIS:  Sepsis  Peritonitis   Hep C (New Dx)  Constipation   ESRD  Hx of Seizure  Hypertension  Chronic systolic CHF with EF 49-21% on recent echo    CONSULTATIONS:  IP CONSULT TO HOSPITALIST  IP CONSULT TO NEPHROLOGY  IP CONSULT TO NEPHROLOGY  IP CONSULT TO NEPHROLOGY  IP CONSULT TO ANESTHESIOLOGY  IP CONSULT TO INFECTIOUS DISEASES  IP CONSULT TO GASTROENTEROLOGY    Excerpted HPI from H&P of Justino Ivey MD:  62years old female from home with past medical history significant for end-stage renal disease on peritoneal dialysis, CHF, seizure, coronary artery disease, history of stroke presented to the hospital for evaluation of generalized abdominal pain associated with distention and chills started since yesterday, patient denies any fever denies any nausea any vomiting, blood work in ED was significant for elevated  white blood cell count 21.9, CT abdomen was done and show large amount of ascites with peritoneal catheter in place.     We were asked to admit for work up and evaluation of the above problems.      ______________________________________________________________________  DISCHARGE SUMMARY/HOSPITAL COURSE:  for full details see H&P, daily progress notes, labs, consult notes. Sepsis POA  Due to Peritonitis Due to or associated with peritoneal dialysis catheter  Was on IV Zosyn, transitioned to PO Clindamycin along with probiotics as per ID recommendations  Appreciate ID assistance  Pt decline HD, she claims she will die but not go one HD  CT abdomen/pelvis with Large amount of ascites with peritoneal catheter in place.  No evidence for small bowel obstruction     Hep C (New Dx)  CT A/P with 1.5 cm hypodensity with peripheral globular enhancement  MRI showed hypodensity seems hemangioma  I have informed patient about new Dx  Appreciate GI Consult, will continue to follow as an OP     Constipation - resolved     ESRD  Nephrology followup appreciate  On peritoneal dialysis    Hx of Seizure  Continue home medication Keppra/Phenytoin    Hypertension  Resume coreg  Holding ARBs    Chronic systolic CHF with EF 48-03% on recent echo  Fluid management as per nephrology   _______________________________________________________________________  Patient seen and examined by me on discharge day. Pertinent Findings:  Gen:    Not in distress  Chest: Clear lungs  CVS:   Regular rhythm. No edema  Abd:  Soft, not distended, not tender  Neuro:  Alert  __  Current Discharge Medication List      START taking these medications    Details   clindamycin (CLEOCIN) 300 mg capsule Take 1 Cap by mouth every eight (8) hours for 4 days. Qty: 12 Cap, Refills: 0      L.acidoph & parac-S.therm-Bifido (LUCAS Q2/RISAQUAD-2) 16 billion cell cap cap Take 1 Cap by mouth daily. Qty: 30 Cap, Refills: 0      ondansetron hcl (ZOFRAN) 4 mg tablet Take 1 Tab by mouth every four (4) hours as needed for Nausea. Qty: 30 Tab, Refills: 0         CONTINUE these medications which have CHANGED    Details   losartan (COZAAR) 25 mg tablet Take 0.5 Tabs by mouth nightly. Qty: 15 Tab, Refills: 0         CONTINUE these medications which have NOT CHANGED    Details   carvedilol (COREG) 6.25 mg tablet Take 6.25 mg by mouth two (2) times daily (with meals). !! sevelamer (RENAGEL) 800 mg tablet Take 800 mg by mouth daily (with lunch). Patient takes 1600 mg BIDCC (breakfast and dinner) and 800 mg with lunch      lactulose (CHRONULAC) 10 gram/15 mL solution Take 30 g by mouth three (3) times daily as needed (constipation). bismuth subsalicylate (PEPTO-BISMOL MAXIMUM STRENGTH) 525 mg/15 mL susp Take 30 mL by mouth every six (6) hours as needed (upset stomach).       glycerin, adult, (FLEET GLYCERIN, ADULT,) suppository Insert 1 Suppository into rectum daily as needed (constipation). trolamine salicylate-aloe vera 04% (ASPERCREME) topical cream Apply  to affected area as needed for Pain. apply as needed for pain on hands, feet lower back, shoulders      phenytoin (DILANTIN ER) 300 mg ER capsule Take 300 mg by mouth nightly. lacosamide (VIMPAT) 150 mg tab tablet Take 150 mg by mouth two (2) times a day. docusate sodium (COLACE) 100 mg capsule Take 100 mg by mouth two (2) times daily as needed for Constipation. clopidogrel (PLAVIX) 75 mg tab Take 1 Tab by mouth daily. Qty: 30 Tab, Refills: 1      levETIRAcetam (KEPPRA) 500 mg tablet Take 500 mg by mouth two (2) times a day. amiodarone (CORDARONE) 200 mg tablet Take 100 mg by mouth nightly. gentamicin (GARAMYCIN) 0.1 % topical cream Apply  to affected area daily. Apply to Cath wound      atorvastatin (LIPITOR) 40 mg tablet TAKE 1 TABLET BY MOUTH ONCE DAILY  Qty: 30 Tab, Refills: 0    Comments: NEEDS OFFICE VISIT FOR MORE REFILLS      !! sevelamer (RENAGEL) 800 mg tablet Take 1,600 mg by mouth two (2) times daily (with meals). Patient takes 1600 mg BIDCC (breakfast and dinner) and 800 mg with lunch      omeprazole (PRILOSEC) 20 mg capsule Take 20 mg by mouth nightly. Refills: 1      potassium chloride (K-DUR, KLOR-CON) 20 mEq tablet Take 10 mEq by mouth nightly. !! - Potential duplicate medications found. Please discuss with provider. STOP taking these medications       cephALEXin (KEFLEX) 250 mg capsule Comments:   Reason for Stopping:         furosemide (LASIX) 40 mg tablet Comments:   Reason for Stopping:         isosorbide mononitrate ER (IMDUR) 60 mg CR tablet Comments:   Reason for Stopping:              Follow-up Information     Follow up With Specialties Details Why Contact Info    Early MD [facility] On 12/12/2019 1pm [address] [facility] Peritoneal Dialysis with Holajohny Shearer at [address] [facility]    Olene Fabry, MD Internal Medicine   [address]  [phone]      Austyn Mosley MD Gastroenterology Schedule an appointment as soon as possible for a visit in 2 weeks For Hep C treatment [address] [facility] [place]  [facility] [address]  [phone]      Peritoneal Hemodialysis    2500cc 2.5% dianeal, low abdulaziz, if available=5 exchanges  On cycler [facility] [address] [address]  [phone]      ________________________________________________________________    Risk of deterioration: Moderate    Condition at Discharge:  Stable  __________________________________________________________________    Disposition  SNF/LTC    ____________________________________________________________________    Code Status: Full Code  ___________________________________________________________________      Total time in minutes spent coordinating this discharge (includes going over instructions, follow-up, prescriptions, and preparing report for sign off to her PCP) :  35 minutes    Signed:  Booker Pantoja MD

## 2019-12-09 NOTE — PROGRESS NOTES
NIRANJAN:  -d/c plan discussed with pt  -[facility] [place] [facility] accepted pt.    -she would need [facility] transport which is confirmed for 1pm Monday     I spoke with Collette Peacock at [facility] [place](cell [phone]) which is the facility that has accepted the patient. He will order the PD equipment and have it in place for a Monday afternoon admission. When discharged, please call report, send emar, facesheet, Rx for narcotics, and ambulance form           2p  Dtr Love called. She lives on [place], [place] and is very supportive. She has contacted the pt's sister who will deliver the PD cycler and 2 days of fluids to the SNF above tonight; I've postponed ambulance to 1pm tomorrow; she is talking with the pt regarding code status as she feels pt has come close to death 6 times already; I updated the PD nurse, Lexi Vazquez at [address] [place] [address] at [phone]. I've updated the SNF. The pt needs a UAI.

## 2019-12-09 NOTE — PROGRESS NOTES
Nutrition Assessment:    INTERVENTIONS/RECOMMENDATIONS:   Continue current diet  Continue Nepro, can increase to BID if needed    ASSESSMENT:   Chart reviewed; medically noted for peritonitis, hep C, ESRD on PD, and HTN. Patient states she hasn't eaten in over a week. Complains of nausea/vomiting/diarrhea. PO mostly 25% of meals per flowsheets. She states she likes the nepro. Noted plans for discharge today. Encouraged intake of meals/snacks. Continue ONS 1-2x/day. Diet Order: Regular(Nepro daily)  % Eaten:    Patient Vitals for the past 72 hrs:   % Diet Eaten   12/09/19 0903 25 %   12/08/19 1800 25 %   12/08/19 1200 25 %   12/08/19 0803 25 %   12/07/19 0915 10 %   12/06/19 1757 50 %   12/06/19 1212 35 %     Pertinent Medications: [] Reviewed []Other: Atorvastatin, Calcitriol, Plavix, Lasix, Keppra, Protonix, Dilantin, Miralax, KCl, Pericolace   Pertinent Labs: [x]Reviewed  []Other: K+ 2.8  Food Allergies: [x]None []Yes:     Last BM: 12/9  [x]Active     []Hyperactive  []Hypoactive       [] Absent  BS  Skin:    [x] Intact   [] Incision  [] Breakdown   []Edema   []Other:    Anthropometrics: Height: 5' 3\" (160 cm) Weight: 92 kg (202 lb 13.2 oz)    IBW (%IBW):   ( ) UBW (%UBW):   (  %)    BMI: Body mass index is 35.93 kg/m². This BMI is indicative of:  []Underweight   []Normal   []Overweight   [x] Obesity   [] Extreme Obesity (BMI>40)  Last Weight Metrics:  Weight Loss Metrics 12/9/2019 11/24/2019 9/27/2019 9/19/2019 7/22/2019 7/16/2019 7/16/2019   Today's Wt 202 lb 13.2 oz 198 lb 198 lb 203 lb 14.8 oz 205 lb - 207 lb 4.8 oz   BMI 35.93 kg/m2 35.07 kg/m2 35.07 kg/m2 36.12 kg/m2 - 36.31 kg/m2 -       Estimated Nutrition Needs (Based on): 1895 Kcals/day(BMR (1444) x 1. 3AF -250kcal) , 110 g(1.2 g/kg bw) Protein  Carbohydrate:  At Least 130 g/day  Fluids: 1600 mL/day     Pt expected to meet estimated nutrient needs: [x]Yes []No    NUTRITION DIAGNOSES:   Problem:  Inadequate protein-energy intake      Etiology: related to poor appetite     Signs/Symptoms: as evidenced by documented PO 10-50% of meals      NUTRITION INTERVENTIONS:  Meals/Snacks: General/healthful diet   Supplements: Commercial supplement              GOAL:   PO intake >50% of meals/supplements next 3-5 days    NUTRITION MONITORING AND EVALUATION   Food/Nutrient Intake Outcomes:  Total energy intake  Physical Signs/Symptoms Outcomes: Weight/weight change, Electrolyte and renal profile, Glucose profile, GI profile    Previous Goal Met:   [] Met              [x] Progressing Towards Goal              [] Not Progressing Towards Goal   Previous Recommendations:   [x] Implemented          [] Not Implemented          [] Not Applicable    LEARNING NEEDS (Diet, Food/Nutrient-Drug Interaction):    [x] None Identified   [] Identified and Education Provided/Documented   [] Identified and Pt declined/was not appropriate     Cultural, Scientology, OR Ethnic Dietary Needs:    [x] None Identified   [] Identified and Addressed     [x] Interdisciplinary Care Plan Reviewed/Documented    [x] Discharge Planning: Regular diet    [] Participated in Interdisciplinary Rounds    NUTRITION RISK:    [x] Patient At Nutritional Risk             [] Patient Not At Nutritional Risk      [facility]  Pager [phone]    Weekend Pager [phone]

## 2019-12-10 VITALS
RESPIRATION RATE: 14 BRPM | WEIGHT: 199.52 LBS | SYSTOLIC BLOOD PRESSURE: 137 MMHG | OXYGEN SATURATION: 97 % | TEMPERATURE: 98.3 F | DIASTOLIC BLOOD PRESSURE: 88 MMHG | HEIGHT: 63 IN | HEART RATE: 76 BPM | BODY MASS INDEX: 35.35 KG/M2

## 2019-12-10 LAB — PHENYTOIN FREE SERPL-MCNC: 0.9 UG/ML (ref 1–2)

## 2019-12-10 PROCEDURE — A4722 DIALYS SOL FLD VOL > 1999CC: HCPCS | Performed by: INTERNAL MEDICINE

## 2019-12-10 PROCEDURE — 74011250637 HC RX REV CODE- 250/637: Performed by: INTERNAL MEDICINE

## 2019-12-10 PROCEDURE — 94760 N-INVAS EAR/PLS OXIMETRY 1: CPT

## 2019-12-10 PROCEDURE — 90945 DIALYSIS ONE EVALUATION: CPT

## 2019-12-10 PROCEDURE — 74011250636 HC RX REV CODE- 250/636: Performed by: INTERNAL MEDICINE

## 2019-12-10 RX ORDER — CLINDAMYCIN HYDROCHLORIDE 300 MG/1
300 CAPSULE ORAL EVERY 8 HOURS
Qty: 12 CAP | Refills: 0 | Status: SHIPPED
Start: 2019-12-10 | End: 2019-12-14

## 2019-12-10 RX ADMIN — CLOPIDOGREL BISULFATE 75 MG: 75 TABLET ORAL at 10:10

## 2019-12-10 RX ADMIN — LACOSAMIDE 150 MG: 50 TABLET, FILM COATED ORAL at 10:10

## 2019-12-10 RX ADMIN — ASPIRIN 81 MG: 81 TABLET, COATED ORAL at 10:11

## 2019-12-10 RX ADMIN — SODIUM CHLORIDE, SODIUM LACTATE, CALCIUM CHLORIDE, MAGNESIUM CHLORIDE AND DEXTROSE 2000 ML: 2.5; 538; 448; 18.3; 5.08 INJECTION, SOLUTION INTRAPERITONEAL at 11:08

## 2019-12-10 RX ADMIN — PANTOPRAZOLE SODIUM 40 MG: 40 TABLET, DELAYED RELEASE ORAL at 10:10

## 2019-12-10 RX ADMIN — ACETAMINOPHEN 650 MG: 325 TABLET ORAL at 06:34

## 2019-12-10 RX ADMIN — POTASSIUM CHLORIDE 20 MEQ: 750 TABLET, FILM COATED, EXTENDED RELEASE ORAL at 10:10

## 2019-12-10 RX ADMIN — CLINDAMYCIN HYDROCHLORIDE 300 MG: 150 CAPSULE ORAL at 06:47

## 2019-12-10 RX ADMIN — SODIUM CHLORIDE, SODIUM LACTATE, CALCIUM CHLORIDE, MAGNESIUM CHLORIDE AND DEXTROSE 2000 ML: 2.5; 538; 448; 18.3; 5.08 INJECTION, SOLUTION INTRAPERITONEAL at 06:51

## 2019-12-10 RX ADMIN — AMIODARONE HYDROCHLORIDE 100 MG: 200 TABLET ORAL at 10:11

## 2019-12-10 RX ADMIN — CARVEDILOL 3.12 MG: 3.12 TABLET, FILM COATED ORAL at 10:10

## 2019-12-10 RX ADMIN — FUROSEMIDE 40 MG: 40 TABLET ORAL at 10:10

## 2019-12-10 RX ADMIN — LEVETIRACETAM 1000 MG: 500 TABLET ORAL at 06:47

## 2019-12-10 RX ADMIN — SENNOSIDES AND DOCUSATE SODIUM 2 TABLET: 8.6; 5 TABLET ORAL at 10:10

## 2019-12-10 RX ADMIN — ACETAMINOPHEN 650 MG: 325 TABLET ORAL at 00:23

## 2019-12-10 NOTE — DISCHARGE INSTRUCTIONS
Daily Peritoneal Hemodialysis: 2500cc 2.5% dianeal, low abdulaziz, if available=5 exchanges  On cycler                           HOSPITALIST DISCHARGE INSTRUCTIONS    NAME: Nora Hawthorne   :  1955   MRN:  [id number]     Date/Time:  2019 9:17 AM    ADMIT DATE: 2019     DISCHARGE DATE: 2019     DISCHARGE DIAGNOSIS:  Sepsis  Peritonitis   Hep C (New Dx)  Constipation   ESRD  Hx of Seizure  Hypertension  Chronic systolic CHF with EF 70-15% on recent echo    MEDICATIONS:  · It is important that you take the medication exactly as they are prescribed. · Keep your medication in the bottles provided by the pharmacist and keep a list of the medication names, dosages, and times to be taken in your wallet. · Do not take other medications without consulting your doctor. Pain Management: per above medications    What to do at Home    Recommended diet:  Regular Diet    Recommended activity: Activity as tolerated and PT/OT Eval and Treat    Peritoneal Hemodialysis: 2500cc 2.5% dianeal, low abdulaziz, if available=5 exchanges  On cycler       If you have questions regarding the hospital related prescriptions or hospital related issues please call [employer] allie Carlos at . If you experience any of the following symptoms then please call your primary care physician or return to the emergency room if you cannot get hold of your doctor:  Fever, chills, nausea, vomiting, diarrhea, change in mentation, falling, bleeding, shortness of breath        Information obtained by :  I understand that if any problems occur once I am at home I am to contact my physician. I understand and acknowledge receipt of the instructions indicated above.                                                                                                                                            Physician's or R.N.'s Signature                                                                  Date/Time Patient or Representative Signature                                                          Date/Time

## 2019-12-10 NOTE — PROGRESS NOTES
Verbal and bedside report to Kalie Freitas oncoming nurse, by Alex Trevino RN off-going nurse. Updated on SBAR, Kardex, MAR, I & O, PD  and events of day.

## 2019-12-10 NOTE — PROGRESS NOTES
NAME: Rochelle Hilton        :  1955        MRN:  [id number]        Assessment :    Plan:  --ESKD (Dr. Sofy Ricketts)  Peritonitis  Tenckhoff catheter, thoracic PD catheter     Seizure d/o  Anemia of ESKD  HTN  SHPT    HCV I spoke with Dr. Sofy Ricketts (primary nephrologist)-he will see her on THursday at [address]    Her outpatient PD regimen is as follows    2500cc 2.5% dianeal, low abdulaziz, if available=5 exchanges  On cycler       She will complete a 2 week course of clindamycin. Send out on oral K replacement         Subjective:     Chief Complaint:  Difficult historian. Review of Systems:    Symptom Y/N Comments  Symptom Y/N Comments   Fever/Chills    Chest Pain     Poor Appetite    Edema     Cough    Abdominal Pain     Sputum    Joint Pain     SOB/MAYORGA    Pruritis/Rash     Nausea/vomit    Tolerating PT/OT     Diarrhea    Tolerating Diet     Constipation    Other       Could not obtain due to: see above     Objective:     VITALS:   Last 24hrs VS reviewed since prior progress note.  Most recent are:  Visit Vitals  /88   Pulse 76   Temp 98.3 °F (36.8 °C)   Resp 14   Ht 5' 3\" (1.6 m)   Wt 90.5 kg (199 lb 8.3 oz)   SpO2 97%   BMI 35.34 kg/m²       Intake/Output Summary (Last 24 hours) at 12/10/2019 1007  Last data filed at 12/10/2019 0730  Gross per 24 hour   Intake 8480 ml   Output 8700 ml   Net -220 ml      Telemetry Reviewed:     PHYSICAL EXAM:  General: NAD  Abdomen tender, diffuse  Left chest PD cath exit site with no drainage/erythema    Lab Data Reviewed: (see below)    Medications Reviewed: (see below)    PMH/SH reviewed - no change compared to H&P  ________________________________________________________________________  Care Plan discussed with:  Patient y    [facility] y                   Consultant:  catracho        Comments   >50% of visit spent in counseling and coordination of care ________________________________________________________________________  Todd Mercado MD     Procedures: see electronic medical records for all procedures/Xrays and details which  were not copied into this note but were reviewed prior to creation of Plan. LABS:  Recent Labs     12/09/19  1110   WBC 9.2   HGB 8.2*   HCT 26.2*        Recent Labs     12/09/19  1110 12/08/19  0825   * 133*   K 3.5 2.8*   CL 97 96*   CO2 27 24   BUN 32* 32*   CREA 7.89* 8.11*   GLU 84 98   CA 8.0* 7.5*     No results for input(s): SGOT, GPT, AP, TBIL, TP, ALB, GLOB, GGT, AML, LPSE in the last 72 hours. No lab exists for component: AMYP, HLPSE  No results for input(s): INR, PTP, APTT, INREXT, INREXT in the last 72 hours. No results for input(s): FE, TIBC, PSAT, FERR in the last 72 hours. No results found for: FOL, RBCF   No results for input(s): PH, PCO2, PO2 in the last 72 hours. No results for input(s): CPK, CKMB in the last 72 hours.     No lab exists for component: TROPONINI  No components found for: [place]  Lab Results   Component Value Date/Time    Color YELLOW/STRAW 04/03/2015 08:31 PM    Appearance CLOUDY (A) 04/03/2015 08:31 PM    Specific gravity 1.013 04/03/2015 08:31 PM    pH (UA) 7.5 04/03/2015 08:31 PM    Protein 300 (A) 04/03/2015 08:31 PM    Glucose NEGATIVE  04/03/2015 08:31 PM    Ketone NEGATIVE  04/03/2015 08:31 PM    Bilirubin NEGATIVE  04/03/2015 08:31 PM    Urobilinogen 0.2 04/03/2015 08:31 PM    Nitrites NEGATIVE  04/03/2015 08:31 PM    Leukocyte Esterase NEGATIVE  04/03/2015 08:31 PM    Epithelial cells MANY (A) 04/03/2015 08:31 PM    Bacteria 1+ (A) 04/03/2015 08:31 PM    WBC 0-4 04/03/2015 08:31 PM    RBC 0-5 04/03/2015 08:31 PM       MEDICATIONS:  Current Facility-Administered Medications   Medication Dose Route Frequency    peritoneal dialysis DEXTROSE 2.5% (2.5 mEq/L low calcium) solution 2,500 mL  2,500 mL IntraPERitoneal 5XD    carvedilol (COREG) tablet 3.125 mg  3.125 mg Oral BID WITH MEALS    heparin (porcine) injection 5,000 Units  5,000 Units SubCUTAneous Q12H    epoetin tawny-epbx (RETACRIT) injection 10,000 Units  10,000 Units SubCUTAneous Q MON, WED & FRI    potassium chloride SR (KLOR-CON 10) tablet 20 mEq  20 mEq Oral DAILY    ondansetron (ZOFRAN) injection 4 mg  4 mg IntraVENous Q4H PRN    clindamycin (CLEOCIN) capsule 300 mg  300 mg Oral Q8H    levETIRAcetam (KEPPRA) tablet 1,000 mg  1,000 mg Oral BID    sorbitol 70 % solution 30 mL  30 mL Oral DAILY PRN    polyethylene glycol (MIRALAX) packet 17 g  17 g Oral DAILY    senna-docusate (PERICOLACE) 8.6-50 mg per tablet 2 Tab  2 Tab Oral DAILY    phenytoin ER (DILANTIN ER) ER capsule 300 mg  300 mg Oral QHS    sodium chloride (NS) flush 5-40 mL  5-40 mL IntraVENous Q8H    sodium chloride (NS) flush 5-40 mL  5-40 mL IntraVENous PRN    acetaminophen (TYLENOL) tablet 650 mg  650 mg Oral Q6H PRN    bisacodyl (DULCOLAX) tablet 5 mg  5 mg Oral DAILY PRN    amiodarone (CORDARONE) tablet 100 mg  100 mg Oral DAILY    atorvastatin (LIPITOR) tablet 40 mg  40 mg Oral QHS    aspirin delayed-release tablet 81 mg  81 mg Oral DAILY    clopidogrel (PLAVIX) tablet 75 mg  75 mg Oral DAILY    calcitRIOL (ROCALTROL) capsule 0.25 mcg  0.25 mcg Oral Once per day on Thu Sat    pantoprazole (PROTONIX) tablet 40 mg  40 mg Oral DAILY    furosemide (LASIX) tablet 40 mg  40 mg Oral DAILY    lacosamide (VIMPAT) tablet 150 mg  150 mg Oral BID

## 2019-12-10 NOTE — PROGRESS NOTES
NIRANJAN:  -d/c plan discussed with pt and ysabel Pisano. -[facility] [place] has accepted pt.    -she would need [facility] transport which is confirmed for 1pm Tuesday     I spoke with Danielle Padilla at [facility] [place](cell [phone]) which is the facility that has accepted the patient. Cordell Lorenzana has received the PD equipment from pt's sister who delivered it last night      I updated Rupesh Cagle at [phone], Danielle Padilla at [facility], MD, staff, and ysabel Pisano.       When discharged, please call report to [phone], fax d/c summary to [phone](I'll do this), send emar, facesheet//ambulance form(on clipboard), Rx for narcotics if any, and d/c instructions. Thanks        Transition of Care Plan to SNF/Rehab    SNF/Rehab Transition:  Patient has been accepted to [facility] [place] and meets criteria for admission. Patient will transported by [facility] at 1pm.    Communication to Patient/Family:  Met with patient and dtr Aliya Gonzalez(identified care giver) and they are agreeable to the transition plan. Communication to SNF/Rehab:  Bedside RN, Prosper Chao, has been notified to update the transition plan to the facility and call report as above. Discharge information has been updated on the AVS.     Discharge instructions to be fax'd to facility at per ecin.      SNF/Rehab Transition:  Patient to follow-up with ,none)  PCP/Specialist:   Ambulatory Care Management:     Reviewed and confirmed with facility, Danielle Padilla they can manage the patient care needs for the following:     Danie Billings with (X) only those applicable:    Medication:  [x]  Medications will be available at the facility  []  IV Antibiotics   []  Controlled Substance - hard copy to be sent with patient   []  Weekly Labs   Documents:  [] Hard RX  [] MAR  [] Kardex  [x] AVS  [x]Transfer Summary  []Discharge   Equipment:  []  CPAP/BiPAP  []  Wound Vacuum  []  [place]rake or Urinary Device  []  PICC/Central Line  []  Nebulizer  []  Ventilator   Treatment:  []Isolation (for MRSA, VRE, etc.)  []Surgical Drain Management  []Tracheostomy Care  []Dressing Changes  []Dialysis cycler is in the facility and they can provide administrations. []PEG Care  []Oxygen  []Daily Weights for Heart Failure   Dietary:  []Any diet limitations  []Tube Feedings   []Total Parenteral Management (TPN)   Eligible for Medicaid Long Term Services and Supports  Yes:  [] Eligible for medical assistance or will become eligible within 180 days and UAI completed. [] Provider/Patient and/or support system has requested screening. [] UAI copy provided to patient or responsible party, .  [x] UAI unavailable at discharge will send once processed to SNF provider. [] UAI unavailable at discharged mailed to patient  No:   [] Private pay and is not financially eligible for Medicaid within the next 180 days. [] Reside out-of-state.   [] A residents of a state owned/operated facility that is licensed  by [place] [address] [employer] and [employer] or [facility]  [] Enrollment in [facility] hospice services  []  Medical [address]  [] Patient /Family declines to have screening completed or provide financial information for screening     Financial Resources:  Medicaid    [] Initiated and application pending   [] Full coverage     Advanced Care Plan:  []Surrogate Decision Maker of Care  []POA  [x]Communicated Code Status -discussed with dtr who will discuss with pt   Other

## 2019-12-10 NOTE — PROGRESS NOTES
1900:Bedside shift change report given to [facility] (oncoming nurse) by [facility] (offgoing nurse). Report included the following information SBAR and Kardex. 0700:Bedside shift change report given to [facility] (oncoming nurse) by Carolyn Elam (offgoing nurse). Report included the following information SBAR and Kardex.

## 2019-12-10 NOTE — PROGRESS NOTES
Hospital to [facility] SBAR Handoff - Shana Smith                                                                        62 y.o.   female    [address] [facility]   Room: [facility]/    MRM 3 MED TELE  Unit Phone# :  [phone]      [address]  MRM 3 MED TELE  [address] [facility]  [place]  [id number]  Dept: [phone]  Loc: [phone]                    SITUATION     Admitted:  11/30/2019         Attending Provider:  Ambrocio Loza MD       Consultations:  IP CONSULT TO HOSPITALIST  IP CONSULT TO NEPHROLOGY  IP CONSULT TO NEPHROLOGY  IP CONSULT TO NEPHROLOGY  IP CONSULT TO ANESTHESIOLOGY  IP CONSULT TO INFECTIOUS DISEASES  IP CONSULT TO GASTROENTEROLOGY    PCP:  Yumiko Cintron MD   [phone]    Treatment Team: Attending Provider: Ambrocio Loza MD; Consulting Provider: Pamella aLuren MD; Consulting Provider: Chasity Srinivasan MD; Utilization Review: Kenny Tapia RN; Consulting Provider: Brii Handley MD; Care Manager: Jocy Borja RN; Primary Nurse: Lesli Linares RN    Admitting Dx:  Sepsis [facility]) [A41.9]  Sepsis ([facility] [address] [A41.9]       Principal Problem: <principal problem not specified>    9 Days Post-Op of   Procedure(s):  PACU/RECOVERY   BY: Anesthesia, Case             ON: 12/1/2019                  Code Status: Full Code                Advance Directives:   Advance Care Planning 12/1/2019   Patient's Healthcare Decision Maker is: -   Primary Decision Maker Name -   Primary Decision Maker Phone Number -   Secondary Decision Maker Name -   Secondary Decision [address] Phone Number -   Secondary Decision Maker Relationship to Patient -   Confirm Advance Directive Yes, not on file   Patient Would Like to Complete Advance Directive -    (Send w/patient)   No Doesnt Have       Isolation:  There are currently no Active Isolations       MDRO: No current active infections    Pain Medications given:  n/a    Last dose: n/a at      Special Equipment needed: yes  Type of equipment:    peritoneal dialysis  (Not currently on dialysis)  (Not currently on dialysis)  (Not currently on dialysis)     BACKGROUND     Allergies: Allergies   Allergen Reactions    Gabapentin Other (comments) and Seizures     Confusion, psychosis \"I was acting like a 4year old at a family event, drawing on walls and everything\"         Past Medical History:   Diagnosis Date    Acute on chronic respiratory failure ([facility] [address] 7/18/2019    Blood clot in vein     left arm several years ago    CAD (coronary artery disease)     Chronic kidney disease     Chronic systolic heart failure ([facility] [address] 7/18/2019    Congestive heart failure ([facility] [address]     Hypercholesterolemia     Hypertension     Legally blind     PAF (paroxysmal atrial fibrillation) ([facility] [address] 7/18/2019    [facility]     Stroke [facility])        Past Surgical History:   Procedure Laterality Date    CARDIAC SURG PROCEDURE UNLIST      heart attack 12/15    HX ORTHOPAEDIC      right middle finger    HX OTHER SURGICAL      shunt placed in brain       Medications Prior to Admission   Medication Sig    carvedilol (COREG) 6.25 mg tablet Take 6.25 mg by mouth two (2) times daily (with meals).  sevelamer (RENAGEL) 800 mg tablet Take 800 mg by mouth daily (with lunch). Patient takes 1600 mg BIDCC (breakfast and dinner) and 800 mg with lunch    lactulose (CHRONULAC) 10 gram/15 mL solution Take 30 g by mouth three (3) times daily as needed (constipation).  bismuth subsalicylate (PEPTO-BISMOL MAXIMUM STRENGTH) 525 mg/15 mL susp Take 30 mL by mouth every six (6) hours as needed (upset stomach).  glycerin, adult, (FLEET GLYCERIN, ADULT,) suppository Insert 1 Suppository into rectum daily as needed (constipation).  trolamine salicylate-aloe vera 61% (ASPERCREME) topical cream Apply  to affected area as needed for Pain. apply as needed for pain on hands, feet lower back, shoulders    phenytoin (DILANTIN ER) 300 mg ER capsule Take 300 mg by mouth nightly.     lacosamide (VIMPAT) 150 mg tab tablet Take 150 mg by mouth two (2) times a day.  docusate sodium (COLACE) 100 mg capsule Take 100 mg by mouth two (2) times daily as needed for Constipation.  clopidogrel (PLAVIX) 75 mg tab Take 1 Tab by mouth daily.  levETIRAcetam (KEPPRA) 500 mg tablet Take 500 mg by mouth two (2) times a day.  amiodarone (CORDARONE) 200 mg tablet Take 100 mg by mouth nightly.  gentamicin (GARAMYCIN) 0.1 % topical cream Apply  to affected area daily. Apply to Cath wound    atorvastatin (LIPITOR) 40 mg tablet TAKE 1 TABLET BY MOUTH ONCE DAILY    sevelamer (RENAGEL) 800 mg tablet Take 1,600 mg by mouth two (2) times daily (with meals). Patient takes 1600 mg BIDCC (breakfast and dinner) and 800 mg with lunch    omeprazole (PRILOSEC) 20 mg capsule Take 20 mg by mouth nightly.  potassium chloride (K-DUR, KLOR-CON) 20 mEq tablet Take 10 mEq by mouth nightly.  [DISCONTINUED] losartan (COZAAR) 25 mg tablet Take 25 mg by mouth nightly.  [DISCONTINUED] isosorbide mononitrate ER (IMDUR) 60 mg CR tablet Take 1 Tab by mouth every morning. Hard scripts included in transfer packet no    Vaccinations:    Immunization History   Administered Date(s) Administered    Influenza Vaccine 01/10/2016       Readmission Risks:    Known Risks: fall        The Charlson Co[employer]bitiy Index tool is an evidenced based tool that has more automatic generated information. The tool looks at many different items such as the age of the patient, how many times they were admitted in the last calendar year, current length of stay in the hospital and their diagnosis. All of these items are pulled automatically from information documented in the chart from various places and will generate a score that predicts whether a patient is at low (less than 13), medium (13-20) or high (21 or greater) risk of being readmitted.         ASSESSMENT                Temp: 98.3 °F (36.8 °C) (12/10/19 0730) Pulse (Heart Rate): 76 (12/10/19 0730)     Resp Rate: 14 (12/10/19 0730)           BP: 137/88 (12/10/19 0730)     O2 Sat (%): 97 % (12/10/19 0730)     Weight: 90.5 kg (199 lb 8.3 oz)    Height: 5' 3\" (160 cm) (11/30/19 2141)       If above not within 1 hour of discharge:    BP:_____  P:____  R:____ T:_____ O2 Sat: ___%  O2: ______    Active Orders   Diet    DIET REGULAR         Orientation: oriented to time, place, person and situation     Active Behaviors: None                                   Active Lines/Drains:  (Peg Tube / Drake / CL or S/L?): yes    Urinary Status: Anuria     Last BM: Last Bowel Movement Date: 12/09/19     Skin Integrity: Scars (comment)(PD catheter to left chest wall)             Mobility: Slightly limited   Weight Bearing Status: WBAT (Weight Bearing as Tolerated)      Gait Training  Assistive Device: Walker, rolling, Gait belt  Ambulation - Level of Assistance: Contact guard assistance  Distance (ft): 10 Feet (ft)         Lab Results   Component Value Date/Time    Glucose 84 12/09/2019 11:10 AM    Hemoglobin A1c <3.5 (L) 07/17/2019 11:31 AM    INR <0.9 (L) 10/12/2015 05:20 PM    INR 1.1 04/03/2015 04:28 PM    HGB 8.2 (L) 12/09/2019 11:10 AM    HGB 8.1 (L) 12/05/2019 02:44 AM        RECOMMENDATION     See After Visit Summary (AVS) for:  · Discharge instructions  · After [address]   · Special equipment needed (entered pre-discharge by Care Management)  · Medication Reconciliation    · Follow up Appointment(s)         Report given/sent by:  Long Vergara                    Verbal report given to:  Erendira Hua LPN  FAXED to:  n/a         Estimated discharge time:  12/10/2019 at 1300

## 2019-12-12 NOTE — PROGRESS NOTES
Her UAI was successfully submitted to the [facility] health Dept in June of 2019.   I sent this info to Napoleon at [facility]